# Patient Record
Sex: FEMALE | Race: WHITE | NOT HISPANIC OR LATINO | Employment: OTHER | URBAN - METROPOLITAN AREA
[De-identification: names, ages, dates, MRNs, and addresses within clinical notes are randomized per-mention and may not be internally consistent; named-entity substitution may affect disease eponyms.]

---

## 2017-01-11 ENCOUNTER — TRANSFERRED RECORDS (OUTPATIENT)
Dept: HEALTH INFORMATION MANAGEMENT | Facility: CLINIC | Age: 72
End: 2017-01-11

## 2017-01-17 ENCOUNTER — CARE COORDINATION (OUTPATIENT)
Dept: CARE COORDINATION | Facility: CLINIC | Age: 72
End: 2017-01-17

## 2017-01-17 NOTE — PROGRESS NOTES
Clinic Care Coordination Contact - Social Work - Follow-Up - 1-24-17  [facility]/Voicemail    Referral Source: Care Team  Clinical Data: SW/Care Coordinator Outreach to follow up to pt's dtr-in-law, Allison Reyes, regarding getting pt's home cleaned up, hoarding and counseling resources, transportation  Outreach attempted x2.  Left message on voicemail with call back information and requested return call.  Plan: SW/Care Coordinator will follow up with dtr-in-law in 3-5 days if she does not call SW back by then    AKIRA Bradford  Care Coordinator - Social Work  [facility]  Office:  [phone]  1/24/2017 2:36 PM    Clinic Care Coordination Contact - Social Work - Follow-Up - 1-17-17  [facility]/Voicemail    Referral Source: Care Team  Clinical Data: SW/Care Coordinator Outreach to follow up to pt's dtr-in-law, Allison Reyes, regarding getting pt's home cleaned up, hoarding and counseling resources, transportation  Outreach attempted x1.  Left message on voicemail with call back information and requested return call.  Plan: SW/Care Coordinator will follow up with dtr-in-law in 3-5 days if she does not call SW back by then    AKIRA Bradford  Care Coordinator - [facility] Work  [facility]  Office:  [phone]  1/17/2017 12:03 PM

## 2017-01-19 ENCOUNTER — TRANSFERRED RECORDS (OUTPATIENT)
Dept: HEALTH INFORMATION MANAGEMENT | Facility: CLINIC | Age: 72
End: 2017-01-19

## 2017-01-26 ENCOUNTER — TELEPHONE (OUTPATIENT)
Dept: FAMILY MEDICINE | Facility: CLINIC | Age: 72
End: 2017-01-26

## 2017-01-26 NOTE — TELEPHONE ENCOUNTER
Patient called regarding (reason for call): Patient has short term disability termed out for her stroke. Needs to see PCP. Is currently scheduled for Fri 2/3/17-would like to be seen sooner if possible to start process to renew claim. An appointment later in the day is ideal.  Is this regarding a medication?: na  If yes, which medication?: na  Pt Provider?: Dr. Moreno  Phone Number Pt can be reached at: Call daughter in law Charles [phone]  Best Time: any  Can we leave a detailed message on this number?: yes    Central Scheduling  Karuna STALEY

## 2017-01-27 NOTE — TELEPHONE ENCOUNTER
Please schedule Wed afternoon, 2/3. Would like 40 min for her. Ok to use same day to accomplish this if needed

## 2017-01-27 NOTE — TELEPHONE ENCOUNTER
Called and spoke with patient's daughter in law per her request.   Family is still concerned about home safety- patient is a hoarder, has had a rodent issue that family helped her get rid of. Daughter in law notes she frequently burns things as she will fall asleep with things on the stove.    Family also concerned about intake. Think eating less than 700# david/day.think she is overall dehydrated.  Has lost over 70# in the past year. GI w/u was benign and feels due to her memory concerns.   Family also concerned by the enormous number of supplements she is taking. Patient Was told by GI to stop cleanse med but doesn't think she has done this.   Also very anxious ongoing.     Daughter will also call SW to review.     Will review possible home eval at visit (unclear if patient is truly home bound, does not drive or take public transportation), nutritionist eval, consider restart anti-depressant at visit next week.

## 2017-01-27 NOTE — TELEPHONE ENCOUNTER
Pt scheduled 2/1/17.  Pt daughter in-law would like a call back from Dr. Pemberton, she would not state what this is regarding, just that she has something to discuss with her before pts appt.    Diane BRASHER, Patient Care

## 2017-02-01 ENCOUNTER — TELEPHONE (OUTPATIENT)
Dept: FAMILY MEDICINE | Facility: CLINIC | Age: 72
End: 2017-02-01

## 2017-02-01 ENCOUNTER — OFFICE VISIT (OUTPATIENT)
Dept: FAMILY MEDICINE | Facility: CLINIC | Age: 72
End: 2017-02-01
Payer: COMMERCIAL

## 2017-02-01 VITALS
WEIGHT: 145.7 LBS | BODY MASS INDEX: 28.61 KG/M2 | SYSTOLIC BLOOD PRESSURE: 136 MMHG | HEIGHT: 60 IN | OXYGEN SATURATION: 97 % | RESPIRATION RATE: 16 BRPM | DIASTOLIC BLOOD PRESSURE: 76 MMHG | HEART RATE: 84 BPM | TEMPERATURE: 97.6 F

## 2017-02-01 DIAGNOSIS — R41.9 COGNITIVE COMPLAINTS: ICD-10-CM

## 2017-02-01 DIAGNOSIS — N30.10 INTERSTITIAL CYSTITIS: ICD-10-CM

## 2017-02-01 DIAGNOSIS — R63.4 LOSS OF WEIGHT: ICD-10-CM

## 2017-02-01 DIAGNOSIS — I10 HYPERTENSION GOAL BP (BLOOD PRESSURE) < 130/80: ICD-10-CM

## 2017-02-01 DIAGNOSIS — R73.01 ELEVATED FASTING BLOOD SUGAR: ICD-10-CM

## 2017-02-01 DIAGNOSIS — I63.9 CEREBROVASCULAR ACCIDENT (CVA), UNSPECIFIED MECHANISM (H): Primary | ICD-10-CM

## 2017-02-01 DIAGNOSIS — N18.2 CKD (CHRONIC KIDNEY DISEASE) STAGE 2, GFR 60-89 ML/MIN: ICD-10-CM

## 2017-02-01 PROCEDURE — 99214 OFFICE O/P EST MOD 30 MIN: CPT | Performed by: FAMILY MEDICINE

## 2017-02-01 ASSESSMENT — PAIN SCALES - GENERAL: PAINLEVEL: NO PAIN (0)

## 2017-02-01 NOTE — MR AVS SNAPSHOT
After Visit Summary   2/1/2017    Anabelle Herbert    MRN: [id number]           Patient Information     Date Of Birth          1945        Visit Information        Provider Department      2/1/2017 1:20 PM Jyoti Moreno MD [facility]        Today's Diagnoses     Cerebrovascular accident (CVA), unspecified mechanism (H)    -  1     Interstitial cystitis         Elevated fasting blood sugar         Loss of weight         Cognitive complaints         CKD (chronic kidney disease) stage 2, GFR 60-89 ml/min         Hypertension goal BP (blood pressure) < 130/80           Care Instructions    Follow up with nutritionist.     Stop Organic Juice cleanse, Ceraplex, sweetMicroZ, Sweet Osei, doctor's total nutrition,  B-12 energy melt, Procera AVH,     Continue Youngevity Beyond Osteo-fx, Youngevity ultimate EFA, perfect multivitamin.     Follow up with home care. They will process the referral and someone will call you to arrange the initial visit.      Follow up in 3-4 weeks.         Follow-ups after your visit        Additional Services     HOME CARE NURSING REFERRAL       **Order classes of: [facility], [facility] and [facility] will route to the Home Care and Hospice Referral Pool.  Home Care or Hospice will then contact the patient to schedule their appointment.**    If you do not hear from Home Care and Hospice, or you would like to call to schedule, please call the referring place of service that your provider has listed below.  ______________________________________________________________________    Your provider has referred you to: FMG: [facility] [place] [phone]   [email or web address]    Extended Emergency Contact Information  Primary Emergency Contact: ATIYA SALAS   [employer]  Work Phone: [phone]  Mobile Phone: [phone]  Relation: Daughter  Secondary Emergency Contact: Ratna Herbert    [place]  Home Phone: [phone]  Relation: Daughter    Patient Anticipated Discharge Date: n/a. Not been hospitalized   RN, PT, HHA to begin 24 - 48 hours after discharge.  PLEASE EVALUATE AND TREAT (Evaluation timeline is 24 - 48 hrs. Please call if there is need for a variance to this timeline).    REASON FOR REFERRAL: Assessment & Treatment: RN    ADDITIONAL SERVICES NEEDED: HHA, Life Line and OT    OTHER PERTINENT INFORMATION: Patient was last seen by provider on 2/1/2017  for CVA, memory loss, weight loss.    Current Outpatient Prescriptions:  clopidogrel (PLAVIX) 75 MG tablet, Take 1 tablet (75 mg) by mouth daily, Disp: 90 tablet, Rfl: 1  simvastatin (ZOCOR) 20 MG tablet, TAKE 1 TABLET BY MOUTH AT BEDTIME, Disp: 90 tablet, Rfl: 2  amLODIPine (NORVASC) 5 MG tablet, TAKE 1 TABLET (5 MG) BY MOUTH DAILY, Disp: 90 tablet, Rfl: 1  lisinopril (PRINIVIL,ZESTRIL) 20 MG tablet, Take 1 tablet (20 mg) by mouth daily, Disp: 90 tablet, Rfl: 3  pantoprazole (PROTONIX) 40 MG enteric coated tablet, TAKE 1 TABLET BY MOUTH DAILY. TAKE 30-60 MINUTES BEFORE A MEAL., Disp: 90 tablet, Rfl: 3  prednisoLONE acetate (PRED FORTE) 1 % ophthalmic suspension, , Disp: , Rfl:   simvastatin (ZOCOR) 20 MG tablet, TAKE 1 TABLET (20 MG) BY MOUTH AT BEDTIME, Disp: 30 tablet, Rfl: 0  fluticasone (FLONASE) 50 MCG/ACT nasal spray, INSTILL 1-2 SPRAYS INTO BOTH NOSTRILS DAILY, Disp: 3 Package, Rfl: 3  Calcium Polycarbophil (KONSYL FIBER PO), Take by mouth 2 times daily, Disp: , Rfl:   traZODone (DESYREL) 50 MG tablet, Take 1-2 tablets ( mg) by mouth nightly as needed for sleep, Disp: 180 tablet, Rfl: 1  Fish Oil OIL, daily, Disp: , Rfl:   MAGNESIUM CITRATE PO, Take by mouth daily, Disp: , Rfl:   Polyethylene Glycol 3350 (MIRALAX PO), Take 1-2 capfuls by mouth as needed. , Disp: , Rfl:   ASPIRIN PO, Take 81 mg by mouth., Disp: , Rfl:   Cholecalciferol (VITAMIN D) 2000 UNIT tablet, Take 2,000 Units by mouth daily., Disp: 100 tablet, Rfl:  0  cetirizine (ZYRTEC ALLERGY) 10 MG tablet, Take 10 mg by mouth daily as needed., Disp: , Rfl:   co-enzyme Q-10 (COQ-10) 100 MG CAPS, Take 1 capsule by mouth daily., Disp: , Rfl:       Patient Active Problem List:     Chronic interstitial cystitis     Adjustment reaction with anxiety and depression     Abdominal pain     Rotator cuff syndrome     Hypertension goal BP (blood pressure) < 130/80     CKD (chronic kidney disease) stage 2, GFR 60-89 ml/min     Hyperlipidemia LDL goal <100     Muscle cramps     Mild persistent asthma     Numbness and tingling     Vitamin D deficiency     Abnormal MRI of head     GERD (gastroesophageal reflux disease)     Advance care planning     B12 deficiency     Stroke (H)     Transient cerebral ischemia     Carotid stenosis     Diverticulosis of colon     Colon polyp     Urgency of urination     Atrophic vaginitis     Bladder pain     Interstitial cystitis     Chronic constipation     Cognitive complaints     Elevated fasting blood sugar     Prediabetes     Osteoporosis     Cerebrovascular accident (CVA), unspecified mechanism (H)     Health Care Home      Documentation of Face to Face and Certification for Home Health Services    I certify that patient, Anabelle Herbert is under my care and that I, or a Nurse Practitioner or Physician's Assistant working with me, had a face-to-face encounter that meets the physician face-to-face encounter requirements with this patient on: 2/1/2017.    This encounter with the patient was in whole, or in part, for the following medical condition, which is the primary reason for Home Health Care: yes, CVA, cognitive concerns, hoarding, home safety.    I certify that, based on my findings, the following services are medically necessary Home Health Services: Nursing and Occupational Therapy    My clinical findings support the need for the above services because: Nurse is needed: To provide assessment and oversight required in the home to assure adherence  to the medical plan due to: CVA.. and Occupational Therapy Services are needed to assess and treat cognitive ability and address ADL safety due to impairment in CVA, impaired memory, vision..    Further, I certify that my clinical findings support that this patient is homebound (i.e. absences from home require considerable and taxing effort and are for medical reasons or [employer] services or infrequently or of short duration when for other reasons) because: unable to leave the house without assistance.    Based on the above findings, I certify that this patient is confined to the home and needs intermittent skilled nursing care, physical therapy and/or speech therapy.  The patient is under my care, and I have initiated the establishment of the plan of care.  This patient will be followed by a physician who will periodically review the plan of care.    Physician/Provider to provide follow up care: Jyoti Moreno    [facility] certified Physician at time of discharge: Jyoti Moreno MD      Please be aware that coverage of these services is subject to the terms and limitations of your health insurance plan.  Call member services at your health plan with any benefit or coverage questions.            NUTRITION REFERRAL       Your provider has referred you to: FMG: [facility] [phone]   [email or web address]    Please be aware that coverage of these services is subject to the terms and limitations of your health insurance plan.  Call member services at your health plan with any benefit or coverage questions.      Please bring the following with you to your appointment:    (1) This referral request  (2) Any documents given to you regarding this referral  (3) Any specific questions you have about diet and/or food choices                  Who to contact     If you have questions or need follow up information about today's clinic  "visit or your schedule please contact [facility] directly at [phone].  Normal or non-critical lab and imaging results will be communicated to you by MyChart, letter or phone within 4 business days after the clinic has received the results. If you do not hear from us within 7 days, please contact the clinic through [employer] or phone. If you have a critical or abnormal lab result, we will notify you by phone as soon as possible.  Submit refill requests through [employer] or call your pharmacy and they will forward the refill request to us. Please allow 3 business days for your refill to be completed.          Additional Information About Your Visit        MyChart Information     [employer] lets you send messages to your doctor, view your test results, renew your prescriptions, schedule appointments and more. To sign up, go to [email or web address] . Click on \"Log in\" on the left side of the screen, which will take you to the Welcome page. Then click on \"Sign up Now\" on the right side of the page.     You will be asked to enter the access code listed below, as well as some personal information. Please follow the directions to create your username and password.     Your access code is: [email or web address]  Expires: 2017  2:44 PM     Your access code will  in 90 days. If you need help or a new code, please call your [facility] clinic or [phone].        Care EveryWhere ID     This is your Care EveryWhere ID. This could be used by other organizations to access your [facility] medical records  [id number]        Your Vitals Were     Pulse Temperature Respirations Height BMI (Body Mass Index) Pulse Oximetry    84 97.6  F (36.4  C) (Oral) 16 1.511 m (4' 11.5\") 28.95 kg/m2 97%       Blood Pressure from Last 3 Encounters:   17 136/76   16 102/58   10/31/16 120/80    Weight from Last 3 Encounters:   17 66.089 kg (145 lb 11.2 oz)   16 68.493 kg (151 lb)   10/31/16 68.947 kg (152 lb)    "           We Performed the Following     HOME CARE NURSING REFERRAL     NUTRITION REFERRAL        Primary Care Provider Office Phone # Fax #    Jyoti Moreno [phone]       [facility] [address] [place] [address] N  [facility] [id number]        Thank you!     Thank you for choosing [facility]  for your care. Our goal is always to provide you with excellent care. Hearing back from our patients is one way we can continue to improve our services. Please take a few minutes to complete the written survey that you may receive in the mail after your visit with us. Thank you!             Your Updated Medication List - Protect others around you: Learn how to safely use, store and throw away your medicines at www.disposemymeds.org.          This list is accurate as of: 2/1/17  2:44 PM.  Always use your most recent med list.                   Brand Name Dispense Instructions for use    amLODIPine 5 MG tablet    NORVASC    90 tablet    TAKE 1 TABLET (5 MG) BY MOUTH DAILY       ASPIRIN PO      Take 81 mg by mouth.       clopidogrel 75 MG tablet    PLAVIX    90 tablet    Take 1 tablet (75 mg) by mouth daily       co-enzyme Q-10 100 MG Caps capsule      Take 1 capsule by mouth daily.       Fish Oil Oil      daily       fluticasone 50 MCG/ACT spray    FLONASE    3 Package    INSTILL 1-2 SPRAYS INTO BOTH NOSTRILS DAILY       KONSYL FIBER PO      Take by mouth 2 times daily       lisinopril 20 MG tablet    PRINIVIL/ZESTRIL    90 tablet    Take 1 tablet (20 mg) by mouth daily       MAGNESIUM CITRATE PO      Take by mouth daily       MIRALAX PO      Take 1-2 capfuls by mouth as needed.       pantoprazole 40 MG EC tablet    PROTONIX    90 tablet    TAKE 1 TABLET BY MOUTH DAILY. TAKE 30-60 MINUTES BEFORE A MEAL.       prednisoLONE acetate 1 % ophthalmic susp    PRED FORTE         * simvastatin 20 MG tablet    ZOCOR    30 tablet    TAKE 1 TABLET (20 MG) BY MOUTH AT BEDTIME       * simvastatin  20 MG tablet    ZOCOR    90 tablet    TAKE 1 TABLET BY MOUTH AT BEDTIME       traZODone 50 MG tablet    DESYREL    180 tablet    Take 1-2 tablets ( mg) by mouth nightly as needed for sleep       vitamin D 2000 UNITS tablet     100 tablet    Take 2,000 Units by mouth daily.       ZYRTEC ALLERGY 10 MG tablet   Generic drug:  cetirizine      Take 10 mg by mouth daily as needed.       * Notice:  This list has 2 medication(s) that are the same as other medications prescribed for you. Read the directions carefully, and ask your doctor or other care provider to review them with you.

## 2017-02-01 NOTE — TELEPHONE ENCOUNTER
First and Last name caller: ATIYA SALAS  Relationship to patient: DAUGHTER IN LAW  Reason for call: [employer] stated that [employer] stated they faxed short term disability forms that need to be filled out and is requesting after visit summary's for the month of December 2016 to present to be attached with the forms.   Provider they see: KRISTIN ROTH  Phone number they can be reached at: ATIYA CAN BE REACHED AT [phone]  Ok to leave a message: YES     Mateo Peterson, Patient Rep  [facility]

## 2017-02-01 NOTE — PATIENT INSTRUCTIONS
Follow up with nutritionist.     Stop Organic Juice cleanse, Ceraplex, sweetMicroZ, Sweet Osei, doctor's total nutrition,  B-12 energy melt, Procera AVH,     Continue Youngevity Beyond Osteo-fx, Youngevity ultimate EFA, perfect multivitamin.     Follow up with home care. They will process the referral and someone will call you to arrange the initial visit.      Follow up in 3-4 weeks.

## 2017-02-01 NOTE — NURSING NOTE
"Chief Complaint   Patient presents with     Hospital F/U       Initial /76 mmHg  Pulse 84  Temp(Src) 97.6  F (36.4  C) (Oral)  Resp 16  Ht 1.511 m (4' 11.5\")  Wt 66.089 kg (145 lb 11.2 oz)  BMI 28.95 kg/m2  SpO2 97% Estimated body mass index is 28.95 kg/(m^2) as calculated from the following:    Height as of this encounter: 1.511 m (4' 11.5\").    Weight as of this encounter: 66.089 kg (145 lb 11.2 oz).  BP completed using cuff size: lynette Javier MA      "

## 2017-02-01 NOTE — PROGRESS NOTES
"  SUBJECTIVE:                                                    Anabelle Herbert is a 71 year old female who presents to clinic today for the following health issues:    1. Follow up OV 11/21/16   - CVA - occurred last in Sept, 2016    Hyperlipidemia Follow-Up - pt is requesting a Nutritionist      Rate your low fat/cholesterol diet?: fair    Taking statin?  Yes, no muscle aches from statin    Other lipid medications/supplements?:  none     Hypertension Follow-up      Outpatient blood pressures are not being checked.    Low Salt Diet: no added salt       Amount of exercise or physical activity: None    Problems taking medications regularly: No    Medication side effects: none    Diet: regular (no restrictions)        Problem list and histories reviewed & adjusted, as indicated.  Additional history: as documented    Patient had CVA this fall with visual field cut and poor balance. Her vision trouble has still persisted since 11/21. She is no longer seeing bottlecaps or waves in her vision but still is not able to see out of her peripheral vision. She is not driving but has a [facility] appt.on March 1st for this    Denies headache. Notes a feeling of weakness in her arms and legs is intermittent.She notices some imbalance with her gait only at night. Her balance is fine during the day. She will have pain in the bottom of her feet when she walks.     She states her memory is \"Discouraging\". She will be talking and want to say something but the words won't come out. She is also unable to remember things in the recent past and then it will come back after she has stopped thinking about it. She had been forgetting things on the stove. Her daughter in law notes that recently she has stopped using the oven/stove due to forgetting things in there. They are focusing more on eating fresh foods and using the microwave.     She has put a lot of her bills on autopay to make managing her finances easier. She has not missed " "any payments. Her daughter in law has helped her develop a filing stystem at home.      She state she is \"excited about her life change\" with turning 72 in April and rearranging her house. She has returned to her own house since last week and notes no concerns. Her daughter in law states she has made great improvements regarding the organization of her home and improving safety such as clearing entryways. Patient has hx of hoarding that has been noted in past encounters. She reports her spirits have been high and does not thinks she needs Prozac. Per past phone encounter, there is concern of anxiety that family has noted    Anabelle is setting up her own medication by organizing medicine by days of the week. She will occasionally miss her meds at night but this is rare.    She has finished OT and PT therapy and they referred her back to her PCP. Anabelle and her daughter-in-law felt that the therapy was more task-focused and did not work on her memory or focus on teaching-.Her daughter-in-law also states there was no f/u with OT and PT. Anabelle would like to f/u with neurology for cognitive testing.      They are looking for a behavioral counselor and Anabelle would like to connect with a nutritionist with home care. Anabelle has lost a lot of weight and states she is not eating much. Anabelle reports that with her interstitial cystis she would like to also work with a nutritionist to learn more about natural foods. Per GI w/u, weight loss was felt to be secondary to her memory loss/concerns.     Additional Notes  -Anabelle also has a bag with her non prescribed medications/supplement. She has an herbal cleanse supplement which her daughter is concerned about as she is already taking Miralax and fiber.   - She is considering the Neomobile Dial-a-Ride for transpotation.   -Daughter-in-law states there have only been a few instances that her blood pressure has been low. Anabelle denies lightheadedness or dizziness.       Patient Active Problem List "   Diagnosis     Chronic interstitial cystitis     Adjustment reaction with anxiety and depression     Abdominal pain     Rotator cuff syndrome     Hypertension goal BP (blood pressure) < 130/80     CKD (chronic kidney disease) stage 2, GFR 60-89 ml/min     Hyperlipidemia LDL goal <100     Muscle cramps     Mild persistent asthma     Numbness and tingling     Vitamin D deficiency     Abnormal MRI of head     GERD (gastroesophageal reflux disease)     Advance care planning     B12 deficiency     Stroke (H)     Transient cerebral ischemia     Carotid stenosis     Diverticulosis of colon     Colon polyp     Urgency of urination     Atrophic vaginitis     Bladder pain     Interstitial cystitis     Chronic constipation     Cognitive complaints     Elevated fasting blood sugar     Prediabetes     Osteoporosis     Cerebrovascular accident (CVA), unspecified mechanism (H)     Health Care Home     Past Surgical History   Procedure Laterality Date     No history of surgery       Colonoscopy       Genitourinary surgery       Cystoscopy, inject collagen, combined  1/6/2014     Procedure: COMBINED CYSTOSCOPY, INJECT BULKING AGENT;  IC cocktail, bladder biopsy, fulguration, heparin, gentamyacin, lidocaine & kenalog;  Surgeon: Vandana Tang MD;  Location: [facility] OR     Cystoscopy, biopsy bladder, combined  1/6/2014     Procedure: COMBINED CYSTOSCOPY, BIOPSY BLADDER;;  Surgeon: Vandana Tang MD;  Location: [facility] OR       Social History   Substance Use Topics     Smoking status: Never Smoker      Smokeless tobacco: Never Used     Alcohol Use: No     Family History   Problem Relation Age of Onset     CANCER Father      colon?     HEART DISEASE Father      Asthma Mother      Alzheimer Disease Mother 86     Unknown/Adopted Maternal Grandmother      Unknown/Adopted Maternal Grandfather      Unknown/Adopted Paternal Grandmother      Unknown/Adopted Paternal Grandfather      Asthma Sister      Allergies Sister      Unknown/Adopted Son   "        Current Outpatient Prescriptions   Medication Sig Dispense Refill     clopidogrel (PLAVIX) 75 MG tablet Take 1 tablet (75 mg) by mouth daily 90 tablet 1     simvastatin (ZOCOR) 20 MG tablet TAKE 1 TABLET BY MOUTH AT BEDTIME 90 tablet 2     amLODIPine (NORVASC) 5 MG tablet TAKE 1 TABLET (5 MG) BY MOUTH DAILY 90 tablet 1     lisinopril (PRINIVIL,ZESTRIL) 20 MG tablet Take 1 tablet (20 mg) by mouth daily 90 tablet 3     pantoprazole (PROTONIX) 40 MG enteric coated tablet TAKE 1 TABLET BY MOUTH DAILY. TAKE 30-60 MINUTES BEFORE A MEAL. 90 tablet 3     prednisoLONE acetate (PRED FORTE) 1 % ophthalmic suspension        simvastatin (ZOCOR) 20 MG tablet TAKE 1 TABLET (20 MG) BY MOUTH AT BEDTIME 30 tablet 0     fluticasone (FLONASE) 50 MCG/ACT nasal spray INSTILL 1-2 SPRAYS INTO BOTH NOSTRILS DAILY 3 Package 3     Calcium Polycarbophil (KONSYL FIBER PO) Take by mouth 2 times daily       traZODone (DESYREL) 50 MG tablet Take 1-2 tablets ( mg) by mouth nightly as needed for sleep 180 tablet 1     Fish Oil OIL daily       MAGNESIUM CITRATE PO Take by mouth daily       Polyethylene Glycol 3350 (MIRALAX PO) Take 1-2 capfuls by mouth as needed.        ASPIRIN PO Take 81 mg by mouth.       Cholecalciferol (VITAMIN D) 2000 UNIT tablet Take 2,000 Units by mouth daily. 100 tablet 0     cetirizine (ZYRTEC ALLERGY) 10 MG tablet Take 10 mg by mouth daily as needed.       co-enzyme Q-10 (COQ-10) 100 MG CAPS Take 1 capsule by mouth daily.       Allergies   Allergen Reactions     Contrast Dye      Burning, hematuria     Dogs      Throat started to close up.        ROS:  Constitutional, HEENT, cardiovascular, pulmonary, gi and gu systems are negative, except as otherwise noted.    OBJECTIVE:                                                    /76 mmHg  Pulse 84  Temp(Src) 97.6  F (36.4  C) (Oral)  Resp 16  Ht 1.511 m (4' 11.5\")  Wt 66.089 kg (145 lb 11.2 oz)  BMI 28.95 kg/m2  SpO2 97%  Body mass index is 28.95 " kg/(m^2).  GENERAL: healthy, alert and no distress  EYES: Eyes grossly normal to inspection, PERRL and conjunctivae and sclerae normal  HENT: ear canals and TM's normal, nose and mouth without ulcers or lesions  NECK: no adenopathy, no asymmetry, masses, or scars and thyroid normal to palpation  RESP: lungs clear to auscultation - no rales, rhonchi or wheezes  CV: regular rate and rhythm, normal S1 S2, no S3 or S4, no murmur, click or rub, no peripheral edema and peripheral pulses strong  NEURO: Normal strength and tone, sensory exam grossly normal, mentation intact and cranial nerves 2-12 intact. Gait is normal. Patient is not always the best historian, slow in reporting her history, her daughter in law fills in information.   PSYCH: mentation appears baseline, affect normal/bright    Diagnostic Test Results:  none      ASSESSMENT/PLAN:                                                      1. Cerebrovascular accident (CVA), unspecified mechanism (H)  Pt is now living at her own home for the last week. Family feels she has been doing okay so far as they have been able to clear the exits to the home and she is not using the range. Given the home safety concerns, will have home care eval patient in her home. Possibly home OT also.  Reviewed importance of safety and benefits of home health aide to assist with daily tasks and safety inspection. F/u 3-4 weeks for check-in.   - HOME CARE NURSING REFERRAL  - NUTRITION REFERRAL    2. Interstitial cystitis  Reviewed benefits of working with nutritionist. F/u for nutrition consultation and discuss urologist notes with nutritionist.   - NUTRITION REFERRAL    3. Elevated fasting blood sugar  Monitor prn.     4. Loss of weight  Suspect mulitfactorial- ? Intentional, anxiety, FTT/cva. Will have her meet with nutrition. Consider daily ensure . Instructed her to d/c various supplements as listed in pt instructions below. Also f/u with nutritionist as above.   - NUTRITION  REFERRAL    5. Cognitive complaints  Reviewed benefits of home health aide. Pt willing to trial this. Instructed her to d/c various supplements as listed in instruction below. [facility] visit scheduled for tomorrow. Consider f/u with neuro and neuropsych testing. I'm not convinced that anxieyt not playing role with some of her memory issues again. Might benefit from prozac again. She declines today  - HOME CARE NURSING REFERRAL    6. CKD (chronic kidney disease) stage 2, GFR 60-89 ml/min  - NUTRITION REFERRAL    7. Hypertension goal BP (blood pressure) < 130/80  At goal, considering dropping htn meds if orthostatic sx's arise      See Patient Instructions  Patient Instructions   Follow up with nutritionist.     Stop Organic Juice cleanse, Ceraplex, sweetMicroZ, Sweet Soei, doctor's total nutrition,  B-12 energy melt, Procera AVH,     Continue Youngevity Beyond Osteo-fx, Youngevity ultimate EFA, perfect multivitamin.     Follow up with home care. They will process the referral and someone will call you to arrange the initial visit.      Follow up in 3-4 weeks.       Total time spent with patient is 50 min with over 50% counseling regarding above information     This document serves as a record of the services and decisions personally performed and made by Jyoti Moreno MD. It was created on her behalf by Ilda Jean,a trained medical scribe. The creation of this document is based the provider's statements to the medical scribe.  Ilda Jean February 1, 2017 2:20 PM     Jyoti Moreno MD  [facility]

## 2017-02-02 ENCOUNTER — TELEPHONE (OUTPATIENT)
Dept: FAMILY MEDICINE | Facility: CLINIC | Age: 72
End: 2017-02-02

## 2017-02-02 ASSESSMENT — ASTHMA QUESTIONNAIRES: ACT_TOTALSCORE: 25

## 2017-02-02 NOTE — TELEPHONE ENCOUNTER
Left message asking pt to call back - also to resend forms    Asked her to either call back to clarify what is being needed attached, or write these down on the forms that will be resent.    Diane - please watch for these    Will Yaya SORENSEN

## 2017-02-02 NOTE — TELEPHONE ENCOUNTER
Reason for Call:  Home Health Care    Pt wants to delay start of care until 2-10    Pt Provider: Josh    Phone Number Homecare Nurse can be reached at: [phone]    Can we leave a detailed message on this number? NO    Best Time: any    Call taken on 2/2/2017 at 11:59 AM by Tania Tam

## 2017-02-02 NOTE — TELEPHONE ENCOUNTER
Dr. Pemberton    Do you have these forms? I have not seen them.    Thank you,    Diane BRASHER, Patient Care

## 2017-02-02 NOTE — TELEPHONE ENCOUNTER
I did not have these forms as of yesterday. Not sure if anything new has been placed on my desk today.      can you clarify what they are wanting from [facility]. Patient was not even seen in Dec.

## 2017-02-06 NOTE — TELEPHONE ENCOUNTER
Left message again for marisol to call back/resend forms.    Please watch for these    Will Yaya SORENSEN

## 2017-02-06 NOTE — TELEPHONE ENCOUNTER
Daughter Charles calling back. She was asking if forms have been received. I gave her information below and stated we have not seen them. I She Spoke with short term disability and she stated she faxed form and got a confirmation that it was received. I asked her to please call them and have them send again as we do not have them.  She is also requesting that all visits and notes from Dec-now be sent to [phone] claim # [id number]. Explained that pt was not seen in Dec. She said she was seen by OT and PT and needs those notes sent as well as notes from 2/1/17. Asking to have these sent ASAP with or without form so they can get a start on this.    Routing to PCP to advise.    Cristy Goodwin CMA

## 2017-02-06 NOTE — TELEPHONE ENCOUNTER
Ok for records to be sent (? Do we send this to [facility] to complete??)  Will fill out forms once received

## 2017-02-07 ENCOUNTER — MEDICAL CORRESPONDENCE (OUTPATIENT)
Dept: HEALTH INFORMATION MANAGEMENT | Facility: CLINIC | Age: 72
End: 2017-02-07

## 2017-02-07 NOTE — TELEPHONE ENCOUNTER
I do not see any OT or PT records in pts chart.    Please advise.    Diane BRASHER, Patient Care

## 2017-02-07 NOTE — TELEPHONE ENCOUNTER
Look under notes section in Epic- look for author type (occupational therapist and physical therapist)

## 2017-02-08 ENCOUNTER — TELEPHONE (OUTPATIENT)
Dept: FAMILY MEDICINE | Facility: CLINIC | Age: 72
End: 2017-02-08

## 2017-02-10 ENCOUNTER — TELEPHONE (OUTPATIENT)
Dept: FAMILY MEDICINE | Facility: CLINIC | Age: 72
End: 2017-02-10

## 2017-02-10 NOTE — TELEPHONE ENCOUNTER
Reason for call: Order   Order or referral being requested: admission to homecare  Reason for request: start of service  Date needed: as soon as possible  Has the patient been seen by the PCP for this problem? YES    Additional comments: leave verbal if no answer      Phone Number: KARON Sadler (HOME CARE) [phone]    Best Time: any  Can we leave a detailed message on this number? YES

## 2017-02-13 ENCOUNTER — TELEPHONE (OUTPATIENT)
Dept: FAMILY MEDICINE | Facility: CLINIC | Age: 72
End: 2017-02-13

## 2017-02-13 NOTE — TELEPHONE ENCOUNTER
Reason for call: Home Healthcare Reason for Call:  Home Health Care    nida with  Homecare called regarding (reason for call): verbal orders    Orders are needed for this patient. PT    PT: one time a week for 2 weeks  For gait training- home exercise program- fall prevention plan    OT: [facility]    Skilled Nursing: [facility]    Pt Provider: Josh    Phone Number Homecare Nurse can be reached at:   KARON Richard (HOME CARE) [phone]         Can we leave a detailed message on this number? YES        Best Time: any    Call taken on 2/13/2017 at 3:07 PM by Ibis Samuel

## 2017-02-15 ENCOUNTER — DOCUMENTATION ONLY (OUTPATIENT)
Dept: CARE COORDINATION | Facility: CLINIC | Age: 72
End: 2017-02-15

## 2017-02-16 ENCOUNTER — TELEPHONE (OUTPATIENT)
Dept: FAMILY MEDICINE | Facility: CLINIC | Age: 72
End: 2017-02-16

## 2017-02-16 NOTE — PROGRESS NOTES
[facility] now requests orders and shares plan of care/discharge summaries for some patients through [id number].  Please REPLY TO THIS MESSAGE in order to give authorization for orders when needed.  This is considered a verbal order, you will still receive a faxed copy of orders for signature.  Thank you for your assistance in improving collaboration for our patients.    ORDER    ST to treat [id number], [id number] for cognitive linguistic skills. Plan also to include monitoring of meds, pain, skin, and falls risk.    GOALS  To improve communication and cognition for safety in the home and community, using both structured therapy materials and functional daily materials, by 3/31/17, the patient will  1. state/utilize 3 strategies to compensate for memory with minimal cues   2. complete mod to complex memory tasks w/ 90 percent acc and min cues   3. complete mod to complex reasoning/problem solving tasks with 90 percent acc and min cues   4. complete high level word finding tasks w/ 90 percent acc and min cues  5. demo use of word finding comp strats in appropriate situations w/ 80 percent acc  6. demo completion of HEP as directed in 80 percent of opps given min cues      Ami Robbins MA, CCC-SLP  Speech-Language Pathologist  [facility]  [email or web address]  [phone]

## 2017-02-16 NOTE — TELEPHONE ENCOUNTER
Reason for Call: Request for an order or referral: VERBAL ORDERS    Order or referral being requested: Continue OT for next week 3 x a week, the week after for 2 x a week.    Date needed: as soon as possible    Has the patient been seen by the PCP for this problem? YES    Additional comments:     Phone number Patient can be reached at:  Other phone number:     [facility] [phone]         Best Time:  any    Can we leave a detailed message on this number?  YES    Call taken on 2/16/2017 at 11:02 AM by Joyce Black

## 2017-02-20 ENCOUNTER — TELEPHONE (OUTPATIENT)
Dept: FAMILY MEDICINE | Facility: CLINIC | Age: 72
End: 2017-02-20

## 2017-02-21 ENCOUNTER — TELEPHONE (OUTPATIENT)
Dept: FAMILY MEDICINE | Facility: CLINIC | Age: 72
End: 2017-02-21

## 2017-02-21 DIAGNOSIS — I63.9 CEREBROVASCULAR ACCIDENT (CVA), UNSPECIFIED MECHANISM (H): Primary | ICD-10-CM

## 2017-02-21 DIAGNOSIS — N18.2 CKD (CHRONIC KIDNEY DISEASE) STAGE 2, GFR 60-89 ML/MIN: ICD-10-CM

## 2017-02-21 DIAGNOSIS — R73.03 PREDIABETES: ICD-10-CM

## 2017-02-21 DIAGNOSIS — R41.9 COGNITIVE COMPLAINTS: ICD-10-CM

## 2017-02-21 DIAGNOSIS — R63.4 LOSS OF WEIGHT: ICD-10-CM

## 2017-02-21 NOTE — TELEPHONE ENCOUNTER
Spoke with daughter in law and informed her of message below. She said they will stop in a little early to get the labs done before. Informed her no orders in but lab could just draw and pur blood on hold until orders are placed. Did you want to put in the orders now?       Alessandra Fuller MA

## 2017-02-21 NOTE — TELEPHONE ENCOUNTER
Reason for Call:  Other call back    Detailed comments: Daughter in law wanted to know if Pt should come in fasting or not for tomorrow's apt.     Phone Number Patient can be reached at: Other phone number:  [phone]     Best Time: Anytime    Can we leave a detailed message on this number? YES    Call taken on 2/21/2017 at 12:43 PM by Nghia Brown

## 2017-02-22 ENCOUNTER — MEDICAL CORRESPONDENCE (OUTPATIENT)
Dept: HEALTH INFORMATION MANAGEMENT | Facility: CLINIC | Age: 72
End: 2017-02-22

## 2017-02-22 ENCOUNTER — TELEPHONE (OUTPATIENT)
Dept: FAMILY MEDICINE | Facility: CLINIC | Age: 72
End: 2017-02-22

## 2017-02-22 ENCOUNTER — OFFICE VISIT (OUTPATIENT)
Dept: FAMILY MEDICINE | Facility: CLINIC | Age: 72
End: 2017-02-22
Payer: COMMERCIAL

## 2017-02-22 ENCOUNTER — DOCUMENTATION ONLY (OUTPATIENT)
Dept: CARE COORDINATION | Facility: CLINIC | Age: 72
End: 2017-02-22

## 2017-02-22 VITALS
WEIGHT: 147.2 LBS | BODY MASS INDEX: 29.23 KG/M2 | SYSTOLIC BLOOD PRESSURE: 114 MMHG | DIASTOLIC BLOOD PRESSURE: 76 MMHG | HEART RATE: 80 BPM | OXYGEN SATURATION: 95 % | TEMPERATURE: 98 F

## 2017-02-22 DIAGNOSIS — R41.9 COGNITIVE COMPLAINTS: ICD-10-CM

## 2017-02-22 DIAGNOSIS — I10 HYPERTENSION GOAL BP (BLOOD PRESSURE) < 130/80: ICD-10-CM

## 2017-02-22 DIAGNOSIS — N18.2 CKD (CHRONIC KIDNEY DISEASE) STAGE 2, GFR 60-89 ML/MIN: ICD-10-CM

## 2017-02-22 DIAGNOSIS — R63.4 LOSS OF WEIGHT: ICD-10-CM

## 2017-02-22 DIAGNOSIS — F43.23 ADJUSTMENT REACTION WITH ANXIETY AND DEPRESSION: ICD-10-CM

## 2017-02-22 DIAGNOSIS — R73.03 PREDIABETES: ICD-10-CM

## 2017-02-22 DIAGNOSIS — I95.1 ORTHOSTATIC HYPOTENSION: Primary | ICD-10-CM

## 2017-02-22 DIAGNOSIS — M79.671 FOOT PAIN, BILATERAL: ICD-10-CM

## 2017-02-22 DIAGNOSIS — I63.9 CEREBROVASCULAR ACCIDENT (CVA), UNSPECIFIED MECHANISM (H): ICD-10-CM

## 2017-02-22 DIAGNOSIS — M79.672 FOOT PAIN, BILATERAL: ICD-10-CM

## 2017-02-22 LAB
ERYTHROCYTE [DISTWIDTH] IN BLOOD BY AUTOMATED COUNT: 12.4 % (ref 10–15)
HCT VFR BLD AUTO: 37.1 % (ref 35–47)
HGB BLD-MCNC: 12.6 G/DL (ref 11.7–15.7)
MCH RBC QN AUTO: 32 PG (ref 26.5–33)
MCHC RBC AUTO-ENTMCNC: 34 G/DL (ref 31.5–36.5)
MCV RBC AUTO: 94 FL (ref 78–100)
PLATELET # BLD AUTO: 265 10E9/L (ref 150–450)
RBC # BLD AUTO: 3.94 10E12/L (ref 3.8–5.2)
WBC # BLD AUTO: 6.8 10E9/L (ref 4–11)

## 2017-02-22 PROCEDURE — 85027 COMPLETE CBC AUTOMATED: CPT | Performed by: FAMILY MEDICINE

## 2017-02-22 PROCEDURE — 80053 COMPREHEN METABOLIC PANEL: CPT | Performed by: FAMILY MEDICINE

## 2017-02-22 PROCEDURE — 99214 OFFICE O/P EST MOD 30 MIN: CPT | Performed by: FAMILY MEDICINE

## 2017-02-22 PROCEDURE — 36415 COLL VENOUS BLD VENIPUNCTURE: CPT | Performed by: FAMILY MEDICINE

## 2017-02-22 PROCEDURE — 82607 VITAMIN B-12: CPT | Performed by: FAMILY MEDICINE

## 2017-02-22 PROCEDURE — 84443 ASSAY THYROID STIM HORMONE: CPT | Performed by: FAMILY MEDICINE

## 2017-02-22 RX ORDER — ADHESIVE BANDAGE 3/4"
1 BANDAGE TOPICAL DAILY PRN
Qty: 1 EACH | Refills: 0 | Status: SHIPPED | OUTPATIENT
Start: 2017-02-22 | End: 2017-03-20

## 2017-02-22 NOTE — LETTER
[address]  [address]  [phone]    February 23, 2017      Anabelle Herbert  [address] [address]              Dear Anabelle,      It was a pleasure to see you in the office recently.    -Your vitamin B12 level continues to be high normal from your supplementation. Please continue to stay with your current supplements as we had reviewed in your past visit.     Your thyroid test was normal.     -Your blood count panel was normal.     -Nothing was seen to explain the hot and cold feelings. I was reviewing your chart after your visit, and see those symptoms were present this fall too. Perhaps the stroke has triggered some of this for you.     -The kidney function test (gfr) was slightly decreased from last check. I suspect that could be from not enough fluids and low blood pressure. Given this, I would like to make a small adjustment to your blood pressure medication. Please start taking 1/2 tablet of amlodipine daily instead of the full tablet you have been taking. Please keep me posted on your home blood pressure readings.     Please MyChart or call if you have any concerns or questions.       Sincerely,    Jyoti Moreno M.D.      Results for orders placed or performed in visit on 02/22/17   **TSH with free T4 reflex FUTURE anytime   Result Value Ref Range    TSH 2.72 0.40 - 4.00 mU/L   **Vitamin B12 FUTURE 2mo   Result Value Ref Range    Vitamin B12 1176 (H) 193 - 986 pg/mL   **Comprehensive metabolic panel FUTURE anytime   Result Value Ref Range    Sodium 140 133 - 144 mmol/L    Potassium 4.3 3.4 - 5.3 mmol/L    Chloride 106 94 - 109 mmol/L    Carbon Dioxide 30 20 - 32 mmol/L    Anion Gap 4 3 - 14 mmol/L    Glucose 89 70 - 99 mg/dL    Urea Nitrogen 29 7 - 30 mg/dL    Creatinine 0.94 0.52 - 1.04 mg/dL    GFR Estimate 59 (L) >60 mL/min/1.7m2    GFR Estimate If Black 71 >60 mL/min/1.7m2    Calcium 9.5 8.5 - 10.1 mg/dL    Bilirubin Total  0.5 0.2 - 1.3 mg/dL    Albumin 4.2 3.4 - 5.0 g/dL    Protein Total 7.7 6.8 - 8.8 g/dL    Alkaline Phosphatase 54 40 - 150 U/L    ALT 26 0 - 50 U/L    AST 27 0 - 45 U/L   **CBC with platelets FUTURE anytime   Result Value Ref Range    WBC 6.8 4.0 - 11.0 10e9/L    RBC Count 3.94 3.8 - 5.2 10e12/L    Hemoglobin 12.6 11.7 - 15.7 g/dL    Hematocrit 37.1 35.0 - 47.0 %    MCV 94 78 - 100 fl    MCH 32.0 26.5 - 33.0 pg    MCHC 34.0 31.5 - 36.5 g/dL    RDW 12.4 10.0 - 15.0 %    Platelet Count 265 150 - 450 10e9/L

## 2017-02-22 NOTE — MR AVS SNAPSHOT
After Visit Summary   2/22/2017    Anabelle Herbert    MRN: [id number]           Patient Information     Date Of Birth          1945        Visit Information        Provider Department      2/22/2017 6:20 PM Jyoti Moreno MD [facility]        Today's Diagnoses     Orthostatic hypotension    -  1    Cerebrovascular accident (CVA), unspecified mechanism (H)        Cognitive complaints        Hypertension goal BP (blood pressure) < 130/80          Care Instructions    Start recording water intake to make sure you are keeping fluid intake up.     Recheck low and high blood pressure readings. Compare your individual home readings with nurse's readings.     Look into nutrition services with home care.     Wear flat, cushioned shoes and ice the bottom of your feet to help with plantar fascitis.     Follow up in 4-6 weeks for check-in.                            Plantar Fasciitis   What is plantar fasciitis?   Plantar fasciitis is a painful inflammation of the bottom of the foot between the ball of the foot and the heel.   How does it occur?   There are several possible causes of plantar fasciitis, including:     wearing high heels     gaining weight     increased walking, standing, or stair-climbing   If you wear high-heeled shoes, including western-style boots, for long periods of time, the tough, tendonlike tissue of the bottom of your foot can become shorter. This layer of tissue is called fascia. Pain occurs when you stretch fascia that has shortened. This painful stretching might happen, for example, when you walk barefoot after getting out of bed in the morning.   If you gain weight, you might be more likely to have plantar fasciitis, especially if you walk a lot or  shoes with poor heel cushioning. Normally there is a pad of fatty tissue under your heel bone. Weight gain might break down this fat pad and cause heel pain.   Runners may get plantar fasciitis when  they change their workout and increase their mileage or frequency of workouts. It can also occur with a change in exercise surface or terrain, or if your shoes are worn out and don't provide enough cushion for your heels.   If the arches of your foot are abnormally high or low, you are more likely to develop plantar fasciitis than if your arches are normal.   What are the symptoms?   The main symptom of plantar fasciitis is heel pain when you walk. You may also feel pain when you stand and possibly even when you are resting. This pain typically occurs first thing in the morning after you get out of bed, when your foot is placed flat on the floor. The pain occurs because you are stretching the plantar fascia. The pain usually lessens with more walking, but you may have it again after periods of rest.   You may feel no pain when you are sleeping because the position of your feet during rest allows the fascia to shorten and relax.   How is it diagnosed?   Your healthcare provider will ask about your symptoms. He or she will ask if the bottom of your heel is tender and if you have pain when you stretch the bottom of your foot. An X-ray of your heel may be done.   How is it treated?     Give your painful heel lots of rest. You may need to stay completely off your foot for several days when the pain is severe.     Your healthcare provider may recommend or prescribe anti-inflammatory medicines, such as aspirin or ibuprofen. These drugs decrease pain and inflammation. Adults aged 65 years and older should not take non-steroidal anti-inflammatory medicine for more than 7 days without their healthcare provider's approval. Resting your heel on an ice pack for a few minutes several times a day can also help.     Try to cushion your foot. You can do this by wearing athletic shoes, even at work, for awhile. Heel cushions can also be used. The cushions should be worn in both shoes. They are most helpful if you are overweight or an  older adult.     Your provider may recommend special arch supports or inserts for your shoes called orthotics, either custom-made or off the shelf. These supports can be particularly helpful if you have flat feet or high arches.     Your provider may recommend an injection of a cortisone-like medicine.     Lose weight if needed.     A night splint may be recommended. This will keep the plantar fascia stretched while you are sleeping.     Physical therapy for additional treatments may be recommended     Surgery is rarely needed.   How long will the effects last?   You may find that the pain is sometimes worse and sometimes better over time. If you get treatment soon after you notice the pain, the symptoms should stop after several weeks. If, however, you have had plantar fasciitis for a long time, it may take many weeks to months for the pain to go away.   When can I return to my normal activities?   Everyone recovers from an injury at a different rate. Return to your activities will be determined by how soon your foot recovers, not by how many days or weeks it has been since your injury has occurred. In general, the longer you have symptoms before you start treatment, the longer it will take to get better. The goal of rehabilitation is to return you to your normal activities as soon as is safely possible. If you return too soon you may worsen your injury.   You may safely return to your activities when, starting from the top of the list and progressing to the end, each of the following is true:     You have full range of motion in the injured foot compared with the uninjured foot.     You have full strength of the injured foot compared with the uninjured foot.     You can walk straight ahead without significant pain or limping.   How can I prevent plantar fasciitis?   The best way to prevent plantar fasciitis is to wear shoes that are well made and fit your feet. This is especially important when you exercise or  walk a lot or stand for a long time on hard surfaces. Get new athletic shoes before your old shoes stop supporting and cushioning your feet.   You should also:     Avoid repeated jarring to the heel.     Keep a healthy weight.     Do your leg and foot stretching exercises regularly.   Developed by [employer].   Published by [employer].   Last modified: 2008-08-11   Last reviewed: 2008-07-07   This content is reviewed periodically and is subject to change as new health information becomes available. The information is intended to inform and educate and is not a replacement for medical evaluation, advice, diagnosis or treatment by a healthcare professional.   Sports Medicine Advisor 2009.1 Index  Sports Medicine Advisor 2009.1 Credits     2009 [facility] and/or its affiliates. All Rights Reserved.               Plantar Fasciitis Rehabilitation Exercises   You may begin exercising the muscles of your foot right away by gently stretching them as follows:     Prone hip extension: Lie on your stomach with your legs straight out behind you. Tighten the buttocks and thigh muscles of your injured leg and lift it off the floor about 8 inches. Keep your knee straight. Hold for 5 seconds. Then lower your leg and relax. Do 3 sets of 10.     Towel stretch: Sit on a hard surface with one leg stretched out in front of you. Loop a towel around your toes and the ball of your foot and pull the towel toward your body keeping your knee straight. Hold this position for 15 to 30 seconds then relax. Repeat 3 times.   When the towel stretch becomes too easy, you may begin doing the standing calf stretch.     Standing calf stretch: Facing a wall, put your hands against the wall at about eye level. Keep one leg back with the heel on the floor, and the other leg forward. Turn your back foot slightly inward (as if you were pigeon-toed) as you slowly lean into the wall until you feel a stretch in the back of your calf. Hold for 15 to 30  seconds. Repeat 3 times and then switch the position of your legs and repeat the exercise 3 times. Do this exercise several times each day.     Sitting plantar fascia stretch: Sit in a chair and cross one foot over your other knee. Grab the base of your toes and pull them back toward your leg until you feel a comfortable stretch. Hold 15 seconds and repeat 3 times.   When you can stand comfortably on your injured foot, you can begin standing to stretch the bottom of your foot using the plantar fascia stretch.     Achilles stretch: Stand with the ball of one foot on a stair. Reach for the bottom step with your heel until you feel a stretch in the arch of your foot. Hold this position for 15 to 30 seconds and then relax. Repeat 3 times.   After you have stretched the bottom muscles of your foot, you can begin strengthening the top muscles of your foot.     Frozen can roll: Roll your bare injured foot back and forth from your heel to your mid-arch over a frozen juice can. Repeat for 3 to 5 minutes. This exercise is particularly helpful if done first thing in the morning.     Towel pickup: With your heel on the ground,  a towel with your toes. Release. Repeat 10 to 20 times. When this gets easy, add more resistance by placing a book or small weight on the towel.     Balance and reach exercises   Stand upright next to a chair with your injured leg farthest from the chair. This will provide you with support if you need it. Stand just on the foot of your injured leg. Try to raise the arch of this foot while keeping your toes on the floor.   A. Keep your foot in this position and reach forward in front of you with the hand farthest away from the chair, allowing your knee to bend. Repeat this 10 times while maintaining the arch height. This exercise can be made more difficult by reaching farther in front of you. Do 2 sets.   B.  the same position as above. While maintaining your arch height, reach the hand  farthest away from the chair across your body toward the chair. The farther you reach, the more challenging the exercise. Do 2 sets of 10.     Heel raise: Balance yourself while standing behind a chair or counter. Using the chair to help you, raise your body up onto your toes and hold for 5 seconds. Then slowly lower yourself down without holding onto the chair. Hold onto the chair or counter if you need to. When this exercise becomes less painful, try lowering on one leg only. Repeat 10 times. Do 3 sets of 10.     Side-lying leg lift: Lying on your uninjured side, tighten the front thigh muscles on your top leg and lift that leg 8 to 10 inches away from the other leg. Keep the leg straight and lower slowly. Do 3 sets of 10.   Written by Rocío Alcantara, MS, PT, and Fabiola Bender PT, [facility], \[facility] [place] [facility]\"", for [employer].   Published by [employer].   Last modified: 2009-02-09   Last reviewed: 2008-07-07   This content is reviewed periodically and is subject to change as new health information becomes available. The information is intended to inform and educate and is not a replacement for medical evaluation, advice, diagnosis or treatment by a healthcare professional.   Sports Medicine Advisor 2009.1 Index                      Follow-ups after your visit        Future tests that were ordered for you today     Open Future Orders        Priority Expected Expires Ordered    **TSH with free T4 reflex FUTURE anytime Routine 2/21/2017 2/21/2018 2/21/2017    **Vitamin B12 FUTURE 2mo Routine  6/21/2017 2/21/2017    **Comprehensive metabolic panel FUTURE anytime Routine 2/21/2017 2/21/2018 2/21/2017    **CBC with platelets FUTURE anytime Routine 2/21/2017 2/21/2018 2/21/2017            Who to contact     If you have questions or need follow up information about today's clinic visit or your schedule please contact [facility] directly at [phone].  Normal or non-critical lab and imaging results will be communicated to  "you by MyChart, letter or phone within 4 business days after the clinic has received the results. If you do not hear from us within 7 days, please contact the clinic through [employer] or phone. If you have a critical or abnormal lab result, we will notify you by phone as soon as possible.  Submit refill requests through [employer] or call your pharmacy and they will forward the refill request to us. Please allow 3 business days for your refill to be completed.          Additional Information About Your Visit        [employer] Information     [employer] lets you send messages to your doctor, view your test results, renew your prescriptions, schedule appointments and more. To sign up, go to [email or web address] . Click on \"Log in\" on the left side of the screen, which will take you to the Welcome page. Then click on \"Sign up Now\" on the right side of the page.     You will be asked to enter the access code listed below, as well as some personal information. Please follow the directions to create your username and password.     Your access code is: [email or web address]  Expires: 2017  2:44 PM     Your access code will  in 90 days. If you need help or a new code, please call your [place] clinic or [phone].        Care EveryWhere ID     This is your Care EveryWhere ID. This could be used by other organizations to access your [place] medical records  [id number]        Your Vitals Were     Pulse Temperature Pulse Oximetry BMI (Body Mass Index)          80 98  F (36.7  C) (Oral) 95% 29.23 kg/m2         Blood Pressure from Last 3 Encounters:   17 114/76   17 136/76   16 102/58    Weight from Last 3 Encounters:   17 66.8 kg (147 lb 3.2 oz)   17 66.1 kg (145 lb 11.2 oz)   16 68.5 kg (151 lb)              Today, you had the following     No orders found for display         Today's Medication Changes          These changes are accurate as of: 17  7:15 PM.  If you have any questions, " ask your nurse or doctor.               Start taking these medicines.        Dose/Directions    Blood Pressure Cuff Misc   Used for:  Hypertension goal BP (blood pressure) < 130/80   Started by:  Jyoti Moreno MD        Dose:  1 Device   1 Device daily as needed   Quantity:  1 each   Refills:  0            Where to get your medicines      Some of these will need a paper prescription and others can be bought over the counter.  Ask your nurse if you have questions.     Bring a paper prescription for each of these medications     Blood Pressure Cuff Misc                Primary Care Provider Office Phone # Fax #    Jyoti Moreno [phone]       [facility] [address] [place] [address] [address]  [facility] [address]        Thank you!     Thank you for choosing [facility]  for your care. Our goal is always to provide you with excellent care. Hearing back from our patients is one way we can continue to improve our services. Please take a few minutes to complete the written survey that you may receive in the mail after your visit with us. Thank you!             Your Updated Medication List - Protect others around you: Learn how to safely use, store and throw away your medicines at www.disposemymeds.org.          This list is accurate as of: 2/22/17  7:15 PM.  Always use your most recent med list.                   Brand Name Dispense Instructions for use    amLODIPine 5 MG tablet    NORVASC    90 tablet    TAKE 1 TABLET (5 MG) BY MOUTH DAILY       Blood Pressure Cuff Misc     1 each    1 Device daily as needed       clopidogrel 75 MG tablet    PLAVIX    90 tablet    Take 1 tablet (75 mg) by mouth daily       co-enzyme Q-10 100 MG Caps capsule      Take 1 capsule by mouth daily.       Fish Oil Oil      daily       fluticasone 50 MCG/ACT spray    FLONASE    3 Package    INSTILL 1-2 SPRAYS INTO BOTH NOSTRILS DAILY       KONSYL FIBER PO      Take by mouth 2 times daily        lisinopril 20 MG tablet    PRINIVIL/ZESTRIL    90 tablet    Take 1 tablet (20 mg) by mouth daily       MAGNESIUM CITRATE PO      Take by mouth daily       MIRALAX PO      Take 1-2 capfuls by mouth as needed.       pantoprazole 40 MG EC tablet    PROTONIX    90 tablet    TAKE 1 TABLET BY MOUTH DAILY. TAKE 30-60 MINUTES BEFORE A MEAL.       prednisoLONE acetate 1 % ophthalmic susp    PRED FORTE         simvastatin 20 MG tablet    ZOCOR    30 tablet    TAKE 1 TABLET (20 MG) BY MOUTH AT BEDTIME       traZODone 50 MG tablet    DESYREL    180 tablet    Take 1-2 tablets ( mg) by mouth nightly as needed for sleep       vitamin D 2000 UNITS tablet     100 tablet    Take 2,000 Units by mouth daily.

## 2017-02-22 NOTE — PROGRESS NOTES
[facility] now requests orders and shares plan of care/discharge summaries for some patients through [employer].  Please REPLY TO THIS MESSAGE in order to give authorization for orders when needed.  This is considered a verbal order, you will still receive a faxed copy of orders for signature.  Thank you for your assistance in improving collaboration for our patients.    ORDER    Requesting order for SW to eval/treat for transportation needs and other community resources.    Ami Robbins MA, CCC-SLP  Speech-Language Pathologist  [facility]  [email or web address]  [phone]

## 2017-02-22 NOTE — PROGRESS NOTES
"  SUBJECTIVE:                                                    Anabelle Herbert is a 71 year old female who presents to clinic today for the following health issues:    Follow up of Cerebrovascular Accident (CVA)        Problem list and histories reviewed & adjusted, as indicated.  Additional history: as documented    Anabelle states she has been \"up and down\" since her last visit. She reports she can be hot and cold-she is having hot flashes, and her mood also fluctuates-feeling agitated at certain instances. Daughter-in-law reports that Anabelle is typically cold and the hot flashes are new. Anabelle denies fevers. She told the nurse coordinator about her sx's and they told her to have her hormones checked when she is in the clinic. Her appetite has increased since d/c of  her hunger suppression supplements.  Daughter-in-law states pt is concerned about her weight.     Her Bp is fluctuating, some lows with positional changes. Sitting systolic : 114-142, diastolic: 62-90. Standing systolic  diastolic: 52-90.    She would like a home bp cuff. She is drinking \"as much water as possible\", she is unsure how much water she is drinking daily and admits some days might not be much    Foot:She is having persistent pain in the bottom of her feet but it is not as bad on her heels.  The pain was constant, day and night. After her last visit (2/1), the pain has improved and is now intermittent instead of constant. She notices the pain any time, it hurts to stand and it hurts when her feet touch the floor. Pain is still present when she is sleeping , but overall better when she does not place pressure on feet.  Denies numbness, tingling, burning in feet. Reports she has had plantar fascitis in the past but sx's do not feel like that. She is not wearing shoes around her house.     Additional Notes  -She reports that things have been busy with home care-they started coming last week. She is feeling safe in her home. She is eating a " lot of salad and sometimes using the stove.states she does not forget things on the stove as she is not as fatigued since she is not working. Daughter-in-law states that family is doing close f/u and check-in. Daughter-in-law has not yet been able to schedule with nutrition.   -Bladder sx's are at baseline. She saw urologist Sept. 2016. She has not scheduled interstitial cystitis cocktail injection yet.    -Her disability is extended to 2/28 and she has reached 12 weeks for FMLA. Her short term disability will be evaluated based on clinic visit and home care visits.   -vision sx's still present, but still improving-higher visual fields are improving but lower visual fields are at baseline.       Patient Active Problem List   Diagnosis     Chronic interstitial cystitis     Adjustment reaction with anxiety and depression     Abdominal pain     Rotator cuff syndrome     Hypertension goal BP (blood pressure) < 130/80     CKD (chronic kidney disease) stage 2, GFR 60-89 ml/min     Hyperlipidemia LDL goal <100     Muscle cramps     Mild persistent asthma     Numbness and tingling     Vitamin D deficiency     Abnormal MRI of head     GERD (gastroesophageal reflux disease)     Advance care planning     B12 deficiency     Stroke (H)     Transient cerebral ischemia     Carotid stenosis     Diverticulosis of colon     Colon polyp     Urgency of urination     Atrophic vaginitis     Bladder pain     Interstitial cystitis     Chronic constipation     Cognitive complaints     Elevated fasting blood sugar     Prediabetes     Osteoporosis     Cerebrovascular accident (CVA), unspecified mechanism (H)     Health Care Home     Past Surgical History   Procedure Laterality Date     No history of surgery       Colonoscopy       Genitourinary surgery       Cystoscopy, inject collagen, combined  1/6/2014     Procedure: COMBINED CYSTOSCOPY, INJECT BULKING AGENT;  IC cocktail, bladder biopsy, fulguration, heparin, gentamyacin, lidocaine &  judiealog;  Surgeon: Vandana Tang MD;  Location: [facility] OR     Cystoscopy, biopsy bladder, combined  1/6/2014     Procedure: COMBINED CYSTOSCOPY, BIOPSY BLADDER;;  Surgeon: Vandana Tang MD;  Location: [facility] OR       Social History   Substance Use Topics     Smoking status: Never Smoker     Smokeless tobacco: Never Used     Alcohol use No     Family History   Problem Relation Age of Onset     CANCER Father      colon?     HEART DISEASE Father      Asthma Mother      Alzheimer Disease Mother 86     Unknown/Adopted Maternal Grandmother      Unknown/Adopted Maternal Grandfather      Unknown/Adopted Paternal Grandmother      Unknown/Adopted Paternal Grandfather      Asthma Sister      Allergies Sister      Unknown/Adopted Son          Current Outpatient Prescriptions   Medication Sig Dispense Refill     clopidogrel (PLAVIX) 75 MG tablet Take 1 tablet (75 mg) by mouth daily 90 tablet 1     amLODIPine (NORVASC) 5 MG tablet TAKE 1 TABLET (5 MG) BY MOUTH DAILY 90 tablet 1     lisinopril (PRINIVIL,ZESTRIL) 20 MG tablet Take 1 tablet (20 mg) by mouth daily 90 tablet 3     pantoprazole (PROTONIX) 40 MG enteric coated tablet TAKE 1 TABLET BY MOUTH DAILY. TAKE 30-60 MINUTES BEFORE A MEAL. 90 tablet 3     prednisoLONE acetate (PRED FORTE) 1 % ophthalmic suspension        simvastatin (ZOCOR) 20 MG tablet TAKE 1 TABLET (20 MG) BY MOUTH AT BEDTIME 30 tablet 0     fluticasone (FLONASE) 50 MCG/ACT nasal spray INSTILL 1-2 SPRAYS INTO BOTH NOSTRILS DAILY 3 Package 3     Calcium Polycarbophil (KONSYL FIBER PO) Take by mouth 2 times daily       traZODone (DESYREL) 50 MG tablet Take 1-2 tablets ( mg) by mouth nightly as needed for sleep 180 tablet 1     Fish Oil OIL daily       MAGNESIUM CITRATE PO Take by mouth daily       Polyethylene Glycol 3350 (MIRALAX PO) Take 1-2 capfuls by mouth as needed.        Cholecalciferol (VITAMIN D) 2000 UNIT tablet Take 2,000 Units by mouth daily. 100 tablet 0     co-enzyme Q-10 (COQ-10) 100 MG  CAPS Take 1 capsule by mouth daily.       Allergies   Allergen Reactions     Contrast Dye      Burning, hematuria     Dogs      Throat started to close up.        ROS:  Constitutional, HEENT, cardiovascular, pulmonary, gi and gu systems are negative, except as otherwise noted.    OBJECTIVE:                                                    /76 (BP Location: Right arm, Patient Position: Chair, Cuff Size: Adult Regular)  Pulse 80  Temp 98  F (36.7  C) (Oral)  Wt 66.8 kg (147 lb 3.2 oz)  SpO2 95%  BMI 29.23 kg/m2  Body mass index is 29.23 kg/(m^2).  GENERAL: healthy, alert and no distress  NEURO: mentation at baseline, poor historian, non-focal  PSYCH: mentation appears normal, affect slightly flat, mood is anxious  Diabetic foot exam:normal sensory exam and normal monofilament exam    Diagnostic Test Results:  none      ASSESSMENT/PLAN:                                                      1. Orthostatic hypotension  4. Hypertension goal BP (blood pressure) < 130/80  Some orthostatic hypotensive readings with home monitoring. Suspect primarily due to poor hydration. However,  If persistent with pushing fluids, will consider decreasing htn meds. Pt will ask pharmacy for best home monitor cuff, instructed her to compare her own readings with nurse's readings to ensure accuracy.   - Blood Pressure Monitoring (BLOOD PRESSURE CUFF) MISC; 1 Device daily as needed  Dispense: 1 each; Refill: 0    2. Cerebrovascular accident (CVA), unspecified mechanism (H)  Assessed status. Patient is now living on her own with close f/u from family members and home cares. She is not yet at a point of returning to work, and I doubt she will get back there. However, is slowly having some improvement in her cva sx's/visual change. She continues to not drive.   - **Comprehensive metabolic panel FUTURE anytime    3. Cognitive complaints  Suspect could be related to #2 and depression. Consider restart prozac disucssed- she will consider but  not interested in this today. Consider neuropsych testing again- again declined for now.   - **TSH with free T4 reflex FUTURE anytime  - **Vitamin B12 FUTURE 2mo  - **CBC with platelets FUTURE anytime      5. Loss of weight  Increased appetite off appetite suppressants. Pt continues to be concerned about weight-offered reassurance and discussed healthy eating and exercise as weight loss methods and to stay off wt loss supplements.   - **TSH with free T4 reflex FUTURE anytime  - **Vitamin B12 FUTURE 2mo  - **CBC with platelets FUTURE anytime    6. Prediabetes  - **Comprehensive metabolic panel FUTURE anytime    7. CKD (chronic kidney disease) stage 2, GFR 60-89 ml/min  monitor  - **Comprehensive metabolic panel FUTURE anytime  - **CBC with platelets FUTURE anytime      9. Adjustment reaction with anxiety and depression  Pt declines medication today but will consider restart of Prozac in the future.     10. Foot pain  Suspect plantar fasciitis by history. Reviewed foot ware, stretching,icing and f/u prn.       See Patient Instructions    Patient Instructions   Start recording water intake to make sure you are keeping fluid intake up.     Recheck low and high blood pressure readings. Compare your individual home readings with nurse's readings.     Look into nutrition services with home care.     Wear flat, cushioned shoes and ice the bottom of your feet to help with plantar fascitis.     Follow up in 4-6 weeks for check-in.                            Plantar Fasciitis   What is plantar fasciitis?   Plantar fasciitis is a painful inflammation of the bottom of the foot between the ball of the foot and the heel.   How does it occur?   There are several possible causes of plantar fasciitis, including:     wearing high heels     gaining weight     increased walking, standing, or stair-climbing   If you wear high-heeled shoes, including western-style boots, for long periods of time, the tough, tendonlike tissue of the bottom  of your foot can become shorter. This layer of tissue is called fascia. Pain occurs when you stretch fascia that has shortened. This painful stretching might happen, for example, when you walk barefoot after getting out of bed in the morning.   If you gain weight, you might be more likely to have plantar fasciitis, especially if you walk a lot or  shoes with poor heel cushioning. Normally there is a pad of fatty tissue under your heel bone. Weight gain might break down this fat pad and cause heel pain.   Runners may get plantar fasciitis when they change their workout and increase their mileage or frequency of workouts. It can also occur with a change in exercise surface or terrain, or if your shoes are worn out and don't provide enough cushion for your heels.   If the arches of your foot are abnormally high or low, you are more likely to develop plantar fasciitis than if your arches are normal.   What are the symptoms?   The main symptom of plantar fasciitis is heel pain when you walk. You may also feel pain when you stand and possibly even when you are resting. This pain typically occurs first thing in the morning after you get out of bed, when your foot is placed flat on the floor. The pain occurs because you are stretching the plantar fascia. The pain usually lessens with more walking, but you may have it again after periods of rest.   You may feel no pain when you are sleeping because the position of your feet during rest allows the fascia to shorten and relax.   How is it diagnosed?   Your healthcare provider will ask about your symptoms. He or she will ask if the bottom of your heel is tender and if you have pain when you stretch the bottom of your foot. An X-ray of your heel may be done.   How is it treated?     Give your painful heel lots of rest. You may need to stay completely off your foot for several days when the pain is severe.     Your healthcare provider may recommend or prescribe  anti-inflammatory medicines, such as aspirin or ibuprofen. These drugs decrease pain and inflammation. Adults aged 65 years and older should not take non-steroidal anti-inflammatory medicine for more than 7 days without their healthcare provider's approval. Resting your heel on an ice pack for a few minutes several times a day can also help.     Try to cushion your foot. You can do this by wearing athletic shoes, even at work, for awhile. Heel cushions can also be used. The cushions should be worn in both shoes. They are most helpful if you are overweight or an older adult.     Your provider may recommend special arch supports or inserts for your shoes called orthotics, either custom-made or off the shelf. These supports can be particularly helpful if you have flat feet or high arches.     Your provider may recommend an injection of a cortisone-like medicine.     Lose weight if needed.     A night splint may be recommended. This will keep the plantar fascia stretched while you are sleeping.     Physical therapy for additional treatments may be recommended     Surgery is rarely needed.   How long will the effects last?   You may find that the pain is sometimes worse and sometimes better over time. If you get treatment soon after you notice the pain, the symptoms should stop after several weeks. If, however, you have had plantar fasciitis for a long time, it may take many weeks to months for the pain to go away.   When can I return to my normal activities?   Everyone recovers from an injury at a different rate. Return to your activities will be determined by how soon your foot recovers, not by how many days or weeks it has been since your injury has occurred. In general, the longer you have symptoms before you start treatment, the longer it will take to get better. The goal of rehabilitation is to return you to your normal activities as soon as is safely possible. If you return too soon you may worsen your injury.    You may safely return to your activities when, starting from the top of the list and progressing to the end, each of the following is true:     You have full range of motion in the injured foot compared with the uninjured foot.     You have full strength of the injured foot compared with the uninjured foot.     You can walk straight ahead without significant pain or limping.   How can I prevent plantar fasciitis?   The best way to prevent plantar fasciitis is to wear shoes that are well made and fit your feet. This is especially important when you exercise or walk a lot or stand for a long time on hard surfaces. Get new athletic shoes before your old shoes stop supporting and cushioning your feet.   You should also:     Avoid repeated jarring to the heel.     Keep a healthy weight.     Do your leg and foot stretching exercises regularly.   Developed by [employer].   Published by [employer].   Last modified: 2008-08-11   Last reviewed: 2008-07-07   This content is reviewed periodically and is subject to change as new health information becomes available. The information is intended to inform and educate and is not a replacement for medical evaluation, advice, diagnosis or treatment by a healthcare professional.   Sports Medicine Advisor 2009.1 Index  Sports Medicine Advisor 2009.1 Credits     2009 [facility] and/or its affiliates. All Rights Reserved.               Plantar Fasciitis Rehabilitation Exercises   You may begin exercising the muscles of your foot right away by gently stretching them as follows:     Prone hip extension: Lie on your stomach with your legs straight out behind you. Tighten the buttocks and thigh muscles of your injured leg and lift it off the floor about 8 inches. Keep your knee straight. Hold for 5 seconds. Then lower your leg and relax. Do 3 sets of 10.     Towel stretch: Sit on a hard surface with one leg stretched out in front of you. Loop a towel around your toes and the ball of  your foot and pull the towel toward your body keeping your knee straight. Hold this position for 15 to 30 seconds then relax. Repeat 3 times.   When the towel stretch becomes too easy, you may begin doing the standing calf stretch.     Standing calf stretch: Facing a wall, put your hands against the wall at about eye level. Keep one leg back with the heel on the floor, and the other leg forward. Turn your back foot slightly inward (as if you were pigeon-toed) as you slowly lean into the wall until you feel a stretch in the back of your calf. Hold for 15 to 30 seconds. Repeat 3 times and then switch the position of your legs and repeat the exercise 3 times. Do this exercise several times each day.     Sitting plantar fascia stretch: Sit in a chair and cross one foot over your other knee. Grab the base of your toes and pull them back toward your leg until you feel a comfortable stretch. Hold 15 seconds and repeat 3 times.   When you can stand comfortably on your injured foot, you can begin standing to stretch the bottom of your foot using the plantar fascia stretch.     Achilles stretch: Stand with the ball of one foot on a stair. Reach for the bottom step with your heel until you feel a stretch in the arch of your foot. Hold this position for 15 to 30 seconds and then relax. Repeat 3 times.   After you have stretched the bottom muscles of your foot, you can begin strengthening the top muscles of your foot.     Frozen can roll: Roll your bare injured foot back and forth from your heel to your mid-arch over a frozen juice can. Repeat for 3 to 5 minutes. This exercise is particularly helpful if done first thing in the morning.     Towel pickup: With your heel on the ground,  a towel with your toes. Release. Repeat 10 to 20 times. When this gets easy, add more resistance by placing a book or small weight on the towel.     Balance and reach exercises   Stand upright next to a chair with your injured leg farthest  from the chair. This will provide you with support if you need it. Stand just on the foot of your injured leg. Try to raise the arch of this foot while keeping your toes on the floor.   A. Keep your foot in this position and reach forward in front of you with the hand farthest away from the chair, allowing your knee to bend. Repeat this 10 times while maintaining the arch height. This exercise can be made more difficult by reaching farther in front of you. Do 2 sets.   B.  the same position as above. While maintaining your arch height, reach the hand farthest away from the chair across your body toward the chair. The farther you reach, the more challenging the exercise. Do 2 sets of 10.     Heel raise: Balance yourself while standing behind a chair or counter. Using the chair to help you, raise your body up onto your toes and hold for 5 seconds. Then slowly lower yourself down without holding onto the chair. Hold onto the chair or counter if you need to. When this exercise becomes less painful, try lowering on one leg only. Repeat 10 times. Do 3 sets of 10.     Side-lying leg lift: Lying on your uninjured side, tighten the front thigh muscles on your top leg and lift that leg 8 to 10 inches away from the other leg. Keep the leg straight and lower slowly. Do 3 sets of 10.   Written by Rocío Alcantara MS, PT, and Fabiola Bender PT, [facility], for [employer].   Published by [employer].   Last modified: 2009-02-09   Last reviewed: 2008-07-07   This content is reviewed periodically and is subject to change as new health information becomes available. The information is intended to inform and educate and is not a replacement for medical evaluation, advice, diagnosis or treatment by a healthcare professional.   Sports Medicine Advisor 2009.1 Index                      This document serves as a record of the services and decisions personally performed and made by Jyoti Moreno MD. It was created on her  behalf by Ilda Jean,a trained medical scribe. The creation of this document is based the provider's statements to the medical scribe.  Ilda Jean February 22, 2017 6:20 PM     The information in this document, created by a scribe for me, accurately reflects the services I personally performed and the decisions made by me. I have reviewed and approved this document for accuracy.    MD Jyoti Renee MD  [facility]

## 2017-02-23 LAB
ALBUMIN SERPL-MCNC: 4.2 G/DL (ref 3.4–5)
ALP SERPL-CCNC: 54 U/L (ref 40–150)
ALT SERPL W P-5'-P-CCNC: 26 U/L (ref 0–50)
ANION GAP SERPL CALCULATED.3IONS-SCNC: 4 MMOL/L (ref 3–14)
AST SERPL W P-5'-P-CCNC: 27 U/L (ref 0–45)
BILIRUB SERPL-MCNC: 0.5 MG/DL (ref 0.2–1.3)
BUN SERPL-MCNC: 29 MG/DL (ref 7–30)
CALCIUM SERPL-MCNC: 9.5 MG/DL (ref 8.5–10.1)
CHLORIDE SERPL-SCNC: 106 MMOL/L (ref 94–109)
CO2 SERPL-SCNC: 30 MMOL/L (ref 20–32)
CREAT SERPL-MCNC: 0.94 MG/DL (ref 0.52–1.04)
GFR SERPL CREATININE-BSD FRML MDRD: 59 ML/MIN/1.7M2
GLUCOSE SERPL-MCNC: 89 MG/DL (ref 70–99)
POTASSIUM SERPL-SCNC: 4.3 MMOL/L (ref 3.4–5.3)
PROT SERPL-MCNC: 7.7 G/DL (ref 6.8–8.8)
SODIUM SERPL-SCNC: 140 MMOL/L (ref 133–144)
TSH SERPL DL<=0.005 MIU/L-ACNC: 2.72 MU/L (ref 0.4–4)
VIT B12 SERPL-MCNC: 1176 PG/ML (ref 193–986)

## 2017-02-23 NOTE — TELEPHONE ENCOUNTER
Form brought to office visit filled out. Please add clinic stamp and fax.   Also [employer] has still not received medical records requested on 2/7/17- see form scanned to chart. Please make sure all these records are sent. [employer] needs by Friday, 2/24/17

## 2017-02-23 NOTE — PATIENT INSTRUCTIONS
Start recording water intake to make sure you are keeping fluid intake up.     Recheck low and high blood pressure readings. Compare your individual home readings with nurse's readings.     Look into nutrition services with home care.     Wear flat, cushioned shoes and ice the bottom of your feet to help with plantar fascitis.     Follow up in 4-6 weeks for check-in.                            Plantar Fasciitis   What is plantar fasciitis?   Plantar fasciitis is a painful inflammation of the bottom of the foot between the ball of the foot and the heel.   How does it occur?   There are several possible causes of plantar fasciitis, including:     wearing high heels     gaining weight     increased walking, standing, or stair-climbing   If you wear high-heeled shoes, including western-style boots, for long periods of time, the tough, tendonlike tissue of the bottom of your foot can become shorter. This layer of tissue is called fascia. Pain occurs when you stretch fascia that has shortened. This painful stretching might happen, for example, when you walk barefoot after getting out of bed in the morning.   If you gain weight, you might be more likely to have plantar fasciitis, especially if you walk a lot or  shoes with poor heel cushioning. Normally there is a pad of fatty tissue under your heel bone. Weight gain might break down this fat pad and cause heel pain.   Runners may get plantar fasciitis when they change their workout and increase their mileage or frequency of workouts. It can also occur with a change in exercise surface or terrain, or if your shoes are worn out and don't provide enough cushion for your heels.   If the arches of your foot are abnormally high or low, you are more likely to develop plantar fasciitis than if your arches are normal.   What are the symptoms?   The main symptom of plantar fasciitis is heel pain when you walk. You may also feel pain when you stand and possibly even when you  are resting. This pain typically occurs first thing in the morning after you get out of bed, when your foot is placed flat on the floor. The pain occurs because you are stretching the plantar fascia. The pain usually lessens with more walking, but you may have it again after periods of rest.   You may feel no pain when you are sleeping because the position of your feet during rest allows the fascia to shorten and relax.   How is it diagnosed?   Your healthcare provider will ask about your symptoms. He or she will ask if the bottom of your heel is tender and if you have pain when you stretch the bottom of your foot. An X-ray of your heel may be done.   How is it treated?     Give your painful heel lots of rest. You may need to stay completely off your foot for several days when the pain is severe.     Your healthcare provider may recommend or prescribe anti-inflammatory medicines, such as aspirin or ibuprofen. These drugs decrease pain and inflammation. Adults aged 65 years and older should not take non-steroidal anti-inflammatory medicine for more than 7 days without their healthcare provider's approval. Resting your heel on an ice pack for a few minutes several times a day can also help.     Try to cushion your foot. You can do this by wearing athletic shoes, even at work, for awhile. Heel cushions can also be used. The cushions should be worn in both shoes. They are most helpful if you are overweight or an older adult.     Your provider may recommend special arch supports or inserts for your shoes called orthotics, either custom-made or off the shelf. These supports can be particularly helpful if you have flat feet or high arches.     Your provider may recommend an injection of a cortisone-like medicine.     Lose weight if needed.     A night splint may be recommended. This will keep the plantar fascia stretched while you are sleeping.     Physical therapy for additional treatments may be recommended     Surgery  is rarely needed.   How long will the effects last?   You may find that the pain is sometimes worse and sometimes better over time. If you get treatment soon after you notice the pain, the symptoms should stop after several weeks. If, however, you have had plantar fasciitis for a long time, it may take many weeks to months for the pain to go away.   When can I return to my normal activities?   Everyone recovers from an injury at a different rate. Return to your activities will be determined by how soon your foot recovers, not by how many days or weeks it has been since your injury has occurred. In general, the longer you have symptoms before you start treatment, the longer it will take to get better. The goal of rehabilitation is to return you to your normal activities as soon as is safely possible. If you return too soon you may worsen your injury.   You may safely return to your activities when, starting from the top of the list and progressing to the end, each of the following is true:     You have full range of motion in the injured foot compared with the uninjured foot.     You have full strength of the injured foot compared with the uninjured foot.     You can walk straight ahead without significant pain or limping.   How can I prevent plantar fasciitis?   The best way to prevent plantar fasciitis is to wear shoes that are well made and fit your feet. This is especially important when you exercise or walk a lot or stand for a long time on hard surfaces. Get new athletic shoes before your old shoes stop supporting and cushioning your feet.   You should also:     Avoid repeated jarring to the heel.     Keep a healthy weight.     Do your leg and foot stretching exercises regularly.   Developed by "[employer]   Published by "[employer]   Last modified: 2008-08-11   Last reviewed: 2008-07-07   This content is reviewed periodically and is subject to change as new health information becomes available. The  information is intended to inform and educate and is not a replacement for medical evaluation, advice, diagnosis or treatment by a healthcare professional.   Sports Medicine Advisor 2009.1 Index  Sports Medicine Advisor 2009.1 Credits     2009 [facility] and/or its affiliates. All Rights Reserved.               Plantar Fasciitis Rehabilitation Exercises   You may begin exercising the muscles of your foot right away by gently stretching them as follows:     Prone hip extension: Lie on your stomach with your legs straight out behind you. Tighten the buttocks and thigh muscles of your injured leg and lift it off the floor about 8 inches. Keep your knee straight. Hold for 5 seconds. Then lower your leg and relax. Do 3 sets of 10.     Towel stretch: Sit on a hard surface with one leg stretched out in front of you. Loop a towel around your toes and the ball of your foot and pull the towel toward your body keeping your knee straight. Hold this position for 15 to 30 seconds then relax. Repeat 3 times.   When the towel stretch becomes too easy, you may begin doing the standing calf stretch.     Standing calf stretch: Facing a wall, put your hands against the wall at about eye level. Keep one leg back with the heel on the floor, and the other leg forward. Turn your back foot slightly inward (as if you were pigeon-toed) as you slowly lean into the wall until you feel a stretch in the back of your calf. Hold for 15 to 30 seconds. Repeat 3 times and then switch the position of your legs and repeat the exercise 3 times. Do this exercise several times each day.     Sitting plantar fascia stretch: Sit in a chair and cross one foot over your other knee. Grab the base of your toes and pull them back toward your leg until you feel a comfortable stretch. Hold 15 seconds and repeat 3 times.   When you can stand comfortably on your injured foot, you can begin standing to stretch the bottom of your foot using the plantar fascia stretch.      Achilles stretch: Stand with the ball of one foot on a stair. Reach for the bottom step with your heel until you feel a stretch in the arch of your foot. Hold this position for 15 to 30 seconds and then relax. Repeat 3 times.   After you have stretched the bottom muscles of your foot, you can begin strengthening the top muscles of your foot.     Frozen can roll: Roll your bare injured foot back and forth from your heel to your mid-arch over a frozen juice can. Repeat for 3 to 5 minutes. This exercise is particularly helpful if done first thing in the morning.     Towel pickup: With your heel on the ground,  a towel with your toes. Release. Repeat 10 to 20 times. When this gets easy, add more resistance by placing a book or small weight on the towel.     Balance and reach exercises   Stand upright next to a chair with your injured leg farthest from the chair. This will provide you with support if you need it. Stand just on the foot of your injured leg. Try to raise the arch of this foot while keeping your toes on the floor.   A. Keep your foot in this position and reach forward in front of you with the hand farthest away from the chair, allowing your knee to bend. Repeat this 10 times while maintaining the arch height. This exercise can be made more difficult by reaching farther in front of you. Do 2 sets.   B.  the same position as above. While maintaining your arch height, reach the hand farthest away from the chair across your body toward the chair. The farther you reach, the more challenging the exercise. Do 2 sets of 10.     Heel raise: Balance yourself while standing behind a chair or counter. Using the chair to help you, raise your body up onto your toes and hold for 5 seconds. Then slowly lower yourself down without holding onto the chair. Hold onto the chair or counter if you need to. When this exercise becomes less painful, try lowering on one leg only. Repeat 10 times. Do 3 sets of 10.      Side-lying leg lift: Lying on your uninjured side, tighten the front thigh muscles on your top leg and lift that leg 8 to 10 inches away from the other leg. Keep the leg straight and lower slowly. Do 3 sets of 10.   Written by Rocío Alcantara MS, PT, and Fabiola Bender PT, [facility], [facility], for [employer].   Published by [employer].   Last modified: 2009-02-09   Last reviewed: 2008-07-07   This content is reviewed periodically and is subject to change as new health information becomes available. The information is intended to inform and educate and is not a replacement for medical evaluation, advice, diagnosis or treatment by a healthcare professional.   Sports Medicine Advisor 2009.1 Index

## 2017-02-23 NOTE — TELEPHONE ENCOUNTER
Stamp added to form, form copied and placed in scan pile. Faxed over OV from 12-16 until now, PT/OT notes from 12-16 till now, med lists to [facility] [phone].      Alessandra Fuller MA

## 2017-02-23 NOTE — NURSING NOTE
"Chief Complaint   Patient presents with     RECHECK       Initial /76 (BP Location: Right arm, Patient Position: Chair, Cuff Size: Adult Regular)  Pulse 80  Temp 98  F (36.7  C) (Oral)  Wt 147 lb 3.2 oz (66.8 kg)  SpO2 95%  BMI 29.23 kg/m2 Estimated body mass index is 29.23 kg/(m^2) as calculated from the following:    Height as of 2/1/17: 4' 11.5\" (1.511 m).    Weight as of this encounter: 147 lb 3.2 oz (66.8 kg).  Medication Reconciliation: complete   Lakia Marrero CMA      "

## 2017-02-24 ENCOUNTER — TELEPHONE (OUTPATIENT)
Dept: FAMILY MEDICINE | Facility: CLINIC | Age: 72
End: 2017-02-24

## 2017-02-24 DIAGNOSIS — F43.23 ADJUSTMENT REACTION WITH ANXIETY AND DEPRESSION: Primary | ICD-10-CM

## 2017-02-24 RX ORDER — FLUOXETINE 10 MG/1
10 CAPSULE ORAL DAILY
Qty: 30 CAPSULE | Refills: 1 | Status: SHIPPED | OUTPATIENT
Start: 2017-02-24 | End: 2017-03-03

## 2017-02-24 NOTE — TELEPHONE ENCOUNTER
rx sent for prozac to her pharmacy.   She was on this in the past.   Will start low dose again.   Call if problems with taking.

## 2017-02-24 NOTE — TELEPHONE ENCOUNTER
Patient contacted and informed of the below per provider documentation. Patient verbalizes understanding.     Erika Brock RN

## 2017-02-24 NOTE — TELEPHONE ENCOUNTER
Reason for call: Symptom   Symptom or request: depression/anxiety    Duration (how long have symptoms been present): ongoing  Have you been treated for this before? No but has discussed this with provider    Additional comments: pt was told to call you if she decided to try some mediation   Call to advise as she is ready    Phone Number Pt can be reached at: Home number on file [phone] (home)  Best Time: any  Can we leave a detailed message on this number? YES

## 2017-02-27 ENCOUNTER — MEDICAL CORRESPONDENCE (OUTPATIENT)
Dept: HEALTH INFORMATION MANAGEMENT | Facility: CLINIC | Age: 72
End: 2017-02-27

## 2017-03-01 ENCOUNTER — TRANSFERRED RECORDS (OUTPATIENT)
Dept: HEALTH INFORMATION MANAGEMENT | Facility: CLINIC | Age: 72
End: 2017-03-01

## 2017-03-02 ENCOUNTER — DOCUMENTATION ONLY (OUTPATIENT)
Dept: CARE COORDINATION | Facility: CLINIC | Age: 72
End: 2017-03-02

## 2017-03-02 DIAGNOSIS — F43.23 ADJUSTMENT REACTION WITH ANXIETY AND DEPRESSION: ICD-10-CM

## 2017-03-02 DIAGNOSIS — R41.9 COGNITIVE COMPLAINTS: Primary | ICD-10-CM

## 2017-03-02 NOTE — PROGRESS NOTES
[facility] now requests orders and shares plan of care/discharge summaries for some patients through [employer].  Please REPLY TO THIS MESSAGE in order to give authorization for orders when needed.  This is considered a verbal order, you will still receive a faxed copy of orders for signature.  Thank you for your assistance in improving collaboration for our patients.    ORDER   Continue home PT visits 1w2 as previously ordered for gait training, instuction in ther exs and balance exs for HEP, falls prevention education, monitor and treat pain, monitor skin integrity, monitor for s/s of depression, diabetic foot care, monitor for symptoms of heart failure and to achieve the following goals,   1. Pt will demo household ambulation without AD with improved gait pattern, decreased pathway deviation and improved safety awareness, including using railing on stairs.   2. Pt will demo ability for safe ambulation outdoors in driveway with sba, with or without AD, to be assessed.   3. Pt will demo correct and indep performance of ther exs for continued progress after d/c from home PT.

## 2017-03-02 NOTE — PROGRESS NOTES
When ST ran home care medication screening tool today, a significant interaction came up between Pt's clopidogrel and her new anti depressant, fluoxetine.   ST is required to inform MD of this interaction.   If no changes are recommended and Pt is ok to remain on her current meds, please simply reply to this message with an OK.  If any med changes are recommended, please call Pt directly. Next home care visits from SN and from ST will take place on 3/7/17.    Thank you,    Ami Robbins MA, CCC-SLP  Speech-Language Pathologist  [facility]  [email or web address]  [phone]

## 2017-03-03 RX ORDER — ESCITALOPRAM OXALATE 10 MG/1
10 TABLET ORAL DAILY
Qty: 30 TABLET | Refills: 1 | Status: SHIPPED | OUTPATIENT
Start: 2017-03-03 | End: 2017-03-28 | Stop reason: DRUGHIGH

## 2017-03-03 NOTE — PROGRESS NOTES
Please call patient: Due to interaction of prozac and plavix would like patient to change from prozac to lexapro 10 mg. D/c prozac. Start lexapro. Continue with plan to see me in 3-4 weeks.   Please fax rx and update patient.

## 2017-03-03 NOTE — PROGRESS NOTES
Linda Coppola contacted [facility] on 03/03/17 and left a message. If patient calls back please contact RN team at [facility].    Pt called back, updated on the below.  Medication faxed.  Linda Coppola RN

## 2017-03-06 ENCOUNTER — DOCUMENTATION ONLY (OUTPATIENT)
Dept: CARE COORDINATION | Facility: CLINIC | Age: 72
End: 2017-03-06

## 2017-03-06 ENCOUNTER — MEDICAL CORRESPONDENCE (OUTPATIENT)
Dept: HEALTH INFORMATION MANAGEMENT | Facility: CLINIC | Age: 72
End: 2017-03-06

## 2017-03-06 NOTE — PROGRESS NOTES
[facility] now requests orders and shares plan of care/discharge summaries for some patients through [employer].  Please REPLY TO THIS MESSAGE in order to give authorization for orders when needed.  This is considered a verbal order, you will still receive a faxed copy of orders for signature.  Thank you for your assistance in improving collaboration for our patients.    ORDER    SW [employer] to assist patient in completing POLST and HC Directive as well as continue discussion regarding long term planning.

## 2017-03-07 ENCOUNTER — TELEPHONE (OUTPATIENT)
Dept: FAMILY MEDICINE | Facility: CLINIC | Age: 72
End: 2017-03-07

## 2017-03-07 NOTE — TELEPHONE ENCOUNTER
..Reason for Call:     verbal orders to continue nursing services    Detailed comments: weekly x 5 weeks with 2 prns  Pt reported today, pain behind rt eye x 2 days and numbness; with Hx of stroke, would you suggest f/u with provider?    Phone Number Patient can be reached at: Other phone number:  [phone]    Best Time: anytime    Can we leave a detailed message on this number? YES    Call taken on 3/7/2017 at 12:39 PM by Fifi Thapa    High priority msg due to sx and hx

## 2017-03-07 NOTE — TELEPHONE ENCOUNTER
"Pt contacted, denies all symptoms listed below.  She said: \"it is all gone now.\" Pt advised to david back if develops any symptoms.   Linda Coppola RN    "

## 2017-03-09 ENCOUNTER — CARE COORDINATION (OUTPATIENT)
Dept: CARE COORDINATION | Facility: CLINIC | Age: 72
End: 2017-03-09

## 2017-03-09 ENCOUNTER — DOCUMENTATION ONLY (OUTPATIENT)
Dept: CARE COORDINATION | Facility: CLINIC | Age: 72
End: 2017-03-09

## 2017-03-09 ENCOUNTER — TELEPHONE (OUTPATIENT)
Dept: FAMILY MEDICINE | Facility: CLINIC | Age: 72
End: 2017-03-09
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
## 2017-03-10 ENCOUNTER — TELEPHONE (OUTPATIENT)
Dept: FAMILY MEDICINE | Facility: CLINIC | Age: 72
End: 2017-03-10

## 2017-03-10 DIAGNOSIS — Z53.9 DIAGNOSIS NOT YET DEFINED: Primary | ICD-10-CM

## 2017-03-10 PROCEDURE — G0180 MD CERTIFICATION HHA PATIENT: HCPCS | Performed by: FAMILY MEDICINE

## 2017-03-10 NOTE — TELEPHONE ENCOUNTER
POLST signed.   Please send copy to [facility] to be scanned to chart and fax copy back to home care.

## 2017-03-10 NOTE — PROGRESS NOTES
[facility] now requests orders and shares plan of care/discharge summaries for some patients through [employer].  Please REPLY TO THIS MESSAGE in order to give authorization for orders when needed.  This is considered a verbal order, you will still receive a faxed copy of orders for signature.  Thank you for your assistance in improving collaboration for our patients.    ORDER    ST to continue 1week4  for cognitive linguistic skills. Plan also to include monitoring of meds, pain, skin, and falls risk.    GOALS  To improve communication and cognition for safety in the home and community, by 4/8/17 the patient will  1. state/utilize 3 strategies to compensate for memory with minimal cues MET 3/9/17  2. complete mod to complex memory tasks w/ 90 percent acc and min cues ONGOING  3. complete mod to complex reasoning/problem solving tasks with 90 percent acc and min cues ONGOING  4. complete high level word finding tasks w/ 90 percent acc and min cues ONGOING  5. demo use of word finding comp strats in appropriate situations w/ 80 percent acc ONGOING  6. demo completion of HEP as directed in 80 percent of opps given min cues ONGOING    Ami Robbins MA, CCC-SLP  Speech-Language Pathologist  [facility]  [email or web address]  [phone]

## 2017-03-13 ENCOUNTER — TELEPHONE (OUTPATIENT)
Dept: FAMILY MEDICINE | Facility: CLINIC | Age: 72
End: 2017-03-13

## 2017-03-15 ENCOUNTER — TELEPHONE (OUTPATIENT)
Dept: FAMILY MEDICINE | Facility: CLINIC | Age: 72
End: 2017-03-15

## 2017-03-15 DIAGNOSIS — I10 HYPERTENSION GOAL BP (BLOOD PRESSURE) < 130/80: Primary | ICD-10-CM

## 2017-03-15 RX ORDER — AMLODIPINE BESYLATE 2.5 MG/1
2.5 TABLET ORAL DAILY
Qty: 90 TABLET | Refills: 1 | Status: SHIPPED | OUTPATIENT
Start: 2017-03-15 | End: 2017-10-02

## 2017-03-15 NOTE — TELEPHONE ENCOUNTER
Reason for Call:  Home Health Care    amlodopine is 5 mg tabs and cant break them can she get 2.5 tabs  [id number] - [place], [place] - [address] [place] [address] Provider: Josh     Phone Number Homecare Nurse can be reached at: [phone]    Can we leave a detailed message on this number? YES    Best Time: any    Call taken on 3/15/2017 at 5:04 PM by Tania Tam

## 2017-03-20 ENCOUNTER — TELEPHONE (OUTPATIENT)
Dept: FAMILY MEDICINE | Facility: CLINIC | Age: 72
End: 2017-03-20

## 2017-03-20 ENCOUNTER — OFFICE VISIT (OUTPATIENT)
Dept: FAMILY MEDICINE | Facility: CLINIC | Age: 72
End: 2017-03-20
Payer: COMMERCIAL

## 2017-03-20 VITALS
HEART RATE: 65 BPM | HEIGHT: 60 IN | OXYGEN SATURATION: 98 % | TEMPERATURE: 97.8 F | RESPIRATION RATE: 12 BRPM | WEIGHT: 151 LBS | SYSTOLIC BLOOD PRESSURE: 122 MMHG | BODY MASS INDEX: 29.64 KG/M2 | DIASTOLIC BLOOD PRESSURE: 80 MMHG

## 2017-03-20 DIAGNOSIS — R26.89 POOR BALANCE: ICD-10-CM

## 2017-03-20 DIAGNOSIS — I95.1 ORTHOSTATIC HYPOTENSION: ICD-10-CM

## 2017-03-20 DIAGNOSIS — I10 HYPERTENSION GOAL BP (BLOOD PRESSURE) < 130/80: ICD-10-CM

## 2017-03-20 DIAGNOSIS — K59.09 CHRONIC CONSTIPATION: ICD-10-CM

## 2017-03-20 DIAGNOSIS — I63.9 CEREBROVASCULAR ACCIDENT (CVA), UNSPECIFIED MECHANISM (H): Primary | ICD-10-CM

## 2017-03-20 DIAGNOSIS — R41.9 COGNITIVE COMPLAINTS: ICD-10-CM

## 2017-03-20 DIAGNOSIS — F43.23 ADJUSTMENT REACTION WITH ANXIETY AND DEPRESSION: ICD-10-CM

## 2017-03-20 PROCEDURE — 99214 OFFICE O/P EST MOD 30 MIN: CPT | Performed by: FAMILY MEDICINE

## 2017-03-20 RX ORDER — ADHESIVE BANDAGE 3/4"
1 BANDAGE TOPICAL DAILY PRN
Qty: 1 EACH | Refills: 0 | Status: SHIPPED | OUTPATIENT
Start: 2017-03-20 | End: 2021-11-11

## 2017-03-20 RX ORDER — FLUOXETINE 10 MG/1
CAPSULE ORAL PRN
Refills: 1 | COMMUNITY
Start: 2017-02-24 | End: 2017-03-20 | Stop reason: ALTCHOICE

## 2017-03-20 RX ORDER — ADHESIVE BANDAGE 3/4"
1 BANDAGE TOPICAL DAILY PRN
Qty: 1 EACH | Refills: 0 | Status: SHIPPED | OUTPATIENT
Start: 2017-03-20 | End: 2017-03-20

## 2017-03-20 ASSESSMENT — ANXIETY QUESTIONNAIRES
GAD7 TOTAL SCORE: 3
2. NOT BEING ABLE TO STOP OR CONTROL WORRYING: SEVERAL DAYS
7. FEELING AFRAID AS IF SOMETHING AWFUL MIGHT HAPPEN: NOT AT ALL
6. BECOMING EASILY ANNOYED OR IRRITABLE: NOT AT ALL
3. WORRYING TOO MUCH ABOUT DIFFERENT THINGS: SEVERAL DAYS
5. BEING SO RESTLESS THAT IT IS HARD TO SIT STILL: NOT AT ALL
1. FEELING NERVOUS, ANXIOUS, OR ON EDGE: SEVERAL DAYS
IF YOU CHECKED OFF ANY PROBLEMS ON THIS QUESTIONNAIRE, HOW DIFFICULT HAVE THESE PROBLEMS MADE IT FOR YOU TO DO YOUR WORK, TAKE CARE OF THINGS AT HOME, OR GET ALONG WITH OTHER PEOPLE: VERY DIFFICULT

## 2017-03-20 ASSESSMENT — PATIENT HEALTH QUESTIONNAIRE - PHQ9: 5. POOR APPETITE OR OVEREATING: NOT AT ALL

## 2017-03-20 ASSESSMENT — PAIN SCALES - GENERAL: PAINLEVEL: NO PAIN (0)

## 2017-03-20 NOTE — MR AVS SNAPSHOT
After Visit Summary   3/20/2017    Anabelle Herbert    MRN: [id number]           Patient Information     Date Of Birth          1945        Visit Information        Provider Department      3/20/2017 8:20 AM Jyoti Moreno MD [facility]        Today's Diagnoses     Visit for screening mammogram        Need for prophylactic vaccination against Streptococcus pneumoniae (pneumococcus)          Care Instructions    Fax or mail blood pressure readings to the clinic.     Take Miralax 2x a day.     Follow up in 1 month for med-check.         Follow-ups after your visit        Who to contact     If you have questions or need follow up information about today's clinic visit or your schedule please contact [facility] directly at [phone].  Normal or non-critical lab and imaging results will be communicated to you by [employer], letter or phone within 4 business days after the clinic has received the results. If you do not hear from us within 7 days, please contact the clinic through [employer] or phone. If you have a critical or abnormal lab result, we will notify you by phone as soon as possible.  Submit refill requests through [employer] or call your pharmacy and they will forward the refill request to us. Please allow 3 business days for your refill to be completed.          Additional Information About Your Visit        MyChart Information     [employer] gives you secure access to your electronic health record. If you see a primary care provider, you can also send messages to your care team and make appointments. If you have questions, please call your primary care clinic.  If you do not have a primary care provider, please call [phone] and they will assist you.        Care EveryWhere ID     This is your Care EveryWhere ID. This could be used by other organizations to access your [place] medical records  [id number]        Your Vitals Were     Pulse Temperature  "Respirations Height Pulse Oximetry BMI (Body Mass Index)    65 97.8  F (36.6  C) (Oral) 12 1.511 m (4' 11.5\") 98% 29.99 kg/m2       Blood Pressure from Last 3 Encounters:   03/20/17 122/80   02/22/17 114/76   02/01/17 136/76    Weight from Last 3 Encounters:   03/20/17 68.5 kg (151 lb)   02/22/17 66.8 kg (147 lb 3.2 oz)   02/01/17 66.1 kg (145 lb 11.2 oz)              Today, you had the following     No orders found for display         Today's Medication Changes          These changes are accurate as of: 3/20/17  9:25 AM.  If you have any questions, ask your nurse or doctor.               Stop taking these medicines if you haven't already. Please contact your care team if you have questions.     FLUoxetine 10 MG capsule   Commonly known as:  PROzac   Stopped by:  Jyoti Moreno MD                    Primary Care Provider Office Phone # Fax #    Jyoti Moreno [phone]       [facility] [address] [place] [address] [place]  [facility] [address]        Thank you!     Thank you for choosing [facility]  for your care. Our goal is always to provide you with excellent care. Hearing back from our patients is one way we can continue to improve our services. Please take a few minutes to complete the written survey that you may receive in the mail after your visit with us. Thank you!             Your Updated Medication List - Protect others around you: Learn how to safely use, store and throw away your medicines at www.disposemymeds.org.          This list is accurate as of: 3/20/17  9:25 AM.  Always use your most recent med list.                   Brand Name Dispense Instructions for use    amLODIPine 2.5 MG tablet    NORVASC    90 tablet    Take 1 tablet (2.5 mg) by mouth daily       Blood Pressure Cuff Misc     1 each    1 Device daily as needed       clopidogrel 75 MG tablet    PLAVIX    90 tablet    Take 1 tablet (75 mg) by mouth daily       co-enzyme Q-10 100 MG Caps " capsule      Take 1 capsule by mouth daily.       escitalopram 10 MG tablet    LEXAPRO    30 tablet    Take 1 tablet (10 mg) by mouth daily       Fish Oil Oil      daily       fluticasone 50 MCG/ACT spray    FLONASE    3 Package    INSTILL 1-2 SPRAYS INTO BOTH NOSTRILS DAILY       KONSYL FIBER PO      Take by mouth 2 times daily       lisinopril 20 MG tablet    PRINIVIL/ZESTRIL    90 tablet    Take 1 tablet (20 mg) by mouth daily       MAGNESIUM CITRATE PO      Take by mouth daily       MIRALAX PO      Take 1-2 capfuls by mouth as needed.       prednisoLONE acetate 1 % ophthalmic susp    PRED FORTE         simvastatin 20 MG tablet    ZOCOR    30 tablet    TAKE 1 TABLET (20 MG) BY MOUTH AT BEDTIME       traZODone 50 MG tablet    DESYREL    180 tablet    Take 1-2 tablets ( mg) by mouth nightly as needed for sleep       vitamin D 2000 UNITS tablet     100 tablet    Take 2,000 Units by mouth daily.

## 2017-03-20 NOTE — NURSING NOTE
"Chief Complaint   Patient presents with     Recheck Medication       Initial /80 (BP Location: Left arm, Patient Position: Left side, Cuff Size: Adult Regular)  Pulse 65  Temp 97.8  F (36.6  C) (Oral)  Resp 12  Ht 1.511 m (4' 11.5\")  Wt 68.5 kg (151 lb)  SpO2 98%  BMI 29.99 kg/m2 Estimated body mass index is 29.99 kg/(m^2) as calculated from the following:    Height as of this encounter: 1.511 m (4' 11.5\").    Weight as of this encounter: 68.5 kg (151 lb).  Medication Reconciliation: complete     Will Yaya SORENSEN      "

## 2017-03-20 NOTE — LETTER
[address] [id number] [phone]    March 20, 2017        Anabelle Herbert  [address] [facility] [address]          To whom it may concern:    Anabelle had a driving evaluation with the [facility] on 3/1/2017. Based on this evaluation she may resume driving with the following recommendations:  - limit driving to familiar routes only  - limit vehicle distractions such as conversation/radio/cell phone/gps, etc  - No driving in inclement weather  - limit rush hour driving.   - Refrain from driving when not feeling well, or is fatigued/stressed.    Please contact me for questions or concerns.         Sincerely,        Jyoti Moreno MD
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
## 2017-03-20 NOTE — PATIENT INSTRUCTIONS
Fax or mail blood pressure readings to the clinic.     Take Miralax 2x a day.     Follow up in 1 month for med-check.

## 2017-03-21 ASSESSMENT — PATIENT HEALTH QUESTIONNAIRE - PHQ9: SUM OF ALL RESPONSES TO PHQ QUESTIONS 1-9: 7

## 2017-03-21 ASSESSMENT — ANXIETY QUESTIONNAIRES: GAD7 TOTAL SCORE: 3

## 2017-03-22 ENCOUNTER — DOCUMENTATION ONLY (OUTPATIENT)
Dept: CARE COORDINATION | Facility: CLINIC | Age: 72
End: 2017-03-22

## 2017-03-22 DIAGNOSIS — I63.9 CEREBROVASCULAR ACCIDENT (CVA), UNSPECIFIED MECHANISM (H): Primary | ICD-10-CM

## 2017-03-22 DIAGNOSIS — R41.9 COGNITIVE COMPLAINTS: ICD-10-CM

## 2017-03-22 NOTE — PROGRESS NOTES
[facility] now requests orders and shares plan of care/discharge summaries for some patients through [employer]. Thank you for your assistance in improving collaboration for our patients.    ORDER    MD, please send an order for outpatient speech-language pathology evaluation for cognitive linguistic skills to the [facility] at fax number .  Final home care speech-language pathology visit will be 3/30/17. Pt will soon be ready to transition to outpatient services and she feels most comfortable getting to the [place] location.     Thank you,    Ami Robbins MA, CCC-SLP  Speech-Language Pathologist  [facility]  [email or web address]  [phone]

## 2017-03-23 ENCOUNTER — TELEPHONE (OUTPATIENT)
Dept: FAMILY MEDICINE | Facility: CLINIC | Age: 72
End: 2017-03-23

## 2017-03-28 ENCOUNTER — TELEPHONE (OUTPATIENT)
Dept: FAMILY MEDICINE | Facility: CLINIC | Age: 72
End: 2017-03-28

## 2017-03-28 DIAGNOSIS — F43.23 ADJUSTMENT REACTION WITH ANXIETY AND DEPRESSION: Primary | ICD-10-CM

## 2017-03-28 NOTE — TELEPHONE ENCOUNTER
Referral made for counseling. Should move up her appt with me if needed if not doing well. Keep her appt 40 min

## 2017-03-28 NOTE — TELEPHONE ENCOUNTER
Reason for Call:  Other 2 issues    Detailed comments:  No 1.FYI- Patient is being discharged from home care as of today. Pt is no longer home bound. No 2 issue, pt is reporting that she is feeling depressed, & her medications was changed 3 weeks ago. Pt is in agreement that a referral be placed for a local therapist.    Phone Number Patient can be reached at: Other phone number:    [facility]/ HOME CARE [phone]         Best Time: any    Can we leave a detailed message on this number? YES    Call taken on 3/28/2017 at 3:34 PM by Joyce Black

## 2017-03-28 NOTE — TELEPHONE ENCOUNTER
Guerrero thinks it is possibly situational - did speak with pt about it. Pt agreed to being seen by therapist.    Guerrero will follow up with pt and let her know to be seen sooner if sx's don't seem to be improving (with Dr. Moreno)    Will Yaya SORENSEN

## 2017-03-31 ENCOUNTER — CARE COORDINATION (OUTPATIENT)
Dept: CARE COORDINATION | Facility: CLINIC | Age: 72
End: 2017-03-31

## 2017-03-31 DIAGNOSIS — Z76.89 HEALTH CARE HOME: ICD-10-CM
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
## 2017-04-07 ENCOUNTER — TELEPHONE (OUTPATIENT)
Dept: FAMILY MEDICINE | Facility: CLINIC | Age: 72
End: 2017-04-07

## 2017-04-14 NOTE — TELEPHONE ENCOUNTER
Form received today and faxed to home care [phone].  Form placed now in abstraction.  CHARLES Castro ([employer])

## 2017-04-18 ENCOUNTER — TELEPHONE (OUTPATIENT)
Dept: FAMILY MEDICINE | Facility: CLINIC | Age: 72
End: 2017-04-18

## 2017-04-18 NOTE — TELEPHONE ENCOUNTER
Reason for Call: Request for an order or referral:    Order or referral being requested: verbal order for Nurse consult for private pay home health aid services.     Date needed: as soon as possible    Has the patient been seen by the PCP for this problem? Not Applicable    Additional comments: Please call Candice for verbal order/ consent. Thanks.     Phone number Patient can be reached at:  [phone]    Best Time:  Anytime     Can we leave a detailed message on this number?  YES    Call taken on 4/18/2017 at 11:18 AM by Yonas Zimmer

## 2017-04-20 DIAGNOSIS — F43.23 ADJUSTMENT DISORDER WITH MIXED ANXIETY AND DEPRESSED MOOD: Primary | ICD-10-CM

## 2017-04-20 RX ORDER — ESCITALOPRAM OXALATE 10 MG/1
10 TABLET ORAL DAILY
Qty: 30 TABLET | Refills: 0 | Status: SHIPPED | OUTPATIENT
Start: 2017-04-20 | End: 2017-08-23

## 2017-04-20 RX ORDER — ESCITALOPRAM OXALATE 10 MG/1
10 TABLET ORAL DAILY
Refills: 1 | COMMUNITY
Start: 2017-03-29 | End: 2017-04-20

## 2017-04-20 NOTE — TELEPHONE ENCOUNTER
Routing refill request to provider for review/approval because:  Drug not active on patient's medication list  PHQ-9 over 4  Melanie Jones RN

## 2017-04-20 NOTE — TELEPHONE ENCOUNTER
escitalopram (LEXAPRO) 10 MG tablet (Discontinued)     Last Written Prescription Date: 3/3/17  Last Fill Quantity: 30, # refills: 1  Last Office Visit with [facility] primary care provider:  3/20/17 Dr. Moreno     Next 5 appointments (look out 90 days)     Apr 26, 2017 11:00 AM CDT   SHORT with Jyoti Moreno MD   [facility]    [address] [place] [address] [phone]                   Last PHQ-9 score on record=   PHQ-9 SCORE 3/20/2017   Total Score -   Total Score 7       Jeniffer Fabian

## 2017-04-26 ENCOUNTER — RADIANT APPOINTMENT (OUTPATIENT)
Dept: ULTRASOUND IMAGING | Facility: CLINIC | Age: 72
End: 2017-04-26
Attending: FAMILY MEDICINE
Payer: COMMERCIAL

## 2017-04-26 ENCOUNTER — OFFICE VISIT (OUTPATIENT)
Dept: FAMILY MEDICINE | Facility: CLINIC | Age: 72
End: 2017-04-26
Payer: COMMERCIAL

## 2017-04-26 VITALS
SYSTOLIC BLOOD PRESSURE: 137 MMHG | WEIGHT: 151.1 LBS | BODY MASS INDEX: 29.67 KG/M2 | TEMPERATURE: 97 F | HEART RATE: 78 BPM | OXYGEN SATURATION: 96 % | HEIGHT: 60 IN | DIASTOLIC BLOOD PRESSURE: 78 MMHG

## 2017-04-26 DIAGNOSIS — R53.83 OTHER FATIGUE: ICD-10-CM

## 2017-04-26 DIAGNOSIS — M79.89 LEFT LEG SWELLING: ICD-10-CM

## 2017-04-26 DIAGNOSIS — F43.23 ADJUSTMENT REACTION WITH ANXIETY AND DEPRESSION: ICD-10-CM

## 2017-04-26 DIAGNOSIS — I63.9 CEREBROVASCULAR ACCIDENT (CVA), UNSPECIFIED MECHANISM (H): Primary | ICD-10-CM

## 2017-04-26 DIAGNOSIS — R42 DIZZINESS: ICD-10-CM

## 2017-04-26 LAB
ALBUMIN SERPL-MCNC: 3.9 G/DL (ref 3.4–5)
ALBUMIN UR-MCNC: 30 MG/DL
ALP SERPL-CCNC: 53 U/L (ref 40–150)
ALT SERPL W P-5'-P-CCNC: 21 U/L (ref 0–50)
AMORPH CRY #/AREA URNS HPF: ABNORMAL /HPF
ANION GAP SERPL CALCULATED.3IONS-SCNC: 4 MMOL/L (ref 3–14)
APPEARANCE UR: CLEAR
AST SERPL W P-5'-P-CCNC: 20 U/L (ref 0–45)
BACTERIA #/AREA URNS HPF: ABNORMAL /HPF
BASOPHILS # BLD AUTO: 0.1 10E9/L (ref 0–0.2)
BASOPHILS NFR BLD AUTO: 0.8 %
BILIRUB SERPL-MCNC: 0.3 MG/DL (ref 0.2–1.3)
BILIRUB UR QL STRIP: NEGATIVE
BUN SERPL-MCNC: 18 MG/DL (ref 7–30)
CALCIUM SERPL-MCNC: 9.3 MG/DL (ref 8.5–10.1)
CHLORIDE SERPL-SCNC: 107 MMOL/L (ref 94–109)
CO2 SERPL-SCNC: 31 MMOL/L (ref 20–32)
COLOR UR AUTO: YELLOW
CREAT SERPL-MCNC: 0.88 MG/DL (ref 0.52–1.04)
DIFFERENTIAL METHOD BLD: NORMAL
EOSINOPHIL # BLD AUTO: 0.3 10E9/L (ref 0–0.7)
EOSINOPHIL NFR BLD AUTO: 4 %
ERYTHROCYTE [DISTWIDTH] IN BLOOD BY AUTOMATED COUNT: 11.8 % (ref 10–15)
GFR SERPL CREATININE-BSD FRML MDRD: 64 ML/MIN/1.7M2
GLUCOSE SERPL-MCNC: 93 MG/DL (ref 70–99)
GLUCOSE UR STRIP-MCNC: NEGATIVE MG/DL
HCT VFR BLD AUTO: 37.9 % (ref 35–47)
HGB BLD-MCNC: 12.7 G/DL (ref 11.7–15.7)
HGB UR QL STRIP: NEGATIVE
KETONES UR STRIP-MCNC: NEGATIVE MG/DL
LEUKOCYTE ESTERASE UR QL STRIP: NEGATIVE
LYMPHOCYTES # BLD AUTO: 2 10E9/L (ref 0.8–5.3)
LYMPHOCYTES NFR BLD AUTO: 31.3 %
MCH RBC QN AUTO: 31.9 PG (ref 26.5–33)
MCHC RBC AUTO-ENTMCNC: 33.5 G/DL (ref 31.5–36.5)
MCV RBC AUTO: 95 FL (ref 78–100)
MONOCYTES # BLD AUTO: 0.5 10E9/L (ref 0–1.3)
MONOCYTES NFR BLD AUTO: 7.8 %
MUCOUS THREADS #/AREA URNS LPF: PRESENT /LPF
NEUTROPHILS # BLD AUTO: 3.6 10E9/L (ref 1.6–8.3)
NEUTROPHILS NFR BLD AUTO: 56.1 %
NITRATE UR QL: NEGATIVE
NON-SQ EPI CELLS #/AREA URNS LPF: ABNORMAL /LPF
PH UR STRIP: 6 PH (ref 5–7)
PLATELET # BLD AUTO: 221 10E9/L (ref 150–450)
POTASSIUM SERPL-SCNC: 4.8 MMOL/L (ref 3.4–5.3)
PROT SERPL-MCNC: 7.3 G/DL (ref 6.8–8.8)
RBC # BLD AUTO: 3.98 10E12/L (ref 3.8–5.2)
RBC #/AREA URNS AUTO: ABNORMAL /HPF (ref 0–2)
SODIUM SERPL-SCNC: 142 MMOL/L (ref 133–144)
SP GR UR STRIP: 1.02 (ref 1–1.03)
URN SPEC COLLECT METH UR: ABNORMAL
UROBILINOGEN UR STRIP-ACNC: 0.2 EU/DL (ref 0.2–1)
WBC # BLD AUTO: 6.3 10E9/L (ref 4–11)
WBC #/AREA URNS AUTO: ABNORMAL /HPF (ref 0–2)

## 2017-04-26 PROCEDURE — 99214 OFFICE O/P EST MOD 30 MIN: CPT | Performed by: FAMILY MEDICINE

## 2017-04-26 PROCEDURE — 85025 COMPLETE CBC W/AUTO DIFF WBC: CPT | Performed by: FAMILY MEDICINE

## 2017-04-26 PROCEDURE — 81001 URINALYSIS AUTO W/SCOPE: CPT | Performed by: FAMILY MEDICINE

## 2017-04-26 PROCEDURE — 80053 COMPREHEN METABOLIC PANEL: CPT | Performed by: FAMILY MEDICINE

## 2017-04-26 PROCEDURE — 93971 EXTREMITY STUDY: CPT | Mod: LT

## 2017-04-26 PROCEDURE — 36415 COLL VENOUS BLD VENIPUNCTURE: CPT | Performed by: FAMILY MEDICINE

## 2017-04-26 ASSESSMENT — ANXIETY QUESTIONNAIRES
3. WORRYING TOO MUCH ABOUT DIFFERENT THINGS: SEVERAL DAYS
2. NOT BEING ABLE TO STOP OR CONTROL WORRYING: SEVERAL DAYS
1. FEELING NERVOUS, ANXIOUS, OR ON EDGE: SEVERAL DAYS
6. BECOMING EASILY ANNOYED OR IRRITABLE: SEVERAL DAYS
IF YOU CHECKED OFF ANY PROBLEMS ON THIS QUESTIONNAIRE, HOW DIFFICULT HAVE THESE PROBLEMS MADE IT FOR YOU TO DO YOUR WORK, TAKE CARE OF THINGS AT HOME, OR GET ALONG WITH OTHER PEOPLE: SOMEWHAT DIFFICULT
5. BEING SO RESTLESS THAT IT IS HARD TO SIT STILL: NOT AT ALL
7. FEELING AFRAID AS IF SOMETHING AWFUL MIGHT HAPPEN: NOT AT ALL
GAD7 TOTAL SCORE: 4

## 2017-04-26 ASSESSMENT — PATIENT HEALTH QUESTIONNAIRE - PHQ9: 5. POOR APPETITE OR OVEREATING: NOT AT ALL

## 2017-04-26 ASSESSMENT — PAIN SCALES - GENERAL: PAINLEVEL: NO PAIN (0)

## 2017-04-26 NOTE — LETTER
[address]  [address]  [phone]    April 27, 2017      Anabelle Herbert  [address] [address]              Dear Anabelle,    It was a pleasure seeing you at your recent visit. Your labs have been reviewed and are attached.     Labs look good. No cause for your dizziness and weakness was seen. The kidney function has improved a bit with your better hydration. Keep up the water!    I've checked with our pharmacist and she report that prozac could decrease the effectiveness of the plavix, so we will avoid this medicine. Let's go ahead and trial a medicine called effexor (venlafaxine). I'll start you at a very low dose of 37.5 mg. It's helpful to take this medicine at the same time every day to help minimize side effects. Remember it may take 2-4 weeks to see the benefit of the medication. Please stop the lexapro and then start the effexor (venlafaxine). I'll send a prescription to your pharmacy.     Sincerely,    Jyoti Moreno M.D.      Results for orders placed or performed in visit on 04/26/17   Comprehensive metabolic panel   Result Value Ref Range    Sodium 142 133 - 144 mmol/L    Potassium 4.8 3.4 - 5.3 mmol/L    Chloride 107 94 - 109 mmol/L    Carbon Dioxide 31 20 - 32 mmol/L    Anion Gap 4 3 - 14 mmol/L    Glucose 93 70 - 99 mg/dL    Urea Nitrogen 18 7 - 30 mg/dL    Creatinine 0.88 0.52 - 1.04 mg/dL    GFR Estimate 64 >60 mL/min/1.7m2    GFR Estimate If Black 77 >60 mL/min/1.7m2    Calcium 9.3 8.5 - 10.1 mg/dL    Bilirubin Total 0.3 0.2 - 1.3 mg/dL    Albumin 3.9 3.4 - 5.0 g/dL    Protein Total 7.3 6.8 - 8.8 g/dL    Alkaline Phosphatase 53 40 - 150 U/L    ALT 21 0 - 50 U/L    AST 20 0 - 45 U/L   UA reflex to Microscopic and Culture   Result Value Ref Range    Color Urine Yellow     Appearance Urine Clear     Glucose Urine Negative NEG mg/dL    Bilirubin Urine Negative NEG    Ketones Urine Negative NEG mg/dL    Specific Gravity  Urine 1.025 1.003 - 1.035    Blood Urine Negative NEG    pH Urine 6.0 5.0 - 7.0 pH    Protein Albumin Urine 30 (A) NEG mg/dL    Urobilinogen Urine 0.2 0.2 - 1.0 EU/dL    Nitrite Urine Negative NEG    Leukocyte Esterase Urine Negative NEG    Source Midstream Urine    CBC with platelets differential   Result Value Ref Range    WBC 6.3 4.0 - 11.0 10e9/L    RBC Count 3.98 3.8 - 5.2 10e12/L    Hemoglobin 12.7 11.7 - 15.7 g/dL    Hematocrit 37.9 35.0 - 47.0 %    MCV 95 78 - 100 fl    MCH 31.9 26.5 - 33.0 pg    MCHC 33.5 31.5 - 36.5 g/dL    RDW 11.8 10.0 - 15.0 %    Platelet Count 221 150 - 450 10e9/L    Diff Method Automated Method     % Neutrophils 56.1 %    % Lymphocytes 31.3 %    % Monocytes 7.8 %    % Eosinophils 4.0 %    % Basophils 0.8 %    Absolute Neutrophil 3.6 1.6 - 8.3 10e9/L    Absolute Lymphocytes 2.0 0.8 - 5.3 10e9/L    Absolute Monocytes 0.5 0.0 - 1.3 10e9/L    Absolute Eosinophils 0.3 0.0 - 0.7 10e9/L    Absolute Basophils 0.1 0.0 - 0.2 10e9/L   Urine Microscopic   Result Value Ref Range    WBC Urine O - 2 0 - 2 /HPF    RBC Urine O - 2 0 - 2 /HPF    Squamous Epithelial /LPF Urine Few FEW /LPF    Bacteria Urine Few (A) NEG /HPF    Amorphous Crystals Few (A) NEG /HPF    Mucous Urine Present (A) NEG /LPF

## 2017-04-26 NOTE — NURSING NOTE
"Chief Complaint   Patient presents with     Hypertension     Hyperlipidemia       Initial /78 (BP Location: Right arm, Patient Position: Chair, Cuff Size: Adult Regular)  Pulse 78  Temp 97  F (36.1  C) (Oral)  Ht 1.511 m (4' 11.5\")  Wt 68.5 kg (151 lb 1.6 oz)  SpO2 96%  Breastfeeding? No  BMI 30.01 kg/m2 Estimated body mass index is 30.01 kg/(m^2) as calculated from the following:    Height as of this encounter: 1.511 m (4' 11.5\").    Weight as of this encounter: 68.5 kg (151 lb 1.6 oz).  Medication Reconciliation: complete     INGRID Horta MA      "

## 2017-04-26 NOTE — MR AVS SNAPSHOT
After Visit Summary   4/26/2017    Anabelle Herbert    MRN: [id number]           Patient Information     Date Of Birth          1945        Visit Information        Provider Department      4/26/2017 11:00 AM Jyoti Moreno MD [facility]        Today's Diagnoses     Cerebrovascular accident (CVA), unspecified mechanism (H)    -  1    Adjustment reaction with anxiety and depression        Left leg swelling        Dizziness        Other fatigue          Care Instructions    Follow up for ultrasound    Follow up with counseling.     Eat at least 3 times a day. Consider nutritional supplement such as Ensure to replace a meal.     Monitor the situation surrounding episodes of dizziness/lightheadedness. Also, monitor cardiac symptoms during these episodes.     Follow up in 3 weeks for check-in.             Follow-ups after your visit        Your next 10 appointments already scheduled     Apr 26, 2017  4:20 PM CDT   US LOWER EXTREMITY VENOUS DUPLEX LEFT with MGUS1 MG  [email or web address]   [facility] ([facility]    [address] [place] [id number] [phone]           Please bring a list of your medicines (including vitamins, minerals and over-the-counter drugs). Also, tell your doctor about any allergies you may have. Wear comfortable clothes and leave your valuables at home.  You do not need to do anything special to prepare for your exam.  Please call the Imaging Department at your exam site with any questions.            May 22, 2017  1:00 PM CDT   Office Visit with Jyoti Moreno MD   [facility] ([facility]    [address] [facility] [address] [phone]           Bring a current list of meds and any records pertaining to this visit.  For Physicals, please bring immunization records and any forms needing to be filled out.  Please arrive 10 minutes early to  "complete paperwork.              Future tests that were ordered for you today     Open Future Orders        Priority Expected Expires Ordered    US Lower Extremity Venous Duplex Left Today  4/26/2018 4/26/2017            Who to contact     If you have questions or need follow up information about today's clinic visit or your schedule please contact [facility] directly at [phone].  Normal or non-critical lab and imaging results will be communicated to you by MyChart, letter or phone within 4 business days after the clinic has received the results. If you do not hear from us within 7 days, please contact the clinic through [employer] or phone. If you have a critical or abnormal lab result, we will notify you by phone as soon as possible.  Submit refill requests through [employer] or call your pharmacy and they will forward the refill request to us. Please allow 3 business days for your refill to be completed.          Additional Information About Your Visit        [employer] [address] Information     [employer] gives you secure access to your electronic health record. If you see a primary care provider, you can also send messages to your care team and make appointments. If you have questions, please call your primary care clinic.  If you do not have a primary care provider, please call [phone] and they will assist you.        Care EveryWhere ID     This is your Care EveryWhere ID. This could be used by other organizations to access your [facility] medical records  [id number]        Your Vitals Were     Pulse Temperature Height Pulse Oximetry Breastfeeding? BMI (Body Mass Index)    78 97  F (36.1  C) (Oral) 1.511 m (4' 11.5\") 96% No 30.01 kg/m2       Blood Pressure from Last 3 Encounters:   04/26/17 137/78   03/20/17 122/80   02/22/17 114/76    Weight from Last 3 Encounters:   04/26/17 68.5 kg (151 lb 1.6 oz)   03/20/17 68.5 kg (151 lb)   02/22/17 66.8 kg (147 lb 3.2 oz)              We Performed the Following     " CBC with platelets differential     Comprehensive metabolic panel     UA reflex to Microscopic and Culture          Today's Medication Changes          These changes are accurate as of: 4/26/17 12:17 PM.  If you have any questions, ask your nurse or doctor.               Stop taking these medicines if you haven't already. Please contact your care team if you have questions.     traZODone 50 MG tablet   Commonly known as:  DESYREL   Stopped by:  Jyoti Moreno MD                    Primary Care Provider Office Phone # Fax #    Jyoti Moreno [phone]       [facility] [address] [place] [address] N  [facility] [address]        Thank you!     Thank you for choosing [facility]  for your care. Our goal is always to provide you with excellent care. Hearing back from our patients is one way we can continue to improve our services. Please take a few minutes to complete the written survey that you may receive in the mail after your visit with us. Thank you!             Your Updated Medication List - Protect others around you: Learn how to safely use, store and throw away your medicines at www.disposemymeds.org.          This list is accurate as of: 4/26/17 12:17 PM.  Always use your most recent med list.                   Brand Name Dispense Instructions for use    amLODIPine 2.5 MG tablet    NORVASC    90 tablet    Take 1 tablet (2.5 mg) by mouth daily       Blood Pressure Cuff Misc     1 each    1 Device daily as needed       clopidogrel 75 MG tablet    PLAVIX    90 tablet    Take 1 tablet (75 mg) by mouth daily       co-enzyme Q-10 100 MG Caps capsule      Take 1 capsule by mouth daily.       escitalopram 10 MG tablet    LEXAPRO    30 tablet    Take 1 tablet (10 mg) by mouth daily       Fish Oil Oil      daily       fluticasone 50 MCG/ACT spray    FLONASE    3 Package    INSTILL 1-2 SPRAYS INTO BOTH NOSTRILS DAILY       lisinopril 20 MG tablet    PRINIVIL/ZESTRIL     90 tablet    Take 1 tablet (20 mg) by mouth daily       MAGNESIUM CITRATE PO      Take by mouth daily       MIRALAX PO      Take 1-2 capfuls by mouth as needed.       prednisoLONE acetate 1 % ophthalmic susp    PRED FORTE         simvastatin 20 MG tablet    ZOCOR    30 tablet    TAKE 1 TABLET (20 MG) BY MOUTH AT BEDTIME       vitamin D 2000 UNITS tablet     100 tablet    Take 2,000 Units by mouth daily.

## 2017-04-26 NOTE — PROGRESS NOTES
"  SUBJECTIVE:                                                    Anabelle Herbert is a 72 year old female who presents to clinic today for the following health issues:      Hyperlipidemia Follow-Up      Rate your low fat/cholesterol diet?: not sure    Taking statin?  Yes, possible muscle aches from statin    Other lipid medications/supplements?:  none     Hypertension Follow-up      Outpatient blood pressures are not being checked.    Low Salt Diet: no added salt       Amount of exercise or physical activity: None    Problems taking medications regularly: No    Medication side effects: none    Diet: acidic foods      Problem list and histories reviewed & adjusted, as indicated.  Additional history: as documented    Anabelle was over 20 minutes late to her visit today, so shorter appt was attempted.    Diet:Anabelle reports that she is feeling very fatigued lately and her sleeping pattern is \"all over the place\". States she has also gained weight and does not believe Lexapro is not working. Reports she has experienced \"some really weird things\". She will become dizzy all of a sudden, feel faint/ligtheaded, and experience a loss of energy. States she will need to lay down and rest during those episodes and she feels disorientated. Denies palpitations, heart racing but she will become shaky. Episodes occur about a couple of times a week, they are not occurring at any specific time during the day. She admits she does not have a consistent eating patterns. She is eating chicken, occ hamburgers, eggs with veggies- she is eating inconsistently throughout the day. Eating 1-2 meals a day. There are no days where she does not eat at all. On days where she only eats 1 meal, she might have a small snack. Unsure if the episodes are related to her eating habits. She is keeping track of her fluid intake, she is having at least 6 glasses of water daily.     Sleep: sleep schedule is variable, ranging from bedtime from 7pm-12am. THe time she " "wakes up is usually dependent on what time she goes to sleep, usually ranging from 6am-9am. She is not sleeping through the night due to her IC and needing to get up to go the bathroom and is not always able to go back to sleep after waking. Having difficulty going back to sleep 1/3 of the time. She has not used Trazodone at all. Denies issues falling asleep initially.     Mood: Describes mood during the day as \"lazy\". Home care has ended and reports that things have been harder/worsened since home care ended. Believes that eating patterns changed since home care ended. This is also when the dizziness/weak episodes sx's started. She is feeling safe in her home. She is driving. Reports memory is not where she wants it to be but it is at baseline since her last visit on 3/20. She is taking Lexapro in the morning and reiterates that she does not believe she it is working and she believes she is gaining weight. Initially started her on Prozac after her visit on 3/20, but had to d/c as it was found to interact with Plavix. Reports Prozac has worked the best for her in the past. On review of chart she has also been on zoloft, celexa in past     Home care has recommended she see a counselor. Anabelle states that she forgot to schedule due to being busy with her family and Easter. States she is back in communication with her son and daughter in law, this has been a stressor for her. Reports she also needs some help with her daughter in law for some financial issues that are affecting her credit.  has not been in contact recently.     Additional Notes  -she states she can not go to her opthalmologist anymore, as they are disputing one of her bills. As a result, [facility] has closed her account.   -Anabelle reports 1 week ago she fell when she was playing with her grand children and hurt her right foot and hand. When she went to sleep that night, she expereinced significnat pain. Pain has improved and now resolved.   -notes " she has a lump on her medial left knee, will occ be black and blue. She is concerned about a blood clot.     Patient Active Problem List   Diagnosis     Chronic interstitial cystitis     Adjustment reaction with anxiety and depression     Abdominal pain     Hypertension goal BP (blood pressure) < 130/80     CKD (chronic kidney disease) stage 2, GFR 60-89 ml/min     Hyperlipidemia LDL goal <100     Mild persistent asthma     Vitamin D deficiency     GERD (gastroesophageal reflux disease)     Advance care planning     B12 deficiency     Transient cerebral ischemia     Carotid stenosis     Diverticulosis of colon     Colon polyp     Atrophic vaginitis     Interstitial cystitis     Chronic constipation     Cognitive complaints     Prediabetes     Osteoporosis     Cerebrovascular accident (CVA), unspecified mechanism (H)     Health Care Home     Past Surgical History:   Procedure Laterality Date     COLONOSCOPY       CYSTOSCOPY, BIOPSY BLADDER, COMBINED  1/6/2014    Procedure: COMBINED CYSTOSCOPY, BIOPSY BLADDER;;  Surgeon: Vandana Tang MD;  Location: [facility] OR     CYSTOSCOPY, INJECT COLLAGEN, COMBINED  1/6/2014    Procedure: COMBINED CYSTOSCOPY, INJECT BULKING AGENT;  IC cocktail, bladder biopsy, fulguration, heparin, gentamyacin, lidocaine & kenalog;  Surgeon: Vandana Tang MD;  Location: [facility] OR     GENITOURINARY SURGERY       NO HISTORY OF SURGERY         Social History   Substance Use Topics     Smoking status: Never Smoker     Smokeless tobacco: Never Used     Alcohol use No     Family History   Problem Relation Age of Onset     CANCER Father      colon?     HEART DISEASE Father      Asthma Mother      Alzheimer Disease Mother 86     Unknown/Adopted Maternal Grandmother      Unknown/Adopted Maternal Grandfather      Unknown/Adopted Paternal Grandmother      Unknown/Adopted Paternal Grandfather      Asthma Sister      Allergies Sister      Unknown/Adopted Son          Current Outpatient Prescriptions  "  Medication Sig Dispense Refill     escitalopram (LEXAPRO) 10 MG tablet Take 1 tablet (10 mg) by mouth daily 30 tablet 0     Blood Pressure Monitoring (BLOOD PRESSURE CUFF) MISC 1 Device daily as needed 1 each 0     amLODIPine (NORVASC) 2.5 MG tablet Take 1 tablet (2.5 mg) by mouth daily 90 tablet 1     clopidogrel (PLAVIX) 75 MG tablet Take 1 tablet (75 mg) by mouth daily 90 tablet 1     lisinopril (PRINIVIL,ZESTRIL) 20 MG tablet Take 1 tablet (20 mg) by mouth daily 90 tablet 3     prednisoLONE acetate (PRED FORTE) 1 % ophthalmic suspension        simvastatin (ZOCOR) 20 MG tablet TAKE 1 TABLET (20 MG) BY MOUTH AT BEDTIME 30 tablet 0     fluticasone (FLONASE) 50 MCG/ACT nasal spray INSTILL 1-2 SPRAYS INTO BOTH NOSTRILS DAILY 3 Package 3     Fish Oil OIL daily       MAGNESIUM CITRATE PO Take by mouth daily       Polyethylene Glycol 3350 (MIRALAX PO) Take 1-2 capfuls by mouth as needed.        Cholecalciferol (VITAMIN D) 2000 UNIT tablet Take 2,000 Units by mouth daily. 100 tablet 0     co-enzyme Q-10 (COQ-10) 100 MG CAPS Take 1 capsule by mouth daily.       traZODone (DESYREL) 50 MG tablet Take 1-2 tablets ( mg) by mouth nightly as needed for sleep (Patient not taking: Reported on 4/26/2017) 180 tablet 1     Allergies   Allergen Reactions     Contrast Dye      Burning, hematuria     Dogs      Throat started to close up.        Reviewed and updated as needed this visit by clinical staff  Tobacco  Allergies  Meds  Med Hx  Surg Hx  Fam Hx  Soc Hx      Reviewed and updated as needed this visit by Provider         ROS:  Constitutional, HEENT, cardiovascular, pulmonary, gi and gu systems are negative, except as otherwise noted.    OBJECTIVE:                                                    /78 (BP Location: Right arm, Patient Position: Chair, Cuff Size: Adult Regular)  Pulse 78  Temp 97  F (36.1  C) (Oral)  Ht 1.511 m (4' 11.5\")  Wt 68.5 kg (151 lb 1.6 oz)  SpO2 96%  Breastfeeding? No  BMI 30.01 " kg/m2  Body mass index is 30.01 kg/(m^2).     GENERAL: healthy, alert and no distress  EYES: Eyes grossly normal to inspection, PERRL and conjunctivae and sclerae normal  NECK: no adenopathy, no asymmetry, masses, or scars and thyroid normal to palpation  RESP: lungs clear to auscultation - no rales, rhonchi or wheezes  CV: regular rate and rhythm, normal S1 S2, no S3 or S4, no murmur, click or rub, no peripheral edema and peripheral pulses strong  MS: prominence of fatty soft tissue on left medial knee, non-tender. No redness or swelling  NEURO: weakness of right arm- able to raise and lower very slowly- at baseline, sensory exam grossly normal and mentation intact  PSYCH: mentation appears at baseline. Affect anxious but more bright than in the past. Continues to be poor to poor historian.     Wt Readings from Last 5 Encounters:   04/26/17 68.5 kg (151 lb 1.6 oz)   03/20/17 68.5 kg (151 lb)   02/22/17 66.8 kg (147 lb 3.2 oz)   02/01/17 66.1 kg (145 lb 11.2 oz)   11/21/16 68.5 kg (151 lb)       Diagnostic Test Results:    Results for orders placed or performed in visit on 04/26/17 (from the past 24 hour(s))   CBC with platelets differential   Result Value Ref Range    WBC 6.3 4.0 - 11.0 10e9/L    RBC Count 3.98 3.8 - 5.2 10e12/L    Hemoglobin 12.7 11.7 - 15.7 g/dL    Hematocrit 37.9 35.0 - 47.0 %    MCV 95 78 - 100 fl    MCH 31.9 26.5 - 33.0 pg    MCHC 33.5 31.5 - 36.5 g/dL    RDW 11.8 10.0 - 15.0 %    Platelet Count 221 150 - 450 10e9/L    Diff Method Automated Method     % Neutrophils 56.1 %    % Lymphocytes 31.3 %    % Monocytes 7.8 %    % Eosinophils 4.0 %    % Basophils 0.8 %    Absolute Neutrophil 3.6 1.6 - 8.3 10e9/L    Absolute Lymphocytes 2.0 0.8 - 5.3 10e9/L    Absolute Monocytes 0.5 0.0 - 1.3 10e9/L    Absolute Eosinophils 0.3 0.0 - 0.7 10e9/L    Absolute Basophils 0.1 0.0 - 0.2 10e9/L   UA reflex to Microscopic and Culture   Result Value Ref Range    Color Urine Yellow     Appearance Urine Clear      Glucose Urine Negative NEG mg/dL    Bilirubin Urine Negative NEG    Ketones Urine Negative NEG mg/dL    Specific Gravity Urine 1.025 1.003 - 1.035    Blood Urine Negative NEG    pH Urine 6.0 5.0 - 7.0 pH    Protein Albumin Urine 30 (A) NEG mg/dL    Urobilinogen Urine 0.2 0.2 - 1.0 EU/dL    Nitrite Urine Negative NEG    Leukocyte Esterase Urine Negative NEG    Source Midstream Urine    Urine Microscopic   Result Value Ref Range    WBC Urine O - 2 0 - 2 /HPF    RBC Urine O - 2 0 - 2 /HPF    Squamous Epithelial /LPF Urine Few FEW /LPF    Bacteria Urine Few (A) NEG /HPF    Amorphous Crystals Few (A) NEG /HPF    Mucous Urine Present (A) NEG /LPF         PHQ-9 SCORE 1/7/2016 3/20/2017 4/26/2017   Total Score - - -   Total Score 6 7 6     VENTURA-7 SCORE 1/7/2016 3/20/2017 4/26/2017   Total Score - - -   Total Score 4 3 4          ASSESSMENT/PLAN:                                                      1. Cerebrovascular accident (CVA), unspecified mechanism (H)  Assessed status. Pt is no longer receiving home care and reports this has been difficult and has impacted her health negatively. Has some financial concerns and I will have  follow up with her. Due to a shorter visit today, will have her f/u in 3 weeks for check-in.   - Comprehensive metabolic panel  - Urine Microscopic    2. Adjustment reaction with anxiety and depression  Pt reports weight gain as a side effect of Lexapro-Reassured her that it is unlikely that the Lexapro is causing weight gain as her weight has been stable since starting. Although Prozac has worked well for her in the past, was informed that it interacts wih Plavix. Have messaged pharmacist to double check on risk with prozac. C ould also consider change to effexor.   - Comprehensive metabolic panel    3. Left leg swelling  Suspect fatty tissue, however, pt is concerned of thrombosis and requests further workup. Given the location and her immobility this is reasonable. F/U for US.   - US  Lower Extremity Venous Duplex Left; Future    4. Dizziness  Suspect due to hypoglycemia given her poor eating patterns. Discussed eating at least 3x a day and consider incorporating nutritional shake such as ensure as meal replacement. Continue hydrating frequently. She should monitor circumstances surrounding her sx's. Consider Holter monitor, repeat carotid u/s if persistent. I have updated. Patient's /care coordinator Ten Styles ([phone])  - Comprehensive metabolic panel  - UA reflex to Microscopic and Culture  - CBC with platelets differential    5. Other fatigue  Labs today to rule out metabolic causes. Pt is no longer taking Trazodone.   - Comprehensive metabolic panel  - UA reflex to Microscopic and Culture  - CBC with platelets differential    See Patient Instructions  Patient Instructions   Follow up for ultrasound    Follow up with counseling.     Eat at least 3 times a day. Consider nutritional supplement such as Ensure to replace a meal.     Monitor the situation surrounding episodes of dizziness/lightheadedness. Also, monitor cardiac symptoms during these episodes.     Follow up in 3 weeks for check-in.           This document serves as a record of the services and decisions personally performed and made by Jyoti Moreno MD. It was created on her behalf by Ilda Jean,a trained medical scribe. The creation of this document is based the provider's statements to the medical scribe.  Ilda Jean April 26, 2017 11:59 AM     The information in this document, created by a scribe for me, accurately reflects the services I personally performed and the decisions made by me. I have reviewed and approved this document for accuracy.    MD Jyoti Renee MD  [facility]

## 2017-04-27 RX ORDER — VENLAFAXINE HYDROCHLORIDE 37.5 MG/1
CAPSULE, EXTENDED RELEASE ORAL
Qty: 30 CAPSULE | Refills: 0 | Status: SHIPPED | OUTPATIENT
Start: 2017-04-27 | End: 2017-08-23

## 2017-04-27 ASSESSMENT — ANXIETY QUESTIONNAIRES: GAD7 TOTAL SCORE: 4

## 2017-04-27 ASSESSMENT — PATIENT HEALTH QUESTIONNAIRE - PHQ9: SUM OF ALL RESPONSES TO PHQ QUESTIONS 1-9: 6

## 2017-04-28 ENCOUNTER — CARE COORDINATION (OUTPATIENT)
Dept: CARE COORDINATION | Facility: CLINIC | Age: 72
End: 2017-04-28

## 2017-04-28 DIAGNOSIS — I63.9 CEREBROVASCULAR ACCIDENT (CVA), UNSPECIFIED MECHANISM (H): ICD-10-CM

## 2017-04-28 RX ORDER — CLOPIDOGREL BISULFATE 75 MG/1
75 TABLET ORAL DAILY
Qty: 90 TABLET | Refills: 3 | Status: SHIPPED | OUTPATIENT
Start: 2017-04-28 | End: 2018-05-20

## 2017-04-28 NOTE — TELEPHONE ENCOUNTER
Advised Home care nurse of verbal order ok below.    Hawa Harris also states today was her first time meeting patient and that patient said she is not taking Plavix. Pt stated she was told they were going to be changing it to another medication but was unsure who told her this and when. Patient unable to say how long she has been off this.      Med list review shows Plavix active on med list, last prescribed 12/2017 for Dx CVA.   Media scan reviewed and last OV notes from [facility] clinic of neuro found from 12/6/16 without mention of changing this medication.    Routing to AW to review and advise.    Edmar Estrella RN

## 2017-04-28 NOTE — TELEPHONE ENCOUNTER
She should be taking the plavix because of her recent stroke.   We had stopped prozac (fluoxetine) due to possible interaction with the plavix. lexapro was started as alternative. Lexapro will be changing to Effexor (see letter I sent to patient 4/27/2017 )

## 2017-04-28 NOTE — TELEPHONE ENCOUNTER
Advised home care nurse of provider notation below.     She states patient doesn't have any Plavix at home and needs refills sent (last Rx 12/2016 for 6 month supply sent to [facility] in [facility], Kimberly said this is not her pharmacy). Kimberly will contact patient to advise.    Routing Plavix refill request to provider for review/approval because:  Unsure how many refills able to send.

## 2017-04-28 NOTE — TELEPHONE ENCOUNTER
Reason for Call:  Home Health Care    Kimberly with [facility] called regarding orders     Orders are needed for this patient. Private pay Health aid 0-24 Hrs per day as requested and private pay nursing 0-24 Hrs as requested     PT:     OT:    Skilled Nursing:     Pt Provider: MD Josh    Phone Number Homecare Nurse can be reached at: [phone]    Can we leave a detailed message on this number? YES    Phone number patient can be reached at:     Best Time: Anytime     Call taken on 4/28/2017 at 11:46 AM by Yonas Zimmer

## 2017-04-28 NOTE — PROGRESS NOTES
Clinic Care Coordination Contact - Social Work - Follow-Up - 5-5-17  [facility]/Voicemail    Referral Source: Care Team  Clinical Data: SW/Care Coordinator Outreach to pt to follow-up with pt regarding things that pt needs assistance with.  SW was able to locate resource, [employer], Jacki Lancaster, [phone], that may be able to assist pt in sorting through her finances/bills.  SW also talked with [facility] regarding mental health case management and was told that pt does meet eligibility requirements.  However, it was recommended that pt contact [facility], [phone], to discuss whether pt might be eligible for other assistance at home.    Outreach attempted x1.  Left message on voicemail with call back information and requested return call.    Plan: SW/Care Coordinator will follow-up with pt in 3-5 days, if pt does not call SW back by then.    AKIRA Bradford  Care Coordinator - Social Work  [facility]  Office:  [phone]  5/5/2017 12:01 PM    Clinic Care Coordination Contact - Social Work - Initial - 4-28-17  OUTREACH    Referral Information:  Referral Source: Care Team  Reason for Contact:  Pt is having difficulty managing everything at home since [facility] discontinued services recently.  Care Conference: No     Universal Utilization:   ED Visits in last year:  (1)  Hospital visits in last year: 0  Last PCP appointment: 04/26/17  Missed Appointments: 0  Concerns:  (none)  Multiple Providers or Specialists:  (OT,PT,Neuro., Urology)    Clinical Concerns:  Current Medical Concerns:      Atrophic vaginitis      Other    Advance care planning     Prediabetes     Osteoporosis     Cerebrovascular accident (CVA), unspecified mechanism (H)     Chronic interstitial cystitis     Adjustment reaction with anxiety and depression     Abdominal pain     Hypertension goal BP (blood pressure) < 130/80     CKD (chronic kidney disease) stage 2, GFR  60-89 ml/min     Hyperlipidemia LDL goal <100     Mild persistent asthma     Vitamin D deficiency     GERD (gastroesophageal reflux disease)     B12 deficiency     Transient cerebral ischemia     Carotid stenosis     Diverticulosis of colon     Colon polyp     Interstitial cystitis     Chronic constipation     Cognitive complaints     Current Behavioral Concerns:  Pt has diagnosis of adjustment disorder with depression and anxiety, pt has had issues with hoarding, forgetfulness    Education Provided to patient:     Clinical Pathway Name: None      Medication Management:  Pt is forgetful and does own now     Functional Status:  Mobility Status: Independent     Transportation:  Pt drives close to home           Psychosocial:  Current living arrangement:: I live alone (staying with son)  Financial/Insurance:  Pt reports that her work disability may end May 1, 2017, and that her insurance through her employer may end as well.  Pt is unsure of this and indicates that her dtr-in-law, Allison, has assisted pt with these things in the past though Allison is handling a crisis situation in her life so has not been as available to assist pt.  SW attempted to discuss Medical Assistance/MnSure application process with pt and pt had difficulty following it.  Pt did tell MICHAEL that a woman came and saw her today to initiate the process of pt getting household help at home.  MICHAEL believes this to be a program that pt qualified for through [facility] (when pt was recently receiving services through [facility]), where she can get a certain amount of household help.  Pt also talked about an outstanding eye clinic appt bill she has so cannot return to her eye clinic and feels the need to see an opthomologist. Pt indicated that she has left message for Charles to call her to see if she can assist her with some of the above issues.  Pt does not want MICHAEL to contact Charles yet.  MICHAEL LM for [facility] to see if pt may qualify for case  management through the [facility] to assist pt in above areas.            Resources and Interventions:  Current Resources:  ([facility] regarding CM for pt);    PAS Number:  (na)  Senior Linkage Line Referral Placed:  (no)  Advanced Care Plans/Directives on file:: No  Referrals Placed:  ([facility])     Goals:   Goal 1 Statement:  (I need help figuring out my disability, insurance, finances)  Goal 1 Progression Percent: 20%              Barriers: Pt's fororgetfulness  Patient/Caregiver understanding:  Limited due to forgetfulness  Frequency of Care Coordination:  (prn)  Upcoming appointment: 05/22/17 (PCP)     Plan:  SW will follow up with pt in one week to see if she received response from dtr-in-Charles arriola.  SW will follow up with [facility] in one week if SW does not receive call back from them.     AKIRA Bradford  Care Coordinator - Social Work  [facility]  Office:  [phone]  4/28/2017 4:36 PM

## 2017-05-10 ENCOUNTER — TELEPHONE (OUTPATIENT)
Dept: FAMILY MEDICINE | Facility: CLINIC | Age: 72
End: 2017-05-10

## 2017-05-10 DIAGNOSIS — Z53.9 DIAGNOSIS NOT YET DEFINED: Primary | ICD-10-CM

## 2017-05-10 PROCEDURE — 99207 ZZC NO BILLABLE SERVICE THIS VISIT: CPT | Performed by: FAMILY MEDICINE

## 2017-05-12 ENCOUNTER — CARE COORDINATION (OUTPATIENT)
Dept: CARE COORDINATION | Facility: CLINIC | Age: 72
End: 2017-05-12

## 2017-05-12 NOTE — PROGRESS NOTES
Clinic Care Coordination Contact - Social Work - Follow Up - 5-12-17  Care Team Conversations    SW contacted pt via phone today and pt was at the cell phone store getting her cell phone repaired.  Pt indicated that she will call SW back.  SW contacted [facility] to see if they are currently involved with pt and learned that pt has home health aid only through the jose d program that [facility] offers.  SW will call pt back if pt does not call SW back in 3-5 days.    AKIRA Bradford  Care Coordinator - Social Work  [facility]  Office:  [phone]  5/12/2017 10:37 AM

## 2017-06-30 ENCOUNTER — TELEPHONE (OUTPATIENT)
Dept: FAMILY MEDICINE | Facility: CLINIC | Age: 72
End: 2017-06-30

## 2017-06-30 ENCOUNTER — DOCUMENTATION ONLY (OUTPATIENT)
Dept: OTHER | Facility: CLINIC | Age: 72
End: 2017-06-30

## 2017-06-30 DIAGNOSIS — Z71.89 ADVANCE CARE PLANNING: Chronic | ICD-10-CM

## 2017-07-03 NOTE — TELEPHONE ENCOUNTER
Patient returning call and states she does not have insurance and cannot come in to be seen. Patient unaware there were forms to be filled out. Please advise pt what can be done instead of coming in to be seen?

## 2017-07-03 NOTE — TELEPHONE ENCOUNTER
This writer attempted to contact Anabelle on 07/03/17      Reason for call appointment/forms and left detailed message.      When patient calls back, please schedule Office Visit appointment ASAP with PCP, document that pt called and close encounter .        Pura Tomlinson

## 2017-07-03 NOTE — TELEPHONE ENCOUNTER
Will route to  to assist with concerns of no insurance.     Forms are disability forms for work, so would be very helpful to have visit of some sort (phone could be okay) to get update on her ability to work, function, due adl's in house, etc. There have been changes since her last forms filled out (no longer with home care and is now driving).   I can fill out forms with the last info I have, but visit would be very much helpful if any way can do it

## 2017-07-03 NOTE — TELEPHONE ENCOUNTER
Patient due for appt.   Would prefer appt to fill out the disability forms so I can fill out most recent info. Please schedule her with me (40 min) some time in the next 1-2 weeks)

## 2017-07-05 ENCOUNTER — CARE COORDINATION (OUTPATIENT)
Dept: CARE COORDINATION | Facility: CLINIC | Age: 72
End: 2017-07-05

## 2017-07-05 NOTE — PROGRESS NOTES
7/5/2017 Clinic Care Coordination Contact  Care Team Conversations - Social Work     Received a return call from Dtr in- law, Charles Reyes.   She has not been very involved recently . Pt's son and she are .   Pt. is back in her home. They did several safety improvements before she moved back in after staying with her son for 6 months.   Charles  is willing to continue to help Anabelle, but has not had the time to spend several hours going through paperwork .  She advised Anabelle to not pay for [employer].   Pt. needs to sign up for Medicare Part B& D and maybe a Medicare Supplement.   Informed that patient declined an appt due to not having insurance.  Dtr. in law will try to find  for this coming Sat., to go and help pt.   They cannot do it in her house, because she gets so distracted by all the mail that is piled up .  Pt. continues to be a hoarder.  She shared the home care services ended when pt. got her drivers license back.  She thinks the Home care jose d provided some HHA hours. Not sure if still in place.  Dtr in Law works for [facility] and is very familiar with services, but feels Ramana has different criteria.   Dtr in law agreed with my concern for assisting with conference calls to L . This writers concern is that pt. will not fully understand what she is signing up for. Charles stated Anabelle  has a very hard time understanding if not presented slowly in a calm environment. We did discuss POA/ conservator ship if family cannot assist, safety of her home, need for help with business affaris.  The following resources were provided to Dtr in law:      [facility] [phone]   [email or web address]  Can request an  to go to their home and assess the situation.  They have 8  licensed Social Workers.   Free of charge .   They cannot manage money, but might help sort through bills, assess situation. They took over [employer] s Chore service and HOME- House and  "outside Maintenance.  Minor repairs,  Homemaking .    Senior Financial Help      Jael Burgos  - from [facility]     Conservators, guardians, [phone]   Has resources and info .  She is an excellent resource    Professional POA -  Usual fee $65-$110 hour   VOA can connect with a person to be POA  Same people that are guardians and Conservators.  Pts can have some deficits but must be able to understand what they are signing.   RECOMMEND papers are drawn up by an   [facility]. Sliding fee    Plan: Dtr in -law will assist pt. with insurance matters and review resources provided.  SW will follow up with pt and dtr in law in     [facility]  , Clinic Care Coordination  Clinics:  [facility], [place]  [phone]   7/5/2017   1:59 PM    7/5/2017 Clinic Care Coordination Contact  Care Team Conversations - Social Work     Message forwarded to me regarding Disability paper work .   PCP requesting a visit prior to completing  forms.  Patient told the  office she cold not come in, as she no longer has insurance.  Patient has been followed by Ten CHAMPION.     Call to pt.   She was very unclear about her situation.  She was on Short Term disability through her employer until May, now thinks she is on Long term disability Stated she just received her second check for $1,694.  Pt. also stated she received paperwork about a \"C\" word.\" - Assuming she meant COBRA.  Pt. said someone told her to just get Medicare.  Very unclear if pt has Medicare, BCBS, or nothing.  Pt admits she does not know.   Nervous giggles throughout our conversation.  Pt gave me permission to contact her DTR In Law , Charles Reyes-  for clarification.  Anabelle shared her son and DTR in law have parted ways and no one returns her calls.  Says she has no one to help her.  In the Past Ten Styles, KATHERINEW has spoken with both son and DTR in law,  but it appeared " her DTR in law helped with insurance issues. Message left for daughter in law.     Plan SW will attempt to clarify insurance situation. And follow up in 5-7 business days.     Thalia Lewis [facility]  , Clinic Care Coordination  Clinics:  [facility], [place]  [phone]   7/5/2017   10:44 AM

## 2017-07-18 ENCOUNTER — TELEPHONE (OUTPATIENT)
Dept: FAMILY MEDICINE | Facility: CLINIC | Age: 72
End: 2017-07-18

## 2017-07-18 NOTE — TELEPHONE ENCOUNTER
7/18/2017    Attempt 1    Contacted patient in regards to scheduling VIP mammogram  Message on voicemail     Patient is also due for - None    Comments:       Outreach   CC

## 2017-07-21 ENCOUNTER — TELEPHONE (OUTPATIENT)
Dept: PHYSICAL THERAPY | Facility: CLINIC | Age: 72
End: 2017-07-21

## 2017-07-21 NOTE — TELEPHONE ENCOUNTER
Social Work Contact - Follow-Up - 7-21-17    SW contacted pt via phone to day to follow-up regarding resources provided to pt/dtr-in-law, Stephanie, during Thalia Lewis's last contact with pt/dtr-in-law on 7-5-17.  Pt indicated that Charles has not been over to assist pt since pt talked with SW last.  Pt indicated that she has applied for Medicare though it is difficult to follow pt's story as to how she wentt about doing that.  Pt reports she completed forms and is awaiting to hear back from Medicare a start date for her Medicare.  SW also talked with pt regarding calling the [facility], [phone], to request an  to assist pt with her bills.  Pt indicates that she will do that.  SW chose not to discuss other resources as to not overwhelm pt.  SW also LM for pt's dtr-in-law, Charles, to call this SW regarding assistance for pt.  Pt reports that she has a dtr though they they have challenges in their relationship so dtr is not able to assist pt.  SW will follow up with pt to confirm that she was able to call [facility] to get assistance with her bills.        AKIRA Bradford     Social Work  [facility]  Office:  [phone]  E-Mail:  [email or web address]  7/21/2017 4:19 PM

## 2017-07-28 ENCOUNTER — TELEPHONE (OUTPATIENT)
Dept: PHYSICAL THERAPY | Facility: CLINIC | Age: 72
End: 2017-07-28

## 2017-07-28 NOTE — TELEPHONE ENCOUNTER
Social Work Contact - Follow-Up - 7-28-17    SW contacted pt via phone to follow-up regarding resource SW provided pt during last contact to assist her with mail/paperwork/bills at no cost.  Pt reports that she contacted [facility] services, Jacki Anisha, [phone], and was told pt would have to pay for this service.  SW LM with Jacki Anisha also as it was SW understanding that this service was free.  Pt continues to indicate that she applied for Medicare and is waiting to hear back from them.  Pt also reports that she has not heard from her dtr-in-law, Griffinizabelmee, who per previous SW conversation with dtr-in-law, she was going to try and get to pt's residence and assist her with some of these things, though it is difficult for Griffinizabelmee to do this as she has small children and it is difficult to work with pt in her home due pt's hoarding tendencies and distractibility.  SW attempted to discuss pt's finances with her and pt is unable to give much details.  MICHAEL LM for Charles again to see if she could give SW information regarding pt's financial situation as to determine whether would be eligible for [facility] services through the [facility].  SW provided active listening and support to pt during contact.  SW will await return call from [facility] and Charles.        Ten Styles, AKIRA     Social Work  [facility]  Office:  [phone]  E-Mail:  [email or web address]  7/28/2017 2:03 PM

## 2017-08-22 ENCOUNTER — TELEPHONE (OUTPATIENT)
Dept: FAMILY MEDICINE | Facility: CLINIC | Age: 72
End: 2017-08-22

## 2017-08-23 ENCOUNTER — OFFICE VISIT (OUTPATIENT)
Dept: FAMILY MEDICINE | Facility: CLINIC | Age: 72
End: 2017-08-23
Payer: MEDICARE

## 2017-08-23 VITALS
SYSTOLIC BLOOD PRESSURE: 110 MMHG | OXYGEN SATURATION: 95 % | BODY MASS INDEX: 28.57 KG/M2 | TEMPERATURE: 97.5 F | HEART RATE: 78 BPM | WEIGHT: 145.5 LBS | HEIGHT: 60 IN | DIASTOLIC BLOOD PRESSURE: 62 MMHG

## 2017-08-23 DIAGNOSIS — I10 BENIGN ESSENTIAL HYPERTENSION: ICD-10-CM

## 2017-08-23 DIAGNOSIS — I63.9 CEREBROVASCULAR ACCIDENT (CVA), UNSPECIFIED MECHANISM (H): Primary | ICD-10-CM

## 2017-08-23 DIAGNOSIS — R41.9 COGNITIVE COMPLAINTS: ICD-10-CM

## 2017-08-23 DIAGNOSIS — F43.23 ADJUSTMENT REACTION WITH ANXIETY AND DEPRESSION: ICD-10-CM

## 2017-08-23 DIAGNOSIS — K59.09 CHRONIC CONSTIPATION: ICD-10-CM

## 2017-08-23 DIAGNOSIS — N18.2 CKD (CHRONIC KIDNEY DISEASE) STAGE 2, GFR 60-89 ML/MIN: ICD-10-CM

## 2017-08-23 PROCEDURE — 99214 OFFICE O/P EST MOD 30 MIN: CPT | Performed by: FAMILY MEDICINE

## 2017-08-23 ASSESSMENT — PAIN SCALES - GENERAL: PAINLEVEL: NO PAIN (0)

## 2017-08-23 NOTE — Clinical Note
"Ten, Please see my note. Anabelle is completely overwhelmed with her financial/insurance issues. She says it's just \"piling up\". She does have more limited cognitive ability to handle these things since her CVA- no family support currently. Any options for her? Thanks!"

## 2017-08-23 NOTE — MR AVS SNAPSHOT
After Visit Summary   8/23/2017    Anabelle Herbert    MRN: [id number]           Patient Information     Date Of Birth          1945        Visit Information        Provider Department      8/23/2017 4:20 PM Jyoti Moreno MD [facility]        Today's Diagnoses     Cerebrovascular accident (CVA), unspecified mechanism (H)    -  1    Adjustment reaction with anxiety and depression        Cognitive complaints        Benign essential hypertension        CKD (chronic kidney disease) stage 2, GFR 60-89 ml/min        Chronic constipation          Care Instructions    To contact your :  AKIRA Bradford     Social Work  [facility]  Office:  [phone]    I will have the  call you.    Discontinue amlodipine and have blood pressure checked in 2-3 weeks- MA only visit.          Follow-ups after your visit        Who to contact     If you have questions or need follow up information about today's clinic visit or your schedule please contact [facility] directly at [phone].  Normal or non-critical lab and imaging results will be communicated to you by [employer], letter or phone within 4 business days after the clinic has received the results. If you do not hear from us within 7 days, please contact the clinic through [employer] or phone. If you have a critical or abnormal lab result, we will notify you by phone as soon as possible.  Submit refill requests through [employer] or call your pharmacy and they will forward the refill request to us. Please allow 3 business days for your refill to be completed.          Additional Information About Your Visit        [employer] Information     [employer] gives you secure access to your electronic health record. If you see a primary care provider, you can also send messages to your care team and make appointments. If you have questions, please call your primary care  "clinic.  If you do not have a primary care provider, please call [phone] and they will assist you.        Care EveryWhere ID     This is your Care EveryWhere ID. This could be used by other organizations to access your [place] medical records  [id number]        Your Vitals Were     Pulse Temperature Height Pulse Oximetry Breastfeeding? BMI (Body Mass Index)    78 97.5  F (36.4  C) (Oral) 1.511 m (4' 11.5\") 95% No 28.9 kg/m2       Blood Pressure from Last 3 Encounters:   08/23/17 110/62   04/26/17 137/78   03/20/17 122/80    Weight from Last 3 Encounters:   08/23/17 66 kg (145 lb 8 oz)   04/26/17 68.5 kg (151 lb 1.6 oz)   03/20/17 68.5 kg (151 lb)              Today, you had the following     No orders found for display         Today's Medication Changes          These changes are accurate as of: 8/23/17  5:13 PM.  If you have any questions, ask your nurse or doctor.               Stop taking these medicines if you haven't already. Please contact your care team if you have questions.     escitalopram 10 MG tablet   Commonly known as:  LEXAPRO   Stopped by:  Jyoti Moreno MD           fluticasone 50 MCG/ACT spray   Commonly known as:  FLONASE   Stopped by:  Jyoti Moreno MD           venlafaxine 37.5 MG 24 hr capsule   Commonly known as:  EFFEXOR-XR   Stopped by:  Jyoti Moreno MD                    Primary Care Provider Office Phone # Fax #    Jyoti Moreno [phone] [address]  [place] [place] [address]        Equal Access to Services     [facility] Center[facility] : Coy Ramos, wajohanda lumilady, qaybta kaalmada sukumar, juhi darden. So [facility] [phone].    ATENCIÓN: Si habla español, tiene a john disposición servicios gratuitos de asistencia lingüística. Llame al [phone].    We comply with applicable federal civil rights laws and [place] laws. We do not discriminate on the basis of " race, color, national origin, age, disability sex, sexual orientation or gender identity.            Thank you!     Thank you for choosing [facility]  for your care. Our goal is always to provide you with excellent care. Hearing back from our patients is one way we can continue to improve our services. Please take a few minutes to complete the written survey that you may receive in the mail after your visit with us. Thank you!             Your Updated Medication List - Protect others around you: Learn how to safely use, store and throw away your medicines at www.disposemymeds.org.          This list is accurate as of: 8/23/17  5:13 PM.  Always use your most recent med list.                   Brand Name Dispense Instructions for use Diagnosis    amLODIPine 2.5 MG tablet    NORVASC    90 tablet    Take 1 tablet (2.5 mg) by mouth daily    Hypertension goal BP (blood pressure) < 130/80       ANTIOXIDANT PO           Blood Pressure Cuff Misc     1 each    1 Device daily as needed    Hypertension goal BP (blood pressure) < 130/80       clopidogrel 75 MG tablet    PLAVIX    90 tablet    Take 1 tablet (75 mg) by mouth daily    Cerebrovascular accident (CVA), unspecified mechanism (H)       co-enzyme Q-10 100 MG Caps capsule      Take 1 capsule by mouth daily.        Fish Oil Oil      daily        lisinopril 20 MG tablet    PRINIVIL/ZESTRIL    90 tablet    Take 1 tablet (20 mg) by mouth daily    Hypertension goal BP (blood pressure) < 130/80       MAGNESIUM CITRATE PO      Take by mouth daily        MIRALAX PO      Take 1-2 capfuls by mouth as needed.        prednisoLONE acetate 1 % ophthalmic susp    PRED FORTE          simvastatin 20 MG tablet    ZOCOR    30 tablet    TAKE 1 TABLET (20 MG) BY MOUTH AT BEDTIME    Hyperlipidemia LDL goal <100       vitamin D 2000 UNITS tablet     100 tablet    Take 2,000 Units by mouth daily.    Vitamin D deficiency

## 2017-08-23 NOTE — PROGRESS NOTES
SUBJECTIVE:   Anabelle Herbert is a 72 year old female who presents to clinic today for the following health issues:    Forms.    Pt has brought in disability forms today.  -Pt says she has been trying to lose weight. [place] Diet is the same.  Wt Readings from Last 5 Encounters:   08/23/17 66 kg (145 lb 8 oz)   04/26/17 68.5 kg (151 lb 1.6 oz)   03/20/17 68.5 kg (151 lb)   02/22/17 66.8 kg (147 lb 3.2 oz)   02/01/17 66.1 kg (145 lb 11.2 oz)     -she is still living independently in her own home. She says it has been an adjustment since she is not working- all her friends are at work.     -She is back to driving. She had eval through [employer].  She is able to grocery shop and complete other errands on her own. She has had no concerns/trouble driving.     -Struggling with paying bills and completing paperwork on her own. Feels it is piling up. Her daughter in law is no longer involved at all. She does not have family, friends, or homecare visiting. She had been working with [facility] but not recently. . She is now on insurance- medicare part A and B which allows her to see me. She restated she is on disability through [employer] until May of 2018. She is in need of someone who can help her with social security, payments, disability, 401k, stocks, etc as this is too overwhelming and confusion to her. . Her son is not able to help her and her daughter is caring for dad.    -She would like to go back to work for financial reasons but does not feel she could return to the same job she was doing.     -Still poor balance and weakness on right side. She corrects herself: does not lose balance but she cannot walk straight- veering one way or the other. She often over-corrects herself if she notices she is veering to one side or the other. Level ground is easier to walk on. She is able to write fine, but does not normally type. She has not noticed joint issues- corrects statement: sometimes right  "shoulder and right knee give her pain when moving extremities the wrong way.     -she has felt lightheaded since the stroke. She does not think it is medication related. She is trying to drink lots of water. When she checks BP in drug store or [facility] it is sporadic. One time it was 200/90 and others it is much lower. She is wondering if it is dependent on location.  BP Readings from Last 3 Encounters:   08/23/17 110/62   04/26/17 137/78   03/20/17 122/80        Reviewed medications:  -She is not taking lexapro or venlafaxine. Mood \"has not been as bad as it was.\" She does not want to be on medication. She does not think she can afford counseling.  -She is not taking simvastatin. She ran out of medication and was unable to afford medication.  -not using Flonase  -She is using Miralax, Vitamin D, Mg, and CoQ10.   -Pt is also taking antioxidant- unsure of the name    Others:  -Anabelle reports she is able to afford food.   -denies new chest pain,  -constipation is controlled with Miralax  -no bladder changes.  -no vision changes since last visit but still with visual field defect  -No edema  -hearing is normal      Problem list and histories reviewed & adjusted, as indicated.  Additional history: as documented    Patient Active Problem List   Diagnosis     Chronic interstitial cystitis     Adjustment reaction with anxiety and depression     Abdominal pain     Hypertension goal BP (blood pressure) < 130/80     CKD (chronic kidney disease) stage 2, GFR 60-89 ml/min     Hyperlipidemia LDL goal <100     Mild persistent asthma     Vitamin D deficiency     GERD (gastroesophageal reflux disease)     Advance care planning     B12 deficiency     Transient cerebral ischemia     Carotid stenosis     Diverticulosis of colon     Colon polyp     Atrophic vaginitis     Interstitial cystitis     Chronic constipation     Cognitive complaints     Prediabetes     Osteoporosis     Cerebrovascular accident (CVA), unspecified mechanism (H)     " Past Surgical History:   Procedure Laterality Date     COLONOSCOPY       CYSTOSCOPY, BIOPSY BLADDER, COMBINED  1/6/2014    Procedure: COMBINED CYSTOSCOPY, BIOPSY BLADDER;;  Surgeon: Vandana Tang MD;  Location: [facility] OR     CYSTOSCOPY, INJECT COLLAGEN, COMBINED  1/6/2014    Procedure: COMBINED CYSTOSCOPY, INJECT BULKING AGENT;  IC cocktail, bladder biopsy, fulguration, heparin, gentamyacin, lidocaine & kenalog;  Surgeon: Vandana Tang MD;  Location: [facility] OR     GENITOURINARY SURGERY       NO HISTORY OF SURGERY         Social History   Substance Use Topics     Smoking status: Never Smoker     Smokeless tobacco: Never Used     Alcohol use No     Family History   Problem Relation Age of Onset     CANCER Father      colon?     HEART DISEASE Father      Asthma Mother      Alzheimer Disease Mother 86     Unknown/Adopted Maternal Grandmother      Unknown/Adopted Maternal Grandfather      Unknown/Adopted Paternal Grandmother      Unknown/Adopted Paternal Grandfather      Asthma Sister      Allergies Sister      Unknown/Adopted Son          Current Outpatient Prescriptions   Medication Sig Dispense Refill     clopidogrel (PLAVIX) 75 MG tablet Take 1 tablet (75 mg) by mouth daily 90 tablet 3     venlafaxine (EFFEXOR-XR) 37.5 MG 24 hr capsule Take 1 capsule daily for 14 days, then take 2 capsules daily. 30 capsule 0     escitalopram (LEXAPRO) 10 MG tablet Take 1 tablet (10 mg) by mouth daily 30 tablet 0     Blood Pressure Monitoring (BLOOD PRESSURE CUFF) MISC 1 Device daily as needed 1 each 0     amLODIPine (NORVASC) 2.5 MG tablet Take 1 tablet (2.5 mg) by mouth daily 90 tablet 1     lisinopril (PRINIVIL,ZESTRIL) 20 MG tablet Take 1 tablet (20 mg) by mouth daily 90 tablet 3     prednisoLONE acetate (PRED FORTE) 1 % ophthalmic suspension        simvastatin (ZOCOR) 20 MG tablet TAKE 1 TABLET (20 MG) BY MOUTH AT BEDTIME 30 tablet 0     fluticasone (FLONASE) 50 MCG/ACT nasal spray INSTILL 1-2 SPRAYS INTO BOTH NOSTRILS DAILY 3  "Package 3     Fish Oil OIL daily       Polyethylene Glycol 3350 (MIRALAX PO) Take 1-2 capfuls by mouth as needed.        Cholecalciferol (VITAMIN D) 2000 UNIT tablet Take 2,000 Units by mouth daily. 100 tablet 0     co-enzyme Q-10 (COQ-10) 100 MG CAPS Take 1 capsule by mouth daily.       MAGNESIUM CITRATE PO Take by mouth daily       Allergies   Allergen Reactions     Contrast Dye      Burning, hematuria     Dogs      Throat started to close up.          Reviewed and updated as needed this visit by clinical staffTobacco  Allergies  Meds  Med Hx  Surg Hx  Fam Hx  Soc Hx      Reviewed and updated as needed this visit by Provider         ROS:  Constitutional, HEENT, cardiovascular, pulmonary, gi and gu systems are negative, except as otherwise noted.    This document serves as a record of the services and decisions personally performed and made by Jyoti Moreno MD. It was created on her behalf by Margaret Will, a trained medical scribe. The creation of this document is based the provider's statements to the medical scribe.  Margaret Will August 23, 2017 4:32 PM      OBJECTIVE:   /62 (BP Location: Right arm, Patient Position: Chair, Cuff Size: Adult Regular)  Pulse 78  Temp 97.5  F (36.4  C) (Oral)  Ht 1.511 m (4' 11.5\")  Wt 66 kg (145 lb 8 oz)  SpO2 95%  Breastfeeding? No  BMI 28.9 kg/m2  Body mass index is 28.9 kg/(m^2).  GENERAL: healthy, alert and no distress, overweight   EYES: Eyes grossly normal to inspection, PERRL and conjunctivae and sclerae normal  NECK: no adenopathy, no asymmetry, masses, or scars and thyroid normal to palpation  RESP: lungs clear to auscultation - no rales, rhonchi or wheezes  CV: regular rate and rhythm, normal S1 S2, no S3 or S4, no murmur, click or rub, no peripheral edema and peripheral pulses strong  SKIN: no suspicious lesions or rashes to visible skin  NEURO: Normal strength and tone, mentation intact, speech normal, cranial nerves 2-12 intact and " gait normal not including heel/toe/tandem walking  PSYCH: mentation appears normal, affect normal/bright    Diagnostic Test Results:  No results found for this or any previous visit (from the past 24 hour(s)).    ASSESSMENT/PLAN:     1. Cerebrovascular accident (CVA), unspecified mechanism (H)  3. Cognitive complaints  Ongoing visual deficits, cognitive deficits, dizziness and balance issues. She is no longer receiving home care and children are not available to help her. She is having financial concerns and is requesting help. I will have  follow up with her. Filled out work disability forms (see scanned to chart). I don't believe she is capable of working currently.     2. Adjustment reaction with anxiety and depression  Pt not taking venlafaxine or Lexapro due to financial status. She is not interested in counseling. Monitor sx's.     4. Benign essential hypertension  Trial d/c use of amlodipine due to lightheadedness and lower bp today. Schedule MA apt in 2-3 weeks to check BP.     5. CKD (chronic kidney disease) stage 2, GFR 60-89 ml/min  Check labs next visit.     6. Chronic constipation  controlled with Miralax.     F/u 3-4 months.       Patient Instructions   To contact your :  AKIRA Bradford     Social Work  [facility]  Office:  [phone]    I will have the  call you.    Discontinue amlodipine and have blood pressure checked in 2-3 weeks- MA only visit.      The information in this document, created by the medical scribe for me, accurately reflects the services I personally performed and the decisions made by me. I have reviewed and approved this document for accuracy.   MD Jyoti Gamble MD  [facility]

## 2017-08-23 NOTE — PATIENT INSTRUCTIONS
To contact your :  AKIRA Bradford     Social Work  [facility]  Office:  [phone]    I will have the  call you.    Discontinue amlodipine and have blood pressure checked in 2-3 weeks- MA only visit.

## 2017-08-23 NOTE — NURSING NOTE
"Chief Complaint   Patient presents with     Forms       Initial /62 (BP Location: Right arm, Patient Position: Chair, Cuff Size: Adult Regular)  Pulse 78  Temp 97.5  F (36.4  C) (Oral)  Ht 1.511 m (4' 11.5\")  Wt 66 kg (145 lb 8 oz)  SpO2 95%  Breastfeeding? No  BMI 28.9 kg/m2 Estimated body mass index is 28.9 kg/(m^2) as calculated from the following:    Height as of this encounter: 1.511 m (4' 11.5\").    Weight as of this encounter: 66 kg (145 lb 8 oz).  Medication Reconciliation: complete     INGRID Horta MA        "

## 2017-08-24 ENCOUNTER — TELEPHONE (OUTPATIENT)
Dept: OCCUPATIONAL THERAPY | Facility: CLINIC | Age: 72
End: 2017-08-24

## 2017-08-24 NOTE — TELEPHONE ENCOUNTER
Social Work Contact - Follow-Up - 8-24-17    SW attempted to contact pt via phone today, was unable to connect with pt, and LM for pt to call SW back.  Pt continues to have difficulty managing her finances/bills.  SW did not receive call back from dtr-in-law, Allison, who is unable to assist pt and son does not assist pt either, per Dr Moreno's note of 8-23-17 .  Pt contacted [facility] - Jacki Lancaster, [phone], though was told that pt would have to pay for assistance with above matters.  SW has left another message for Jacki Lancaster to call SW to clarify their free service of sending out an  to pt's home to assess pt's situation and determine whether their agency can provided assistance to pt in managing bills.  Jacki Lancaster is a difficult person to connect with though MICHAEL will continue to try.  MICHAEL will continue to follow.      AKIRA Bradford     Social Work  [facility]  Office:  [phone]  E-Mail:  [email or web address]  8/24/2017 10:13 AM

## 2017-08-29 ASSESSMENT — ASTHMA QUESTIONNAIRES: ACT_TOTALSCORE: 25

## 2017-09-02 DIAGNOSIS — I10 HYPERTENSION GOAL BP (BLOOD PRESSURE) < 130/80: ICD-10-CM

## 2017-09-05 RX ORDER — LISINOPRIL 20 MG/1
TABLET ORAL
Qty: 90 TABLET | Refills: 2 | Status: SHIPPED | OUTPATIENT
Start: 2017-09-05 | End: 2018-07-05

## 2017-09-05 NOTE — TELEPHONE ENCOUNTER
lisinopril (PRINIVIL,ZESTRIL) 20 MG tablet      Last Written Prescription Date: 7/21/17  Last Fill Quantity: 90, # refills: 3    Last Office Visit with G, P or [facility] prescribing provider:  8/23/17 Dr. Moreno   Future Office Visit:        BP Readings from Last 3 Encounters:   08/23/17 110/62   04/26/17 137/78   03/20/17 122/80

## 2017-09-07 ENCOUNTER — TELEPHONE (OUTPATIENT)
Dept: CARE COORDINATION | Facility: CLINIC | Age: 72
End: 2017-09-07

## 2017-09-07 NOTE — TELEPHONE ENCOUNTER
Social Work Contact - Follow-Up - 9-7-17    MICHAEL contacted pt via phone today to follow up regarding pt needing assistance with finances, insurance, and paying bills.  SW discussed with pt the option of having someone from [facility] to assist her in paying her monthly bills as that is all they will do at no cost to pt.  Pt declines this as she reports that her monthly bills are taken directly out of her account, so paying them is not a problem.  Pt does not appear to be willing/able to pay for any kind of assistance with her finances and insurance though she did indicate that she has an .  SW also discussed with pt the option of applying through [facility] for an Independent Living Skills (ILS) worker that may be able to assist pt in these matters.  However, pt would need to meet financial criteria to be eligible.  Pt able to tell SW what her monthly income is ($1,600.00 minus the $400.00 her  told her she needs to save monthly to pay taxes) though is unable to provide SW with other information regarding her finances.  Pt reports that she does have a 401K though does not know the value.  MICHAEL encouraged pt to contact [facility], [phone], to be evaluated for assistance in these matters through [facility].  SW provided contact number to pt and will follow up with pt next week regarding whether she was able to connect with [facility].  Pt continues to report no assistance available to her from her family.  Pt is forgetful at times.  MICHAEL provided active listening, affirmations, and support to pt during contact.  MICHAEL will follow and reach out to pt on 9-12-17.      AKIRA Bradford     Social Work  [facility]  Office:  [phone]  E-Mail:  [email or web address]  9/7/2017 2:32 PM

## 2017-09-12 ENCOUNTER — TELEPHONE (OUTPATIENT)
Dept: CARE COORDINATION | Facility: CLINIC | Age: 72
End: 2017-09-12

## 2017-09-12 NOTE — TELEPHONE ENCOUNTER
Social Work Contact - Follow-Up - 9-12-17    SW attempted to contact pt via phone today to follow-up with pt regarding whether she was able to contact [facility] to request evaluation for assistance with finances/insurance at home.  SW unable to connect with pt and  for pt to call SW.  SW will continue to follow and reach out to pt again on 9-14-17, if SW does not hear back from pt by them.    AKIRA Bradford     Social Work  [facility]  Office:  [phone]  E-Mail:  [email or web address]  9/12/2017 11:40 AM

## 2017-09-21 ENCOUNTER — TELEPHONE (OUTPATIENT)
Dept: CARE COORDINATION | Facility: CLINIC | Age: 72
End: 2017-09-21

## 2017-09-21 NOTE — TELEPHONE ENCOUNTER
Social Work Contact - Follow-Up - 9-21-17    SW attempted to contact pt via phone today to follow-up regarding whether pt was able to talk to county regarding getting assistance with insurance/finances.  SW was unable to connect with pt and LM for pt to call SW back. This is the 2nd time that SW has reached out to pt with no response.  SW will follow and reach out to pt again in one week if SW does not hear back from pt.    AKIRA Bradford     Social Work  [facility]  Office:  [phone]  E-Mail:  [email or web address]  9/21/2017 4:10 PM

## 2017-09-29 ENCOUNTER — TELEPHONE (OUTPATIENT)
Dept: CARE COORDINATION | Facility: CLINIC | Age: 72
End: 2017-09-29

## 2017-09-29 NOTE — TELEPHONE ENCOUNTER
Social Work Contact - Follow-Up - 9-29-17    SW attempted to contact pt via phone today to follow up with pt regarding assistance with insurance/finances.  SW unable to connect with pt and LM for pt to call SW back.  This is third outreach to pt in past 3 weeks with no response and no calls back.  SW will attempt to reach pt again next week.  Previously pt has typically called SW back when SW left message.        AKIRA Bradford     Social Work  [facility]  Office:  [phone]  E-Mail:  [email or web address]  9/29/2017 2:14 PM

## 2017-10-02 ENCOUNTER — OFFICE VISIT (OUTPATIENT)
Dept: FAMILY MEDICINE | Facility: CLINIC | Age: 72
End: 2017-10-02
Payer: MEDICARE

## 2017-10-02 VITALS
OXYGEN SATURATION: 97 % | HEART RATE: 60 BPM | BODY MASS INDEX: 28.4 KG/M2 | SYSTOLIC BLOOD PRESSURE: 144 MMHG | TEMPERATURE: 97.5 F | DIASTOLIC BLOOD PRESSURE: 88 MMHG | RESPIRATION RATE: 12 BRPM | WEIGHT: 143 LBS

## 2017-10-02 DIAGNOSIS — E78.2 MIXED HYPERLIPIDEMIA: ICD-10-CM

## 2017-10-02 DIAGNOSIS — R35.0 URINARY FREQUENCY: ICD-10-CM

## 2017-10-02 DIAGNOSIS — Z13.220 SCREENING FOR CHOLESTEROL LEVEL: ICD-10-CM

## 2017-10-02 DIAGNOSIS — R42 DIZZINESS: Primary | ICD-10-CM

## 2017-10-02 DIAGNOSIS — M62.81 GENERALIZED MUSCLE WEAKNESS: ICD-10-CM

## 2017-10-02 LAB
ALBUMIN SERPL-MCNC: 3.9 G/DL (ref 3.4–5)
ALBUMIN UR-MCNC: NEGATIVE MG/DL
ALP SERPL-CCNC: 56 U/L (ref 40–150)
ALT SERPL W P-5'-P-CCNC: 22 U/L (ref 0–50)
ANION GAP SERPL CALCULATED.3IONS-SCNC: 5 MMOL/L (ref 3–14)
APPEARANCE UR: CLEAR
AST SERPL W P-5'-P-CCNC: 18 U/L (ref 0–45)
BILIRUB SERPL-MCNC: 0.5 MG/DL (ref 0.2–1.3)
BILIRUB UR QL STRIP: NEGATIVE
BUN SERPL-MCNC: 14 MG/DL (ref 7–30)
CALCIUM SERPL-MCNC: 9.8 MG/DL (ref 8.5–10.1)
CHLORIDE SERPL-SCNC: 105 MMOL/L (ref 94–109)
CHOLEST SERPL-MCNC: 227 MG/DL
CO2 SERPL-SCNC: 27 MMOL/L (ref 20–32)
COLOR UR AUTO: YELLOW
CREAT SERPL-MCNC: 0.73 MG/DL (ref 0.52–1.04)
ERYTHROCYTE [DISTWIDTH] IN BLOOD BY AUTOMATED COUNT: 11.7 % (ref 10–15)
GFR SERPL CREATININE-BSD FRML MDRD: 79 ML/MIN/1.7M2
GLUCOSE SERPL-MCNC: 98 MG/DL (ref 70–99)
GLUCOSE UR STRIP-MCNC: NEGATIVE MG/DL
HCT VFR BLD AUTO: 36.1 % (ref 35–47)
HDLC SERPL-MCNC: 71 MG/DL
HGB BLD-MCNC: 12.3 G/DL (ref 11.7–15.7)
HGB UR QL STRIP: NEGATIVE
KETONES UR STRIP-MCNC: NEGATIVE MG/DL
LDLC SERPL CALC-MCNC: 147 MG/DL
LEUKOCYTE ESTERASE UR QL STRIP: NEGATIVE
MCH RBC QN AUTO: 31.9 PG (ref 26.5–33)
MCHC RBC AUTO-ENTMCNC: 34.1 G/DL (ref 31.5–36.5)
MCV RBC AUTO: 94 FL (ref 78–100)
NITRATE UR QL: NEGATIVE
NONHDLC SERPL-MCNC: 156 MG/DL
PH UR STRIP: 7 PH (ref 5–7)
PLATELET # BLD AUTO: 208 10E9/L (ref 150–450)
POTASSIUM SERPL-SCNC: 5 MMOL/L (ref 3.4–5.3)
PROT SERPL-MCNC: 7.4 G/DL (ref 6.8–8.8)
RBC # BLD AUTO: 3.85 10E12/L (ref 3.8–5.2)
SODIUM SERPL-SCNC: 137 MMOL/L (ref 133–144)
SOURCE: NORMAL
SP GR UR STRIP: 1.01 (ref 1–1.03)
TRIGL SERPL-MCNC: 47 MG/DL
TSH SERPL DL<=0.005 MIU/L-ACNC: 2.01 MU/L (ref 0.4–4)
UROBILINOGEN UR STRIP-ACNC: 0.2 EU/DL (ref 0.2–1)
VIT B12 SERPL-MCNC: 1956 PG/ML (ref 193–986)
WBC # BLD AUTO: 4.2 10E9/L (ref 4–11)

## 2017-10-02 PROCEDURE — 36415 COLL VENOUS BLD VENIPUNCTURE: CPT | Performed by: NURSE PRACTITIONER

## 2017-10-02 PROCEDURE — 84443 ASSAY THYROID STIM HORMONE: CPT | Performed by: NURSE PRACTITIONER

## 2017-10-02 PROCEDURE — 99214 OFFICE O/P EST MOD 30 MIN: CPT | Performed by: NURSE PRACTITIONER

## 2017-10-02 PROCEDURE — 80061 LIPID PANEL: CPT | Performed by: NURSE PRACTITIONER

## 2017-10-02 PROCEDURE — 81003 URINALYSIS AUTO W/O SCOPE: CPT | Performed by: NURSE PRACTITIONER

## 2017-10-02 PROCEDURE — 82607 VITAMIN B-12: CPT | Performed by: NURSE PRACTITIONER

## 2017-10-02 PROCEDURE — 80053 COMPREHEN METABOLIC PANEL: CPT | Performed by: NURSE PRACTITIONER

## 2017-10-02 PROCEDURE — 85027 COMPLETE CBC AUTOMATED: CPT | Performed by: NURSE PRACTITIONER

## 2017-10-02 NOTE — NURSING NOTE
"Chief Complaint   Patient presents with     Dizziness       Initial /88 (BP Location: Right arm, Patient Position: Sitting, Cuff Size: Adult Regular)  Pulse 60  Temp 97.5  F (36.4  C) (Oral)  Resp 12  Wt 64.9 kg (143 lb)  SpO2 97%  BMI 28.4 kg/m2 Estimated body mass index is 28.4 kg/(m^2) as calculated from the following:    Height as of 8/23/17: 1.511 m (4' 11.5\").    Weight as of this encounter: 64.9 kg (143 lb).  Medication Reconciliation: incomplete     Alessandra Fuller        "

## 2017-10-02 NOTE — Clinical Note
Staff to call patient in 1 day-10/3/17, to ensure she scheduled her MRI/A of head/neck without contrast.

## 2017-10-02 NOTE — Clinical Note
Please call patient: Tell her to cancel the MRI that is scheduled for 10/11 as she just had a brain/carotid MR done in the ER on 10/3/17.

## 2017-10-02 NOTE — PROGRESS NOTES
SUBJECTIVE:   Anabelle Herbert is a 72 year old female who presents to clinic today for the following health issues:      Dizziness  Onset: this morning    Description:   Do you feel faint:  no   Does it feel like the surroundings (bed, room) are moving: no   Unsteady/off balance: YES  Have you passed out or fallen: no     Intensity: moderate, severe    Progression of Symptoms:  Same or improving as the day goes on    Accompanying Signs & Symptoms:  Heart palpitations: no   Nausea, vomiting: YES- nausea but no vomiting. Also seems to be improving as the day goes on  Weakness in arms or legs: no   Fatigue: YES  Vision or speech changes: slightly, feels like she is talking slower than normal  Ringing in ears (Tinnitus): no   Hearing Loss: no     History:   Head trauma/concussion hx: no   Previous similar symptoms: no   Recent bleeding history: no     Precipitating factors:   Worse with activity or head movement: no   Any new medications (BP?): no   Alcohol/drug abuse/withdrawal: no     Alleviating factors:   Does staying in a fixed position give relief:  YES    Therapies Tried and outcome: resting and being a fixed position    When she called her daughter this morning, felt like she was panicking. Daughter reports she was talking very fast, appeared to be panicking. Had dizziness, nausea. Tried to eat some crackers, felt nauseated. That's all she had to eat today.   Currently stomach is upset but improving, had a few loose stools, not quite diarrhea. Has interstitial cysitis-always feels a little dysuria and the urge to go frequently.  Hands are less shaky now than earlier this morning. Has had multiple strokes in the past, the worst one 1 year ago: affected right side-since then is able to use right arm more without pain recently, has right visual/peripheral cut, speech was slower 1 year ago, memory was also affected.  Currently she feels she is talking slightly slower than normal, dizziness is improving some,  denies weakness in one side of the body. Denies vision changes from baseline. Denies neuropathy but feels like the bottoms of her feet can get sore at times.     Up until about 1.5 week ago she felt she was doing well. Was walking at least once a day for 1-2 miles. For past week has not exercised. No changes to diet.     The only medication she is currently taking is plavix. She is supposed to be on lisinpril but has not picked that up since begining of September. Takes many vitamins: Vit D 5000 un, B12, Magnesium, CoQ10.   She is no longer on norvasc due to hypotension since April/May 2017. States she was taken off her zocor 1 year ago.     Problem list and histories reviewed & adjusted, as indicated.  Additional history: as documented    Patient Active Problem List   Diagnosis     Chronic interstitial cystitis     Adjustment reaction with anxiety and depression     Abdominal pain     Benign essential hypertension     CKD (chronic kidney disease) stage 2, GFR 60-89 ml/min     Hyperlipidemia LDL goal <100     Mild persistent asthma     Vitamin D deficiency     GERD (gastroesophageal reflux disease)     Advance care planning     B12 deficiency     Transient cerebral ischemia     Carotid stenosis     Diverticulosis of colon     Colon polyp     Atrophic vaginitis     Interstitial cystitis     Chronic constipation     Cognitive complaints     Prediabetes     Osteoporosis     Cerebrovascular accident (CVA), unspecified mechanism (H)     Past Surgical History:   Procedure Laterality Date     COLONOSCOPY       CYSTOSCOPY, BIOPSY BLADDER, COMBINED  1/6/2014    Procedure: COMBINED CYSTOSCOPY, BIOPSY BLADDER;;  Surgeon: Vandana Tang MD;  Location:  OR     CYSTOSCOPY, INJECT COLLAGEN, COMBINED  1/6/2014    Procedure: COMBINED CYSTOSCOPY, INJECT BULKING AGENT;  IC cocktail, bladder biopsy, fulguration, heparin, gentamyacin, lidocaine & kenalog;  Surgeon: Vandana Tang MD;  Location:  OR     GENITOURINARY SURGERY        NO HISTORY OF SURGERY         Social History   Substance Use Topics     Smoking status: Never Smoker     Smokeless tobacco: Never Used     Alcohol use No     Family History   Problem Relation Age of Onset     CANCER Father      colon?     HEART DISEASE Father      Asthma Mother      Alzheimer Disease Mother 86     Unknown/Adopted Maternal Grandmother      Unknown/Adopted Maternal Grandfather      Unknown/Adopted Paternal Grandmother      Unknown/Adopted Paternal Grandfather      Asthma Sister      Allergies Sister      Unknown/Adopted Son          Current Outpatient Prescriptions   Medication Sig Dispense Refill     lisinopril (PRINIVIL/ZESTRIL) 20 MG tablet TAKE 1 TABLET (20 MG) BY MOUTH DAILY 90 tablet 2     Multiple Vitamins-Minerals (ANTIOXIDANT PO)        clopidogrel (PLAVIX) 75 MG tablet Take 1 tablet (75 mg) by mouth daily 90 tablet 3     Blood Pressure Monitoring (BLOOD PRESSURE CUFF) MISC 1 Device daily as needed 1 each 0     prednisoLONE acetate (PRED FORTE) 1 % ophthalmic suspension        Fish Oil OIL daily       MAGNESIUM CITRATE PO Take by mouth daily       Polyethylene Glycol 3350 (MIRALAX PO) Take 1-2 capfuls by mouth as needed.        Cholecalciferol (VITAMIN D) 2000 UNIT tablet Take 2,000 Units by mouth daily. 100 tablet 0     co-enzyme Q-10 (COQ-10) 100 MG CAPS Take 1 capsule by mouth daily.       Allergies   Allergen Reactions     Contrast Dye      Burning, hematuria     Dogs      Throat started to close up.          Reviewed and updated as needed this visit by clinical staffTobacco  Allergies  Meds  Problems  Med Hx  Surg Hx  Fam Hx  Soc Hx        Reviewed and updated as needed this visit by Provider  Allergies  Meds  Problems  Fam Hx         ROS:  Constitutional, HEENT-positive for dizziness, slow speech, cardiovascular, pulmonary, gi-nausea and gu systems are negative, except as otherwise noted in HPI.      OBJECTIVE:   /88 (BP Location: Right arm, Patient Position:  Sitting, Cuff Size: Adult Regular)  Pulse 60  Temp 97.5  F (36.4  C) (Oral)  Resp 12  Wt 64.9 kg (143 lb)  SpO2 97%  BMI 28.4 kg/m2  Body mass index is 28.4 kg/(m^2).  GENERAL: healthy appearing, alert and no acute distress  EYES: Eyes grossly normal to inspection, PERRL and conjunctivae and sclerae normal  HENT: ear canals and TM's normal, nose and mouth without ulcers or lesions  NECK: no adenopathy, no asymmetry, masses, or scars and thyroid normal to palpation  RESP: lungs clear to auscultation - no rales, rhonchi or wheezes  CV: regular rate and rhythm, normal S1 S2, no S3 or S4, no murmur, click or rub, no peripheral edema  ABDOMEN: soft, nontender, no hepatosplenomegaly, no masses and bowel sounds hypoactive. LBM today-loose  MS: states she feels overall weak, gait is slightly unsteady-using the walk to keep balance.   NEURO: Strength equal both arms/legs, no facial droop, mentation is forgetful at baseline, and speech is slow-per patient feels it is slightly slower than normal, no slurred speech, did not note word finding difficulty. No dizziness during exam with EOM-normal.  PSYCH: mentation appears normal although slightly forgetful, affect normal     Diagnostic Test Results:  Results for orders placed or performed in visit on 10/02/17 (from the past 24 hour(s))   UA reflex to Microscopic and Culture   Result Value Ref Range    Color Urine Yellow     Appearance Urine Clear     Glucose Urine Negative NEG^Negative mg/dL    Bilirubin Urine Negative NEG^Negative    Ketones Urine Negative NEG^Negative mg/dL    Specific Gravity Urine 1.015 1.003 - 1.035    Blood Urine Negative NEG^Negative    pH Urine 7.0 5.0 - 7.0 pH    Protein Albumin Urine Negative NEG^Negative mg/dL    Urobilinogen Urine 0.2 0.2 - 1.0 EU/dL    Nitrite Urine Negative NEG^Negative    Leukocyte Esterase Urine Negative NEG^Negative    Source Midstream Urine        ASSESSMENT/PLAN:       1. Dizziness  Unsure what caused this. Potential for  TIA, uncontrolled HTN as she has not taken her lisinopril in 1 month, metabolic reasons for dizziness? Do not see any alarming neurological signs on exam-patient feels dizziness is resolving and speech is slightly slower than normal but no other neuro changes. Obtain labs today and get MRI/A of head/neck today or tomorrow. Staff will call her tomorrow to remind her of this as she has memory issues at baseline. Go to ER if anything changes from how you feel now-daughter was present and also verbalized understanding.   - Vitamin B12  - Comprehensive metabolic panel (BMP + Alb, Alk Phos, ALT, AST, Total. Bili, TP)  - CBC with platelets  - MRA Neck w/o Contrast Angiogram; Future  - MR Brain w/o Contrast; Future    10-4-17: call patient to cancel MR for 10/11. She had brain/carotid MR done in ER on 10/3/17 and was stable-negative for acute stroke.     Addend: 10/3/17: B12 is very high, called patient to tell her to STOP taking this supplement. We can recheck this level in a few months.     2. Urinary frequency  Normal UA  - UA reflex to Microscopic and Culture    3. Generalized muscle weakness  Check thyroid, see above  - TSH with free T4 reflex    4. Screening for cholesterol level  Check today. Off zocor for 1 year now  - Lipid panel reflex to direct LDL    Addend: 10/3/17  5. Mixed hyperlipidemia  Cholesterol is elevated. With history of stroke, suggest restarting. Sent script in and had staff call patient to remind her.   - simvastatin (ZOCOR) 20 MG tablet; Take 1 tablet (20 mg) by mouth At Bedtime  Dispense: 90 tablet; Refill: 0      FUTURE APPOINTMENTS:       - Follow-up visit prn depends on labs and MRA/I of head/neck. Daughter present for whole visit, is aware patient needs to go to ER if she feels any different or worse at all.       Will also send to PCP so MD is aware of plan.    CLAYTON Gallagher, NP-C  [facility]

## 2017-10-02 NOTE — MR AVS SNAPSHOT
After Visit Summary   10/2/2017    Anabelle Herbert    MRN: [id number]           Patient Information     Date Of Birth          1945        Visit Information        Provider Department      10/2/2017 1:40 PM Sheba Henriquez NP [facility]        Today's Diagnoses     Urinary frequency    -  1    Dizziness        Generalized muscle weakness        Screening for cholesterol level           Follow-ups after your visit        Future tests that were ordered for you today     Open Future Orders        Priority Expected Expires Ordered    MRA Neck w/o Contrast Angiogram Routine  10/2/2018 10/2/2017    MR Brain w/o Contrast Routine  10/2/2018 10/2/2017            Who to contact     If you have questions or need follow up information about today's clinic visit or your schedule please contact [facility] directly at [phone].  Normal or non-critical lab and imaging results will be communicated to you by MyChart, letter or phone within 4 business days after the clinic has received the results. If you do not hear from us within 7 days, please contact the clinic through MyChart or phone. If you have a critical or abnormal lab result, we will notify you by phone as soon as possible.  Submit refill requests through [employer] or call your pharmacy and they will forward the refill request to us. Please allow 3 business days for your refill to be completed.          Additional Information About Your Visit        MyChart Information     [employer] gives you secure access to your electronic health record. If you see a primary care provider, you can also send messages to your care team and make appointments. If you have questions, please call your primary care clinic.  If you do not have a primary care provider, please call [phone] and they will assist you.        Care EveryWhere ID     This is your Care EveryWhere ID. This could be used by other organizations to access your [facility]  medical records  [id number]        Your Vitals Were     Pulse Temperature Respirations Pulse Oximetry BMI (Body Mass Index)       60 97.5  F (36.4  C) (Oral) 12 97% 28.4 kg/m2        Blood Pressure from Last 3 Encounters:   10/02/17 144/88   08/23/17 110/62   04/26/17 137/78    Weight from Last 3 Encounters:   10/02/17 64.9 kg (143 lb)   08/23/17 66 kg (145 lb 8 oz)   04/26/17 68.5 kg (151 lb 1.6 oz)              We Performed the Following     CBC with platelets     Comprehensive metabolic panel (BMP + Alb, Alk Phos, ALT, AST, Total. Bili, TP)     Lipid panel reflex to direct LDL     TSH with free T4 reflex     UA reflex to Microscopic and Culture     Vitamin B12          Today's Medication Changes          These changes are accurate as of: 10/2/17  2:56 PM.  If you have any questions, ask your nurse or doctor.               Stop taking these medicines if you haven't already. Please contact your care team if you have questions.     amLODIPine 2.5 MG tablet   Commonly known as:  NORVASC   Stopped by:  Sheba Henriquez NP           simvastatin 20 MG tablet   Commonly known as:  ZOCOR   Stopped by:  Sheba Henriquez NP                    Primary Care Provider Office Phone # Fax #    Jyoti Gayatri Moreno [phone] [address]  [place] [address]        Equal Access to Services     [facility] Hospital[facility] : Hadii aad ku hadasho Soomaali, waaxda luqadaha, qaybta kaalmada adeegyada, wax brittani buchanan . So [facility] [phone].    ATENCIÓN: Si habla español, tiene a john disposición servicios gratuitos de asistencia lingüística. Llame al [phone].    We comply with applicable federal civil rights laws and [place] laws. We do not discriminate on the basis of race, color, national origin, age, disability, sex, sexual orientation, or gender identity.            Thank you!     Thank you for choosing [facility]  for your care. Our goal is always to provide you with  excellent care. Hearing back from our patients is one way we can continue to improve our services. Please take a few minutes to complete the written survey that you may receive in the mail after your visit with us. Thank you!             Your Updated Medication List - Protect others around you: Learn how to safely use, store and throw away your medicines at www.disposemymeds.org.          This list is accurate as of: 10/2/17  2:56 PM.  Always use your most recent med list.                   Brand Name Dispense Instructions for use Diagnosis    ANTIOXIDANT PO           Blood Pressure Cuff Misc     1 each    1 Device daily as needed    Hypertension goal BP (blood pressure) < 130/80       clopidogrel 75 MG tablet    PLAVIX    90 tablet    Take 1 tablet (75 mg) by mouth daily    Cerebrovascular accident (CVA), unspecified mechanism (H)       co-enzyme Q-10 100 MG Caps capsule      Take 1 capsule by mouth daily.        Fish Oil Oil      daily        lisinopril 20 MG tablet    PRINIVIL/ZESTRIL    90 tablet    TAKE 1 TABLET (20 MG) BY MOUTH DAILY    Hypertension goal BP (blood pressure) < 130/80       MAGNESIUM CITRATE PO      Take by mouth daily        MIRALAX PO      Take 1-2 capfuls by mouth as needed.        prednisoLONE acetate 1 % ophthalmic susp    PRED FORTE          vitamin D 2000 UNITS tablet     100 tablet    Take 2,000 Units by mouth daily.    Vitamin D deficiency

## 2017-10-03 ENCOUNTER — TELEPHONE (OUTPATIENT)
Dept: FAMILY MEDICINE | Facility: CLINIC | Age: 72
End: 2017-10-03

## 2017-10-03 ENCOUNTER — TRANSFERRED RECORDS (OUTPATIENT)
Dept: HEALTH INFORMATION MANAGEMENT | Facility: CLINIC | Age: 72
End: 2017-10-03

## 2017-10-03 DIAGNOSIS — E78.2 MIXED HYPERLIPIDEMIA: Primary | ICD-10-CM

## 2017-10-03 RX ORDER — SIMVASTATIN 20 MG
20 TABLET ORAL AT BEDTIME
Qty: 90 TABLET | Refills: 0 | Status: SHIPPED | OUTPATIENT
Start: 2017-10-03 | End: 2017-10-04

## 2017-10-03 NOTE — TELEPHONE ENCOUNTER
Called patient and she informed that unable to get in for MRI/MRA of brain until next week. Advised to go to ED for evaluation as a week is too long with concerning symptoms. Patient wants provider to recommend if she can wait or if she should go to ED.  Routing to provider to review and advise if agrees with RN recommendation.  Melanie Dixon RN.

## 2017-10-03 NOTE — TELEPHONE ENCOUNTER
Reason for Call:  Other     Detailed comments: can anyone take a triage call [phone] history of stroke very dizzy pt said md wanted her to get scan but they cant do until next week- her questions should I go to ER  Transferring to Triage    Phone Number Patient can be reached at: Home number on file [phone] (home)    Best Time: any    Can we leave a detailed message on this number? YES    Call taken on 10/3/2017 at 9:49 AM by Tania Tam

## 2017-10-03 NOTE — TELEPHONE ENCOUNTER
If cannot get in until next week, would agree with RN and advise going to ER to ensure this is not a new stroke or TIA, especially with her history of multiple strokes, uncontrolled HTN, and high cholesterol.   CLAYTON Gallagher, NP-C

## 2017-10-03 NOTE — TELEPHONE ENCOUNTER
Called and advised patient of below. Advised that she has a  or to call 911 but do not drive self. Voiced understanding and will call for a ride and go to ED for evaluation.  Melanie Dixon RN.

## 2017-10-04 RX ORDER — ATORVASTATIN CALCIUM 20 MG/1
10 TABLET, FILM COATED ORAL DAILY
Qty: 30 TABLET | Refills: 1 | Status: SHIPPED | OUTPATIENT
Start: 2017-10-04 | End: 2018-05-30

## 2017-10-05 NOTE — PROGRESS NOTES
This writer attempted to contact Anabelle on 10/05/17      Reason for call relay message and left message to return call.      If patient calls back:   Relay message Tell her to cancel the MRI that is scheduled for 10/11 as she just had a brain/carotid MR done in the ER on 10/3/17.   , (read verbatim), document that pt called and close encounter.        Diane Peters MA

## 2017-10-06 NOTE — PROGRESS NOTES
This writer attempted to contact Anabelle on 10/6/17        Reason for call relay message and left message to return call.        If patient calls back:                        Relay message Tell her to cancel the MRI that is scheduled for 10/11 as she just had a brain/carotid MR done in the ER on 10/3/17.   , (read verbatim), document that pt called and close encounter.           Diane Peters MA

## 2017-10-10 ENCOUNTER — TELEPHONE (OUTPATIENT)
Dept: CARE COORDINATION | Facility: CLINIC | Age: 72
End: 2017-10-10

## 2017-10-10 NOTE — TELEPHONE ENCOUNTER
Social Work Contact - Follow-Up - 10-10-17    SW attempted to contact pt via phone today, [phone] to follow up regarding pt situation.  SW was unable to connect with pt and LM for pt to call SW back.  SW also attempted to contact pt's dtr-in-law, Charles Reyes, though SW was unable to connect with her either.  SW will follow and await return call from pt.      Ten Styles, AKIRA     Social Work  [facility]  Office:  [phone]  E-Mail:  [email or web address]  10/10/2017 3:34 PM

## 2017-10-17 ENCOUNTER — TELEPHONE (OUTPATIENT)
Dept: FAMILY MEDICINE | Facility: CLINIC | Age: 72
End: 2017-10-17

## 2017-10-17 NOTE — TELEPHONE ENCOUNTER
[facility] PT order placed on Dr. Moreno's desk for completion.        Avani LIND)(JESSICA)

## 2017-10-24 ENCOUNTER — TELEPHONE (OUTPATIENT)
Dept: CARE COORDINATION | Facility: CLINIC | Age: 72
End: 2017-10-24

## 2017-10-24 NOTE — TELEPHONE ENCOUNTER
Social Work Contact - Follow-Up - 10-24-17    MICHAEL contacted pt via phone to follow-up regarding support/resources.  Anabelle indicated that she finally received her Medicare card indicating that she now has both Medicare Part A and B, B being effective 8-1-17.  MICHAEL contacted [employer] to provide that information to them as pt has outstanding bills with them.  [employer] will now bill anything from 8-1-17 to pt's Medicare Part B.  MICHAEL also contacted [facility] and provided Medicare information to them so they can bill pt's 10-3-17 visit to pt's Medicare Part B.  With pt's permission, MICHAEL also contacted [facility] to request an evaluation for [employer] to get assistance at home with paperwork/hoarding.  Natty, [phone], took referral from MICHAEL.  MICHAEL is uncertain whether pt will be eligible as it is very difficult to obtain financial information from pt.  MICHAEL will follow and reach out to pt again in 2-3 weeks to follow-up regarding evaluation for home assistance through the [employer].    AKIRA Bradford     Social Work  [facility]  Office:  [phone]  E-Mail:  [email or web address]  10/24/2017 8:48 AM

## 2017-11-09 ENCOUNTER — TELEPHONE (OUTPATIENT)
Dept: FAMILY MEDICINE | Facility: CLINIC | Age: 72
End: 2017-11-09

## 2017-11-09 ENCOUNTER — ALLIED HEALTH/NURSE VISIT (OUTPATIENT)
Dept: NURSING | Facility: CLINIC | Age: 72
End: 2017-11-09
Payer: MEDICARE

## 2017-11-09 VITALS — SYSTOLIC BLOOD PRESSURE: 136 MMHG | DIASTOLIC BLOOD PRESSURE: 82 MMHG

## 2017-11-09 DIAGNOSIS — I10 BENIGN ESSENTIAL HYPERTENSION: Primary | ICD-10-CM

## 2017-11-09 PROCEDURE — 99207 ZZC NO CHARGE NURSE ONLY: CPT

## 2017-11-09 NOTE — MR AVS SNAPSHOT
After Visit Summary   11/9/2017    Anabelle Herbert    MRN: [id number]           Patient Information     Date Of Birth          1945        Visit Information        Provider Department      11/9/2017 4:20 PM BA ANCILLARY [facility]        Today's Diagnoses     Benign essential hypertension    -  1       Follow-ups after your visit        Who to contact     If you have questions or need follow up information about today's clinic visit or your schedule please contact [facility] directly at [phone].  Normal or non-critical lab and imaging results will be communicated to you by MyChart, letter or phone within 4 business days after the clinic has received the results. If you do not hear from us within 7 days, please contact the clinic through [employer] or phone. If you have a critical or abnormal lab result, we will notify you by phone as soon as possible.  Submit refill requests through [employer] or call your pharmacy and they will forward the refill request to us. Please allow 3 business days for your refill to be completed.          Additional Information About Your Visit        MyChart Information     [employer] gives you secure access to your electronic health record. If you see a primary care provider, you can also send messages to your care team and make appointments. If you have questions, please call your primary care clinic.  If you do not have a primary care provider, please call [phone] and they will assist you.        Care EveryWhere ID     This is your Care EveryWhere ID. This could be used by other organizations to access your [facility] medical records  [id number]         Blood Pressure from Last 3 Encounters:   11/09/17 136/82   10/02/17 144/88   08/23/17 110/62    Weight from Last 3 Encounters:   10/02/17 64.9 kg (143 lb)   08/23/17 66 kg (145 lb 8 oz)   04/26/17 68.5 kg (151 lb 1.6 oz)              Today, you had the following     No orders found for  display       Primary Care Provider Office Phone # Fax #    Jyoti Moreno [phone] [address]  [place] [facility] [address]        Equal Access to Services     MYRON MARTINEZ : Coy diego samo Somontyali, waaxda luqadaha, qaybta kaalmada adeegyada, juhi griffith dewayne darden. So [facility] [phone].    ATENCIÓN: Si habla español, tiene a john disposición servicios gratuitos de asistencia lingüística. Llame al [phone].    We comply with applicable federal civil rights laws and [place] laws. We do not discriminate on the basis of race, color, national origin, age, disability, sex, sexual orientation, or gender identity.            Thank you!     Thank you for choosing [facility]  for your care. Our goal is always to provide you with excellent care. Hearing back from our patients is one way we can continue to improve our services. Please take a few minutes to complete the written survey that you may receive in the mail after your visit with us. Thank you!             Your Updated Medication List - Protect others around you: Learn how to safely use, store and throw away your medicines at www.disposemymeds.org.          This list is accurate as of: 11/9/17  4:54 PM.  Always use your most recent med list.                   Brand Name Dispense Instructions for use Diagnosis    ANTIOXIDANT PO           atorvastatin 20 MG tablet    LIPITOR    30 tablet    Take 0.5 tablets (10 mg) by mouth daily    Mixed hyperlipidemia       Blood Pressure Cuff Misc     1 each    1 Device daily as needed    Hypertension goal BP (blood pressure) < 130/80       clopidogrel 75 MG tablet    PLAVIX    90 tablet    Take 1 tablet (75 mg) by mouth daily    Cerebrovascular accident (CVA), unspecified mechanism (H)       co-enzyme Q-10 100 MG Caps capsule      Take 1 capsule by mouth daily.        Fish Oil Oil      daily        lisinopril 20 MG tablet    PRINIVIL/ZESTRIL    90  tablet    TAKE 1 TABLET (20 MG) BY MOUTH DAILY    Hypertension goal BP (blood pressure) < 130/80       MAGNESIUM CITRATE PO      Take by mouth daily        MIRALAX PO      Take 1-2 capfuls by mouth as needed.        vitamin D 2000 UNITS tablet     100 tablet    Take 2,000 Units by mouth daily.    Vitamin D deficiency

## 2017-11-09 NOTE — TELEPHONE ENCOUNTER
..Reason for Call:  Form, our goal is to have forms completed with 72 hours, however, some forms may require a visit or additional information.    Type of letter, form or note:  disability    Who is the form from?: Insurance comp    Where did the form come from: Patient or family brought in       What clinic location was the form placed at?: [place]    Where the form was placed: Given to MA/RN    What number is listed as a contact on the form?: [phone]        Additional comments: patient would like the last three office visit notes and testing results faxed to [employer] ASAP. They need it before 12/01/2017. Please call the patient it is faxed.    Call taken on 11/9/2017 at 3:56 PM by Philip Elias

## 2017-11-09 NOTE — TELEPHONE ENCOUNTER
Unable to take care of from Christina Montgomery MA at [place] to review forms, as Diane is off.  Jacque Kc MA/  For Teams Frank

## 2017-11-10 NOTE — TELEPHONE ENCOUNTER
Last 2 office visit dated 10/2/17 and 823/17 including labs dated 10/2/17 and 4/26/17 printed and faxed to [facility] at fax # [phone]. Alejandro Mayberry,  For [facility]    Postponing message till Monday for the care team/TC at [facility] to check if the form from [facility] needs to be faxed with the medical records, If the form is required, pls fax the form and note that medical records were faxed on 11/10/17 as this message is being taken care of offsite from [facility].  Alejandro Mayberry,  For [employer] [facility]

## 2017-11-20 ENCOUNTER — TELEPHONE (OUTPATIENT)
Dept: CARE COORDINATION | Facility: CLINIC | Age: 72
End: 2017-11-20

## 2017-11-20 NOTE — TELEPHONE ENCOUNTER
Social Work Telephone Message Note  [facility]    Patient Name:  Anabelle Herbert  /Age:  1945 (72 year old)    Referral Source:  PT/OT  Reason for Referral:  Assistance at home with chores, managing finances     contacted Patient via telephone on 17. Message was left on Patient's voicemail to be called back.  will await Patient's return phone call and will provide assistance at that time.    AKIRA Bradford     Social Work  [facility]  Office:  [phone]  E-Mail:  [email or web address]  2017 8:50 AM

## 2017-11-28 ENCOUNTER — TELEPHONE (OUTPATIENT)
Dept: CARE COORDINATION | Facility: CLINIC | Age: 72
End: 2017-11-28

## 2017-11-28 NOTE — TELEPHONE ENCOUNTER
Social Work Telephone Message Note  [facility]    Patient Name:  Anabelle Herbert  /Age:  1945 (72 year old)    Referral Source:  Dr Moreno, PCP  Reason for Referral:  Assistance at home through the [facility] waiver program.    SW attempted to contact pt via phone today, was unable to connect with pt, and LM for pt to call SW back.  SW will follow and reach out to pt again in one week if pt has not called SW back by then.    AKIRA Bradford     Social Work  [facility]  Office:  [phone]  E-Mail:  [email or web address]  2017 9:34 AM

## 2017-12-11 ENCOUNTER — TELEPHONE (OUTPATIENT)
Dept: CARE COORDINATION | Facility: CLINIC | Age: 72
End: 2017-12-11

## 2017-12-11 NOTE — TELEPHONE ENCOUNTER
Social Work Brief Intervention  [facility]    Data/Intervention:    Patient Name:  Anabelle Herbert  /Age:  1945 (72 year old)    Visit Type: telephone  Referral Source:  OT, Corinna Richards  Reason for Referral:  Pt needs help at home, pt curtis aldana    Collaborated With:    Pt, [facility], [phone]    Patient Concerns/Issues:   Pt is forgetful and needs assistance at home, pt hoards things    Intervention/Education/Resources Provided:  SW contacted pt via phone today to follow up to see if [facility] contacted pt to set up assessment for home help for pt.  SW made initial referral to [facility] on 10-24-17.  Pt indicated that she has not heard from [facility] regarding this though also indicates that she doesn't always get her phone messages    Assessment/Plan:  Pt hoards things and needs assistance managing this at home.  SW LM for [facility] regarding whether they have reached out to pt yet regarding home assessment.  SW will follow and await return call from [facility].  Provided patient/family with contact information and availability to follow up as needed.    Addendum:  SW received call back from [facility] today and was informed that PCA  attempted to contact pt on 17 to schedule home PCA assessment.  Pt must not have accessed this message on her phone.  [facility] indicated that they will call pt X3 and then send a letter out to pt and if they don't get a response from pt they will close referral.  SW attempted to contact pt via phone 4 more times this afternoon to give her this infomration and could not reach her.  SW will attempt to reach pt again tomorrow and request she call PCA scheduling herself, [phone], so assessment can be scheduled.  SW will follow.      Ten Styles, AKIRA     Social Work  [facility]  Clinic  Office:  [phone]  E-Mail:  [email or web address]  12/11/2017 3:58 PM

## 2017-12-12 NOTE — TELEPHONE ENCOUNTER
Addendum To Note Below:  Social Work Brief Intervention  12-12-17    SW attempted to call pt X5 today to let pt know she needs to call [facility] Assessment, [phone], in order to be evaluated for help at home.  SW LM for pt to call SW back though pt does not always listen to her messages.  SW will continue to attempt to reach pt via phone.     AKIRA Bradford     Social Work  [facility]  Office:  [phone]  E-Mail:  [email or web address]  12/12/2017 4:25 PM

## 2017-12-14 ENCOUNTER — TELEPHONE (OUTPATIENT)
Dept: CARE COORDINATION | Facility: CLINIC | Age: 72
End: 2017-12-14

## 2017-12-14 NOTE — TELEPHONE ENCOUNTER
Social Work Follow-Up  [facility]    Data/Intervention:  Patient Name:  Anabelle Herbert  /Age:  1945 (72 year old)    Reason for Follow-Up:  SW attempting to reach pt to call [facility] back regarding scheduling assessment for home help.      Collaborated With:    Pt, dtr-in-law, Charles Reyes, [phone]    Intervention/Education/Resources Provided:  SW unable to reach pt to let her know she needs to call [facility], [phone], to schedule home assessment for assistance at home.  SW has reached out to pt multiple times on 17, 17, and today.  Today SW LM for pt's dtr-in-law, Charles Reyes, [phone], to see if she can assist SW in getting pt's home assistance evaluation set up.      Assessment/Plan:  SW will follow and continue to attempt to reach pt via phone re: above.  Previously provided patient/family with writer's contact information and availability.     AKIRA Bradford     Social Work  [facility]  Office:  [phone]  E-Mail:  [email or web address]  2017 11:28 AM

## 2018-01-22 ENCOUNTER — TELEPHONE (OUTPATIENT)
Dept: CARE COORDINATION | Facility: CLINIC | Age: 73
End: 2018-01-22

## 2018-01-22 NOTE — TELEPHONE ENCOUNTER
Social Work Follow-Up  [facility]    Data/Intervention:  Patient Name:  Anabelle Herbert  /Age:  1945 (72 year old)    Reason for Follow-Up:  To follow up with pt regarding home assistance and resources.    Collaborated With:    RONAL    Intervention/Education/Resources Provided:  SW contacted pt via phone today and pt indicated that she was shopping so could not talk with SW at that time.      Assessment/Plan:  [facility] was unable to connect with pt to set up home help assessment and sent letter to pt indicating that.  SW will follow and reach out again to pt tomorrow.  Previously provided patient/family with writer's contact information and availability.     AKIRA Bradford     Social Work  [facility]  Office:  [phone]  E-Mail:  [email or web address]  2018 2:42 PM

## 2018-05-08 ENCOUNTER — TELEPHONE (OUTPATIENT)
Dept: FAMILY MEDICINE | Facility: CLINIC | Age: 73
End: 2018-05-08

## 2018-05-08 NOTE — TELEPHONE ENCOUNTER
Panel Management Review      BP Readings from Last 1 Encounters:   11/09/17 136/82    ,   Lab Results   Component Value Date    A1C 5.6 10/31/2016    A1C 6.0 07/05/2016   , 11/9/2017  Last Office Visit with this department: 11/9/2017    Fail List measure: Mammogram      Patient is due/failing the following:   MAMMOGRAM    Action needed:   call    Type of outreach:    Sent [employer] message.    Questions for provider review:    None                                                                                                                                    Stefania Branch      Chart routed to N/A .

## 2018-05-20 DIAGNOSIS — I63.9 CEREBROVASCULAR ACCIDENT (CVA), UNSPECIFIED MECHANISM (H): ICD-10-CM

## 2018-05-21 NOTE — TELEPHONE ENCOUNTER
"Requested Prescriptions   Pending Prescriptions Disp Refills     clopidogrel (PLAVIX) 75 MG tablet [Pharmacy Med Name: CLOPIDOGREL 75 MG TABLET] 90 tablet 2     Sig: TAKE 1 TABLET (75 MG) BY MOUTH DAILY    Plavix Passed    5/20/2018  5:50 AM       Passed - No active PPI on record unless is Protonix       Passed - Normal HGB on file in past 12 months    Recent Labs   Lab Test  10/02/17   1440   HGB  12.3              Passed - Normal Platelets on file in past 12 months    Recent Labs   Lab Test  10/02/17   1440   PLT  208              Passed - Recent (12 mo) or future (30 days) visit within the authorizing provider's specialty    Patient had office visit in the last 12 months or has a visit in the next 30 days with authorizing provider or within the authorizing provider's specialty.  See \"Patient Info\" tab in inbasket, or \"Choose Columns\" in Meds & Orders section of the refill encounter.           Passed - Patient is age 18 or older       Passed - No active pregnancy on record       Passed - No positive pregnancy test in past 12 months        clopidogrel (PLAVIX) 75 MG tablet  Last Written Prescription Date:  4/28/17  Last Fill Quantity: 90,  # refills: 3   Last office visit: 10/2/2017 with prescribing provider:  Dr. Moreno   Future Office Visit:   Next 5 appointments (look out 90 days)     May 30, 2018  1:20 PM CDT   Office Visit with Jyoti [facility]no MD   [facility]    [address] [place] [id number] [phone]                   "

## 2018-05-22 RX ORDER — CLOPIDOGREL BISULFATE 75 MG/1
TABLET ORAL
Qty: 90 TABLET | Refills: 0 | Status: SHIPPED | OUTPATIENT
Start: 2018-05-22 | End: 2018-07-05

## 2018-05-22 NOTE — TELEPHONE ENCOUNTER
Prescription approved per [facility] Refill Protocol.    Nelda Terry RN, BSN

## 2018-05-29 ENCOUNTER — TELEPHONE (OUTPATIENT)
Dept: FAMILY MEDICINE | Facility: CLINIC | Age: 73
End: 2018-05-29

## 2018-05-29 NOTE — TELEPHONE ENCOUNTER
Informed pt of Dr. rockwell note.  Pt insists she can do it tomorrow, states she doesn't work so she can adjust her schedule.  Again advised pt that this isnt safe per DR. Pemberton, pt states she will be fine.    Diane BRASHER, Patient Care

## 2018-05-29 NOTE — TELEPHONE ENCOUNTER
.Reason for Call:  Other     Detailed comments: Patient called and wondering if she need to do a fasting lab for tomorrows apt. Patient stated that she need to know if she need to fast by the end of the day. Please call patient back as regard to this message.     Phone Number Patient can be reached at: Cell number on file:    Telephone Information:   Mobile [phone]       Best Time: any    Can we leave a detailed message on this number? YES    Call taken on 5/29/2018 at 9:42 AM by Philip Elias

## 2018-05-29 NOTE — TELEPHONE ENCOUNTER
She will need fasting labs, but I don't suggest she fast all day (not safe!).   We can discuss the labs she needs tomorrow and then she can rtc for fasting lab appt in the morning another day.

## 2018-05-30 ENCOUNTER — OFFICE VISIT (OUTPATIENT)
Dept: FAMILY MEDICINE | Facility: CLINIC | Age: 73
End: 2018-05-30
Payer: COMMERCIAL

## 2018-05-30 VITALS
OXYGEN SATURATION: 98 % | HEART RATE: 59 BPM | HEIGHT: 60 IN | WEIGHT: 146 LBS | RESPIRATION RATE: 16 BRPM | SYSTOLIC BLOOD PRESSURE: 154 MMHG | TEMPERATURE: 97.7 F | DIASTOLIC BLOOD PRESSURE: 88 MMHG | BODY MASS INDEX: 28.66 KG/M2

## 2018-05-30 DIAGNOSIS — I63.9 CEREBROVASCULAR ACCIDENT (CVA), UNSPECIFIED MECHANISM (H): ICD-10-CM

## 2018-05-30 DIAGNOSIS — E53.8 B12 DEFICIENCY: ICD-10-CM

## 2018-05-30 DIAGNOSIS — N18.2 CKD (CHRONIC KIDNEY DISEASE) STAGE 2, GFR 60-89 ML/MIN: ICD-10-CM

## 2018-05-30 DIAGNOSIS — Z12.31 VISIT FOR SCREENING MAMMOGRAM: ICD-10-CM

## 2018-05-30 DIAGNOSIS — F43.23 ADJUSTMENT REACTION WITH ANXIETY AND DEPRESSION: Primary | ICD-10-CM

## 2018-05-30 DIAGNOSIS — I65.29 STENOSIS OF CAROTID ARTERY, UNSPECIFIED LATERALITY: ICD-10-CM

## 2018-05-30 DIAGNOSIS — I10 BENIGN ESSENTIAL HYPERTENSION: ICD-10-CM

## 2018-05-30 DIAGNOSIS — G47.33 OSA (OBSTRUCTIVE SLEEP APNEA): ICD-10-CM

## 2018-05-30 DIAGNOSIS — R41.9 COGNITIVE COMPLAINTS: ICD-10-CM

## 2018-05-30 LAB
ALBUMIN SERPL-MCNC: 4.1 G/DL (ref 3.4–5)
ALP SERPL-CCNC: 53 U/L (ref 40–150)
ALT SERPL W P-5'-P-CCNC: 20 U/L (ref 0–50)
ANION GAP SERPL CALCULATED.3IONS-SCNC: 3 MMOL/L (ref 3–14)
AST SERPL W P-5'-P-CCNC: 20 U/L (ref 0–45)
BILIRUB SERPL-MCNC: 0.5 MG/DL (ref 0.2–1.3)
BUN SERPL-MCNC: 22 MG/DL (ref 7–30)
CALCIUM SERPL-MCNC: 9.4 MG/DL (ref 8.5–10.1)
CHLORIDE SERPL-SCNC: 107 MMOL/L (ref 94–109)
CHOLEST SERPL-MCNC: 243 MG/DL
CO2 SERPL-SCNC: 31 MMOL/L (ref 20–32)
CREAT SERPL-MCNC: 0.87 MG/DL (ref 0.52–1.04)
ERYTHROCYTE [DISTWIDTH] IN BLOOD BY AUTOMATED COUNT: 11.7 % (ref 10–15)
GFR SERPL CREATININE-BSD FRML MDRD: 64 ML/MIN/1.7M2
GLUCOSE SERPL-MCNC: 92 MG/DL (ref 70–99)
HCT VFR BLD AUTO: 36.4 % (ref 35–47)
HDLC SERPL-MCNC: 73 MG/DL
HGB BLD-MCNC: 12.4 G/DL (ref 11.7–15.7)
LDLC SERPL CALC-MCNC: 156 MG/DL
MCH RBC QN AUTO: 32.2 PG (ref 26.5–33)
MCHC RBC AUTO-ENTMCNC: 34.1 G/DL (ref 31.5–36.5)
MCV RBC AUTO: 95 FL (ref 78–100)
NONHDLC SERPL-MCNC: 170 MG/DL
PLATELET # BLD AUTO: 226 10E9/L (ref 150–450)
POTASSIUM SERPL-SCNC: 4.7 MMOL/L (ref 3.4–5.3)
PROT SERPL-MCNC: 7.4 G/DL (ref 6.8–8.8)
RBC # BLD AUTO: 3.85 10E12/L (ref 3.8–5.2)
SODIUM SERPL-SCNC: 141 MMOL/L (ref 133–144)
TRIGL SERPL-MCNC: 71 MG/DL
TSH SERPL DL<=0.005 MIU/L-ACNC: 2.41 MU/L (ref 0.4–4)
VIT B12 SERPL-MCNC: 1139 PG/ML (ref 193–986)
WBC # BLD AUTO: 5.3 10E9/L (ref 4–11)

## 2018-05-30 PROCEDURE — 85027 COMPLETE CBC AUTOMATED: CPT | Performed by: FAMILY MEDICINE

## 2018-05-30 PROCEDURE — 82607 VITAMIN B-12: CPT | Performed by: FAMILY MEDICINE

## 2018-05-30 PROCEDURE — 80053 COMPREHEN METABOLIC PANEL: CPT | Performed by: FAMILY MEDICINE

## 2018-05-30 PROCEDURE — 99214 OFFICE O/P EST MOD 30 MIN: CPT | Performed by: FAMILY MEDICINE

## 2018-05-30 PROCEDURE — 84443 ASSAY THYROID STIM HORMONE: CPT | Performed by: FAMILY MEDICINE

## 2018-05-30 PROCEDURE — 80061 LIPID PANEL: CPT | Performed by: FAMILY MEDICINE

## 2018-05-30 PROCEDURE — 36415 COLL VENOUS BLD VENIPUNCTURE: CPT | Performed by: FAMILY MEDICINE

## 2018-05-30 RX ORDER — FLUOXETINE 10 MG/1
10 CAPSULE ORAL DAILY
Qty: 30 CAPSULE | Refills: 1 | Status: SHIPPED | OUTPATIENT
Start: 2018-05-30 | End: 2018-07-05 | Stop reason: DRUGHIGH

## 2018-05-30 ASSESSMENT — ANXIETY QUESTIONNAIRES
6. BECOMING EASILY ANNOYED OR IRRITABLE: NEARLY EVERY DAY
IF YOU CHECKED OFF ANY PROBLEMS ON THIS QUESTIONNAIRE, HOW DIFFICULT HAVE THESE PROBLEMS MADE IT FOR YOU TO DO YOUR WORK, TAKE CARE OF THINGS AT HOME, OR GET ALONG WITH OTHER PEOPLE: EXTREMELY DIFFICULT
2. NOT BEING ABLE TO STOP OR CONTROL WORRYING: NEARLY EVERY DAY
3. WORRYING TOO MUCH ABOUT DIFFERENT THINGS: NEARLY EVERY DAY
7. FEELING AFRAID AS IF SOMETHING AWFUL MIGHT HAPPEN: MORE THAN HALF THE DAYS
1. FEELING NERVOUS, ANXIOUS, OR ON EDGE: NEARLY EVERY DAY

## 2018-05-30 ASSESSMENT — PATIENT HEALTH QUESTIONNAIRE - PHQ9: 5. POOR APPETITE OR OVEREATING: NEARLY EVERY DAY

## 2018-05-30 NOTE — LETTER
June 1, 2018      Anabelle Herbert  [address]  [place] [address]        Dear Anabelle,     It was a pleasure seeing you at your recent visit. Your labs have been reviewed and are attached.     Your cholesterol panel showed the LDL (bad cholesterol) is above goal of < 100. I would urge you to reconsider a daily cholesterol medication- we can talk more about this at your next visit.     The other labs look pretty good.   -thyroid test is normal.   - kidney, liver and electrolyte panel and complete blood count were good.   - the vitamin b12 level was in an acceptable range.     Please continue with the plan we developed in the office.       Sincerely,        Jyoti Moreno MD      Results for orders placed or performed in visit on 05/30/18   TSH with free T4 reflex   Result Value Ref Range    TSH 2.41 0.40 - 4.00 mU/L   Lipid panel reflex to direct LDL Fasting   Result Value Ref Range    Cholesterol 243 (H) <200 mg/dL    Triglycerides 71 <150 mg/dL    HDL Cholesterol 73 >49 mg/dL    LDL Cholesterol Calculated 156 (H) <100 mg/dL    Non HDL Cholesterol 170 (H) <130 mg/dL   CBC with platelets   Result Value Ref Range    WBC 5.3 4.0 - 11.0 10e9/L    RBC Count 3.85 3.8 - 5.2 10e12/L    Hemoglobin 12.4 11.7 - 15.7 g/dL    Hematocrit 36.4 35.0 - 47.0 %    MCV 95 78 - 100 fl    MCH 32.2 26.5 - 33.0 pg    MCHC 34.1 31.5 - 36.5 g/dL    RDW 11.7 10.0 - 15.0 %    Platelet Count 226 150 - 450 10e9/L   Comprehensive metabolic panel   Result Value Ref Range    Sodium 141 133 - 144 mmol/L    Potassium 4.7 3.4 - 5.3 mmol/L    Chloride 107 94 - 109 mmol/L    Carbon Dioxide 31 20 - 32 mmol/L    Anion Gap 3 3 - 14 mmol/L    Glucose 92 70 - 99 mg/dL    Urea Nitrogen 22 7 - 30 mg/dL    Creatinine 0.87 0.52 - 1.04 mg/dL    GFR Estimate 64 >60 mL/min/1.7m2    GFR Estimate If Black 78 >60 mL/min/1.7m2    Calcium 9.4 8.5 - 10.1 mg/dL    Bilirubin Total 0.5 0.2 - 1.3 mg/dL    Albumin 4.1 3.4 - 5.0 g/dL    Protein Total 7.4 6.8  - 8.8 g/dL    Alkaline Phosphatase 53 40 - 150 U/L    ALT 20 0 - 50 U/L    AST 20 0 - 45 U/L   Vitamin B12   Result Value Ref Range    Vitamin B12 1139 (H) 193 - 986 pg/mL

## 2018-05-30 NOTE — PATIENT INSTRUCTIONS
Med check in 3-4 weeks  Goal BP <140/90  Ideal BP <120/80    Start fluoxetine 10 mg daily.   Consider follow up with Mark Lin [phone]      We will want to update your cardiac echo, carotid ultrasound and mammogram. We can talk more about these at your next visit.     Rotator Cuff Injury   What is a rotator cuff injury?   A rotator cuff injury is a strain or tear in the group of tendons and muscles that hold your shoulder joint together and help move your shoulder.   How does it occur?   A rotator cuff injury may result from:     using your arm to break a fall     falling onto your arm     lifting a heavy object     use of your shoulder in sports with a repetitive overhead movement, such as swimming, baseball (mainly pitchers), football, and tennis, which gradually strains the tendon     manual labor such as painting, plastering, raking leaves, or housework   What are the symptoms?   The symptoms of a torn rotator cuff are:     arm and shoulder pain     shoulder weakness     shoulder tenderness     loss of shoulder movement, especially overhead   How is it diagnosed?   Your healthcare provider will examine you and check your shoulder for pain, tenderness, and loss of motion as you move your arm in all directions. Your provider will ask if your shoulder pain began suddenly or gradually. You may have an X-ray to make sure there are not any fractures or bone spurs.   Based on these results, you may have other tests or procedures right away or later, such as:     magnetic resonance imaging (MRI), which creates images of your shoulder and surrounding structures with sound waves     an arthrogram, which is an X-ray or MRI that is taken after a special dye has been injected into your shoulder joint to outline its soft structures     arthroscopy, a surgical procedure in which a small instrument is inserted into your shoulder joint so your provider can look directly at your rotator cuff   What is the  treatment?   A tendon in your shoulder can be inflamed, partially torn, or completely torn. What is done about it depends on how torn it is and how much it hurts.   If your tear is a minor one, it can be left to heal by itself if it does not interfere with your everyday activities. Your treatment plan should include:     proper sitting posture, in which your head and shoulders are balanced     rest for your shoulder, which means avoiding strenuous activity or any overhead motion that causes pain     ice packs at least once a day, and preferably 2 or 3 times a day     doing the exercises your healthcare provider gives you     anti-inflammatory drugs. Adults aged 65 years and older should not take non-steroidal anti-inflammatory medicine for more than 7 days without their healthcare provider's approval.     physical therapy to strengthen your shoulder as it heals   If you have a bad tear, you may need to have it repaired by arthroscopy. Arthroscopy can be used to perform surgery on a joint as well as to see inside the joint. The rough edges of a torn tendon can be trimmed and left to heal. Larger tears can be stitched back together. After surgery, your treatment plan will include physical therapy to strengthen your shoulder as it heals.   How long will the effects last?   Full recovery depends on what is torn and how it is treated.   When can I return to my normal activities?   Everyone recovers from an injury at a different rate. Return to your activities will be determined by how soon your shoulder recovers, not by how many days or weeks it has been since your injury has occurred. In general, the longer you have symptoms before you start treatment, the longer it will take to get better. The goal of rehabilitation is to return you to your normal activities as soon as is safely possible. If you return too soon you may worsen your injury.   You may safely return to your normal activities when:     Your injured shoulder  has full range of motion without pain.     Your injured shoulder has regained normal strength compared to the uninjured shoulder.   What can be done to help prevent this from recurring?   The best way to prevent a recurrence is to strengthen your shoulder muscles and keep them in peak condition with shoulder exercises.           Rotator Cuff Strain Rehabilitation Exercises                You may do all of these exercises right away.   Isometric shoulder external rotation:  a doorway with your elbow bent 90 degrees and the back of the wrist on your injured side pressed against the door frame. Try to press your hand outward into the door frame. Hold for 5 seconds. Do 3 sets of 10.   Isometric shoulder internal rotation:  a doorway with your elbow bent 90 degrees and the front of the wrist on your injured side pressed against the door frame. Try to press your palm into the door frame. Hold for 5 seconds. Do 3 sets of 10.   Wand exercise: Flexion: Stand upright and hold a stick in both hands, palms down. Stretch your arms by lifting them over your head, keeping your arms straight. Hold for 5 seconds and return to the starting position. Repeat 10 times.   Wand exercise: Extension: Stand upright and hold a stick in both hands behind your back. Move the stick away from your back. Hold the end position for 5 seconds. Relax and return to the starting position. Repeat 10 times.   Wand exercise: External rotation: Lie on your back and hold a stick in both hands, palms up. Your upper arms should be resting on the floor with your elbows at your sides and bent 90 degrees. Use your uninjured arm to push your injured arm out away from your body. Keep the elbow of your injured arm at your side while it is being pushed. Hold the stretch for 5 seconds. Repeat 10 times.   Wand exercise: Shoulder abduction and adduction: Stand and hold a stick with both hands, palms facing away from your body. Rest the stick against the  front of your thighs. Use your uninjured arm to push your injured arm out to the side and up as high as possible. Keep your arms straight. Hold for 5 seconds. Repeat 10 times.   Resisted shoulder external rotation: Stand sideways next to a door with your injured arm farther from the door. Tie a knot in the end of the tubing and shut the knot in the door at waist level. Hold the other end of the tubing with the hand of your injured arm. Rest the hand of your injured arm across your stomach. Keeping your elbow in at your side, rotate your arm outward and away from your waist. Make sure you keep your elbow bent 90 degrees and your forearm parallel to the floor. Repeat 10 times. Build up to 3 sets of 10.   Resisted shoulder internal rotation: Stand sideways next to a door with your injured arm closest to the door. Tie a knot in the end of the tubing and shut the knot in the door at waist level. Hold the other end of the tubing with the hand of your injured arm. Bend the elbow of your injured arm 90 degrees. Keeping your elbow in at your side, rotate your forearm across your body and then back to the starting position. Make sure you keep your forearm parallel to the floor. Do 3 sets of 10.   Scaption: Stand with your arms at your sides and with your elbows straight. Slowly raise your arms to eye level. As you raise your arms, spread them apart so that they are only slightly in front of your body (at about a 30-degree angle to the front of your body). Point your thumbs toward the ceiling. Hold for 2 seconds and lower your arms slowly. Do 3 sets of 10. Progress to holding a soup can or light weight when you are doing the exercise and increase the weight as the exercise gets easier.   Side-lying external rotation: Lie on your uninjured side with your injured arm at your side and your elbow bent 90 degrees. Keeping your elbow against your side, raise your forearm toward the ceiling and hold for 2 seconds. Slowly lower your  "arm. Do 3 sets of 10. You can start doing this exercise holding a soup can or light weight and gradually increase the weight as long as there is no pain.   Horizontal abduction: Lie on your stomach on a table or the edge of a bed with the arm on your injured side hanging down over the edge. Raise your arm out to the side, with your thumb pointed toward the ceiling, until your arm is parallel to the floor. Hold for 2 seconds and then lower it slowly. Start this exercise with no weight. As you get stronger, add a light weight or hold a soup can. Do 3 sets of 10.   Push-up with a plus: Begin on the floor on your hands and knees. Keep your arms a shoulder width apart and lift your feet off the floor. Arch your back as high as possible and round your shoulders (this is the \"plus\" part or the exercise). Bend your elbows and lower your body to the floor. Return to the starting position and arch your back again. Do 3 sets of 10.   Published by [employer].  This content is reviewed periodically and is subject to change as new health information becomes available. The information is intended to inform and educate and is not a replacement for medical evaluation, advice, diagnosis or treatment by a healthcare professional.   Written by Rocío Alcantara, MS, PT, and Fabiola Bender PT, [facility], [facility], for [employer].   ? 2010 [employer] and/or its affiliates. All Rights Reserved.   Copyright   [employer] 2011  Adult Health Advisor                  "

## 2018-05-30 NOTE — PROGRESS NOTES
"  SUBJECTIVE:   Anabelle Herbert is a 73 year old female who presents to clinic today for the following health issues:    Patient is here for stroke follow-up. She is here to discuss past results with MD.     Hyperlipidemia Follow-Up      Rate your low fat/cholesterol diet?: good    Taking statin?  Yes, no muscle aches from statin    Other lipid medications/supplements?:  none    Hypertension Follow-up      Outpatient blood pressures are not being checked.    Low Salt Diet: no added salt    Asthma Follow-Up    Was ACT completed today?  No      Respiratory symptoms:   Cough: No   Wheezing: No   Shortness of breath: No    Use of short- acting(rescue) inhaler: None    Taking controlled (daily) meds as prescribed: Yes    ER/UC visits or hospital admissions since last visit: none     Recent asthma triggers that patient is dealing with: None     Mood: Pt feels she has not been herself. As of December she has been more irritable, anxious, depressed, having mood swings, and feels her body temp changes from cold to hot at random times. She had hot flashes in the past, but this feels irregular/different.   -not getting along with her children. Her son is \"gomez,\" irritable, and currently going through a divorce. Pt's daughter is caring for Anabelle's ex- and often will get angry with Anabelle which makes her sad. She says her daughter does not yell or get upset with anyone besides herself.  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  -Also stressed about finances. She was hoping to get more money back from 401K and is currently struggling to pay bills.  -Pt says she is not sleeping well. She is urinating about 2-4x per night due to cystitis and anxiety also keeps hear awake.  -She has been trying to f/u with a counselor although the counselor she was seeing was not contacting her back. She has given up on this and only wants to follow a with a [employer] counselor.     Others: "   -Low back pain bilaterally- chronically  -Pain in legs (deep) and plantar aspect of feet bilterally- thinks this is related to cystitis   -Memory is worsening recently. She has had this concern in the past and it correlated with mood sx's. Pt has been using nerium supplement in attempt to help with this.  -Not using CPAP for the last few years as she did not like this. She is waking often due to cystitis and she feels wearing this takes more time away from sleeping.  -Feeling more off balance and needing to get up slowly. When trying to walk in a straight she veers to one side and then will veer back to the other. This has been going on for a while (I believe since her stroke)  -Using bene-fiber 2x daily to help with bowel movements. Reports this allows her to have daily, soft, BM.  -upper right arm/shoulder is sore/painful and is unsure why. She has had similar pain in the past and completed exercises to help with this.      Weight: Gained 6 lbs over winter. She thinks this is due to eating more and exercising less.  Wt Readings from Last 5 Encounters:   05/30/18 66.2 kg (146 lb)   10/02/17 64.9 kg (143 lb)   08/23/17 66 kg (145 lb 8 oz)   04/26/17 68.5 kg (151 lb 1.6 oz)   03/20/17 68.5 kg (151 lb)     BP: Typically taking BP med in the morning although had not taken medication this morning as she wanted to be completely fasting.   BP Readings from Last 3 Encounters:   05/30/18 154/88   11/09/17 136/82   10/02/17 144/88     Lipids: pt declines statin again today.  Recent Labs   Lab Test  10/02/17   1441  11/28/16   1132   06/30/15   1442  11/13/14   1541   CHOL  227*  172   < >  146  200*   HDL  71  69   < >  53  74   LDL  147*  87   < >  76  114   TRIG  47  82   < >  87  59   CHOLHDLRATIO   --    --    --   2.8  2.7    < > = values in this interval not displayed.           Problem list and histories reviewed & adjusted, as indicated.  Additional history: as documented    Patient Active Problem List   Diagnosis      Chronic interstitial cystitis     Adjustment reaction with anxiety and depression     Abdominal pain     Benign essential hypertension     CKD (chronic kidney disease) stage 2, GFR 60-89 ml/min     Hyperlipidemia LDL goal <100     Mild persistent asthma     Vitamin D deficiency     GERD (gastroesophageal reflux disease)     Advance care planning     B12 deficiency     Transient cerebral ischemia     Carotid stenosis     Diverticulosis of colon     Colon polyp     Atrophic vaginitis     Interstitial cystitis     Chronic constipation     Cognitive complaints     Prediabetes     Osteoporosis     Cerebrovascular accident (CVA), unspecified mechanism (H)     Past Surgical History:   Procedure Laterality Date     COLONOSCOPY       CYSTOSCOPY, BIOPSY BLADDER, COMBINED  1/6/2014    Procedure: COMBINED CYSTOSCOPY, BIOPSY BLADDER;;  Surgeon: Vandana Tang MD;  Location: [facility] OR     CYSTOSCOPY, INJECT COLLAGEN, COMBINED  1/6/2014    Procedure: COMBINED CYSTOSCOPY, INJECT BULKING AGENT;  IC cocktail, bladder biopsy, fulguration, heparin, gentamyacin, lidocaine & kenalog;  Surgeon: Vandana Tang MD;  Location: [facility] OR     GENITOURINARY SURGERY       NO HISTORY OF SURGERY         Social History   Substance Use Topics     Smoking status: Never Smoker     Smokeless tobacco: Never Used     Alcohol use No     Family History   Problem Relation Age of Onset     CANCER Father      colon?     HEART DISEASE Father      Asthma Mother      Alzheimer Disease Mother 86     Unknown/Adopted Maternal Grandmother      Unknown/Adopted Maternal Grandfather      Unknown/Adopted Paternal Grandmother      Unknown/Adopted Paternal Grandfather      Asthma Sister      Allergies Sister      Unknown/Adopted Son          Current Outpatient Prescriptions   Medication Sig Dispense Refill     Cholecalciferol (VITAMIN D) 2000 UNIT tablet Take 2,000 Units by mouth daily. 100 tablet 0     clopidogrel (PLAVIX) 75 MG tablet TAKE 1 TABLET (75 MG) BY MOUTH  DAILY 90 tablet 0     co-enzyme Q-10 (COQ-10) 100 MG CAPS Take 1 capsule by mouth daily.       Fish Oil OIL daily       lisinopril (PRINIVIL/ZESTRIL) 20 MG tablet TAKE 1 TABLET (20 MG) BY MOUTH DAILY 90 tablet 2     MAGNESIUM CITRATE PO Take by mouth daily       Multiple Vitamins-Minerals (ANTIOXIDANT PO)        Polyethylene Glycol 3350 (MIRALAX PO) Take 1-2 capfuls by mouth as needed.        atorvastatin (LIPITOR) 20 MG tablet Take 0.5 tablets (10 mg) by mouth daily (Patient not taking: Reported on 5/30/2018) 30 tablet 1     Blood Pressure Monitoring (BLOOD PRESSURE CUFF) MISC 1 Device daily as needed (Patient not taking: Reported on 5/30/2018) 1 each 0     Allergies   Allergen Reactions     Contrast Dye      Burning, hematuria     Dogs      Throat started to close up.        Reviewed and updated as needed this visit by clinical staff  Tobacco  Allergies  Meds  Med Hx  Surg Hx  Fam Hx  Soc Hx      Reviewed and updated as needed this visit by Provider Tobacco  Allergies  Meds  Med Hx  Surg Hx  Fam Hx  Soc Hx          ROS:  Constitutional, HEENT, cardiovascular, pulmonary, gi and gu systems are negative, except as otherwise noted.    This document serves as a record of the services and decisions personally performed and made by Jyoti Moreno MD. It was created on her behalf by Margaret Will, a trained medical scribe. The creation of this document is based the provider's statements to the medical scribe.  Margaret Will May 30, 2018 1:46 PM      OBJECTIVE:     /88 (BP Location: Right arm, Patient Position: Sitting, Cuff Size: Adult Regular)  Pulse 59  Temp 97.7  F (36.5  C) (Oral)  Resp 16  Ht 1.524 m (5')  Wt 66.2 kg (146 lb)  SpO2 98%  BMI 28.51 kg/m2  Body mass index is 28.51 kg/(m^2).  GENERAL alert and no distress  EYES: Eyes grossly normal to inspection, PERRL and conjunctivae and sclerae normal  NECK: no adenopathy, no asymmetry, masses, or scars and thyroid normal to  palpation, no carotid bruits  RESP: lungs clear to auscultation - no rales, rhonchi or wheezes  CV: regular rate and rhythm, normal S1 S2, no S3 or S4, no murmur, click or rub, no peripheral edema and peripheral pulses strong  ABDOMEN: soft, nontender, no hepatosplenomegaly, no masses and bowel sounds normal  MS: right shoulder decreased external rotation, normal internal rotation.   SKIN: no suspicious lesions or rashes to visible skin  NEURO: Normal strength and tone, cranial nerves 2-12 intact, mentation intact and speech normal  PSYCH: mentation appears normal, affect normal/bright    Diagnostic Test Results:  No results found for this or any previous visit (from the past 24 hour(s)).    ASSESSMENT/PLAN:   1. Adjustment reaction with anxiety and depression   flare of mood sx's ongoing for about 5 months. She has been off ssri since last year but ready to begin again.  Trial Prozac 10 mg daily and recommended f/u with counseling. Reviewed timing of taking, onset, benefits, monitoring and typicall and severe AE of the medication.   - FLUoxetine (PROZAC) 10 MG capsule; Take 1 capsule (10 mg) by mouth daily  Dispense: 30 capsule; Refill: 1  - TSH with free T4 reflex  - CBC with platelets    2. Cerebrovascular accident (CVA), unspecified mechanism (H)  Ongoing cognitive deficits, dizziness and balance issues. Advised f/u with future carotid US and cardiac echo. She does not want to be overwhelmed right now with scheduling this and will address next time.   - Lipid panel reflex to direct LDL Fasting  - Comprehensive metabolic panel    3. Visit for screening mammogram   pt is to schedule mammogram.  - MA SCREENING DIGITAL BILAT - Future  (s+30); Future    4. Benign essential hypertension   no med changes today given she had not taken medication at the same time she typically does. Recheck at f/u visit in 4 weeks. All questions answered and pt agrees with the plan.  - Comprehensive metabolic panel    5. CKD (chronic  kidney disease) stage 2, GFR 60-89 ml/min   monitoring labs  - Lipid panel reflex to direct LDL Fasting  - Comprehensive metabolic panel    6. Stenosis of carotid artery, unspecified laterality  as above #2  - Lipid panel reflex to direct LDL Fasting    7. B12 deficiency  supplementing.   - Vitamin B12    8. Cognitive complaints  \suspect due to mood sx's given similar concerns with flare of mood sx's in the past. monitor  - CBC with platelets  - Comprehensive metabolic panel    9. SHANEKA (obstructive sleep apnea)  not using CPAP. Strongly encourage her to use this and f/u with sleep clinic.    10. Right shoulder injury- suspect injury to rotator cuff. Pt is to complete at home exercises. If pain is not improving/worsening she is to let me know and will f/u with formal PT.      Patient Instructions   Med check in 3-4 weeks  Goal BP <140/90  Ideal BP <120/80    Start fluoxetine 10 mg daily.   Consider follow up with Mark Lin [phone]      We will want to update your cardiac echo, carotid ultrasound and mammogram. We can talk more about these at your next visit.     Rotator Cuff Injury   What is a rotator cuff injury?   A rotator cuff injury is a strain or tear in the group of tendons and muscles that hold your shoulder joint together and help move your shoulder.   How does it occur?   A rotator cuff injury may result from:     using your arm to break a fall     falling onto your arm     lifting a heavy object     use of your shoulder in sports with a repetitive overhead movement, such as swimming, baseball (mainly pitchers), football, and tennis, which gradually strains the tendon     manual labor such as painting, plastering, raking leaves, or housework   What are the symptoms?   The symptoms of a torn rotator cuff are:     arm and shoulder pain     shoulder weakness     shoulder tenderness     loss of shoulder movement, especially overhead   How is it diagnosed?   Your healthcare provider will  examine you and check your shoulder for pain, tenderness, and loss of motion as you move your arm in all directions. Your provider will ask if your shoulder pain began suddenly or gradually. You may have an X-ray to make sure there are not any fractures or bone spurs.   Based on these results, you may have other tests or procedures right away or later, such as:     magnetic resonance imaging (MRI), which creates images of your shoulder and surrounding structures with sound waves     an arthrogram, which is an X-ray or MRI that is taken after a special dye has been injected into your shoulder joint to outline its soft structures     arthroscopy, a surgical procedure in which a small instrument is inserted into your shoulder joint so your provider can look directly at your rotator cuff   What is the treatment?   A tendon in your shoulder can be inflamed, partially torn, or completely torn. What is done about it depends on how torn it is and how much it hurts.   If your tear is a minor one, it can be left to heal by itself if it does not interfere with your everyday activities. Your treatment plan should include:     proper sitting posture, in which your head and shoulders are balanced     rest for your shoulder, which means avoiding strenuous activity or any overhead motion that causes pain     ice packs at least once a day, and preferably 2 or 3 times a day     doing the exercises your healthcare provider gives you     anti-inflammatory drugs. Adults aged 65 years and older should not take non-steroidal anti-inflammatory medicine for more than 7 days without their healthcare provider's approval.     physical therapy to strengthen your shoulder as it heals   If you have a bad tear, you may need to have it repaired by arthroscopy. Arthroscopy can be used to perform surgery on a joint as well as to see inside the joint. The rough edges of a torn tendon can be trimmed and left to heal. Larger tears can be stitched back  together. After surgery, your treatment plan will include physical therapy to strengthen your shoulder as it heals.   How long will the effects last?   Full recovery depends on what is torn and how it is treated.   When can I return to my normal activities?   Everyone recovers from an injury at a different rate. Return to your activities will be determined by how soon your shoulder recovers, not by how many days or weeks it has been since your injury has occurred. In general, the longer you have symptoms before you start treatment, the longer it will take to get better. The goal of rehabilitation is to return you to your normal activities as soon as is safely possible. If you return too soon you may worsen your injury.   You may safely return to your normal activities when:     Your injured shoulder has full range of motion without pain.     Your injured shoulder has regained normal strength compared to the uninjured shoulder.   What can be done to help prevent this from recurring?   The best way to prevent a recurrence is to strengthen your shoulder muscles and keep them in peak condition with shoulder exercises.           Rotator Cuff Strain Rehabilitation Exercises                You may do all of these exercises right away.   Isometric shoulder external rotation:  a doorway with your elbow bent 90 degrees and the back of the wrist on your injured side pressed against the door frame. Try to press your hand outward into the door frame. Hold for 5 seconds. Do 3 sets of 10.   Isometric shoulder internal rotation:  a doorway with your elbow bent 90 degrees and the front of the wrist on your injured side pressed against the door frame. Try to press your palm into the door frame. Hold for 5 seconds. Do 3 sets of 10.   Wand exercise: Flexion: Stand upright and hold a stick in both hands, palms down. Stretch your arms by lifting them over your head, keeping your arms straight. Hold for 5 seconds and  return to the starting position. Repeat 10 times.   Wand exercise: Extension: Stand upright and hold a stick in both hands behind your back. Move the stick away from your back. Hold the end position for 5 seconds. Relax and return to the starting position. Repeat 10 times.   Wand exercise: External rotation: Lie on your back and hold a stick in both hands, palms up. Your upper arms should be resting on the floor with your elbows at your sides and bent 90 degrees. Use your uninjured arm to push your injured arm out away from your body. Keep the elbow of your injured arm at your side while it is being pushed. Hold the stretch for 5 seconds. Repeat 10 times.   Wand exercise: Shoulder abduction and adduction: Stand and hold a stick with both hands, palms facing away from your body. Rest the stick against the front of your thighs. Use your uninjured arm to push your injured arm out to the side and up as high as possible. Keep your arms straight. Hold for 5 seconds. Repeat 10 times.   Resisted shoulder external rotation: Stand sideways next to a door with your injured arm farther from the door. Tie a knot in the end of the tubing and shut the knot in the door at waist level. Hold the other end of the tubing with the hand of your injured arm. Rest the hand of your injured arm across your stomach. Keeping your elbow in at your side, rotate your arm outward and away from your waist. Make sure you keep your elbow bent 90 degrees and your forearm parallel to the floor. Repeat 10 times. Build up to 3 sets of 10.   Resisted shoulder internal rotation: Stand sideways next to a door with your injured arm closest to the door. Tie a knot in the end of the tubing and shut the knot in the door at waist level. Hold the other end of the tubing with the hand of your injured arm. Bend the elbow of your injured arm 90 degrees. Keeping your elbow in at your side, rotate your forearm across your body and then back to the starting position.  "Make sure you keep your forearm parallel to the floor. Do 3 sets of 10.   Scaption: Stand with your arms at your sides and with your elbows straight. Slowly raise your arms to eye level. As you raise your arms, spread them apart so that they are only slightly in front of your body (at about a 30-degree angle to the front of your body). Point your thumbs toward the ceiling. Hold for 2 seconds and lower your arms slowly. Do 3 sets of 10. Progress to holding a soup can or light weight when you are doing the exercise and increase the weight as the exercise gets easier.   Side-lying external rotation: Lie on your uninjured side with your injured arm at your side and your elbow bent 90 degrees. Keeping your elbow against your side, raise your forearm toward the ceiling and hold for 2 seconds. Slowly lower your arm. Do 3 sets of 10. You can start doing this exercise holding a soup can or light weight and gradually increase the weight as long as there is no pain.   Horizontal abduction: Lie on your stomach on a table or the edge of a bed with the arm on your injured side hanging down over the edge. Raise your arm out to the side, with your thumb pointed toward the ceiling, until your arm is parallel to the floor. Hold for 2 seconds and then lower it slowly. Start this exercise with no weight. As you get stronger, add a light weight or hold a soup can. Do 3 sets of 10.   Push-up with a plus: Begin on the floor on your hands and knees. Keep your arms a shoulder width apart and lift your feet off the floor. Arch your back as high as possible and round your shoulders (this is the \"plus\" part or the exercise). Bend your elbows and lower your body to the floor. Return to the starting position and arch your back again. Do 3 sets of 10.   Published by [employer].  This content is reviewed periodically and is subject to change as new health information becomes available. The information is intended to inform and educate and is not " a replacement for medical evaluation, advice, diagnosis or treatment by a healthcare professional.   Written by Rocío Alcantara, MS, PT, and Fabiola Bender, PT, [facility], [facility], for [employer].   ? 2010 [employer] and/or its affiliates. All Rights Reserved.   Copyright   [employer] 2011  Adult Health Advisor                    Length of visit was 43 minutes with more than 50 percent of that time used for discussing medical concerns and education    The information in this document, created by the medical scribe for me, accurately reflects the services I personally performed and the decisions made by me. I have reviewed and approved this document for accuracy.   MD Jyoti Gamble MD  [facility]

## 2018-05-30 NOTE — MR AVS SNAPSHOT
After Visit Summary   5/30/2018    Anabelle Herbert    MRN: [id number]           Patient Information     Date Of Birth          1945        Visit Information        Provider Department      5/30/2018 1:20 PM Jyoti Moreno MD [facility]        Today's Diagnoses     Adjustment reaction with anxiety and depression    -  1    Cerebrovascular accident (CVA), unspecified mechanism (H)        Visit for screening mammogram        Benign essential hypertension        CKD (chronic kidney disease) stage 2, GFR 60-89 ml/min        Stenosis of carotid artery, unspecified laterality        B12 deficiency        Cognitive complaints        SHANEKA (obstructive sleep apnea)          Care Instructions      Med check in 3-4 weeks    Goal BP <140/90  Ideal BP <120/80    Start fluoxetine 10 mg daily.   Consider follow up with Mark Lin [phone]      We will want to update your cardiac echo, carotid ultrasound and mammogram. We can talk more about these at your next visit.     Rotator Cuff Injury   What is a rotator cuff injury?   A rotator cuff injury is a strain or tear in the group of tendons and muscles that hold your shoulder joint together and help move your shoulder.   How does it occur?   A rotator cuff injury may result from:     using your arm to break a fall     falling onto your arm     lifting a heavy object     use of your shoulder in sports with a repetitive overhead movement, such as swimming, baseball (mainly pitchers), football, and tennis, which gradually strains the tendon     manual labor such as painting, plastering, raking leaves, or housework   What are the symptoms?   The symptoms of a torn rotator cuff are:     arm and shoulder pain     shoulder weakness     shoulder tenderness     loss of shoulder movement, especially overhead   How is it diagnosed?   Your healthcare provider will examine you and check your shoulder for pain, tenderness, and  loss of motion as you move your arm in all directions. Your provider will ask if your shoulder pain began suddenly or gradually. You may have an X-ray to make sure there are not any fractures or bone spurs.   Based on these results, you may have other tests or procedures right away or later, such as:     magnetic resonance imaging (MRI), which creates images of your shoulder and surrounding structures with sound waves     an arthrogram, which is an X-ray or MRI that is taken after a special dye has been injected into your shoulder joint to outline its soft structures     arthroscopy, a surgical procedure in which a small instrument is inserted into your shoulder joint so your provider can look directly at your rotator cuff   What is the treatment?   A tendon in your shoulder can be inflamed, partially torn, or completely torn. What is done about it depends on how torn it is and how much it hurts.   If your tear is a minor one, it can be left to heal by itself if it does not interfere with your everyday activities. Your treatment plan should include:     proper sitting posture, in which your head and shoulders are balanced     rest for your shoulder, which means avoiding strenuous activity or any overhead motion that causes pain     ice packs at least once a day, and preferably 2 or 3 times a day     doing the exercises your healthcare provider gives you     anti-inflammatory drugs. Adults aged 65 years and older should not take non-steroidal anti-inflammatory medicine for more than 7 days without their healthcare provider's approval.     physical therapy to strengthen your shoulder as it heals   If you have a bad tear, you may need to have it repaired by arthroscopy. Arthroscopy can be used to perform surgery on a joint as well as to see inside the joint. The rough edges of a torn tendon can be trimmed and left to heal. Larger tears can be stitched back together. After surgery, your treatment plan will include  physical therapy to strengthen your shoulder as it heals.   How long will the effects last?   Full recovery depends on what is torn and how it is treated.   When can I return to my normal activities?   Everyone recovers from an injury at a different rate. Return to your activities will be determined by how soon your shoulder recovers, not by how many days or weeks it has been since your injury has occurred. In general, the longer you have symptoms before you start treatment, the longer it will take to get better. The goal of rehabilitation is to return you to your normal activities as soon as is safely possible. If you return too soon you may worsen your injury.   You may safely return to your normal activities when:     Your injured shoulder has full range of motion without pain.     Your injured shoulder has regained normal strength compared to the uninjured shoulder.   What can be done to help prevent this from recurring?   The best way to prevent a recurrence is to strengthen your shoulder muscles and keep them in peak condition with shoulder exercises.           Rotator Cuff Strain Rehabilitation Exercises                You may do all of these exercises right away.   Isometric shoulder external rotation:  a doorway with your elbow bent 90 degrees and the back of the wrist on your injured side pressed against the door frame. Try to press your hand outward into the door frame. Hold for 5 seconds. Do 3 sets of 10.   Isometric shoulder internal rotation:  a doorway with your elbow bent 90 degrees and the front of the wrist on your injured side pressed against the door frame. Try to press your palm into the door frame. Hold for 5 seconds. Do 3 sets of 10.   Wand exercise: Flexion: Stand upright and hold a stick in both hands, palms down. Stretch your arms by lifting them over your head, keeping your arms straight. Hold for 5 seconds and return to the starting position. Repeat 10 times.   Serafin  exercise: Extension: Stand upright and hold a stick in both hands behind your back. Move the stick away from your back. Hold the end position for 5 seconds. Relax and return to the starting position. Repeat 10 times.   Wand exercise: External rotation: Lie on your back and hold a stick in both hands, palms up. Your upper arms should be resting on the floor with your elbows at your sides and bent 90 degrees. Use your uninjured arm to push your injured arm out away from your body. Keep the elbow of your injured arm at your side while it is being pushed. Hold the stretch for 5 seconds. Repeat 10 times.   Wand exercise: Shoulder abduction and adduction: Stand and hold a stick with both hands, palms facing away from your body. Rest the stick against the front of your thighs. Use your uninjured arm to push your injured arm out to the side and up as high as possible. Keep your arms straight. Hold for 5 seconds. Repeat 10 times.   Resisted shoulder external rotation: Stand sideways next to a door with your injured arm farther from the door. Tie a knot in the end of the tubing and shut the knot in the door at waist level. Hold the other end of the tubing with the hand of your injured arm. Rest the hand of your injured arm across your stomach. Keeping your elbow in at your side, rotate your arm outward and away from your waist. Make sure you keep your elbow bent 90 degrees and your forearm parallel to the floor. Repeat 10 times. Build up to 3 sets of 10.   Resisted shoulder internal rotation: Stand sideways next to a door with your injured arm closest to the door. Tie a knot in the end of the tubing and shut the knot in the door at waist level. Hold the other end of the tubing with the hand of your injured arm. Bend the elbow of your injured arm 90 degrees. Keeping your elbow in at your side, rotate your forearm across your body and then back to the starting position. Make sure you keep your forearm parallel to the floor. Do  "3 sets of 10.   Scaption: Stand with your arms at your sides and with your elbows straight. Slowly raise your arms to eye level. As you raise your arms, spread them apart so that they are only slightly in front of your body (at about a 30-degree angle to the front of your body). Point your thumbs toward the ceiling. Hold for 2 seconds and lower your arms slowly. Do 3 sets of 10. Progress to holding a soup can or light weight when you are doing the exercise and increase the weight as the exercise gets easier.   Side-lying external rotation: Lie on your uninjured side with your injured arm at your side and your elbow bent 90 degrees. Keeping your elbow against your side, raise your forearm toward the ceiling and hold for 2 seconds. Slowly lower your arm. Do 3 sets of 10. You can start doing this exercise holding a soup can or light weight and gradually increase the weight as long as there is no pain.   Horizontal abduction: Lie on your stomach on a table or the edge of a bed with the arm on your injured side hanging down over the edge. Raise your arm out to the side, with your thumb pointed toward the ceiling, until your arm is parallel to the floor. Hold for 2 seconds and then lower it slowly. Start this exercise with no weight. As you get stronger, add a light weight or hold a soup can. Do 3 sets of 10.   Push-up with a plus: Begin on the floor on your hands and knees. Keep your arms a shoulder width apart and lift your feet off the floor. Arch your back as high as possible and round your shoulders (this is the \"plus\" part or the exercise). Bend your elbows and lower your body to the floor. Return to the starting position and arch your back again. Do 3 sets of 10.   Published by [employer].  This content is reviewed periodically and is subject to change as new health information becomes available. The information is intended to inform and educate and is not a replacement for medical evaluation, advice, diagnosis or " treatment by a healthcare professional.   Written by Rocío Alcantara, MS, PT, and Fabiola Bender, PT, [facility], [facility], for [facility].   ? 2010 [facility] and/or its affiliates. All Rights Reserved.   Copyright   [employer] 2011  Adult Health Advisor                          Follow-ups after your visit        Future tests that were ordered for you today     Open Future Orders        Priority Expected Expires Ordered    MA SCREENING DIGITAL BILAT - Future  (s+30) Routine  5/30/2019 5/30/2018            Who to contact     If you have questions or need follow up information about today's clinic visit or your schedule please contact [facility] directly at [phone].  Normal or non-critical lab and imaging results will be communicated to you by MyChart, letter or phone within 4 business days after the clinic has received the results. If you do not hear from us within 7 days, please contact the clinic through [employer] or phone. If you have a critical or abnormal lab result, we will notify you by phone as soon as possible.  Submit refill requests through [employer] or call your pharmacy and they will forward the refill request to us. Please allow 3 business days for your refill to be completed.          Additional Information About Your Visit        [employer] Information     [employer] gives you secure access to your electronic health record. If you see a primary care provider, you can also send messages to your care team and make appointments. If you have questions, please call your primary care clinic.  If you do not have a primary care provider, please call [phone] and they will assist you.        Care EveryWhere ID     This is your Care EveryWhere ID. This could be used by other organizations to access your [place] medical records  [id number]        Your Vitals Were     Pulse Temperature Respirations Height Pulse Oximetry BMI (Body Mass Index)    59 97.7  F (36.5  C) (Oral) 16 1.524 m (5') 98%  28.51 kg/m2       Blood Pressure from Last 3 Encounters:   05/30/18 154/88   11/09/17 136/82   10/02/17 144/88    Weight from Last 3 Encounters:   05/30/18 66.2 kg (146 lb)   10/02/17 64.9 kg (143 lb)   08/23/17 66 kg (145 lb 8 oz)              We Performed the Following     CBC with platelets     Comprehensive metabolic panel     Lipid panel reflex to direct LDL Fasting     TSH with free T4 reflex     Vitamin B12          Today's Medication Changes          These changes are accurate as of 5/30/18  2:22 PM.  If you have any questions, ask your nurse or doctor.               Start taking these medicines.        Dose/Directions    FLUoxetine 10 MG capsule   Commonly known as:  PROzac   Used for:  Adjustment reaction with anxiety and depression   Started by:  Jyoti Moreno MD        Dose:  10 mg   Take 1 capsule (10 mg) by mouth daily   Quantity:  30 capsule   Refills:  1         Stop taking these medicines if you haven't already. Please contact your care team if you have questions.     atorvastatin 20 MG tablet   Commonly known as:  LIPITOR   Stopped by:  Jyoti Moreno MD                Where to get your medicines      These medications were sent to [facility]/pharmacy #[id number] - [address] [address]     Phone:  [phone]     FLUoxetine 10 MG capsule                Primary Care Provider Office Phone # Fax #    Jyoti Moreno [phone] [address] [facility] [address]        Equal Access to Services     [facility]: Hadii aad ku hadasho Soomaali, waaxda luqadaha, qaybta kaalmada adeegyada, waxay brittani darden. So [facility] [phone].    ATENCIÓN: Si habla español, tiene a john disposición servicios gratuitos de asistencia lingüística. Llame al [phone].    We comply with applicable federal civil rights laws and [place] laws. We do not discriminate on the basis of race,  color, national origin, age, disability, sex, sexual orientation, or gender identity.            Thank you!     Thank you for choosing [facility]  for your care. Our goal is always to provide you with excellent care. Hearing back from our patients is one way we can continue to improve our services. Please take a few minutes to complete the written survey that you may receive in the mail after your visit with us. Thank you!             Your Updated Medication List - Protect others around you: Learn how to safely use, store and throw away your medicines at www.disposemymeds.org.          This list is accurate as of 5/30/18  2:22 PM.  Always use your most recent med list.                   Brand Name Dispense Instructions for use Diagnosis    ANTIOXIDANT PO           Blood Pressure Cuff Misc     1 each    1 Device daily as needed    Hypertension goal BP (blood pressure) < 130/80       clopidogrel 75 MG tablet    PLAVIX    90 tablet    TAKE 1 TABLET (75 MG) BY MOUTH DAILY    Cerebrovascular accident (CVA), unspecified mechanism (H)       co-enzyme Q-10 100 MG Caps capsule      Take 1 capsule by mouth daily.        Fish Oil Oil      daily        FLUoxetine 10 MG capsule    PROzac    30 capsule    Take 1 capsule (10 mg) by mouth daily    Adjustment reaction with anxiety and depression       lisinopril 20 MG tablet    PRINIVIL/ZESTRIL    90 tablet    TAKE 1 TABLET (20 MG) BY MOUTH DAILY    Hypertension goal BP (blood pressure) < 130/80       MAGNESIUM CITRATE PO      Take by mouth daily        MIRALAX PO      Take 1-2 capfuls by mouth as needed.        vitamin D 2000 units tablet     100 tablet    Take 2,000 Units by mouth daily.    Vitamin D deficiency

## 2018-05-31 ASSESSMENT — PATIENT HEALTH QUESTIONNAIRE - PHQ9: SUM OF ALL RESPONSES TO PHQ QUESTIONS 1-9: 25

## 2018-05-31 ASSESSMENT — ASTHMA QUESTIONNAIRES: ACT_TOTALSCORE: 25

## 2018-06-13 ENCOUNTER — TELEPHONE (OUTPATIENT)
Dept: FAMILY MEDICINE | Facility: CLINIC | Age: 73
End: 2018-06-13

## 2018-06-25 ENCOUNTER — OFFICE VISIT (OUTPATIENT)
Dept: FAMILY MEDICINE | Facility: CLINIC | Age: 73
End: 2018-06-25
Payer: COMMERCIAL

## 2018-06-25 VITALS
TEMPERATURE: 98.1 F | SYSTOLIC BLOOD PRESSURE: 136 MMHG | OXYGEN SATURATION: 97 % | DIASTOLIC BLOOD PRESSURE: 80 MMHG | HEIGHT: 60 IN | RESPIRATION RATE: 18 BRPM | WEIGHT: 141 LBS | BODY MASS INDEX: 27.68 KG/M2 | HEART RATE: 66 BPM

## 2018-06-25 DIAGNOSIS — N30.10 CHRONIC INTERSTITIAL CYSTITIS: ICD-10-CM

## 2018-06-25 DIAGNOSIS — E53.8 B12 DEFICIENCY: ICD-10-CM

## 2018-06-25 DIAGNOSIS — F43.23 ADJUSTMENT REACTION WITH ANXIETY AND DEPRESSION: Primary | ICD-10-CM

## 2018-06-25 DIAGNOSIS — I63.9 CEREBROVASCULAR ACCIDENT (CVA), UNSPECIFIED MECHANISM (H): ICD-10-CM

## 2018-06-25 DIAGNOSIS — R42 DIZZINESS: ICD-10-CM

## 2018-06-25 DIAGNOSIS — E78.5 HYPERLIPIDEMIA LDL GOAL <100: ICD-10-CM

## 2018-06-25 LAB
ALBUMIN UR-MCNC: 30 MG/DL
AMORPH CRY #/AREA URNS HPF: ABNORMAL /HPF
APPEARANCE UR: CLEAR
BACTERIA #/AREA URNS HPF: ABNORMAL /HPF
BILIRUB UR QL STRIP: NEGATIVE
COLOR UR AUTO: YELLOW
GLUCOSE UR STRIP-MCNC: NEGATIVE MG/DL
HGB UR QL STRIP: NEGATIVE
KETONES UR STRIP-MCNC: NEGATIVE MG/DL
LEUKOCYTE ESTERASE UR QL STRIP: NEGATIVE
MUCOUS THREADS #/AREA URNS LPF: PRESENT /LPF
NITRATE UR QL: NEGATIVE
NON-SQ EPI CELLS #/AREA URNS LPF: ABNORMAL /LPF
PH UR STRIP: 8.5 PH (ref 5–7)
RBC #/AREA URNS AUTO: ABNORMAL /HPF
SOURCE: ABNORMAL
SP GR UR STRIP: 1.01 (ref 1–1.03)
UROBILINOGEN UR STRIP-ACNC: 0.2 EU/DL (ref 0.2–1)
WBC #/AREA URNS AUTO: ABNORMAL /HPF

## 2018-06-25 PROCEDURE — 81001 URINALYSIS AUTO W/SCOPE: CPT | Performed by: FAMILY MEDICINE

## 2018-06-25 PROCEDURE — 99214 OFFICE O/P EST MOD 30 MIN: CPT | Performed by: FAMILY MEDICINE

## 2018-06-25 RX ORDER — BUPROPION HYDROCHLORIDE 150 MG/1
150 TABLET ORAL EVERY MORNING
Qty: 30 TABLET | Refills: 1 | Status: SHIPPED | OUTPATIENT
Start: 2018-06-25 | End: 2018-07-05

## 2018-06-25 ASSESSMENT — ANXIETY QUESTIONNAIRES
3. WORRYING TOO MUCH ABOUT DIFFERENT THINGS: NEARLY EVERY DAY
GAD7 TOTAL SCORE: 18
5. BEING SO RESTLESS THAT IT IS HARD TO SIT STILL: MORE THAN HALF THE DAYS
2. NOT BEING ABLE TO STOP OR CONTROL WORRYING: MORE THAN HALF THE DAYS
6. BECOMING EASILY ANNOYED OR IRRITABLE: NEARLY EVERY DAY
IF YOU CHECKED OFF ANY PROBLEMS ON THIS QUESTIONNAIRE, HOW DIFFICULT HAVE THESE PROBLEMS MADE IT FOR YOU TO DO YOUR WORK, TAKE CARE OF THINGS AT HOME, OR GET ALONG WITH OTHER PEOPLE: VERY DIFFICULT
1. FEELING NERVOUS, ANXIOUS, OR ON EDGE: NEARLY EVERY DAY
7. FEELING AFRAID AS IF SOMETHING AWFUL MIGHT HAPPEN: MORE THAN HALF THE DAYS

## 2018-06-25 ASSESSMENT — PATIENT HEALTH QUESTIONNAIRE - PHQ9: 5. POOR APPETITE OR OVEREATING: NEARLY EVERY DAY

## 2018-06-25 ASSESSMENT — PAIN SCALES - GENERAL: PAINLEVEL: MILD PAIN (3)

## 2018-06-25 NOTE — LETTER
My Depression Action Plan  Name: Anabelle Herbert   Date of Birth 1945  Date: 6/25/2018    My doctor: Jyoti Moreno   My clinic: [address] [address] [phone]          GREEN    ZONE   Good Control    What it looks like:     Things are going generally well. You have normal up s and down s. You may even feel depressed from time to time, but bad moods usually last less than a day.   What you need to do:  1. Continue to care for yourself (see self care plan)  2. Check your depression survival kit and update it as needed  3. Follow your physician s recommendations including any medication.  4. Do not stop taking medication unless you consult with your physician first.           YELLOW         ZONE Getting Worse    What it looks like:     Depression is starting to interfere with your life.     It may be hard to get out of bed; you may be starting to isolate yourself from others.    Symptoms of depression are starting to last most all day and this has happened for several days.     You may have suicidal thoughts but they are not constant.   What you need to do:     1. Call your care team, your response to treatment will improve if you keep your care team informed of your progress. Yellow periods are signs an adjustment may need to be made.     2. Continue your self-care, even if you have to fake it!    3. Talk to someone in your support network    4. Open up your depression survival kit           RED    ZONE Medical Alert - Get Help    What it looks like:     Depression is seriously interfering with your life.     You may experience these or other symptoms: You can t get out of bed most days, can t work or engage in other necessary activities, you have trouble taking care of basic hygiene, or basic responsibilities, thoughts of suicide or death that will not go away, self-injurious behavior.     What you need to do:  1. Call your  care team and request a same-day appointment. If they are not available (weekends or after hours) call your local crisis line, emergency room or 911.            Depression Self Care Plan / Survival Kit    Self-Care for Depression  Here s the deal. Your body and mind are really not as separate as most people think.  What you do and think affects how you feel and how you feel influences what you do and think. This means if you do things that people who feel good do, it will help you feel better.  Sometimes this is all it takes.  There is also a place for medication and therapy depending on how severe your depression is, so be sure to consult with your medical provider and/ or Behavioral Health Consultant if your symptoms are worsening or not improving.     In order to better manage my stress, I will:    Exercise  Get some form of exercise, every day. This will help reduce pain and release endorphins, the  feel good  chemicals in your brain. This is almost as good as taking antidepressants!  This is not the same as joining a gym and then never going! (they count on that by the way ) It can be as simple as just going for a walk or doing some gardening, anything that will get you moving.      Hygiene   Maintain good hygiene (Get out of bed in the morning, Make your bed, Brush your teeth, Take a shower, and Get dressed like you were going to work, even if you are unemployed).  If your clothes don't fit try to get ones that do.    Diet  I will strive to eat foods that are good for me, drink plenty of water, and avoid excessive sugar, caffeine, alcohol, and other mood-altering substances.  Some foods that are helpful in depression are: complex carbohydrates, B vitamins, flaxseed, fish or fish oil, fresh fruits and vegetables.    Psychotherapy  I agree to participate in Individual Therapy (if recommended).    Medication  If prescribed medications, I agree to take them.  Missing doses can result in serious side effects.  I  understand that drinking alcohol, or other illicit drug use, may cause potential side effects.  I will not stop my medication abruptly without first discussing it with my provider.    Staying Connected With Others  I will stay in touch with my friends, family members, and my primary care provider/team.    Use your imagination  Be creative.  We all have a creative side; it doesn t matter if it s oil painting, sand castles, or mud pies! This will also kick up the endorphins.    Witness Beauty  (AKA stop and smell the roses) Take a look outside, even in mid-winter. Notice colors, textures. Watch the squirrels and birds.     Service to others  Be of service to others.  There is always someone else in need.  By helping others we can  get out of ourselves  and remember the really important things.  This also provides opportunities for practicing all the other parts of the program.    Humor  Laugh and be silly!  Adjust your TV habits for less news and crime-drama and more comedy.    Control your stress  Try breathing deep, massage therapy, biofeedback, and meditation. Find time to relax each day.     My support system    Clinic Contact:  Phone number:    Contact 1:  Phone number:    Contact 2:  Phone number:    [employer]/:  Phone number:    Therapist:  Phone number:    Local crisis center:    Phone number:    Other community support:  Phone number:

## 2018-06-25 NOTE — MR AVS SNAPSHOT
After Visit Summary   6/25/2018    Anabelle Herbert    MRN: [id number]           Patient Information     Date Of Birth          1945        Visit Information        Provider Department      6/25/2018 1:00 PM Jyoti Moreno MD [facility]        Today's Diagnoses     Adjustment reaction with anxiety and depression    -  1    Dizziness        Chronic interstitial cystitis        Hyperlipidemia LDL goal <100        Cerebrovascular accident (CVA), unspecified mechanism (H)        B12 deficiency          Care Instructions    Start Wellbutrin 150 mg in the morning. Continue with Prozac.   Med check in 3-4 weeks (end of July)- phone visit is ok.  Strongly recommend follow up with counseling- via your [facility] or [facility]: [phone]    Drink lots of water during the day and then decrease in the afternoon.    Follow up with neurology for brain scan when you are ready.    At [facility], we strive to deliver an exceptional experience to you, every time we see you.  If you receive a survey in the mail, please send us back your thoughts. We really do value your feedback.    Suggested websites for health information:  Www.[facility].[employer] : Up to date and easily searchable information on multiple topics.  Www.medlineplus.gov : medication info, interactive tutorials, watch real surgeries online  Www.familydoctor.org : good info from the Academy of Family Physicians  Www.cdc.gov : public health info, travel advisories, epidemics (H1N1)  Www.aap.org : children's health info, normal development, vaccinations  Www.health.state[place] : [place] dept of health, public health issues in [place], N1N1    Your care team:     Family Medicine   KYE Marks MD Emily Bunt, APRN CNP   S. MD Aida Covarrubias MD Angela Wermerskirchen, MD         Clinic hours: Monday - Wednesday 7 am-7 pm   Thursdays and Fridays 7 am-5 pm.      [facility]: Monday - Friday 11 am-9 pm,   Saturday and Sunday 9 am-5 pm.    [facility]: Monday -Thursday 8 am-8 pm; Friday 8 am-6 pm; Saturday and Sunday 9 am-5 pm.     [facility]: Monday - Thursday 8 am - 7 pm; Friday 8 am - 6 pm    Clinic: [phone]   [facility]: [phone]   [facility]: [phone]                  Follow-ups after your visit        Additional Services     CARE COORDINATION REFERRAL       Services are provided by a Care Coordinator for people with complex needs such as: medical, social, or financial troubles.  The Care Coordinator works with the patient and their Primary Care Provider to determine health goals, obtain resources, achieve outcomes, and develop care plans that help coordinate the patient's care.     Reason for Referral: Complex Medical Concerns/Education: Chronic diagnosis s/p CVA, depression/anxiety, Financial Support: feels current medicare is not enough to live on, Mental Wellness (Health) (Mental Illness/Chemical Depedency): Education, Resources of Behavioral Health Services and Treatment Options and Patient/Caregiver Support: Resources for Support    Additional pertinent details:  Previous care coordination patient- see previous notes    Clinical Staff have discussed the Care Coordination Referral with the patient and/or caregiver: yes                  Who to contact     If you have questions or need follow up information about today's clinic visit or your schedule please contact [facility] directly at [phone].  Normal or non-critical lab and imaging results will be communicated to you by MyChart, letter or phone within 4 business days after the clinic has received the results. If you do not hear from us within 7 days, please contact the clinic through MyChart or phone. If you have a critical or abnormal lab result, we will notify you by phone as soon as  possible.  Submit refill requests through [employer] or call your pharmacy and they will forward the refill request to us. Please allow 3 business days for your refill to be completed.          Additional Information About Your Visit        [employer] Information     [employer] gives you secure access to your electronic health record. If you see a primary care provider, you can also send messages to your care team and make appointments. If you have questions, please call your primary care clinic.  If you do not have a primary care provider, please call [phone] and they will assist you.        Care EveryWhere ID     This is your Care EveryWhere ID. This could be used by other organizations to access your [facility] medical records  [id number]        Your Vitals Were     Pulse Temperature Respirations Height Pulse Oximetry Breastfeeding?    66 98.1  F (36.7  C) (Oral) 18 1.524 m (5') 97% No    BMI (Body Mass Index)                   27.54 kg/m2            Blood Pressure from Last 3 Encounters:   06/25/18 136/80   05/30/18 154/88   11/09/17 136/82    Weight from Last 3 Encounters:   06/25/18 64 kg (141 lb)   05/30/18 66.2 kg (146 lb)   10/02/17 64.9 kg (143 lb)              We Performed the Following     CARE COORDINATION REFERRAL     DEPRESSION ACTION PLAN (DAP)          Today's Medication Changes          These changes are accurate as of 6/25/18  1:48 PM.  If you have any questions, ask your nurse or doctor.               Start taking these medicines.        Dose/Directions    buPROPion 150 MG 24 hr tablet   Commonly known as:  WELLBUTRIN XL   Used for:  Adjustment reaction with anxiety and depression   Started by:  Jyoti Moreno MD        Dose:  150 mg   Take 1 tablet (150 mg) by mouth every morning   Quantity:  30 tablet   Refills:  1            Where to get your medicines      These medications were sent to [facility]/pharmacy #[id number] - [place], [place] - [address] [place] [address]  [address]     Phone:  [phone]     buPROPion 150 MG 24 hr tablet                Primary Care Provider Office Phone # Fax #    Jyoti Moreno [phone] [address]  [facility] [address]        Equal Access to Services     MYRON MARTINEZ : Hadii aad ku hadasho Soomaali, waaxda luqadaha, qaybta kaalmada adeegyada, waxlauren christianin elen albaro maegantri darden. So [facility] [phone].    ATENCIÓN: Si habla español, tiene a john disposición servicios gratuitos de asistencia lingüística. Bhumika al [phone].    We comply with applicable federal civil rights laws and [place] laws. We do not discriminate on the basis of race, color, national origin, age, disability, sex, sexual orientation, or gender identity.            Thank you!     Thank you for choosing [facility]  for your care. Our goal is always to provide you with excellent care. Hearing back from our patients is one way we can continue to improve our services. Please take a few minutes to complete the written survey that you may receive in the mail after your visit with us. Thank you!             Your Updated Medication List - Protect others around you: Learn how to safely use, store and throw away your medicines at www.disposemymeds.org.          This list is accurate as of 6/25/18  1:48 PM.  Always use your most recent med list.                   Brand Name Dispense Instructions for use Diagnosis    ANTIOXIDANT PO           Blood Pressure Cuff Misc     1 each    1 Device daily as needed    Hypertension goal BP (blood pressure) < 130/80       buPROPion 150 MG 24 hr tablet    WELLBUTRIN XL    30 tablet    Take 1 tablet (150 mg) by mouth every morning    Adjustment reaction with anxiety and depression       clopidogrel 75 MG tablet    PLAVIX    90 tablet    TAKE 1 TABLET (75 MG) BY MOUTH DAILY    Cerebrovascular accident (CVA), unspecified mechanism (H)       co-enzyme Q-10 100 MG Caps capsule      Take 1  capsule by mouth daily.        Fish Oil Oil      daily        FLUoxetine 10 MG capsule    PROzac    30 capsule    Take 1 capsule (10 mg) by mouth daily    Adjustment reaction with anxiety and depression       lisinopril 20 MG tablet    PRINIVIL/ZESTRIL    90 tablet    TAKE 1 TABLET (20 MG) BY MOUTH DAILY    Hypertension goal BP (blood pressure) < 130/80       MAGNESIUM CITRATE PO      Take by mouth daily        MIRALAX PO      Take 1-2 capfuls by mouth as needed.        vitamin D 2000 units tablet     100 tablet    Take 2,000 Units by mouth daily.    Vitamin D deficiency

## 2018-06-25 NOTE — LETTER
June 26, 2018      Anabelle Herbert  [address]  [place] [address]        Dear Anabelle,     Your recent urinalysis showed no signs of infection. Please continue with the plan we developed in the office.       Sincerely,        Jyoti Moreno MD

## 2018-06-25 NOTE — PATIENT INSTRUCTIONS
Start Wellbutrin 150 mg in the morning. Continue with Prozac.   Med check in 3-4 weeks (end of July)- phone visit is ok.  Strongly recommend follow up with counseling- via your [facility] or [facility]: [phone]    Drink lots of water during the day and then decrease in the afternoon.    Follow up with neurology for brain scan when you are ready.    At [facility], we strive to deliver an exceptional experience to you, every time we see you.  If you receive a survey in the mail, please send us back your thoughts. We really do value your feedback.    Suggested websites for health information:  Www.[facility].org : Up to date and easily searchable information on multiple topics.  Www.medlineplus.gov : medication info, interactive tutorials, watch real surgeries online  Www.familydoctor.org : good info from the Academy of Family Physicians  Www.cdc.gov : public health info, travel advisories, epidemics (H1N1)  Www.aap.org : children's health info, normal development, vaccinations  Www.health.[facility].[place].us : [place] dept of health, public health issues in [place], N1N1    Your care team:     Family Medicine   KYE Marks MD Emily Bunt, CLAYTON CNP   S. MD Aida Covarrubias MD Angela Wermerskirchen, MD         Clinic hours: Monday - Wednesday 7 am-7 pm   Thursdays and Fridays 7 am-5 pm.     [facility]: Monday - Friday 11 am-9 pm,   Saturday and Sunday 9 am-5 pm.    [facility]: Monday -Thursday 8 am-8 pm; Friday 8 am-6 pm; Saturday and Sunday 9 am-5 pm.     [facility]: Monday Thursday 8 am   7 pm; Friday 8 am   6 pm    Clinic: [phone]   [facility]: [phone]   [facility]: [phone]

## 2018-06-25 NOTE — PROGRESS NOTES
"  SUBJECTIVE:   Anabelle Herbert is a 73 year old female who presents to clinic today for the following health issues:  Discuss lab results      Mood; Pt says she is \"miserable.\" Prozac is not helping, she has low energy, difficulty sleeping, and she feels fatigued.   -Some nights she is only waking a few times and feels so tired she can fall asleep quickly after waking to urinate. Other nights she is waking multiple times both due to bladder and anxiety. Reports she can stay awake for 2 days, sleep for about 4 hours, or sleep for multiple hours.   -Pt has been taking fluoxetine daily, in the morning since the last visit 5/30. Mood is not improving and at times she feels mood has worsened. She occasionally feels 'jittery' and off balance- onset after stroke although this has worsened. She is moving slowly to avoid falling. Notes orthostatic dizziness.  -feeling stressed out as she is struggling with money. She receives SSI but this is not enough. She doesn't think she can afford senior living  -[place] and recently construction has cut down her trees and fence in the backyard which has removed her privacy. Her street is also being redone so it is difficult to get from her house to another location.   -doesn't remember discussing counseling and is unsure if she was able to schedule a therapy appointment. She had wanted to f/u with [employer] counseling. -Reports brain fog recent.  -unsure of last f/u with neurology- chart review shows last visit December 2016. Reviewed note with pt.   Denies: change in vision, AE with prozac, thoughts of self harm,       Mood: She has had decrease appetite over the past few months  Wt Readings from Last 5 Encounters:   06/25/18 64 kg (141 lb)   05/30/18 66.2 kg (146 lb)   10/02/17 64.9 kg (143 lb)   08/23/17 66 kg (145 lb 8 oz)   04/26/17 68.5 kg (151 lb 1.6 oz)       Lab Results   Component Value Date    CHOL 243 05/30/2018     Lab Results   Component Value Date    " HDL 73 05/30/2018     Lab Results   Component Value Date     05/30/2018     Lab Results   Component Value Date    TRIG 71 05/30/2018     Lab Results   Component Value Date    CHOLHDLRATIO 2.8 06/30/2015     The ASCVD Risk score (Sadegabriel LIZ Jr, et al., 2013) failed to calculate for the following reasons:    The patient has a prior MCI or stroke diagnosis      Problem list and histories reviewed & adjusted, as indicated.  Additional history: as documented    Patient Active Problem List   Diagnosis     Chronic interstitial cystitis     Adjustment reaction with anxiety and depression     Abdominal pain     Benign essential hypertension     CKD (chronic kidney disease) stage 2, GFR 60-89 ml/min     Hyperlipidemia LDL goal <100     Mild persistent asthma     Vitamin D deficiency     GERD (gastroesophageal reflux disease)     Advance care planning     B12 deficiency     Transient cerebral ischemia     Carotid stenosis     Diverticulosis of colon     Colon polyp     Atrophic vaginitis     Interstitial cystitis     Chronic constipation     Cognitive complaints     Prediabetes     Osteoporosis     Cerebrovascular accident (CVA), unspecified mechanism (H)     SHANEKA (obstructive sleep apnea)     Past Surgical History:   Procedure Laterality Date     COLONOSCOPY       CYSTOSCOPY, BIOPSY BLADDER, COMBINED  1/6/2014    Procedure: COMBINED CYSTOSCOPY, BIOPSY BLADDER;;  Surgeon: Vandana Tang MD;  Location: [facility] OR     CYSTOSCOPY, INJECT COLLAGEN, COMBINED  1/6/2014    Procedure: COMBINED CYSTOSCOPY, INJECT BULKING AGENT;  IC cocktail, bladder biopsy, fulguration, heparin, gentamyacin, lidocaine & kenalog;  Surgeon: Vandana Tang MD;  Location: [facility] OR     GENITOURINARY SURGERY       NO HISTORY OF SURGERY         Social History   Substance Use Topics     Smoking status: Never Smoker     Smokeless tobacco: Never Used     Alcohol use No     Family History   Problem Relation Age of Onset     Cancer Father      colon?     HEART  DISEASE Father      Asthma Mother      Alzheimer Disease Mother 86     Unknown/Adopted Maternal Grandmother      Unknown/Adopted Maternal Grandfather      Unknown/Adopted Paternal Grandmother      Unknown/Adopted Paternal Grandfather      Asthma Sister      Allergies Sister      Unknown/Adopted Son          Current Outpatient Prescriptions   Medication Sig Dispense Refill     Blood Pressure Monitoring (BLOOD PRESSURE CUFF) MISC 1 Device daily as needed 1 each 0     Cholecalciferol (VITAMIN D) 2000 UNIT tablet Take 2,000 Units by mouth daily. 100 tablet 0     clopidogrel (PLAVIX) 75 MG tablet TAKE 1 TABLET (75 MG) BY MOUTH DAILY 90 tablet 0     co-enzyme Q-10 (COQ-10) 100 MG CAPS Take 1 capsule by mouth daily.       Fish Oil OIL daily       FLUoxetine (PROZAC) 10 MG capsule Take 1 capsule (10 mg) by mouth daily 30 capsule 1     lisinopril (PRINIVIL/ZESTRIL) 20 MG tablet TAKE 1 TABLET (20 MG) BY MOUTH DAILY 90 tablet 2     MAGNESIUM CITRATE PO Take by mouth daily       Multiple Vitamins-Minerals (ANTIOXIDANT PO)        Polyethylene Glycol 3350 (MIRALAX PO) Take 1-2 capfuls by mouth as needed.        Allergies   Allergen Reactions     Contrast Dye      Burning, hematuria     Dogs      Throat started to close up.        Reviewed and updated as needed this visit by clinical staff       Reviewed and updated as needed this visit by Provider Tobacco  Allergies  Meds  Med Hx  Surg Hx  Fam Hx  Soc Hx            ROS:  Constitutional, HEENT, cardiovascular, pulmonary, gi and gu systems are negative, except as otherwise noted.    This document serves as a record of the services and decisions personally performed and made by Jyoti Moreno MD. It was created on her behalf by Margaret Will, a trained medical scribe. The creation of this document is based the provider's statements to the medical scribe.  Margaret Will June 25, 2018 1:15 PM      OBJECTIVE:     /80 (BP Location: Right arm, Patient Position:  Chair, Cuff Size: Adult Regular)  Pulse 66  Temp 98.1  F (36.7  C) (Oral)  Resp 18  Ht 1.524 m (5')  Wt 64 kg (141 lb)  SpO2 97%  Breastfeeding? No  BMI 27.54 kg/m2  Body mass index is 27.54 kg/(m^2).  GENERAL: healthy, alert and no distress  EYES: Eyes grossly normal to inspection, PERRL and conjunctivae and sclerae normal, EOMI  RESP: lungs clear to auscultation - no rales, rhonchi or wheezes  CV: regular rate and rhythm, normal S1 S2, no S3 or S4, no murmur, click or rub, no peripheral edema and peripheral pulses strong  SKIN: no suspicious lesions or rashes to visible skin  NEURO: Normal strength and tone, sensory exam normal, mentation intact and speech normal  PSYCH: mentation appears normal, affect normal/bright    Diagnostic Test Results:  Results for orders placed or performed in visit on 06/25/18 (from the past 24 hour(s))   UA reflex to Microscopic and Culture   Result Value Ref Range    Color Urine Yellow     Appearance Urine Clear     Glucose Urine Negative NEG^Negative mg/dL    Bilirubin Urine Negative NEG^Negative    Ketones Urine Negative NEG^Negative mg/dL    Specific Gravity Urine 1.015 1.003 - 1.035    Blood Urine Negative NEG^Negative    pH Urine 8.5 (H) 5.0 - 7.0 pH    Protein Albumin Urine 30 (A) NEG^Negative mg/dL    Urobilinogen Urine 0.2 0.2 - 1.0 EU/dL    Nitrite Urine Negative NEG^Negative    Leukocyte Esterase Urine Negative NEG^Negative    Source Midstream Urine    Urine Microscopic   Result Value Ref Range    WBC Urine 0 - 5 OTO5^0 - 5 /HPF    RBC Urine O - 2 OTO2^O - 2 /HPF    Squamous Epithelial /LPF Urine Few FEW^Few /LPF    Bacteria Urine Few (A) NEG^Negative /HPF    Amorphous Crystals Few (A) NEG^Negative /HPF    Mucous Urine Present (A) NEG^Negative /LPF       Component      Latest Ref Rng & Units 5/30/2018   Sodium      133 - 144 mmol/L 141   Potassium      3.4 - 5.3 mmol/L 4.7   Chloride      94 - 109 mmol/L 107   Carbon Dioxide      20 - 32 mmol/L 31   Anion Gap      3  - 14 mmol/L 3   Glucose      70 - 99 mg/dL 92   Urea Nitrogen      7 - 30 mg/dL 22   Creatinine      0.52 - 1.04 mg/dL 0.87   GFR Estimate      >60 mL/min/1.7m2 64   GFR Estimate If Black      >60 mL/min/1.7m2 78   Calcium      8.5 - 10.1 mg/dL 9.4   Bilirubin Total      0.2 - 1.3 mg/dL 0.5   Albumin      3.4 - 5.0 g/dL 4.1   Protein Total      6.8 - 8.8 g/dL 7.4   Alkaline Phosphatase      40 - 150 U/L 53   ALT      0 - 50 U/L 20   AST      0 - 45 U/L 20   WBC      4.0 - 11.0 10e9/L 5.3   RBC Count      3.8 - 5.2 10e12/L 3.85   Hemoglobin      11.7 - 15.7 g/dL 12.4   Hematocrit      35.0 - 47.0 % 36.4   MCV      78 - 100 fl 95   MCH      26.5 - 33.0 pg 32.2   MCHC      31.5 - 36.5 g/dL 34.1   RDW      10.0 - 15.0 % 11.7   Platelet Count      150 - 450 10e9/L 226   Cholesterol      <200 mg/dL 243 (H)   Triglycerides      <150 mg/dL 71   HDL Cholesterol      >49 mg/dL 73   LDL Cholesterol Calculated      <100 mg/dL 156 (H)   Non HDL Cholesterol      <130 mg/dL 170 (H)   TSH      0.40 - 4.00 mU/L 2.41   Vitamin B12      193 - 986 pg/mL 1139 (H)     ASSESSMENT/PLAN:     1. Adjustment reaction with anxiety and depression  2. Dizziness  Ongoing significant mood sx's. pt continues to struggle with fatigue, dizziness, feeling depressed, and decreased motivation. Reviewed her past drug trials (lexapro, celexa, zoloft, effexor and current fluoxetine- she has stopped all these meds due to AE). Trial adding Wellbutrin and pt will continue taking Prozac. Strongly encouraged f/u with counseling.  Verbally contracts for safety. Will have care coordination connect with her to help with the financial concerns.    Reviewed timing of taking, onset, benefits, monitoring and typicall and severe AE of the medication.   - buPROPion (WELLBUTRIN XL) 150 MG 24 hr tablet; Take 1 tablet (150 mg) by mouth every morning  Dispense: 30 tablet; Refill: 1  - CARE COORDINATION REFERRAL    3. Chronic interstitial cystitis  - CARE COORDINATION  REFERRAL    4. Hyperlipidemia LDL goal <100   pt declines statin again. Discussed risks and benefits of not medicating due to hx of stroke.    5. Cerebrovascular accident (CVA), unspecified mechanism (H)   ongoing cognitive deficits, dizziness, and balance issues- pt reports this is worsening. Advised f/u with neurology and for MRI of brain although pt is not willing. She feels sx's are related to stress. Will continue to monitor  - CARE COORDINATION REFERRAL    6. B12 deficiency   pt recently restarted supplement.      Patient Instructions     Start Wellbutrin 150 mg in the morning. Continue with Prozac.   Med check in 3-4 weeks (end of July)- phone visit is ok.  Strongly recommend follow up with counseling- via your [employer] or [facility]: [phone]    Drink lots of water during the day and then decrease in the afternoon.    Follow up with neurology for brain scan when you are ready.    At [facility], we strive to deliver an exceptional experience to you, every time we see you.  If you receive a survey in the mail, please send us back your thoughts. We really do value your feedback.    Suggested websites for health information:  Www.[employer] : Up to date and easily searchable information on multiple topics.  Www.medlineplus.gov : medication info, interactive tutorials, watch real surgeries online  Www.familydoctor.org : good info from the Academy of Family Physicians  Www.cdc.gov : public health info, travel advisories, epidemics (H1N1)  Www.aap.org : children's health info, normal development, vaccinations  Www.health.[facility].[place].us : [place] dept of health, public health issues in [place], N1N1    Your care team:     Family Medicine   KYE Marks MD Emily Bunt, CLAYTON MOY   S. MD Aida Covarrubias MD Angela Wermerskirchen, MD         Clinic hours: Monday - Wednesday 7 am-7 pm   Thursdays and Fridays 7 am-5 pm.     [facility]: Monday - Friday 11 am-9 pm,   Saturday and Sunday 9 am-5 pm.    [facility]: Monday -Thursday 8 am-8 pm; Friday 8 am-6 pm; Saturday and Sunday 9 am-5 pm.     [facility]: Monday - Thursday 8 am - 7 pm; Friday 8 am - 6 pm    Clinic: [phone]   [facility]: [phone]   [facility]: [phone]              The information in this document, created by the medical scribe for me, accurately reflects the services I personally performed and the decisions made by me. I have reviewed and approved this document for accuracy.   MD Jyoti Gamble MD  [facility]

## 2018-06-26 ASSESSMENT — ANXIETY QUESTIONNAIRES: GAD7 TOTAL SCORE: 18

## 2018-06-26 ASSESSMENT — PATIENT HEALTH QUESTIONNAIRE - PHQ9: SUM OF ALL RESPONSES TO PHQ QUESTIONS 1-9: 21

## 2018-06-28 ENCOUNTER — TELEPHONE (OUTPATIENT)
Dept: CARE COORDINATION | Facility: CLINIC | Age: 73
End: 2018-06-28

## 2018-06-28 NOTE — TELEPHONE ENCOUNTER
Social Work Telephone Message Note  [facility]    Patient Name:  Anabelle Herbert  /Age:  1945 (73 year old)    Referral Source:  Original referral was from Corinna Richards OT, though updated referral information received from Dr Jyoti Moreno, [facility]  Reason for Referral:  Complex Medical Concerns/Education: Chronic diagnosis s/p CVA, depression/anxiety, Financial Support: feels current medicare is not enough to live on, Mental Wellness (Health) (Mental Illness/Chemical Depedency): Education, Resources of Behavioral Health Services and Treatment Options and Patient/Caregiver Support: Resources for Support    SW has been following pt for care coordination though it has been very difficult to work with pt on goal achievement for a number of reasons.  Pt is forgetful, she is not able/open to give details about her finances, she has no support system, and she either does not listen to her voice mail messages or is not willing/able return calls.  Previously SW had worked with pt at length on getting a home PCA assessment set up through [facility] though it never happened as pt did not return [facility] calls to schedule the home PCA assessment.  SW attempted to contact pt several times when the [facility] was attempting to get this scheduled, to no avail.  Per new information received from Dr Moreno, SW attempted to contact pt via phone today, was unable to connect with pt, and LM for pt to call SW back.  SW also decided it might be helpful to send out [facility], per pt's interest,so that was done today.  SW will follow and await return call from pt.  If pt does not call SW back in 3-5 days, SW will attempt to reach pt at home again.    AKIRA Bradford     Social Work  [facility]  Office:  [phone]  E-Mail:  [email or web address]  2018 1:25 PM

## 2018-06-29 ENCOUNTER — TELEPHONE (OUTPATIENT)
Dept: FAMILY MEDICINE | Facility: CLINIC | Age: 73
End: 2018-06-29

## 2018-06-29 NOTE — TELEPHONE ENCOUNTER
Anabelle Herbert is a 73 year old female who calls with Anxiety.    NURSING ASSESSMENT:  Description:  Patient reports she was seen on 6/25/18 and started on a new medication Bupropion which she has taken every morning this week. She also has been taking her prozac every night. Shortly after taking her medication this morning, patient started feeling very shakey. She describes visible tremors in her hands. She feels anxious, inability to concentrate, forgetful, increased light-headedness, and notices a slight pressure in her head on both temples. Patient denies hallucinations, paranoia, confusion, self-harm, palpitations, SOB/hyperventalation, profuse sweating, N/V/diarrhea, use of caffeine, and difficulty sleeping. Patient is not a smoker.    Onset/duration:  Started today, after taking her morning medications. Patient has been feeling ok prior. She was able to sleep last night.   Precip. factors:  Recent OV 6/25/18 - started a new medication, Bupropion.   Psych History: Adjustment reaction with anxiety and depression  Allergies:   Allergies   Allergen Reactions     Contrast Dye      Burning, hematuria     Dogs      Throat started to close up.        MEDICATIONS:   Taking medication(s) as prescribed? Yes  Taking over the counter medication(s)? No  Any medication side effects? tremulousness    Any barriers to taking medication(s) as prescribed?  No  Medication(s) improving/managing symptoms?  unsure  Medication reconciliation completed: Yes    Last visit:  6/25/18  Imminent danger to self:  no.   Imminent danger to others:  no.   Impaired reality:  no  Is patient home alone?  Yes    If further questions/concerns or if symptoms do not improve, worsen or new symptoms develop such as  *Suicidal thought without a plan or means to carry out the plan  *Depression interferes with daily activities  *Persistent inability to sleep  Call your PCP or [facility] as soon as possible.     SEEK [facility] CARE  IMMEDIATELY IF ANY OF THE FOLLOWING OCCUR:  *Suicidal thoughts and a plan and means to carry out the plan  *Injury to self or others     NURSING PLAN: Nursing advice to patient attempt distraction, deep breaths, or sitting with head reaching to knees. Urgent care if needed.     RECOMMENDED DISPOSITION:  Provider and covering providers in office to review and advise - RN team will call patient back with provider's response.   Will comply with recommendation: Yes    Guideline used:  Telephone Triage Protocols for Nurses, Fifth Edition, Montse Brock RN

## 2018-06-29 NOTE — TELEPHONE ENCOUNTER
Continue with plan per Dr. Jack and sounds like patient will update PCP next week or call sooner if she has concerns.   CLAYTON Gallagher, NP-C

## 2018-06-29 NOTE — TELEPHONE ENCOUNTER
Called and spoke with patient. After triage earlier she ended up taking a nap for a little while, when she arose she ended up making some lunch and said that she wasn't feeling well while eating so she held off. She reports when she is lying down/sitting/at rest she feel okay but when she is walking/standing for about 10 minutes and more she begins to feel the jitters again.     Writer went over the two choices that Dr. Jack advised on. Patient became tearful over the phone and stated that she wants to stop taking the medication. Continuing to speak to patient she began to sound more relieved/relaxed after making that decision and tone was pleasant/positive when getting off the phone with writer.     She requested that PCP be sent this triage and update. Patient reports she will call early next week to update on how she is feeling/doing. Writer advised if symptoms persist or worsen, or new symptoms arise to call clinic/talk with nurse triage. She states understanding.    Patient understands this message won't be reviewed by Dr. Moreno until she returns to clinic on Monday 7/2/18.    Routing to provider to review and advise if needed.     Jennifer Ma RN

## 2018-06-29 NOTE — TELEPHONE ENCOUNTER
2 choices -could either continue with the medication and see if it disappears.  This is likely a temporary side effect that will go away in the next few days.  Or she can stop the Wellbutrin and give it a few days to wash out of her system and if she is feeling good again can try it again.  Sometimes stopping and restarting helps the side effects

## 2018-06-29 NOTE — TELEPHONE ENCOUNTER
Re: new medication    Patient was ok with this until today  This morning she is very shaky  Can't concentrate/ anxiety level is up    Close to panic attack    Call to advise as soon as possible  [phone] ok to leave message    *reaching out to triage

## 2018-07-02 ENCOUNTER — TRANSFERRED RECORDS (OUTPATIENT)
Dept: HEALTH INFORMATION MANAGEMENT | Facility: CLINIC | Age: 73
End: 2018-07-02

## 2018-07-02 LAB
CREAT SERPL-MCNC: 1.03 MG/DL (ref 0.57–1.11)
GFR SERPL CREATININE-BSD FRML MDRD: 53 ML/MIN/1.73M2
GLUCOSE SERPL-MCNC: 87 MG/DL (ref 65–100)
POTASSIUM SERPL-SCNC: 4.7 MMOL/L (ref 3.5–5)

## 2018-07-02 NOTE — TELEPHONE ENCOUNTER
"Called and spoke with patient, she reports that the symptoms did not seem to resolve with stopping the Wellbutrin over the weekend. She reports that her anxiety has been high still and that she is still having some shakiness, especially with hands. When she is lying down she feels better, but when moving around/being active it gets worse. She had not eaten since Thursday and finally had a salad yesterday morning.     RN Triage:     Chief Complaint   Patient presents with     Tremors     Anxiety     Call Back       Initial There were no vitals taken for this visit. Estimated body mass index is 27.54 kg/(m^2) as calculated from the following:    Height as of 6/25/18: 1.524 m (5').    Weight as of 6/25/18: 64 kg (141 lb).  Telephone triage    Assessment:  During conversation, patient reported new onset of her head feelings \"cloudy\" and that her ears felt plugged. She then said that it felt like one of her eyelids were drooping and then she looked in the mirror and both seemed to be drooping. She then stated, \"these symptoms feel similar to when I had a stroke in the past\". Writer immediately advised that patient call 911 to be seen in the ED. Patient is resistant as she is wanting to know what Dr. Moreno thinks. Writer advised that provider would likely agree with nursing assessment of symptoms and advise that she be seen and evaluated in ED. Patient is agreeable and will call 911. Patient is scheduled for a follow-up appointment for anxiety and likely a hospital follow-up with PCP on Thursday 7/5/18.    Triage Dispo: Patient to be seen in ED for symptoms and history of stroke. Patient advised to call 911 to go to ED. Patient agreed. Patient is scheduled for OV with PCP later this week.     Writer utilized Telephone Triage Protocols for Nurses by Montse Good Fifth Edition.    Routing to provider to review and advise.     Jennifer Ma RN      "

## 2018-07-02 NOTE — TELEPHONE ENCOUNTER
...Reason for Call:  call back    Detailed comments: Patient said she need someone to call her back as soon as possible, she did not want to give any details.    Phone Number Patient can be reached at: Home number on file [phone] (home)    Best Time: anytime    Can we leave a detailed message on this number? YES    Call taken on 7/2/2018 at 10:05 AM by Mali Tompkins

## 2018-07-02 NOTE — TELEPHONE ENCOUNTER
This writer attempted to contact Vera on 07/02/18      Reason for call relay provider message and left message.      If patient calls back:   Patient contacted by a Registered Nurse. Inform patient that someone from the RN group will contact them, document that pt called and route to P DYAD 3 RN POOL [[id number]]      Jennifer Ma RN

## 2018-07-02 NOTE — TELEPHONE ENCOUNTER
Agree with plan of stopping wellbutrin (buproprion)medication.   If she is feeling better and back to baseline and is tolerating the fluoxetine better with taking it at night, we could trial increasing the fluoxetine to 20 mg if she is willing.   If so, she can take two of the 10 mg capsules until out- route back to me for rx for the 20 mg capsules to be sent also    Appears SW was going to send her some counseling options.   impt to start counseling    ntbs in office if continuing to feel poorly from adverse effects of wellbutrin

## 2018-07-05 ENCOUNTER — OFFICE VISIT (OUTPATIENT)
Dept: FAMILY MEDICINE | Facility: CLINIC | Age: 73
End: 2018-07-05
Payer: COMMERCIAL

## 2018-07-05 VITALS
OXYGEN SATURATION: 98 % | SYSTOLIC BLOOD PRESSURE: 124 MMHG | WEIGHT: 136 LBS | HEIGHT: 60 IN | TEMPERATURE: 98 F | BODY MASS INDEX: 26.7 KG/M2 | DIASTOLIC BLOOD PRESSURE: 80 MMHG | HEART RATE: 61 BPM | RESPIRATION RATE: 16 BRPM

## 2018-07-05 DIAGNOSIS — Z12.31 VISIT FOR SCREENING MAMMOGRAM: ICD-10-CM

## 2018-07-05 DIAGNOSIS — M54.50 ACUTE RIGHT-SIDED LOW BACK PAIN WITHOUT SCIATICA: ICD-10-CM

## 2018-07-05 DIAGNOSIS — K21.9 GASTROESOPHAGEAL REFLUX DISEASE, ESOPHAGITIS PRESENCE NOT SPECIFIED: ICD-10-CM

## 2018-07-05 DIAGNOSIS — F43.23 ADJUSTMENT REACTION WITH ANXIETY AND DEPRESSION: ICD-10-CM

## 2018-07-05 DIAGNOSIS — I10 HYPERTENSION GOAL BP (BLOOD PRESSURE) < 130/80: ICD-10-CM

## 2018-07-05 DIAGNOSIS — T43.205D: Primary | ICD-10-CM

## 2018-07-05 DIAGNOSIS — I63.9 CEREBROVASCULAR ACCIDENT (CVA), UNSPECIFIED MECHANISM (H): ICD-10-CM

## 2018-07-05 PROCEDURE — 99214 OFFICE O/P EST MOD 30 MIN: CPT | Performed by: FAMILY MEDICINE

## 2018-07-05 RX ORDER — FLUOXETINE 10 MG/1
10 CAPSULE ORAL DAILY
Qty: 30 CAPSULE | Refills: 1 | Status: CANCELLED | OUTPATIENT
Start: 2018-07-05

## 2018-07-05 RX ORDER — CLOPIDOGREL BISULFATE 75 MG/1
TABLET ORAL
Qty: 90 TABLET | Refills: 3 | Status: SHIPPED | OUTPATIENT
Start: 2018-07-05 | End: 2019-08-07

## 2018-07-05 RX ORDER — LISINOPRIL 20 MG/1
TABLET ORAL
Qty: 90 TABLET | Refills: 3 | Status: SHIPPED | OUTPATIENT
Start: 2018-07-05 | End: 2019-05-08

## 2018-07-05 ASSESSMENT — PATIENT HEALTH QUESTIONNAIRE - PHQ9: 5. POOR APPETITE OR OVEREATING: MORE THAN HALF THE DAYS

## 2018-07-05 ASSESSMENT — ANXIETY QUESTIONNAIRES
GAD7 TOTAL SCORE: 15
2. NOT BEING ABLE TO STOP OR CONTROL WORRYING: MORE THAN HALF THE DAYS
IF YOU CHECKED OFF ANY PROBLEMS ON THIS QUESTIONNAIRE, HOW DIFFICULT HAVE THESE PROBLEMS MADE IT FOR YOU TO DO YOUR WORK, TAKE CARE OF THINGS AT HOME, OR GET ALONG WITH OTHER PEOPLE: VERY DIFFICULT
5. BEING SO RESTLESS THAT IT IS HARD TO SIT STILL: SEVERAL DAYS
6. BECOMING EASILY ANNOYED OR IRRITABLE: MORE THAN HALF THE DAYS
1. FEELING NERVOUS, ANXIOUS, OR ON EDGE: NEARLY EVERY DAY
3. WORRYING TOO MUCH ABOUT DIFFERENT THINGS: NEARLY EVERY DAY
7. FEELING AFRAID AS IF SOMETHING AWFUL MIGHT HAPPEN: MORE THAN HALF THE DAYS

## 2018-07-05 NOTE — PATIENT INSTRUCTIONS
Drink a protein drink daily until your stomach issues resolve.    Increase Prozac to 20 mg (2, 10 mg tablets or 1, 20 mg tablet). Med check in one month (beginning of August).  Connect with  and schedule with counselor.     Let me know if you would like to schedule with physical therapy.     Work to stay awake more during the day (especially afternoon and evening). Ok to take melatonin 3-6 mg at bedtime to help with falling asleep

## 2018-07-05 NOTE — PROGRESS NOTES
SUBJECTIVE:   Anabelle Herbert is a 73 year old female who presents to clinic today for the following health issues:   Patient stopped taking Wellbutrin.     Depression and Anxiety Follow-Up    Status since last visit: No change    Other associated symptoms: fatigue     Complicating factors:     Significant life event: Yes-  Recently in the ER on 7/2/18     Current substance abuse: None    PHQ-9 5/30/2018 6/25/2018 7/5/2018   Total Score 25 21 16   Q9: Suicide Ideation More than half the days Not at all Not at all     VENTURA-7 SCORE 4/26/2017 6/25/2018 7/5/2018   Total Score - - -   Total Score 4 18 15     PHQ-9  English  PHQ-9   Any Language  VENTURA-7  Suicide Assessment Five-step Evaluation and Treatment (SAFE-T)      Amount of exercise or physical activity: None    Problems taking medications regularly: Yes,  Wellbutrin - side effects    Medication side effects: Dizziness, Nauseas .. Etc     Diet: regular (no restrictions)      Last visit 6/25 pt was struggling with persistent mood d/o- fatigue, dizziness, feeling depressed, and decreased motivation. We reviewed her past drug trials (lexapro, celexa, zoloft, effexor and current fluoxetine- she has stopped all these meds due to AE). We decided to trial Wellbutrin which she started immediately after this visit. Pt had AE with this medication and was seen in the ER 7/2 due to the sxs.  AE of Wellbutrin: shaking, very anxious, couldn't concentrate, pressure in the face, ear congested, nausea, and right eye lid drooping. She d/c'ed Wellbutrin 7 days ago.     GI: Since d/c'ing Wellbutrin most of the above sx's have resolved. However, she has continued to have mild dyspepsia (no pain or nury reflux) and decreased appetite. These sx's are possible little better as she is now eating again (hadn't eaten a few days while on the wellbutrin).  She is drinking nutritional supplement on some days- comes in a can, but patient very vague about how often she is doing this. Pt  "originally states that she may be taking Prilosec. After discussing, she says she doesn't think she taking antacid medication and confused the name with Prozac.  Denies: change in bowels, constipation, diarrhea, GERD, abdominal pain with eating,     Mood: pt continues to feel overwhelmed with her financial status and feels she needs the \"help of a professional\". Care coordination has tried to contact her although she has not contacted them back. She also has not scheduled with counseling but says she will do this when she has time. Her daughter in law, who previously came to visits and advocated for her, is no longer speaking to her. Her son and daughter in law are going through a divorce currently and she reports neither of them are speaking to her.  -Fatigue has worsened. She has been lying in her bed since Friday (6 days ago). She is sleeping every few hours during the day and night and watching TV when she is awake. She doesn't have a specific sleep schedule.   -pt does feel safe right now.  Denies: thoughts of self harm/suicide    MS: Pt lifted her grandchild and felt a twinge of pain in her right low back. The pain was significant and kept her awake at night for the first 1-2 days. She has been stretching which has improved pain slightly. No spinal pain. No radicular pain/new weakness/change in bowel/bladder    Problem list and histories reviewed & adjusted, as indicated.  Additional history: as documented    Patient Active Problem List   Diagnosis     Chronic interstitial cystitis     Adjustment reaction with anxiety and depression     Abdominal pain     Benign essential hypertension     CKD (chronic kidney disease) stage 2, GFR 60-89 ml/min     Hyperlipidemia LDL goal <100     Mild persistent asthma     Vitamin D deficiency     GERD (gastroesophageal reflux disease)     Advance care planning     B12 deficiency     Transient cerebral ischemia     Carotid stenosis     Diverticulosis of colon     Colon polyp "     Atrophic vaginitis     Interstitial cystitis     Chronic constipation     Cognitive complaints     Prediabetes     Osteoporosis     Cerebrovascular accident (CVA), unspecified mechanism (H)     SHANEKA (obstructive sleep apnea)     Past Surgical History:   Procedure Laterality Date     COLONOSCOPY       CYSTOSCOPY, BIOPSY BLADDER, COMBINED  1/6/2014    Procedure: COMBINED CYSTOSCOPY, BIOPSY BLADDER;;  Surgeon: Vandana Tang MD;  Location: [facility] OR     CYSTOSCOPY, INJECT COLLAGEN, COMBINED  1/6/2014    Procedure: COMBINED CYSTOSCOPY, INJECT BULKING AGENT;  IC cocktail, bladder biopsy, fulguration, heparin, gentamyacin, lidocaine & kenalog;  Surgeon: Vandana Tang MD;  Location: [facility] OR     GENITOURINARY SURGERY       NO HISTORY OF SURGERY         Social History   Substance Use Topics     Smoking status: Never Smoker     Smokeless tobacco: Never Used     Alcohol use No     Family History   Problem Relation Age of Onset     Cancer Father      colon?     HEART DISEASE Father      Asthma Mother      Alzheimer Disease Mother 86     Unknown/Adopted Maternal Grandmother      Unknown/Adopted Maternal Grandfather      Unknown/Adopted Paternal Grandmother      Unknown/Adopted Paternal Grandfather      Asthma Sister      Allergies Sister      Unknown/Adopted Son          Current Outpatient Prescriptions   Medication Sig Dispense Refill     Blood Pressure Monitoring (BLOOD PRESSURE CUFF) MISC 1 Device daily as needed 1 each 0     Cholecalciferol (VITAMIN D) 2000 UNIT tablet Take 2,000 Units by mouth daily. 100 tablet 0     clopidogrel (PLAVIX) 75 MG tablet TAKE 1 TABLET (75 MG) BY MOUTH DAILY 90 tablet 3     co-enzyme Q-10 (COQ-10) 100 MG CAPS Take 1 capsule by mouth daily.       Fish Oil OIL daily       FLUoxetine (PROZAC) 20 MG capsule Take 1 capsule (20 mg) by mouth daily 30 capsule 1     lisinopril (PRINIVIL/ZESTRIL) 20 MG tablet TAKE 1 TABLET (20 MG) BY MOUTH DAILY 90 tablet 3     MAGNESIUM CITRATE PO Take by mouth  daily       Multiple Vitamins-Minerals (ANTIOXIDANT PO)        Polyethylene Glycol 3350 (MIRALAX PO) Take 1-2 capfuls by mouth as needed.        STATIN NOT PRESCRIBED, INTENTIONAL, 1 each daily Please choose reason not prescribed, below       [DISCONTINUED] clopidogrel (PLAVIX) 75 MG tablet TAKE 1 TABLET (75 MG) BY MOUTH DAILY 90 tablet 0     [DISCONTINUED] FLUoxetine (PROZAC) 10 MG capsule Take 1 capsule (10 mg) by mouth daily 30 capsule 1     [DISCONTINUED] lisinopril (PRINIVIL/ZESTRIL) 20 MG tablet TAKE 1 TABLET (20 MG) BY MOUTH DAILY 90 tablet 2     Allergies   Allergen Reactions     Contrast Dye      Burning, hematuria     Dogs      Throat started to close up.      Bupropion Anxiety     Patient reports increased anxiety, panic, difficulty concentrating, and various aches and pains       Reviewed and updated as needed this visit by clinical staff  Tobacco  Allergies  Meds       Reviewed and updated as needed this visit by Provider Tobacco  Allergies  Meds  Med Hx  Surg Hx  Fam Hx  Soc Hx            ROS:  Constitutional, HEENT, cardiovascular, pulmonary, gi and gu systems are negative, except as otherwise noted.    This document serves as a record of the services and decisions personally performed and made by Jyoti Moreno MD. It was created on her behalf by Margaret Will, a trained medical scribe. The creation of this document is based the provider's statements to the medical scribe.  Margaret Will July 5, 2018 10:05 AM      OBJECTIVE:     /80 (BP Location: Right arm, Patient Position: Sitting, Cuff Size: Adult Regular)  Pulse 61  Temp 98  F (36.7  C) (Oral)  Resp 16  Ht 1.524 m (5')  Wt 61.7 kg (136 lb)  SpO2 98%  BMI 26.56 kg/m2  Body mass index is 26.56 kg/(m^2).  GENERAL: healthy, alert and no distress  NECK: no adenopathy, no asymmetry, masses, or scars and thyroid normal to palpation  RESP: lungs clear to auscultation - no rales, rhonchi or wheezes  CV: regular rate and  "rhythm, normal S1 S2, no S3 or S4, no murmur, click or rub, no peripheral edema and peripheral pulses strong  MS: Right paraspinal, upper pelvic brim tenderness with palpation. No spinal tenderness.    SKIN: no suspicious lesions or rashes to visible skin  PSYCH: mentation at baseline, vague and tangential historian, affect is slightly improved from last check but mood still anxious/depressed. Tearful at times.   Neuro: at baseline. CN 2-12 intact.      Diagnostic Test Results:  none           Back to top of Miscellaneous Notes  ED Provider Note - Sarah Roland PA - 07/02/2018 12:56 PM CDT  Formatting of this note may be different from the original.  PATIENT: Anabelle Herbert 73 y.o. female MRN #: [id number]    CHIEF COMPLAINT:  Anxiety and Medication Reaction    The history is provided by the patient.     I, Ludy Wall, am serving as a scribe to document services personally performed by DEE Rocha based on my observation and the provider's statements to me.     Anabelle Herbert is a 73 y.o. female with a medical history of hypertension, diverticulosis, gastroesophageal reflux disease, and colitis who presents to the Emergency Department for evaluation of anxiety and medication reaction.    On Monday(seven days ago), the patient started bupropion XL for adjunct to Lexapro 10 mg daily for anxiety and depression. since then has been experiencing inability to concentrate, tasks seem \"overwhelming\", increased restlessness and anxiety/panic feeling, generalized \"head pressure\", bilateral clogged ears, general weakness/malaise. She also notes that she felt like her right eyelid was drooping 2 days ago but never actually noticed a drooping in the mirror. Symptoms peaked on Friday, and her primary care provider subsequently discontinued the Wellbutrin due to side effects with last dose on Friday.     After consultation with her primary care provider (nurse triage today) she mentioned the eyelid swelling " "and they recommended she present to emergency department for further consultation. Patient arrives by personal vehicle. Currently feels jittery but feels that she is able to manage her anxiety with distraction. Additionally mentions that she has right calf pain. No redness or swelling.    Does mention that she had a brief moment of feeling her heart race, otherwise denies any chest pain, shortness of breath, any focal weakness, numbness, tingling, severe headache, dizziness, trouble word finding, changes to vision, overt confusion. She notes she feels safe at home. Does have family around but they are \"busy\". Denies any thoughts of harming herself or others. No history of psychiatric hospitalization.    Of note, the patient says her right side has felt weak since her stroke(September 2016). She is on Plavix. No recent falls.    SHx: The patient reports that she has never smoked. She has never used smokeless tobacco. She reports that she does not drink alcohol or use drugs.  PCP: Jyoti Moreno I, DEE Rocha attest that Ludy Wall is acting in a scribe capacity, has observed my performance of the services and has documented them in accordance with my direction.    ALLERGIES:  Contrast dye [diatrizoate meglumine (iv contrast dye)] and Wellbutrin [bupropion]    CURRENT MEDICATIONS:   acetaminophen (TYLENOL) 650 mg tablet   amLODIPine (NORVASC) 5 mg tablet   aspirin chewable 81 mg chewable tablet   IBUPROFEN 200 MG TAB   LACTAID 3,000 UNIT TAB   lisinopril (PRINIVIL; ZESTRIL) 10 mg tablet   ondansetron (ZOFRAN ODT) 4 mg disintegrating tablet   pantoprazole (PROTONIX) 40 mg delayed-release tablet   simvastatin (ZOCOR) 20 mg tablet     PROBLEM LIST:   HTN (hypertension)  Hyperlipidemia  Colitis  Diverticulosis  GERD (gastroesophageal reflux disease)    PAST MEDICAL HISTORY:   Past Medical History:   Diagnosis Date     Chronic interstitial cystitis     Depression     Diverticula, colon     Hiatal " "hernia     Hypertension     PAST SURGICAL HISTORY:   Past Surgical History:   Procedure Laterality Date      section 1982     FAMILY HISTORY:   Family History   Problem Relation Age of Onset     Other Unknown   CA - colon and rectal : dad     SOCIAL HISTORY:  Marital Status:  [4]  Employment Status: Full Time [1]   Occupation: BUILDER  Substance Use:  Smoking status: Never Smoker     Smokeless tobacco: Never Used   Alcohol use: No       Review of Systems   Constitutional: Positive for malaise/fatigue. Negative for chills and fever.   HENT: Positive for hearing loss (bilateral). Negative for congestion and sore throat.   Eyes: Negative for blurred vision and double vision.   Respiratory: Negative for cough and shortness of breath.   Cardiovascular: Negative for chest pain.   Gastrointestinal: Negative for abdominal pain, nausea and vomiting.   Genitourinary: Negative.   Musculoskeletal: Positive for neck pain (left (resolved)). Negative for falls.   Positive for right calf pain.   Neurological: Positive for focal weakness (right side (since stroke 2016)) and weakness. Negative for dizziness, tingling, tremors (But feels jittery within), sensory change, speech change and headaches (Notes generalized head pressure).   Positive for head pressure radiating to ears.   Psychiatric/Behavioral: Positive for depression. Negative for substance abuse and suicidal ideas. The patient is nervous/anxious.   Trouble concentrating   All other systems reviewed and are negative.      Patient Vitals for the past 24 hrs:  BP Temp Pulse Resp SpO2 Height Weight   18 1520 163/82 - 56 16 99 % - -   18 1226 133/74 99  F (37.2  C) 70 18 98 % 1.575 m (5' 2\") 63.5 kg (140 lb)     Physical Exam   OBJECTIVE:   Vital signs reviewed.   General: Patient alert, pleasant female who appears her stated age. No acute distress. Well-appearing.  Skin: Skin color, texture, turgor normal.   Head: Normocephalic, " atraumatic  Eyes: conjunctivae clear. PERRL, EOM's intact.   Ears: normal TM's and external ear canals bilaterally.  Nose: Nares patent. Mucosa normal.   Oropharynx: posterior pharynx moist and patent. No erythema. Uvula midline.   Neck: Supple. Trachea midline. No neck, supraclavicular lymphadenopathy. Normal ROM.   CV: RRR with grade 1 systolic murmur, no rubs or gallops. Normal S1 and S2 heart sounds. DP/PT pulses normal.  Resp: No acute respiratory distress, normal effort and chest expansion. Clear to auscultation without rhonchi, wheezes, or rales.   Extremities: No Edema, Full ROM. Right calf tenderness to palpation, no redness or swelling.   Neuro: CN II-XII grossly intact, motor & sensory function intact.  strength 4+/5 on right and 5/5 on left. Normal finger-nose-finger bilaterally. Negative Romberg. Able to go up on toes and heels. Unable to walk and tandem walk. Some gait instability.  Psychiatric: Alert & oriented. Pleasant. Slightly anxious affect. Mood cited as anxious.Appropriately-groomed, fair eye contact. No thoughts of harming self or others.    Procedures    INTERVENTIONS:  All Medication Administration through 07/02/2018 1709   None       Imaging (reviewed and interpreted):  CT head brain (final result):  1. No acute intracerebral hemorrhage or midline shift is identified.  2. Some age related volume loss and small vessel ischemic change noted.  3. Areas of chronic infarct identified in the medial left occipital lobe and the right parietal lobe. These are unchanged.  Please see the radiology dictation for the complete report    US - Venous, right lower extremity (final result):  No evidence of deep vein thrombosis within the right lower extremity. 2.9 x 0.7 x 0.8 centimeter Baker`s cyst       Please see radiology dictation for complete report.     LABORATORY: (reviewed and interpreted)  CBC WITH WBC DIFFERENTIAL:  CBC WBC Differential   Recent Labs   07/02/18  1501   WBC 5.5   HGB 12.4   HCT  "35.8   MCV 93   MCH 32.3   MCHC 34.6   RDW 10.9 L      Recent Labs   07/02/18  1501   NEUTROPHILS 61.7   LYMPHS 27.2   MONOS 8.8   EOSINOS 1.7   BASOS 0.6       BASIC METABOLIC PANEL:  Recent Labs   07/02/18  1501   SODIUM 139   POTASSIUM 4.7   CHLORIDE 107   XQ8NBPIX 26   ANIONGAP 6   BUN 18   CREATININE 1.03   GLUCOSE 87   CALCIUM 9.8     GFR:  Recent Labs   07/02/18  1501   GFRAFRICAN >60   GFRNOTAFRICA 53 L       ED COURSE:  2:00 PM I met with the patient to gather history and to perform my initial exam. We discussed plans for the ED course, including diagnostic testing (if needed), working differential, and plan. 2:13 PM ED: Based on my assessment the patient was appropriately assigned to ED. Initially, triaged as UC, changed status to ED due to need for CT imaging. Patient verbalizes understanding of change in class. Awaiting labs and imaging.  3:51 PM I rechecked and updated the patient. Reviewed CT results.  4:06 PM consulted hospital pharmacist who stated that bupropion XL will be out of system in about 5 days after last dose.  4:10 PM updated patient on results. Discussed limitations of CT that could miss very small areas of ischemia however patient agreeable with follow-up with primary care provider to consult whether MRI is needed for further evaluation. Patient will be discharged home with instructions regarding proposed management plan and indications for return to the emergency department.    IMPRESSION/PLAN:  Anabelle Herbert is a 73 y.o. female who presents to the urgent care / emergency department with medication intolerance to newly added bupropion 150 mg XL which patient started 1 week ago and discontinued Friday (3 days ago) due to symptoms. Patient reported increased anxiety and panic sensations, feeling tremorous from within but not visualized, difficulty concentrating, malaise. Additionally mentioned that she felt that her right eyelid was drooping \"similar to when she had a stroke\". " "She has had several phone consultations with primary care provider and was recommended to present to emergency department for further evaluation.     Patient does have history of CVA in 2016 and patient unable to characterize her residual deficits on the right side. On my exam she did have slightly diminished  strength and slight weakness of large muscle groups in the right lower extremity (4+/5) compared to 5/5 in the left. Her gait also seemed a little unstable which patient states was \"most likely\" her baseline but unable to fully characterize. Cranial nerves II through XII otherwise intact. Normal FNF and negative Romberg.     Broad differential diagnoses considered including medication side effect/intolerance, uncontrolled anxiety and depression, electrolyte disturbance, thyroid disorder, CVA, intracranial mass, subarachnoid hemorrhage, dysrhythmia. Also she mentions some right calf pain was tender to palpation, no redness or swelling. Ultrasound negative for DVT. Did show Baker's cyst which I feel is an incidental finding as she is not currently having any knee pain. CBC unremarkable. BMP with GFR = 53, otherwise normal. Patient definitely describing side effects that can be intolerance to the bupropion however given some limitations of neurologic exam, I felt the CT was warranted and patient was agreeable.     CT head brain (final result):  1. No acute intracerebral hemorrhage or midline shift is identified.  2. Some age related volume loss and small vessel ischemic change noted.  3. Areas of chronic infarct identified in the medial left occipital lobe and the right parietal lobe. These are unchanged.    I acknowledge limitations of CT imaging but did not feel that MRI would change her management at this point. Will defer to primary care provider if MRI is indicated if symptoms not starting to improve over the next couple days as bupropion works out of the system and an estimated 5 days. History and " physical exam are most consistent with below stated diagnoses.     Wellbutrin XL should be out of system within 5 days. Overall patient states she is improved today and I anticipate her feeling much better over the next couple days. I advise she continue to take the Lexapro and follow her advice by primary care provider. She should be having an appointment on 7/5. She otherwise feels safe at home and no thoughts of harming self or others and is safe for discharge to home. Return precautions reviewed; patient verbalized understanding and agreement with plan.     DIAGNOSIS:  ENCOUNTER DIAGNOSES   ICD-10-CM   1. Adverse effect of drug, initial encounter T88.7XXA   2. Anxiety and depression F41.8   3. Difficulty concentrating R41.840   4. Miles cyst, right M71.21   5. Right calf pain M79.661     DISCHARGE INSTRUCTIONS:  Discharge Instructions     I recommend you stay off the Wellbutrin XL. The Wellbutrin should be out of your system in the next couple days. Continue the Lexapro 10 mg.    Continue rest. Push fluids. Good nutrition. Do it relaxes you.    Labs and imaging looks stable. Not quite sure why the right calf is tender however you have a Baker's cyst in the right knee which is likely not causing any problems. There is no blood clot.    Continue close follow-up with her primary care provider. Please try to schedule appointment for recheck on 7/5/18.    Return to emergency department immediately with uncontrolled anxiety/panic, chest pain, shortness of breath, leg swelling, any thoughts of harming herself or others, any acute numbness, tingling, weakness, other concerns.    Thank you for choosing [facility]/ED for your visit today.   DEE Marie    Follow-up Information   Follow up With Specialties Details Why Contact Info   Jyoti Moreno [facility] Schedule an appointment as soon as possible for a visit in 3 days recheck [address] [address], [address]  [facility]  [address]  [phone]    [facility] Emergency/Urgent Care Emergency If symptoms worsen or change in character [address] [place] [address]  [phone]       DEE Fabian  7/2/2018  [facility] URGENT CARE / EMERGENCY DEPARTMENT    CC: Jyoti Moreno    Dictation Disclaimer: This note has in part been created with the assistance of software that utilizes speech recognition transcription technology and may create an occasional, unintended word/grammar substitution. Errors are generally corrected in real time. Please message me via [facility] In Basket if you note any errors requiring clarification.       ASSESSMENT/PLAN:     1. Adverse effect of antidepressant drug, subsequent encounter  2. Adjustment reaction with anxiety and depression  AE of wellbutrin slowly improving although ongoing GI complaints. Patient feels the GI sx's are directly mood related and does not really want to address. Is willing to Trial increasing prozac to 20 mg. Discussed her erradict sleep schedule and Also advised staying awake during the day as much as able. Urged her to f/u with psychology and care coordination. Could also benefit from psychiatry but is only willing to make very small changes at this point.   Verbally contracts for safety.   - FLUoxetine (PROZAC) 20 MG capsule; Take 1 capsule (20 mg) by mouth daily  Dispense: 30 capsule; Refill: 1    3. Cerebrovascular accident (CVA), unspecified mechanism (H)  Recent head ct neg for acute changes. Would still like her to get MRI given ongoing dizziness/gait disturbance/etc as difficult to tell via hx if these sx's are worsening/same since her cva. She again declines mri/neuro visit today as feels this is too overwhelming to do at this point  - clopidogrel (PLAVIX) 75 MG tablet; TAKE 1 TABLET (75 MG) BY MOUTH DAILY  Dispense: 90 tablet; Refill: 3    4. Hypertension goal BP (blood pressure) < 130/80   Controlled. Continue same medication.   - lisinopril  (PRINIVIL/ZESTRIL) 20 MG tablet; TAKE 1 TABLET (20 MG) BY MOUTH DAILY  Dispense: 90 tablet; Refill: 3    5. Visit for screening mammogram  pt is to schedule mammorgam    6. Gastroesophageal reflux disease, esophagitis presence not specified  Dyspepsia right now. Encouraged restart of PPI but she declines noting that she feels her sx's are mood related. Discussed drinking protein supplement shake daily while not eating well.     7. Acute right-sided low back pain without sciatica   suspect pulled muscle. Recommended f/u with PT but pt declines and will continue to stretch on her own at home. She will notify me if she would like to f/u with PT.       Patient Instructions   Drink a protein drink daily until your stomach issues resolve.    Increase Prozac to 20 mg (2, 10 mg tablets or 1, 20 mg tablet). Med check in one month (beginning of August).  Connect with  and schedule with counselor.     Let me know if you would like to schedule with physical therapy.     Work to stay awake more during the day (especially afternoon and evening). Ok to take melatonin 3-6 mg at bedtime to help with falling asleep    Length of visit was 31 minutes with more than 50 percent of that time used for discussing medical concerns and education    The information in this document, created by the medical scribe for me, accurately reflects the services I personally performed and the decisions made by me. I have reviewed and approved this document for accuracy.   MD Jyoti Gamble MD  [facility]

## 2018-07-05 NOTE — MR AVS SNAPSHOT
After Visit Summary   7/5/2018    Anabelle Herbert    MRN: [id number]           Patient Information     Date Of Birth          1945        Visit Information        Provider Department      7/5/2018 9:40 AM Jyoti Moreno MD [facility]        Today's Diagnoses     Adverse effect of antidepressant drug, subsequent encounter    -  1    Adjustment reaction with anxiety and depression        Cerebrovascular accident (CVA), unspecified mechanism (H)        Hypertension goal BP (blood pressure) < 130/80        Visit for screening mammogram        Gastroesophageal reflux disease, esophagitis presence not specified        Acute right-sided low back pain without sciatica          Care Instructions    Drink a protein drink daily until your stomach issues resolve.    Increase Prozac to 20 mg (2, 10 mg tablets or 1, 20 mg tablet). Med check in one month (beginning of August).  Connect with  and schedule with counselor.     Let me know if you would like to schedule with physical therapy.     Work to stay awake more during the day (especially afternoon and evening). Ok to take melatonin 3-6 mg at bedtime to help with falling asleep          Follow-ups after your visit        Who to contact     If you have questions or need follow up information about today's clinic visit or your schedule please contact [facility] directly at [phone].  Normal or non-critical lab and imaging results will be communicated to you by [employer], letter or phone within 4 business days after the clinic has received the results. If you do not hear from us within 7 days, please contact the clinic through [employer] or phone. If you have a critical or abnormal lab result, we will notify you by phone as soon as possible.  Submit refill requests through [employer] or call your pharmacy and they will forward the refill request to us. Please allow 3 business days for your refill to be  completed.          Additional Information About Your Visit        Prom[employer]hart Information     [facility] [facility] [employer] gives you secure access to your electronic health record. If you see a primary care provider, you can also send messages to your care team and make appointments. If you have questions, please call your primary care clinic.  If you do not have a primary care provider, please call [phone] and they will assist you.        Care EveryWhere ID     This is your Care EveryWhere ID. This could be used by other organizations to access your [place] medical records  [id number]        Your Vitals Were     Pulse Temperature Respirations Height Pulse Oximetry BMI (Body Mass Index)    61 98  F (36.7  C) (Oral) 16 1.524 m (5') 98% 26.56 kg/m2       Blood Pressure from Last 3 Encounters:   07/05/18 124/80   06/25/18 136/80   05/30/18 154/88    Weight from Last 3 Encounters:   07/05/18 61.7 kg (136 lb)   06/25/18 64 kg (141 lb)   05/30/18 66.2 kg (146 lb)              Today, you had the following     No orders found for display         Today's Medication Changes          These changes are accurate as of 7/5/18 10:33 AM.  If you have any questions, ask your nurse or doctor.               These medicines have changed or have updated prescriptions.        Dose/Directions    FLUoxetine 20 MG capsule   Commonly known as:  PROzac   This may have changed:    - medication strength  - how much to take   Used for:  Adjustment reaction with anxiety and depression   Changed by:  Jyoti Moreno MD        Dose:  20 mg   Take 1 capsule (20 mg) by mouth daily   Quantity:  30 capsule   Refills:  1         Stop taking these medicines if you haven't already. Please contact your care team if you have questions.     buPROPion 150 MG 24 hr tablet   Commonly known as:  WELLBUTRIN XL   Stopped by:  Jyoti Moreno MD                Where to get your medicines      These medications were sent to [facility]/pharmacy #[id number] [place], [place]  - [address] [place] [address]  [address] [place] [address]     Phone:  [phone]     clopidogrel 75 MG tablet    FLUoxetine 20 MG capsule    lisinopril 20 MG tablet                Primary Care Provider Office Phone # Fax #    Jyoti Moreno [phone] [address]  [facility] [address]        Equal Access to Services     [facility] : Hadii aad ku hadasho Soomaali, waaxda luqadaha, qaybta kaalmada adeegyada, waxay idiin hayaan adeeg kharash la'aan . So [facility] [phone].    ATENCIÓN: Si yasmin hester, tiene a john disposición servicios gratuitos de asistencia lingüística. [facility] [phone].    We comply with applicable federal civil rights laws and [place] laws. We do not discriminate on the basis of race, color, national origin, age, disability, sex, sexual orientation, or gender identity.            Thank you!     Thank you for choosing [facility]  for your care. Our goal is always to provide you with excellent care. Hearing back from our patients is one way we can continue to improve our services. Please take a few minutes to complete the written survey that you may receive in the mail after your visit with us. Thank you!             Your Updated Medication List - Protect others around you: Learn how to safely use, store and throw away your medicines at www.disposemymeds.org.          This list is accurate as of 7/5/18 10:33 AM.  Always use your most recent med list.                   Brand Name Dispense Instructions for use Diagnosis    ANTIOXIDANT PO           Blood Pressure Cuff Misc     1 each    1 Device daily as needed    Hypertension goal BP (blood pressure) < 130/80       clopidogrel 75 MG tablet    PLAVIX    90 tablet    TAKE 1 TABLET (75 MG) BY MOUTH DAILY    Cerebrovascular accident (CVA), unspecified mechanism (H)       co-enzyme Q-10 100 MG Caps capsule      Take 1 capsule by mouth daily.        Fish Oil Oil      daily         FLUoxetine 20 MG capsule    PROzac    30 capsule    Take 1 capsule (20 mg) by mouth daily    Adjustment reaction with anxiety and depression       lisinopril 20 MG tablet    PRINIVIL/ZESTRIL    90 tablet    TAKE 1 TABLET (20 MG) BY MOUTH DAILY    Hypertension goal BP (blood pressure) < 130/80       MAGNESIUM CITRATE PO      Take by mouth daily        MIRALAX PO      Take 1-2 capfuls by mouth as needed.        STATIN NOT PRESCRIBED (INTENTIONAL)      1 each daily Please choose reason not prescribed, below    Hyperlipidemia LDL goal <100, Cerebrovascular accident (CVA), unspecified mechanism (H)       vitamin D 2000 units tablet     100 tablet    Take 2,000 Units by mouth daily.    Vitamin D deficiency

## 2018-07-06 ASSESSMENT — PATIENT HEALTH QUESTIONNAIRE - PHQ9: SUM OF ALL RESPONSES TO PHQ QUESTIONS 1-9: 16

## 2018-07-06 ASSESSMENT — ASTHMA QUESTIONNAIRES: ACT_TOTALSCORE: 25

## 2018-07-06 ASSESSMENT — ANXIETY QUESTIONNAIRES: GAD7 TOTAL SCORE: 15

## 2018-07-09 ENCOUNTER — TELEPHONE (OUTPATIENT)
Dept: CARE COORDINATION | Facility: CLINIC | Age: 73
End: 2018-07-09

## 2018-07-09 NOTE — TELEPHONE ENCOUNTER
Social Work Telephone Message Note   [facility]    Patient Name:  Anabelle Herbert  /Age:  1945 (73 year old)    Referral Source:  Original referral was from [facility] Department and 18 referral received from Dr Moreno at [facility]  Reason for Referral:  Multiple psychosocial needs    Pt called SW back to follow up regarding list of Hoahaoism Counseling Resources that SW sent out to pt on 18.  Pt wanted more detailed information regarding the choices, which SW provided to her.  SW discussed the benefits of counseling with pt and pt reports she will call the agencies to learn more to decide which one she wants to pursue.  SW talked with pt at length again regarding her need for assistance with bills, finances.  SW had attempted to get pt assistance through [facility] though pt never returned [facility]'s phone calls/letters to her to arrange an assessment for assistance at home.  In reviewing this with pt again on the phone, pt indicated she would not be eligible for [facility] assistance service programs as she earns too much money.  However, pt is reluctant to spend money on any support/resources as she says she cannot afford it.  SW discussed with pt the options of financial counseling through [facility]  and pt reports that she will call them to discuss this.  [facility] Service phone number provided to pt.  SW offered assistance to pt in calling places with/for her and she declines as she wants to do it herself.  SW will follow and assist as needed.    AKIRA Bradford     Social Work  [facility]  Office:  [phone]  E-Mail:  [email or web address]  2018 2:06 PM

## 2018-07-16 ENCOUNTER — TELEPHONE (OUTPATIENT)
Dept: CARE COORDINATION | Facility: CLINIC | Age: 73
End: 2018-07-16

## 2018-07-16 NOTE — TELEPHONE ENCOUNTER
Social Work Telephone Message Note  [facility]    Patient Name:  Anabelle Herbert  /Age:  1945 (73 year old)    Referral Source:  Original referral was from [facility] Department and 18 referral received from Dr Moreno at [facility]  Reason for Referral:  Multiple psychosocial needs    SW attempted to contact pt today via phone to follow up regarding whether pt was able to get Rastafarian counseling appt set up.  Also, SW following up on whether pt was able to call [facility]  regarding obtaining free financial counseling from them.  SW unable to connect with pt today and LM for pt to call SW back.  SW will follow and await return call from pt.    AKIRA Bradford     Social Work  [facility]  Office:  [phone]  E-Mail:  [email or web address]  2018 4:02 PM

## 2018-07-23 ENCOUNTER — TELEPHONE (OUTPATIENT)
Dept: CARE COORDINATION | Facility: CLINIC | Age: 73
End: 2018-07-23

## 2018-07-23 NOTE — TELEPHONE ENCOUNTER
Social Work Telephone Message Note  [facility]    Patient Name:  Anabelle Herbert  /Age:  1945 (73 year old)    Referral Source:  Original referral was from [facility] Department and 18 referral received from Dr Moreno at [facility]  Reason for Referral:  Multiple psychosocial needs    SW attempted to contact pt multiple times this AM to follow up regarding whether pt was able to set up a Synagogue counseling appt and contact [facility]  for financial counseling.  SW unable to connect with pt this AM and LM for pt to call SW back.  SW will follow and assist as needed.    AKIRA Bradford     Social Work  [facility]  Office:  [phone]  E-Mail:  [email or web address]  2018 10:04 AM

## 2018-07-24 ENCOUNTER — TELEPHONE (OUTPATIENT)
Dept: CARE COORDINATION | Facility: CLINIC | Age: 73
End: 2018-07-24

## 2018-07-24 NOTE — TELEPHONE ENCOUNTER
Social Work Telephone Note  [facility]    Patient Name:  Anabelle Herbert  /Age:  1945 (73 year old)    Referral Source:  Original referral was from [facility] Rehabilitation Department and 18 referral received from Dr Moreno at [facility]  Reason for Referral:  Multiple psychosocial needs    SW contacted pt via phone today to follow up with pt regarding scheduling a Hoahaoism counseling appt and an appointment with [facility] for financial counseling.  SW talked with pt at length about the benefits of counseling though pt is no longer interested in setting up an appt for counseling as she does not feels she can pay for her copay.  Pt reported to SW that she did call [facility] , [phone], and scheduled an appt for financial counseling on 18.  Pt reported that she was told by S that she would receive paperwork in the mail prior to the appt that she needs to bring with her to the appt.  However, pt reported that she did not receive the paperwork so did not go to the appt.  SW inquired again regarding pt's desire to have financial counseling and pt agrees she needs it.  It has been very difficult for this SW to get details regarding pt's finances from her due to forgetfulness.  Pt reports that she receives $1,600.00 per month in social security and she received some work disability payment in 2017, that is for 2018, though pt indicates that money will run out in about 3 months.  Pt indicates that she is not eligible for any county assistance/services because of her income/assets being too high though isn't in a position to pay for counseling appt copays.  Pt's situation is difficult in that she has family though gets no support from son, Sang.  Pt plans to call [facility] [employer] again to schedule financial counseling appt.  SW will follow and assist pt as needed.           AKIRA Bradford     Social Work  [facility]  [facility]  Office:  [phone]  E-Mail:  [email or web address]  7/24/2018 10:13 AM

## 2018-08-03 ENCOUNTER — TELEPHONE (OUTPATIENT)
Dept: CARE COORDINATION | Facility: CLINIC | Age: 73
End: 2018-08-03

## 2018-08-03 NOTE — TELEPHONE ENCOUNTER
Social Work Telephone Note  [facility]    Patient Name:  Anabelle Herbert  /Age:  1945 (73 year old)    Referral Source:  Original referral was from [facility] Rehabilitation Department and 18 referral received from Dr Moreno at [facility]  Reason for Referral:  Multiple psychosocial needs    SW contacted pt via phone today and pt was just leaving the house to go to the post office.  Pt indicated that she has not had a chance to call [facility]  back yet and plans to do so.  SW will continue to follow for support/resources.    AKIRA Bradford     Social Work  [facility]  Office:  [phone]  E-Mail:  [email or web address]  8/3/2018 12:05 PM

## 2018-08-13 ENCOUNTER — TELEPHONE (OUTPATIENT)
Dept: CARE COORDINATION | Facility: CLINIC | Age: 73
End: 2018-08-13

## 2018-08-13 NOTE — TELEPHONE ENCOUNTER
Social Work Telephone Message Note  [facility]    Patient Name:  Anabelle Herbert  /Age:  1945 (73 year old)    Referral Source:  Original referral was from [facility] Department and 18 referral received from Dr Moreno at [facility]  Reason for Referral:  Multiple psychosocial needs    SW attempted to contact pt today via phone to follow up regarding resources SW has provided to pt previously.  SW unable to connect with pt and LM for pt to call SW back.  SW will follow and await return call from pt.    AKIRA Bradford     Social Work  [facility]  Office:  [phone]  E-Mail:  [email or web address]  2018 2:08 PM

## 2018-08-30 ENCOUNTER — TELEPHONE (OUTPATIENT)
Dept: CARE COORDINATION | Facility: CLINIC | Age: 73
End: 2018-08-30

## 2018-08-30 NOTE — TELEPHONE ENCOUNTER
Social Work Telephone Message Note  [facility]    Patient Name:  Anabelle Herbert  /Age:  1945 (73 year old)    Referral Source:  Original referral was from [facility] Department and 18 referral received from Dr Moreno at [facility]  Reason for Referral:  Multiple psychosocial needs    SW received message from pt to call her.  SW attempted to contact pt via phone today, was unable to connect with pt, and LM for pt to call SW back.    AKIRA Bradford     Social Work  [facility]  Office:  [phone]  E-Mail:  [email or web address]  2018 2:20 PM

## 2018-09-03 DIAGNOSIS — F43.23 ADJUSTMENT REACTION WITH ANXIETY AND DEPRESSION: ICD-10-CM

## 2018-09-04 NOTE — TELEPHONE ENCOUNTER
"Requested Prescriptions   Pending Prescriptions Disp Refills     FLUoxetine (PROZAC) 20 MG capsule [Pharmacy Med Name: FLUOXETINE HCL 20 MG CAPSULE] 30 capsule 1     Sig: TAKE 1 CAPSULE BY MOUTH EVERY DAY    SSRIs Protocol Passed    9/3/2018  2:19 AM       Passed - Recent (12 mo) or future (30 days) visit within the authorizing provider's specialty    Patient had office visit in the last 12 months or has a visit in the next 30 days with authorizing provider or within the authorizing provider's specialty.  See \"Patient Info\" tab in inbasket, or \"Choose Columns\" in Meds & Orders section of the refill encounter.           Passed - Patient is age 18 or older       Passed - No active pregnancy on record       Passed - No positive pregnancy test in last 12 months      FLUoxetine (PROZAC) 20 MG capsule  Last Written Prescription Date:  7/5/18  Last Fill Quantity: 30,  # refills: 1   Last office visit: 7/5/2018 with prescribing provider:  Dr. Moreno   Future Office Visit:        PHQ-9 SCORE 5/30/2018 6/25/2018 7/5/2018   Total Score - - -   Total Score 25 21 16     VENTURA-7 SCORE 4/26/2017 6/25/2018 7/5/2018   Total Score - - -   Total Score 4 18 15         "

## 2018-09-06 NOTE — TELEPHONE ENCOUNTER
Routing refill request to provider for review/approval because:  Last PHQ9 greater than 5  Yumiko Diamond RN

## 2018-10-16 DIAGNOSIS — F43.23 ADJUSTMENT REACTION WITH ANXIETY AND DEPRESSION: ICD-10-CM

## 2018-10-16 NOTE — TELEPHONE ENCOUNTER
"Requested Prescriptions   Pending Prescriptions Disp Refills     FLUoxetine (PROZAC) 20 MG capsule  Last Written Prescription Date:  09/06/18  Last Fill Quantity: 30 capsule,  # refills: 0   Last office visit: 7/5/2018 with prescribing provider:  Dr. Moreno   Future Office Visit:   30 capsule 0     Sig: Take 1 capsule (20 mg) by mouth daily Office visit needed prior to further refill    SSRIs Protocol Passed    10/16/2018  9:22 AM  VENTURA-7 SCORE 4/26/2017 6/25/2018 7/5/2018   Total Score - - -   Total Score 4 18 15       PHQ-9 SCORE 5/30/2018 6/25/2018 7/5/2018   Total Score - - -   Total Score 25 21 16            Passed - Recent (12 mo) or future (30 days) visit within the authorizing provider's specialty    Patient had office visit in the last 12 months or has a visit in the next 30 days with authorizing provider or within the authorizing provider's specialty.  See \"Patient Info\" tab in inbasket, or \"Choose Columns\" in Meds & Orders section of the refill encounter.           Passed - Patient is age 18 or older       Passed - No active pregnancy on record       Passed - No positive pregnancy test in last 12 months   FLUoxetine (PROZAC) 20 MG capsule 30 capsule 0 9/6/2018  No   Sig - Route: Take 1 capsule (20 mg) by mouth daily Office visit needed prior to further refill - Oral   Class: E-Prescribe   Order: [id number]   E-Prescribing Status: Receipt confirmed by pharmacy (9/6/2018  1:32 PM CDT)            "

## 2018-10-18 NOTE — TELEPHONE ENCOUNTER
Routing refill request to provider for review/approval because:  Patient needs to be seen because:  Per last refill patient is due for office visit  PHQ-9 is 5 or more. Per protocol, RN cannot refill if PHQ-9 is over 4.        Nelda Terry RN, BSN

## 2018-10-18 NOTE — TELEPHONE ENCOUNTER
Please call patient re:  Appointment needed - once appointment scheduled, we can send in script to get to appointment.  CHUYK

## 2018-10-19 NOTE — TELEPHONE ENCOUNTER
This writer attempted to contact pt on 10/19/18      Reason for call schedule appt and left message.      If patient calls back:   Schedule Office Visit appointment within 2 weeks with PCP, document that pt called and route to PCP.      Diane Peters MA

## 2018-10-20 DIAGNOSIS — F43.23 ADJUSTMENT REACTION WITH ANXIETY AND DEPRESSION: ICD-10-CM

## 2018-10-22 NOTE — TELEPHONE ENCOUNTER
This writer attempted to contact pt on 10/22/18        Reason for call schedule appt and left message.        If patient calls back:                        Schedule Office Visit appointment within 2 weeks with PCP, document that pt called and route to PCP.        Diane Peters MA

## 2018-10-23 NOTE — TELEPHONE ENCOUNTER
"Requested Prescriptions   Pending Prescriptions Disp Refills     FLUoxetine (PROZAC) 20 MG capsule [Pharmacy Med Name: FLUOXETINE HCL 20 MG CAPSULE] 30 capsule 0     Sig: TAKE 1 CAPSULE (20 MG) BY MOUTH DAILY OFFICE VISIT NEEDED PRIOR TO FURTHER REFILL    SSRIs Protocol Passed    10/23/2018  9:00 AM       Passed - Recent (12 mo) or future (30 days) visit within the authorizing provider's specialty    Patient had office visit in the last 12 months or has a visit in the next 30 days with authorizing provider or within the authorizing provider's specialty.  See \"Patient Info\" tab in inbasket, or \"Choose Columns\" in Meds & Orders section of the refill encounter.             Passed - Patient is age 18 or older       Passed - No active pregnancy on record       Passed - No positive pregnancy test in last 12 months        Duplicate request; see refill encounter from 10/16/18. Patient needs office visit.         Nelda Terry RN, BSN   "

## 2018-11-09 DIAGNOSIS — N18.2 CKD (CHRONIC KIDNEY DISEASE) STAGE 2, GFR 60-89 ML/MIN: ICD-10-CM

## 2018-11-09 DIAGNOSIS — I63.9 CEREBROVASCULAR ACCIDENT (CVA), UNSPECIFIED MECHANISM (H): ICD-10-CM

## 2018-11-09 LAB
ANION GAP SERPL CALCULATED.3IONS-SCNC: 4 MMOL/L (ref 3–14)
BUN SERPL-MCNC: 14 MG/DL (ref 7–30)
CALCIUM SERPL-MCNC: 9.2 MG/DL (ref 8.5–10.1)
CHLORIDE SERPL-SCNC: 107 MMOL/L (ref 94–109)
CHOLEST SERPL-MCNC: 215 MG/DL
CO2 SERPL-SCNC: 31 MMOL/L (ref 20–32)
CREAT SERPL-MCNC: 0.96 MG/DL (ref 0.52–1.04)
GFR SERPL CREATININE-BSD FRML MDRD: 57 ML/MIN/1.7M2
GLUCOSE SERPL-MCNC: 95 MG/DL (ref 70–99)
HDLC SERPL-MCNC: 62 MG/DL
LDLC SERPL CALC-MCNC: 136 MG/DL
NONHDLC SERPL-MCNC: 153 MG/DL
POTASSIUM SERPL-SCNC: 4.8 MMOL/L (ref 3.4–5.3)
SODIUM SERPL-SCNC: 142 MMOL/L (ref 133–144)
TRIGL SERPL-MCNC: 87 MG/DL

## 2018-11-09 PROCEDURE — 80061 LIPID PANEL: CPT | Performed by: FAMILY MEDICINE

## 2018-11-09 PROCEDURE — 80048 BASIC METABOLIC PNL TOTAL CA: CPT | Performed by: FAMILY MEDICINE

## 2018-11-09 PROCEDURE — 36415 COLL VENOUS BLD VENIPUNCTURE: CPT | Performed by: FAMILY MEDICINE

## 2018-11-12 ENCOUNTER — OFFICE VISIT (OUTPATIENT)
Dept: FAMILY MEDICINE | Facility: CLINIC | Age: 73
End: 2018-11-12
Payer: COMMERCIAL

## 2018-11-12 VITALS
SYSTOLIC BLOOD PRESSURE: 120 MMHG | RESPIRATION RATE: 17 BRPM | WEIGHT: 128 LBS | HEART RATE: 68 BPM | DIASTOLIC BLOOD PRESSURE: 75 MMHG | HEIGHT: 61 IN | TEMPERATURE: 98.2 F | BODY MASS INDEX: 24.17 KG/M2 | OXYGEN SATURATION: 97 %

## 2018-11-12 DIAGNOSIS — Z12.31 VISIT FOR SCREENING MAMMOGRAM: ICD-10-CM

## 2018-11-12 DIAGNOSIS — Z00.00 ENCOUNTER FOR ROUTINE ADULT HEALTH EXAMINATION WITHOUT ABNORMAL FINDINGS: Primary | ICD-10-CM

## 2018-11-12 DIAGNOSIS — I63.9 CEREBROVASCULAR ACCIDENT (CVA), UNSPECIFIED MECHANISM (H): ICD-10-CM

## 2018-11-12 DIAGNOSIS — I10 BENIGN ESSENTIAL HYPERTENSION: ICD-10-CM

## 2018-11-12 DIAGNOSIS — E78.5 HYPERLIPIDEMIA LDL GOAL <100: ICD-10-CM

## 2018-11-12 DIAGNOSIS — I65.29 STENOSIS OF CAROTID ARTERY, UNSPECIFIED LATERALITY: ICD-10-CM

## 2018-11-12 DIAGNOSIS — K59.09 CHRONIC CONSTIPATION: ICD-10-CM

## 2018-11-12 DIAGNOSIS — Z78.0 MENOPAUSE: ICD-10-CM

## 2018-11-12 DIAGNOSIS — F43.23 ADJUSTMENT DISORDER WITH MIXED ANXIETY AND DEPRESSED MOOD: ICD-10-CM

## 2018-11-12 DIAGNOSIS — R41.9 COGNITIVE COMPLAINTS: ICD-10-CM

## 2018-11-12 DIAGNOSIS — K21.9 GASTROESOPHAGEAL REFLUX DISEASE, ESOPHAGITIS PRESENCE NOT SPECIFIED: ICD-10-CM

## 2018-11-12 DIAGNOSIS — N18.2 CKD (CHRONIC KIDNEY DISEASE) STAGE 2, GFR 60-89 ML/MIN: ICD-10-CM

## 2018-11-12 DIAGNOSIS — R63.4 LOSS OF WEIGHT: ICD-10-CM

## 2018-11-12 DIAGNOSIS — N30.10 CHRONIC INTERSTITIAL CYSTITIS: ICD-10-CM

## 2018-11-12 PROCEDURE — 99213 OFFICE O/P EST LOW 20 MIN: CPT | Mod: 25 | Performed by: FAMILY MEDICINE

## 2018-11-12 PROCEDURE — 99397 PER PM REEVAL EST PAT 65+ YR: CPT | Performed by: FAMILY MEDICINE

## 2018-11-12 RX ORDER — FLUOXETINE 10 MG/1
10 CAPSULE ORAL DAILY
Qty: 30 CAPSULE | Refills: 1 | Status: SHIPPED | OUTPATIENT
Start: 2018-11-12 | End: 2019-09-04

## 2018-11-12 RX ORDER — UBIDECARENONE 200 MG
200 CAPSULE ORAL DAILY
COMMUNITY
End: 2019-10-21

## 2018-11-12 ASSESSMENT — PATIENT HEALTH QUESTIONNAIRE - PHQ9
5. POOR APPETITE OR OVEREATING: MORE THAN HALF THE DAYS
SUM OF ALL RESPONSES TO PHQ QUESTIONS 1-9: 16

## 2018-11-12 ASSESSMENT — ANXIETY QUESTIONNAIRES
1. FEELING NERVOUS, ANXIOUS, OR ON EDGE: NEARLY EVERY DAY
7. FEELING AFRAID AS IF SOMETHING AWFUL MIGHT HAPPEN: SEVERAL DAYS
5. BEING SO RESTLESS THAT IT IS HARD TO SIT STILL: NOT AT ALL
2. NOT BEING ABLE TO STOP OR CONTROL WORRYING: NEARLY EVERY DAY
6. BECOMING EASILY ANNOYED OR IRRITABLE: SEVERAL DAYS
3. WORRYING TOO MUCH ABOUT DIFFERENT THINGS: NEARLY EVERY DAY
GAD7 TOTAL SCORE: 13
IF YOU CHECKED OFF ANY PROBLEMS ON THIS QUESTIONNAIRE, HOW DIFFICULT HAVE THESE PROBLEMS MADE IT FOR YOU TO DO YOUR WORK, TAKE CARE OF THINGS AT HOME, OR GET ALONG WITH OTHER PEOPLE: SOMEWHAT DIFFICULT

## 2018-11-12 ASSESSMENT — PAIN SCALES - GENERAL: PAINLEVEL: NO PAIN (0)

## 2018-11-12 NOTE — PATIENT INSTRUCTIONS
Please call [facility] (formerly called [facility]) at [phone] to schedule your brain MRI and mammogram.     Make an appointment with [facility] at [phone] to evaluate your memory.     Schedule with a counselor. [facility] will be calling you to arrange. Their number is [phone].     To help with the pain on the bottom of your feet, wear supportive shoes at all times, even at home.    Start with the prescription of 10 mg fluoxetine once daily for a two weeks and then increase to two 10 mg capsules daily until that runs out. Then take one daily of the 20 mg capsule.         At [facility], we strive to deliver an exceptional experience to you, every time we see you.  If you receive a survey in the mail, please send us back your thoughts. We really do value your feedback.    Your care team:     Family Medicine   KYE Marks MD Emily Bunt, APRN CNP S. MD Aida Covarrubias MD Angela Wermerskirchen, MD         Clinic hours: Monday - Wednesday 7 am-7 pm   Thursdays and Fridays 7 am-5 pm.     [facility]: Monday - Friday 11 am-9 pm,   Saturday and Sunday 9 am-5 pm.    [facility]: Monday -Thursday 8 am-8 pm; Friday 8 am-6 pm; Saturday and Sunday 9 am-5 pm.     [facility]: Monday - Thursday 8 am - 7 pm; Friday 8 am - 6 pm    Clinic: [phone]   [facility]: [phone]   [facility]: [phone]              Preventive Health Recommendations    See your health care provider every year to    Review health changes.     Discuss preventive care.      Review your medicines if your doctor has prescribed any.      You no longer need a yearly Pap test unless you've had an abnormal Pap test in the past 10 years. If you have vaginal symptoms, such as bleeding or discharge, be sure to  talk with your provider about a Pap test.      Every 1 to 2 years, have a mammogram.  If you are over 69, talk with your health care provider about whether or not you want to continue having screening mammograms.      Every 10 years, have a colonoscopy. Or, have a yearly FIT test (stool test). These exams will check for colon cancer.       Have a cholesterol test every 5 years, or more often if your doctor advises it.       Have a diabetes test (fasting glucose) every three years. If you are at risk for diabetes, you should have this test more often.       At age 65, have a bone density scan (DEXA) to check for osteoporosis (brittle bone disease).    Shots:    Get a flu shot each year.    Get a tetanus shot every 10 years.    Talk to your doctor about your pneumonia vaccines. There are now two you should receive - Pneumovax (PPSV 23) and Prevnar (PCV 13).    Talk to your pharmacist about the shingles vaccine.    Talk to your doctor about the hepatitis B vaccine.    Nutrition:     Eat at least 5 servings of fruits and vegetables each day.      Eat whole-grain bread, whole-wheat pasta and brown rice instead of white grains and rice.      Get adequate about Calcium and Vitamin D.     Lifestyle    Exercise at least 150 minutes a week (30 minutes a day, 5 days a week). This will help you control your weight and prevent disease.      Limit alcohol to one drink per day.      No smoking.       Wear sunscreen to prevent skin cancer.       See your dentist twice a year for an exam and cleaning.      See your eye doctor every 1 to 2 years to screen for conditions such as glaucoma, macular degeneration, cataracts, etc.    Personalized Prevention Plan  You are due for the preventive services outlined below.  Your care team is available to assist you in scheduling these services.  If you have already completed any of these items, please share that information with your care team to update in your medical record.    Health  Maintenance Due   Topic Date Due     Mammogram - every 2 years  05/08/2015     Pneumococcal Vaccine (2 of 2 - PPSV23) 05/28/2016     Asthma Action Plan - yearly  01/07/2017     Flu Vaccine (1) 09/01/2018
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
## 2018-11-12 NOTE — MR AVS SNAPSHOT
After Visit Summary   11/12/2018    Anabelle Herbert    MRN: [id number]           Patient Information     Date Of Birth          1945        Visit Information        Provider Department      11/12/2018 1:00 PM Jyoti Moreno MD [facility]        Today's Diagnoses     Encounter for routine adult health examination without abnormal findings    -  1    Visit for screening mammogram        Cognitive complaints        Cerebrovascular accident (CVA), unspecified mechanism (H)        Gastroesophageal reflux disease, esophagitis presence not specified        Benign essential hypertension        Adjustment disorder with mixed anxiety and depressed mood        Chronic constipation        Hyperlipidemia LDL goal <100        CKD (chronic kidney disease) stage 2, GFR 60-89 ml/min        Stenosis of carotid artery, unspecified laterality        Chronic interstitial cystitis        Menopause        Adjustment reaction with anxiety and depression        Loss of weight          Care Instructions    Please call [facility] (formerly called [facility]) at [phone] to schedule your brain MRI and mammogram.     Make an appointment with [facility] at [phone] to evaluate your memory.     Schedule with a counselor. [facility] will be calling you to arrange. Their number is [phone].     To help with the pain on the bottom of your feet, wear supportive shoes at all times, even at home.    Start with the prescription of 10 mg fluoxetine once daily for a two weeks and then increase to two 10 mg capsules daily until that runs out. Then take one daily of the 20 mg capsule.         At [facility], we strive to deliver an exceptional experience to you, every time we see you.  If you receive a survey in the mail, please send us back your thoughts. We really do value your feedback.    Your  care team:     Family Medicine   KYE Marks MD Emily Bunt, CLAYTON MOY   S. MD Aida Covarrubias MD Angela Wermerskirchen, MD         Clinic hours: Monday - Wednesday 7 am-7 pm   Thursdays and Fridays 7 am-5 pm.     [facility]: Monday - Friday 11 am-9 pm,   Saturday and Sunday 9 am-5 pm.    [facility]: Monday -Thursday 8 am-8 pm; Friday 8 am-6 pm; Saturday and Sunday 9 am-5 pm.     [facility]: Monday - Thursday 8 am - 7 pm; Friday 8 am - 6 pm    Clinic: [phone]   [facility]: [phone]   [facility]: [phone]              Preventive Health Recommendations    See your health care provider every year to    Review health changes.     Discuss preventive care.      Review your medicines if your doctor has prescribed any.      You no longer need a yearly Pap test unless you've had an abnormal Pap test in the past 10 years. If you have vaginal symptoms, such as bleeding or discharge, be sure to talk with your provider about a Pap test.      Every 1 to 2 years, have a mammogram.  If you are over 69, talk with your health care provider about whether or not you want to continue having screening mammograms.      Every 10 years, have a colonoscopy. Or, have a yearly FIT test (stool test). These exams will check for colon cancer.       Have a cholesterol test every 5 years, or more often if your doctor advises it.       Have a diabetes test (fasting glucose) every three years. If you are at risk for diabetes, you should have this test more often.       At age 65, have a bone density scan (DEXA) to check for osteoporosis (brittle bone disease).    Shots:    Get a flu shot each year.    Get a tetanus shot every 10 years.    Talk to your doctor about your pneumonia vaccines. There are now two you should receive - Pneumovax (PPSV 23) and Prevnar (PCV 13).    Talk to your pharmacist about  the shingles vaccine.    Talk to your doctor about the hepatitis B vaccine.    Nutrition:     Eat at least 5 servings of fruits and vegetables each day.      Eat whole-grain bread, whole-wheat pasta and brown rice instead of white grains and rice.      Get adequate about Calcium and Vitamin D.     Lifestyle    Exercise at least 150 minutes a week (30 minutes a day, 5 days a week). This will help you control your weight and prevent disease.      Limit alcohol to one drink per day.      No smoking.       Wear sunscreen to prevent skin cancer.       See your dentist twice a year for an exam and cleaning.      See your eye doctor every 1 to 2 years to screen for conditions such as glaucoma, macular degeneration, cataracts, etc.    Personalized Prevention Plan  You are due for the preventive services outlined below.  Your care team is available to assist you in scheduling these services.  If you have already completed any of these items, please share that information with your care team to update in your medical record.    Health Maintenance Due   Topic Date Due     Mammogram - every 2 years  05/08/2015     Pneumococcal Vaccine (2 of 2 - PPSV23) 05/28/2016     Asthma Action Plan - yearly  01/07/2017     Flu Vaccine (1) 09/01/2018             Follow-ups after your visit        Additional Services     MENTAL HEALTH REFERRAL  - Adult; Outpatient Treatment; Individual/Couples/Family/Group Therapy/Health Psychology; [facility]: [facility] [phone]; We will contact you to schedule the appointment or please call with any questions       All scheduling is subject to the client's specific insurance plan & benefits, provider/location availability, and provider clinical specialities.  Please arrive 15 minutes early for your first appointment and bring your completed paperwork.    Please be aware that coverage of these services is subject to the terms and limitations of your health insurance plan.  Call member  services at your health plan with any benefit or coverage questions.                      NEUROLOGY ADULT REFERRAL       Your provider has referred you for the following:   Consult at [facility]: [facility] [phone]   [email or web address]    Please be aware that coverage of these services is subject to the terms and limitations of your health insurance plan.  Call member services at your health plan with any benefit or coverage questions.      Please bring the following with you to your appointment:    (1) Any X-Rays, CTs or MRIs which have been performed.  Contact the facility where they were done to arrange for  prior to your scheduled appointment.    (2) List of current medications  (3) This referral request   (4) Any documents/labs given to you for this referral                  Follow-up notes from your care team     Return in about 3 months (around 2/12/2019).      Future tests that were ordered for you today     Open Future Orders        Priority Expected Expires Ordered    DX Hip/Pelvis/Spine Routine  11/12/2019 11/12/2018            Who to contact     If you have questions or need follow up information about today's clinic visit or your schedule please contact [facility] directly at [phone].  Normal or non-critical lab and imaging results will be communicated to you by MyChart, letter or phone within 4 business days after the clinic has received the results. If you do not hear from us within 7 days, please contact the clinic through [employer] or phone. If you have a critical or abnormal lab result, we will notify you by phone as soon as possible.  Submit refill requests through [employer] or call your pharmacy and they will forward the refill request to us. Please allow 3 business days for your refill to be completed.          Additional Information About Your Visit        [employer] Information     [employer] gives you secure  "access to your electronic health record. If you see a primary care provider, you can also send messages to your care team and make appointments. If you have questions, please call your primary care clinic.  If you do not have a primary care provider, please call [phone] and they will assist you.        Care EveryWhere ID     This is your Care EveryWhere ID. This could be used by other organizations to access your [facility] medical records  [id number]        Your Vitals Were     Pulse Temperature Respirations Height Last Period Pulse Oximetry    68 98.2  F (36.8  C) (Oral) 17 1.537 m (5' 0.5\") (Exact Date) 97%    Breastfeeding? BMI (Body Mass Index)                No 24.59 kg/m2           Blood Pressure from Last 3 Encounters:   11/12/18 120/75   07/05/18 124/80   06/25/18 136/80    Weight from Last 3 Encounters:   11/12/18 58.1 kg (128 lb)   07/05/18 61.7 kg (136 lb)   06/25/18 64 kg (141 lb)              We Performed the Following     MENTAL HEALTH REFERRAL  - Adult; Outpatient Treatment; Individual/Couples/Family/Group Therapy/Health Psychology; G: [facility] [phone]; We will contact you to schedule the appointment or please call with any questions     NEUROLOGY ADULT REFERRAL          Today's Medication Changes          These changes are accurate as of 11/12/18  2:04 PM.  If you have any questions, ask your nurse or doctor.               These medicines have changed or have updated prescriptions.        Dose/Directions    cholecalciferol 5000 units (125 mcg) Caps capsule   Commonly known as:  vitamin D3   This may have changed:  Another medication with the same name was removed. Continue taking this medication, and follow the directions you see here.   Changed by:  Jyoti Moreno MD        Dose:  5000 Units   Take 5,000 Units by mouth daily   Refills:  0       coenzyme Q-10 200 MG Caps   This may have changed:  Another medication with the same name was removed. " Continue taking this medication, and follow the directions you see here.   Changed by:  Jyoti Moreno MD        Dose:  200 mg   Take 200 mg by mouth daily   Refills:  0       * FLUoxetine 20 MG capsule   Commonly known as:  PROzac   This may have changed:  Another medication with the same name was added. Make sure you understand how and when to take each.   Used for:  Adjustment reaction with anxiety and depression   Changed by:  Jyoti Moreno MD        Dose:  20 mg   Take 1 capsule (20 mg) by mouth daily Office visit needed prior to further refill   Quantity:  90 capsule   Refills:  1       * FLUoxetine 10 MG capsule   Commonly known as:  PROzac   This may have changed:  You were already taking a medication with the same name, and this prescription was added. Make sure you understand how and when to take each.   Used for:  Adjustment reaction with anxiety and depression   Changed by:  Jyoti Moreno MD        Dose:  10 mg   Take 1 capsule (10 mg) by mouth daily Increase to 20 mg after 2 weeks.   Quantity:  30 capsule   Refills:  1       * Notice:  This list has 2 medication(s) that are the same as other medications prescribed for you. Read the directions carefully, and ask your doctor or other care provider to review them with you.         Where to get your medicines      These medications were sent to [facility]/pharmacy #[id number] [place] [id number] [place] [id number]     Phone:  [phone]     FLUoxetine 10 MG capsule    FLUoxetine 20 MG capsule                Primary Care Provider Office Phone # Fax #    Jyoti Moreno [phone] [address] [facility] [address]        Equal Access to Services     [facility]: Hadii john ulrich Sotameka, waaxda luqlinette, qaybta juhi olivia. So [facility] [phone].    ATENCIÓN: tyler Clark  disposición servicios gratuitos de asistencia lingüística. Bhumika payne [phone].    We comply with applicable federal civil rights laws and [place] laws. We do not discriminate on the basis of race, color, national origin, age, disability, sex, sexual orientation, or gender identity.            Thank you!     Thank you for choosing [facility]  for your care. Our goal is always to provide you with excellent care. Hearing back from our patients is one way we can continue to improve our services. Please take a few minutes to complete the written survey that you may receive in the mail after your visit with us. Thank you!             Your Updated Medication List - Protect others around you: Learn how to safely use, store and throw away your medicines at www.disposemymeds.org.          This list is accurate as of 11/12/18  2:04 PM.  Always use your most recent med list.                   Brand Name Dispense Instructions for use Diagnosis    ANTIOXIDANT PO      CONSULT Lolly Wolly DoodleS WELLNESS        Blood Pressure Cuff Misc     1 each    1 Device daily as needed    Hypertension goal BP (blood pressure) < 130/80       cholecalciferol 5000 units (125 mcg) Caps capsule    vitamin D3     Take 5,000 Units by mouth daily        clopidogrel 75 MG tablet    PLAVIX    90 tablet    TAKE 1 TABLET (75 MG) BY MOUTH DAILY    Cerebrovascular accident (CVA), unspecified mechanism (H)       coenzyme Q-10 200 MG Caps      Take 200 mg by mouth daily        Fish Oil Oil      daily        * FLUoxetine 20 MG capsule    PROzac    90 capsule    Take 1 capsule (20 mg) by mouth daily Office visit needed prior to further refill    Adjustment reaction with anxiety and depression       * FLUoxetine 10 MG capsule    PROzac    30 capsule    Take 1 capsule (10 mg) by mouth daily Increase to 20 mg after 2 weeks.    Adjustment reaction with anxiety and depression       lisinopril 20 MG tablet    PRINIVIL/ZESTRIL    90 tablet    TAKE 1  TABLET (20 MG) BY MOUTH DAILY    Hypertension goal BP (blood pressure) < 130/80       MAGNESIUM CITRATE PO      Take 200 mg by mouth daily        MIRALAX PO      Take 1-2 capfuls by mouth as needed.        OVER-THE-COUNTER      Memory 500        OVER-THE-COUNTER      PERFECT MULTI SUPER ESSENTIALS (SUPER FRUITS AND VEGETABLES)        STATIN NOT PRESCRIBED (INTENTIONAL)      1 each daily Please choose reason not prescribed, below    Hyperlipidemia LDL goal <100, Cerebrovascular accident (CVA), unspecified mechanism (H)       * Notice:  This list has 2 medication(s) that are the same as other medications prescribed for you. Read the directions carefully, and ask your doctor or other care provider to review them with you.

## 2018-11-13 ASSESSMENT — ANXIETY QUESTIONNAIRES: GAD7 TOTAL SCORE: 13

## 2018-11-13 ASSESSMENT — ASTHMA QUESTIONNAIRES: ACT_TOTALSCORE: 25

## 2019-02-12 ENCOUNTER — TELEPHONE (OUTPATIENT)
Dept: FAMILY MEDICINE | Facility: CLINIC | Age: 74
End: 2019-02-12

## 2019-02-12 NOTE — TELEPHONE ENCOUNTER
Panel Management Review        Last Office Visit with this department: 11/12/2018    Fail List measure: MAMMO      Patient is due/failing the following:   MAMMOGRAM    Action needed:   Needs to schedule    Type of outreach:    Sent [employer] message.    Questions for provider review:    None                                                                                                                                    Pura Tomlinson

## 2019-02-15 ENCOUNTER — TRANSFERRED RECORDS (OUTPATIENT)
Dept: HEALTH INFORMATION MANAGEMENT | Facility: CLINIC | Age: 74
End: 2019-02-15

## 2019-02-15 LAB
ALT SERPL-CCNC: 11 IU/L (ref 8–45)
AST SERPL-CCNC: 22 IU/L (ref 2–40)
CREAT SERPL-MCNC: 0.98 MG/DL (ref 0.57–1.11)
GFR SERPL CREATININE-BSD FRML MDRD: 56 ML/MIN/1.73M2
GLUCOSE SERPL-MCNC: 87 MG/DL (ref 65–100)
POTASSIUM SERPL-SCNC: 4.7 MMOL/L (ref 3.5–5)
TSH SERPL-ACNC: 2.31 UIU/ML (ref 0.35–4.94)

## 2019-03-25 ENCOUNTER — TELEPHONE (OUTPATIENT)
Dept: FAMILY MEDICINE | Facility: CLINIC | Age: 74
End: 2019-03-25

## 2019-03-25 NOTE — TELEPHONE ENCOUNTER
This writer attempted to contact Vera on 03/25/19      Reason for call providers recommendations and left message.      If patient calls back:   Registered Nurse called. Follow Triage Call workflow        Yumiko Diamond RN

## 2019-03-25 NOTE — TELEPHONE ENCOUNTER
Patient asking for results from her [facility] [place] [facility] visit. She states that she received a letter from them advising her to follow-up with her primary doctor to go over results and findings from this visit.     Writer offered to schedule patient with provider to review and go over results. Patient is refusing to schedule an appointment and is asking for Dr. Moreno to call her back.     Writer offered a telephone visit for patient if she wishes to discuss results with provider and further suggestions. Patient states that she just wants a call from Dr. Pemberton to go over results and tell her what to do. Patient also states that she has talked with provider on the phone before and was not charged. Writer again offered visit options, patient declining and asking that Dr. Pemberton call her back.     Jennifer Ma RN

## 2019-03-25 NOTE — TELEPHONE ENCOUNTER
Reason for Call:  Other call back    Detailed comments: following up on her Urgent Care in February, received letter that they found something and to speak with her PCP. Wants to know what was found and if it would have anything to do with an episode she had yesterday 03/24. Said she was outside and all of a sudden she felt really light, almost like she was going to pass out but she did not pass out. Still feels dizzy and light headed. Would like a nurse to call her back. Thank you. Declined immediate triage    Phone Number Patient can be reached at: Home number on file [phone] (home)    Best Time: Any    Can we leave a detailed message on this number? YES    Call taken on 3/25/2019 at 2:30 PM by Kaycee Doss

## 2019-03-25 NOTE — TELEPHONE ENCOUNTER
pt returned call    Best number to reach caller: Home number on file [phone] (home)    Is it ok to leave a detailed message: NO   Voicemail doesn't work

## 2019-03-25 NOTE — TELEPHONE ENCOUNTER
I'm not sure what ER was referring to in the letter.     Only think I can see is that she had a mild anemia noted on her labs. This needs f/u in the office. Can't address this over the phone. Will need labs and exam to know how significant this is. She can bring letter with to the visit.     Also would recommend her being seen for the dizziness she is having. I don't know if they are related.

## 2019-03-25 NOTE — TELEPHONE ENCOUNTER
Reviewed providers message with her.    Spoke with patient on the phone. She reports that she almost fainted yesterday but has not felt that way today. She reports that most of the winter she has felt weak and extremely fatigued and she sleeps a lot.     She denies feeling SOB or fainting.     Told her if she has following symptoms she should be assessed tonight: SOB, chest pain, feel faint, not able to ambulate due to weakness/dizzines etc.  She reports that she does not have these symptoms. Told her if they develop she should report to ED or call ambulance. She verbalized understanding.    Assisted her in scheduling appointment in clinic tomorrow.    Yumiko Diamond RN

## 2019-03-26 ENCOUNTER — OFFICE VISIT (OUTPATIENT)
Dept: FAMILY MEDICINE | Facility: CLINIC | Age: 74
End: 2019-03-26
Payer: COMMERCIAL

## 2019-03-26 ENCOUNTER — TELEPHONE (OUTPATIENT)
Dept: FAMILY MEDICINE | Facility: CLINIC | Age: 74
End: 2019-03-26

## 2019-03-26 VITALS
HEIGHT: 61 IN | DIASTOLIC BLOOD PRESSURE: 91 MMHG | HEART RATE: 60 BPM | TEMPERATURE: 97.6 F | OXYGEN SATURATION: 99 % | BODY MASS INDEX: 22.28 KG/M2 | WEIGHT: 118 LBS | SYSTOLIC BLOOD PRESSURE: 161 MMHG

## 2019-03-26 DIAGNOSIS — R42 DIZZINESS: Primary | ICD-10-CM

## 2019-03-26 DIAGNOSIS — R63.4 WEIGHT LOSS: ICD-10-CM

## 2019-03-26 DIAGNOSIS — R82.71 BACTERIA IN URINE: ICD-10-CM

## 2019-03-26 DIAGNOSIS — R41.3 MEMORY LOSS: ICD-10-CM

## 2019-03-26 LAB
ALBUMIN SERPL-MCNC: 4 G/DL (ref 3.4–5)
ALBUMIN UR-MCNC: 30 MG/DL
ALP SERPL-CCNC: 45 U/L (ref 40–150)
ALT SERPL W P-5'-P-CCNC: 24 U/L (ref 0–50)
ANION GAP SERPL CALCULATED.3IONS-SCNC: 4 MMOL/L (ref 3–14)
APPEARANCE UR: CLEAR
AST SERPL W P-5'-P-CCNC: 24 U/L (ref 0–45)
BACTERIA #/AREA URNS HPF: ABNORMAL /HPF
BILIRUB SERPL-MCNC: 0.5 MG/DL (ref 0.2–1.3)
BILIRUB UR QL STRIP: NEGATIVE
BUN SERPL-MCNC: 19 MG/DL (ref 7–30)
CALCIUM SERPL-MCNC: 9.5 MG/DL (ref 8.5–10.1)
CHLORIDE SERPL-SCNC: 105 MMOL/L (ref 94–109)
CO2 SERPL-SCNC: 30 MMOL/L (ref 20–32)
COLOR UR AUTO: YELLOW
CREAT SERPL-MCNC: 0.85 MG/DL (ref 0.52–1.04)
ERYTHROCYTE [DISTWIDTH] IN BLOOD BY AUTOMATED COUNT: 12 % (ref 10–15)
GFR SERPL CREATININE-BSD FRML MDRD: 69 ML/MIN/{1.73_M2}
GLUCOSE SERPL-MCNC: 86 MG/DL (ref 70–99)
GLUCOSE UR STRIP-MCNC: NEGATIVE MG/DL
HCT VFR BLD AUTO: 35.5 % (ref 35–47)
HGB BLD-MCNC: 12 G/DL (ref 11.7–15.7)
HGB UR QL STRIP: NEGATIVE
KETONES UR STRIP-MCNC: ABNORMAL MG/DL
LEUKOCYTE ESTERASE UR QL STRIP: ABNORMAL
MCH RBC QN AUTO: 32.4 PG (ref 26.5–33)
MCHC RBC AUTO-ENTMCNC: 33.8 G/DL (ref 31.5–36.5)
MCV RBC AUTO: 96 FL (ref 78–100)
NITRATE UR QL: NEGATIVE
NON-SQ EPI CELLS #/AREA URNS LPF: ABNORMAL /LPF
PH UR STRIP: 6 PH (ref 5–7)
PLATELET # BLD AUTO: 230 10E9/L (ref 150–450)
POTASSIUM SERPL-SCNC: 5.1 MMOL/L (ref 3.4–5.3)
PROT SERPL-MCNC: 7 G/DL (ref 6.8–8.8)
RBC # BLD AUTO: 3.7 10E12/L (ref 3.8–5.2)
RBC #/AREA URNS AUTO: ABNORMAL /HPF
SODIUM SERPL-SCNC: 139 MMOL/L (ref 133–144)
SOURCE: ABNORMAL
SP GR UR STRIP: 1.02 (ref 1–1.03)
TSH SERPL DL<=0.005 MIU/L-ACNC: 2.91 MU/L (ref 0.4–4)
UROBILINOGEN UR STRIP-ACNC: 0.2 EU/DL (ref 0.2–1)
VIT B12 SERPL-MCNC: 1498 PG/ML (ref 193–986)
WBC # BLD AUTO: 5.3 10E9/L (ref 4–11)
WBC #/AREA URNS AUTO: ABNORMAL /HPF

## 2019-03-26 PROCEDURE — 80053 COMPREHEN METABOLIC PANEL: CPT | Performed by: NURSE PRACTITIONER

## 2019-03-26 PROCEDURE — 81001 URINALYSIS AUTO W/SCOPE: CPT | Performed by: NURSE PRACTITIONER

## 2019-03-26 PROCEDURE — 85027 COMPLETE CBC AUTOMATED: CPT | Performed by: NURSE PRACTITIONER

## 2019-03-26 PROCEDURE — 87086 URINE CULTURE/COLONY COUNT: CPT | Performed by: NURSE PRACTITIONER

## 2019-03-26 PROCEDURE — 99214 OFFICE O/P EST MOD 30 MIN: CPT | Performed by: NURSE PRACTITIONER

## 2019-03-26 PROCEDURE — 36415 COLL VENOUS BLD VENIPUNCTURE: CPT | Performed by: NURSE PRACTITIONER

## 2019-03-26 PROCEDURE — 82607 VITAMIN B-12: CPT | Performed by: NURSE PRACTITIONER

## 2019-03-26 PROCEDURE — 84443 ASSAY THYROID STIM HORMONE: CPT | Performed by: NURSE PRACTITIONER

## 2019-03-26 RX ORDER — MECLIZINE HYDROCHLORIDE 25 MG/1
25 TABLET ORAL 2 TIMES DAILY PRN
Qty: 30 TABLET | Refills: 0 | Status: SHIPPED | OUTPATIENT
Start: 2019-03-26 | End: 2020-05-08

## 2019-03-26 ASSESSMENT — MIFFLIN-ST. JEOR: SCORE: 979.68

## 2019-03-26 NOTE — TELEPHONE ENCOUNTER
Patient left before I could tell her I scheduled her Neurology consult.    This writer attempted to contact Vera on 03/26/19      Reason for call Scheduled patient for Neurology and left detailed message.      If patient calls back:   Relay message [facility] on 3/28/19 @ 12:45 , (read verbatim), document that pt called and close encounter        Margaret Hanson MA
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
## 2019-03-26 NOTE — PATIENT INSTRUCTIONS
Your provider has referred you for the following:   Consult at [facility]: [facility] [phone]   - ask for soonest available or get on cancellation list.    Meclizine: only take when you get dizzy

## 2019-03-27 ENCOUNTER — PATIENT OUTREACH (OUTPATIENT)
Dept: CARE COORDINATION | Facility: CLINIC | Age: 74
End: 2019-03-27

## 2019-03-27 ENCOUNTER — TELEPHONE (OUTPATIENT)
Dept: FAMILY MEDICINE | Facility: CLINIC | Age: 74
End: 2019-03-27

## 2019-03-27 LAB
BACTERIA SPEC CULT: NORMAL
SPECIMEN SOURCE: NORMAL

## 2019-03-27 NOTE — TELEPHONE ENCOUNTER
Notes recorded by Sheba Henriquez NP on 3/27/2019 at 3:32 PM CDT  Please call patient: urine culture is negative, do not feel that is the cause of your dizziness or memory issues. Your B12 is slightly elevated, if you are taking a B-complex, recommend taking that only 3x/week instead of daily. Other labs are stable and not showing a reason for your dizziness or memory loss. Follow up with Neurology tomorrow (3/28/19) and return to clinic within 7-10 days if symptoms not improving, otherwise return in 2 weeks for a check up. Call if further questions.  CLAYTON Gallagher, NP-C  [facility]      This writer attempted to contact patient on 03/27/19      Reason for call results and unable to leave message.      If patient calls back:   Registered Nurse called. Follow Triage Call workflow        Linda Coppola RN

## 2019-03-27 NOTE — TELEPHONE ENCOUNTER
This writer attempted to contact Regan on 03/27/19      Reason for call OV VISIT and left detailed message.      If patient calls back:   Relay message was [facility] on 3/28/19 @ 12:45, (read verbatim), document that pt called and close encounter        Margaret Hanson MA

## 2019-03-27 NOTE — PROGRESS NOTES
Clinic Care Coordination Contact    Patient was seen in PCP clinic on 3/26/19.    Appointment with [facility] was made for 3/28/19 at 12:45 pm.     Called patient and left a message with information. Called daughter Charles and requested a return call to provide her with the same information.     Arcelia Sullivan RN, CCM - Care Coordinator     3/27/2019    11:39 AM  [phone]

## 2019-03-28 ENCOUNTER — TRANSFERRED RECORDS (OUTPATIENT)
Dept: HEALTH INFORMATION MANAGEMENT | Facility: CLINIC | Age: 74
End: 2019-03-28

## 2019-03-28 NOTE — TELEPHONE ENCOUNTER
Called patient's number and daughter in law Charles answered, as patient left her cell phone at her house. There is consent to communicate on file. Writer asked if patient knew about her Neurology appointment for today at 12:45 PM, she states that patient is aware of this appointment.     Writer asked that she let patient know to call the clinic back to further go over results and information from Sheba Oreillymariah when she gets her phone back.     Jennifer Ma RN    This [facility]      Reason for call discuss results/recommendations (patient is attending Neurology visit today) and left message with patient's dtr in law for patient to return call.      If patient calls back:   Registered Nurse called. Follow Triage Call workflow    Jennifer Ma, RN

## 2019-03-29 NOTE — TELEPHONE ENCOUNTER
Call returned to [facility];   Below message read to patient verbatim;  States she will schedule follow up after  head scan that was ordered by neurologist; understands to decrease  B12   no further questions   Althea Patel RN  [facility]         Notes recorded by Sheba Henriquez NP on 3/27/2019 at 3:32 PM CDT  Please call patient: urine culture is negative, do not feel that is the cause of your dizziness or memory issues. Your B12 is slightly elevated, if you are taking a B-complex, recommend taking that only 3x/week instead of daily. Other labs are stable and not showing a reason for your dizziness or memory loss. Follow up with Neurology tomorrow (3/28/19) and return to clinic within 7-10 days if symptoms not improving, otherwise return in 2 weeks for a check up. Call if further questions.  CLAYTON Gallagher, CARLA-C  [facility] writer attempted to contact patient on 03/29/19      Reason for call results and left message.      If patient calls back:   Registered Nurse called. Follow Triage Call workflow        Linda Coppola RN

## 2019-05-01 ENCOUNTER — TELEPHONE (OUTPATIENT)
Dept: FAMILY MEDICINE | Facility: CLINIC | Age: 74
End: 2019-05-01

## 2019-05-08 DIAGNOSIS — F43.23 ADJUSTMENT DISORDER WITH MIXED ANXIETY AND DEPRESSED MOOD: ICD-10-CM

## 2019-05-08 DIAGNOSIS — I10 HYPERTENSION GOAL BP (BLOOD PRESSURE) < 130/80: ICD-10-CM

## 2019-05-08 NOTE — TELEPHONE ENCOUNTER
"Requested Prescriptions   Pending Prescriptions Disp Refills     lisinopril (PRINIVIL/ZESTRIL) 20 MG tablet [Pharmacy Med Name: LISINOPRIL 20 MG TABLET] 90 tablet 3     Sig: TAKE 1 TABLET BY MOUTH EVERY DAY       ACE Inhibitors (Including Combos) Protocol Failed - 5/8/2019  1:35 AM        Failed - Blood pressure under 140/90 in past 12 months     BP Readings from Last 3 Encounters:   03/26/19 (!) 161/91   11/12/18 120/75   07/05/18 124/80                 Passed - Recent (12 mo) or future (30 days) visit within the authorizing provider's specialty     Patient had office visit in the last 12 months or has a visit in the next 30 days with authorizing provider or within the authorizing provider's specialty.  See \"Patient Info\" tab in inbasket, or \"Choose Columns\" in Meds & Orders section of the refill encounter.              Passed - Medication is active on med list        Passed - Patient is age 18 or older        Passed - No active pregnancy on record        Passed - Normal serum creatinine on file in past 12 months     Recent Labs   Lab Test 03/26/19  1309   CR 0.85             Passed - Normal serum potassium on file in past 12 months     Recent Labs   Lab Test 03/26/19  1309   POTASSIUM 5.1             Passed - No positive pregnancy test within past 12 months        FLUoxetine (PROZAC) 20 MG capsule [Pharmacy Med Name: FLUOXETINE HCL 20 MG CAPSULE] 90 capsule 1     Sig: TAKE 1 CAPSULE (20 MG) BY MOUTH ONCE DAILY **OFFICE VISIT NEEDED PRIOR TO FURTHER REFILL**       SSRIs Protocol Passed - 5/8/2019  1:35 AM        Passed - Recent (12 mo) or future (30 days) visit within the authorizing provider's specialty     Patient had office visit in the last 12 months or has a visit in the next 30 days with authorizing provider or within the authorizing provider's specialty.  See \"Patient Info\" tab in inbasket, or \"Choose Columns\" in Meds & Orders section of the refill encounter.              Passed - Medication is active on med " list        Passed - Patient is age 18 or older        Passed - No active pregnancy on record        Passed - No positive pregnancy test in last 12 months        lisinopril (PRINIVIL/ZESTRIL) 20 MG tablet  Last Written Prescription Date:  7/5/18  Last Fill Quantity: 90,  # refills: 3   Last office visit: 3/26/2019 with prescribing provider:  Dr. Moreno   Future Office Visit:      FLUoxetine (PROZAC) 20 MG capsule  Last Written Prescription Date:  11/12/18  Last Fill Quantity: 90,  # refills: 1   Last office visit: 3/26/2019 with prescribing provider:  Dr. Moreno   Future Office Visit:      PHQ-9 SCORE 6/25/2018 7/5/2018 11/12/2018   PHQ-9 Total Score - - -   PHQ-9 Total Score 21 16 16     VENTURA-7 SCORE 6/25/2018 7/5/2018 11/12/2018   Total Score - - -   Total Score 18 15 13

## 2019-05-09 RX ORDER — LISINOPRIL 20 MG/1
TABLET ORAL
Qty: 90 TABLET | Refills: 1 | Status: SHIPPED | OUTPATIENT
Start: 2019-05-09 | End: 2019-09-04

## 2019-05-09 NOTE — TELEPHONE ENCOUNTER
Routing refill request to provider for review/approval because:  BP above goal  PHQ-9 score above 5  Requests fail protocol    Beba Kelley RN

## 2019-07-11 DIAGNOSIS — F43.23 ADJUSTMENT DISORDER WITH MIXED ANXIETY AND DEPRESSED MOOD: ICD-10-CM

## 2019-07-11 NOTE — TELEPHONE ENCOUNTER
"Requested Prescriptions   Pending Prescriptions Disp Refills     FLUoxetine (PROZAC) 20 MG capsule [Pharmacy Med Name: FLUOXETINE HCL 20 MG CAPSULE] 90 capsule 1     Sig: TAKE 1 CAPSULE (20 MG) BY MOUTH ONCE DAILY **OFFICE VISIT NEEDED PRIOR TO FURTHER REFILL**       SSRIs Protocol Failed - 7/11/2019  4:10 PM        Failed - PHQ-9 score less than 5 in past 6 months     Please review last PHQ-9 score.           Passed - Medication is active on med list        Passed - Patient is age 18 or older        Passed - No active pregnancy on record        Passed - No positive pregnancy test in last 12 months        Passed - Recent (6 mo) or future (30 days) visit within the authorizing provider's specialty     Patient had office visit in the last 6 months or has a visit in the next 30 days with authorizing provider or within the authorizing provider's specialty.  See \"Patient Info\" tab in inbasket, or \"Choose Columns\" in Meds & Orders section of the refill encounter.            FLUoxetine (PROZAC) 20 MG capsule  Last Written Prescription Date:  5/9/19  Last Fill Quantity: 90,  # refills: 1   Last office visit: 3/26/2019 with prescribing provider:  Dr. Moreno   Future Office Visit:      PHQ-9 score:    PHQ-9 SCORE 11/12/2018   PHQ-9 Total Score -   PHQ-9 Total Score 16             VENTURA-7 SCORE 6/25/2018 7/5/2018 11/12/2018   Total Score - - -   Total Score 18 15 13         "

## 2019-08-07 DIAGNOSIS — I63.9 CEREBROVASCULAR ACCIDENT (CVA), UNSPECIFIED MECHANISM (H): ICD-10-CM

## 2019-08-09 RX ORDER — CLOPIDOGREL BISULFATE 75 MG/1
TABLET ORAL
Qty: 90 TABLET | Refills: 1 | Status: SHIPPED | OUTPATIENT
Start: 2019-08-09 | End: 2020-02-07

## 2019-08-09 NOTE — TELEPHONE ENCOUNTER
Prescription approved per [facility] [place].    Vicki Shearer RN, [facility]

## 2019-08-13 ENCOUNTER — TELEPHONE (OUTPATIENT)
Dept: FAMILY MEDICINE | Facility: CLINIC | Age: 74
End: 2019-08-13

## 2019-08-13 DIAGNOSIS — I10 BENIGN ESSENTIAL HYPERTENSION: ICD-10-CM

## 2019-08-13 DIAGNOSIS — N18.2 CKD (CHRONIC KIDNEY DISEASE) STAGE 2, GFR 60-89 ML/MIN: ICD-10-CM

## 2019-08-13 DIAGNOSIS — R73.03 PREDIABETES: ICD-10-CM

## 2019-08-13 DIAGNOSIS — I63.9 CEREBROVASCULAR ACCIDENT (CVA), UNSPECIFIED MECHANISM (H): Primary | ICD-10-CM

## 2019-08-13 DIAGNOSIS — I10 HYPERTENSION GOAL BP (BLOOD PRESSURE) < 130/80: ICD-10-CM

## 2019-08-13 NOTE — TELEPHONE ENCOUNTER
Orders placed for fasting labs. Please update patient and assist with scheduling pre-visit fasting lab visit

## 2019-08-13 NOTE — TELEPHONE ENCOUNTER
Reason for Call:  Other prescription    Detailed comments: Patient has made the first available appointment for 9/4/2019 and she is asking if she needs to do any lab work before her appointment?    Phone Number Patient can be reached at: Home number on file [phone] (home)    Best Time: any    Can we leave a detailed message on this number? YES    Call taken on 8/13/2019 at 1:18 PM by Joyce Black

## 2019-08-21 DIAGNOSIS — I63.9 CEREBROVASCULAR ACCIDENT (CVA), UNSPECIFIED MECHANISM (H): ICD-10-CM

## 2019-08-21 DIAGNOSIS — N18.2 CKD (CHRONIC KIDNEY DISEASE) STAGE 2, GFR 60-89 ML/MIN: ICD-10-CM

## 2019-08-21 DIAGNOSIS — R73.03 PREDIABETES: ICD-10-CM

## 2019-08-21 DIAGNOSIS — I10 BENIGN ESSENTIAL HYPERTENSION: ICD-10-CM

## 2019-08-21 LAB
ALBUMIN SERPL-MCNC: 3.7 G/DL (ref 3.4–5)
ALP SERPL-CCNC: 48 U/L (ref 40–150)
ALT SERPL W P-5'-P-CCNC: 17 U/L (ref 0–50)
ANION GAP SERPL CALCULATED.3IONS-SCNC: 6 MMOL/L (ref 3–14)
AST SERPL W P-5'-P-CCNC: 19 U/L (ref 0–45)
BILIRUB SERPL-MCNC: 0.7 MG/DL (ref 0.2–1.3)
BUN SERPL-MCNC: 16 MG/DL (ref 7–30)
CALCIUM SERPL-MCNC: 9.4 MG/DL (ref 8.5–10.1)
CHLORIDE SERPL-SCNC: 107 MMOL/L (ref 94–109)
CHOLEST SERPL-MCNC: 215 MG/DL
CO2 SERPL-SCNC: 29 MMOL/L (ref 20–32)
CREAT SERPL-MCNC: 0.86 MG/DL (ref 0.52–1.04)
GFR SERPL CREATININE-BSD FRML MDRD: 67 ML/MIN/{1.73_M2}
GLUCOSE SERPL-MCNC: 95 MG/DL (ref 70–99)
HBA1C MFR BLD: 5.2 % (ref 0–5.6)
HDLC SERPL-MCNC: 69 MG/DL
LDLC SERPL CALC-MCNC: 131 MG/DL
NONHDLC SERPL-MCNC: 146 MG/DL
POTASSIUM SERPL-SCNC: 4.3 MMOL/L (ref 3.4–5.3)
PROT SERPL-MCNC: 6.9 G/DL (ref 6.8–8.8)
SODIUM SERPL-SCNC: 142 MMOL/L (ref 133–144)
TRIGL SERPL-MCNC: 75 MG/DL

## 2019-08-21 PROCEDURE — 83036 HEMOGLOBIN GLYCOSYLATED A1C: CPT | Performed by: FAMILY MEDICINE

## 2019-08-21 PROCEDURE — 80053 COMPREHEN METABOLIC PANEL: CPT | Performed by: FAMILY MEDICINE

## 2019-08-21 PROCEDURE — 36415 COLL VENOUS BLD VENIPUNCTURE: CPT | Performed by: FAMILY MEDICINE

## 2019-08-21 PROCEDURE — 80061 LIPID PANEL: CPT | Performed by: FAMILY MEDICINE

## 2019-09-04 ENCOUNTER — OFFICE VISIT (OUTPATIENT)
Dept: FAMILY MEDICINE | Facility: CLINIC | Age: 74
End: 2019-09-04
Payer: COMMERCIAL

## 2019-09-04 VITALS
HEIGHT: 61 IN | HEART RATE: 70 BPM | BODY MASS INDEX: 23.6 KG/M2 | RESPIRATION RATE: 16 BRPM | WEIGHT: 125 LBS | OXYGEN SATURATION: 97 % | TEMPERATURE: 97.9 F | SYSTOLIC BLOOD PRESSURE: 148 MMHG | DIASTOLIC BLOOD PRESSURE: 88 MMHG

## 2019-09-04 DIAGNOSIS — R79.89 ABNORMAL TSH: ICD-10-CM

## 2019-09-04 DIAGNOSIS — F43.23 ADJUSTMENT DISORDER WITH MIXED ANXIETY AND DEPRESSED MOOD: ICD-10-CM

## 2019-09-04 DIAGNOSIS — R53.83 FATIGUE, UNSPECIFIED TYPE: ICD-10-CM

## 2019-09-04 DIAGNOSIS — R42 DIZZINESS: ICD-10-CM

## 2019-09-04 DIAGNOSIS — R68.89 TEMPERATURE INTOLERANCE: ICD-10-CM

## 2019-09-04 DIAGNOSIS — M62.81 GENERALIZED MUSCLE WEAKNESS: ICD-10-CM

## 2019-09-04 DIAGNOSIS — N30.10 CHRONIC INTERSTITIAL CYSTITIS: ICD-10-CM

## 2019-09-04 DIAGNOSIS — N18.2 CKD (CHRONIC KIDNEY DISEASE) STAGE 2, GFR 60-89 ML/MIN: ICD-10-CM

## 2019-09-04 DIAGNOSIS — I10 HYPERTENSION GOAL BP (BLOOD PRESSURE) < 130/80: ICD-10-CM

## 2019-09-04 DIAGNOSIS — R41.9 COGNITIVE COMPLAINTS: Primary | ICD-10-CM

## 2019-09-04 DIAGNOSIS — D64.9 ANEMIA, UNSPECIFIED TYPE: ICD-10-CM

## 2019-09-04 DIAGNOSIS — I63.9 CEREBROVASCULAR ACCIDENT (CVA), UNSPECIFIED MECHANISM (H): ICD-10-CM

## 2019-09-04 LAB
BASOPHILS # BLD AUTO: 0 10E9/L (ref 0–0.2)
BASOPHILS NFR BLD AUTO: 0.6 %
CK SERPL-CCNC: 71 U/L (ref 30–225)
DIFFERENTIAL METHOD BLD: ABNORMAL
EOSINOPHIL # BLD AUTO: 0.1 10E9/L (ref 0–0.7)
EOSINOPHIL NFR BLD AUTO: 0.9 %
ERYTHROCYTE [DISTWIDTH] IN BLOOD BY AUTOMATED COUNT: 11.5 % (ref 10–15)
HCT VFR BLD AUTO: 34.5 % (ref 35–47)
HGB BLD-MCNC: 11.6 G/DL (ref 11.7–15.7)
LYMPHOCYTES # BLD AUTO: 1.5 10E9/L (ref 0.8–5.3)
LYMPHOCYTES NFR BLD AUTO: 27.1 %
MCH RBC QN AUTO: 32 PG (ref 26.5–33)
MCHC RBC AUTO-ENTMCNC: 33.6 G/DL (ref 31.5–36.5)
MCV RBC AUTO: 95 FL (ref 78–100)
MONOCYTES # BLD AUTO: 0.3 10E9/L (ref 0–1.3)
MONOCYTES NFR BLD AUTO: 6.2 %
NEUTROPHILS # BLD AUTO: 3.5 10E9/L (ref 1.6–8.3)
NEUTROPHILS NFR BLD AUTO: 65.2 %
PLATELET # BLD AUTO: 201 10E9/L (ref 150–450)
RBC # BLD AUTO: 3.63 10E12/L (ref 3.8–5.2)
T4 FREE SERPL-MCNC: 0.79 NG/DL (ref 0.76–1.46)
TSH SERPL DL<=0.005 MIU/L-ACNC: 4.24 MU/L (ref 0.4–4)
VIT B12 SERPL-MCNC: 687 PG/ML (ref 193–986)
WBC # BLD AUTO: 5.4 10E9/L (ref 4–11)

## 2019-09-04 PROCEDURE — 85025 COMPLETE CBC W/AUTO DIFF WBC: CPT | Performed by: FAMILY MEDICINE

## 2019-09-04 PROCEDURE — 82550 ASSAY OF CK (CPK): CPT | Performed by: FAMILY MEDICINE

## 2019-09-04 PROCEDURE — 84439 ASSAY OF FREE THYROXINE: CPT | Performed by: FAMILY MEDICINE

## 2019-09-04 PROCEDURE — 99215 OFFICE O/P EST HI 40 MIN: CPT | Performed by: FAMILY MEDICINE

## 2019-09-04 PROCEDURE — 84443 ASSAY THYROID STIM HORMONE: CPT | Performed by: FAMILY MEDICINE

## 2019-09-04 PROCEDURE — 36415 COLL VENOUS BLD VENIPUNCTURE: CPT | Performed by: FAMILY MEDICINE

## 2019-09-04 PROCEDURE — 82607 VITAMIN B-12: CPT | Performed by: FAMILY MEDICINE

## 2019-09-04 RX ORDER — LISINOPRIL 20 MG/1
40 TABLET ORAL DAILY
Qty: 1 TABLET | Refills: 0 | COMMUNITY
Start: 2019-09-04 | End: 2020-01-22

## 2019-09-04 ASSESSMENT — ANXIETY QUESTIONNAIRES
1. FEELING NERVOUS, ANXIOUS, OR ON EDGE: MORE THAN HALF THE DAYS
7. FEELING AFRAID AS IF SOMETHING AWFUL MIGHT HAPPEN: SEVERAL DAYS
IF YOU CHECKED OFF ANY PROBLEMS ON THIS QUESTIONNAIRE, HOW DIFFICULT HAVE THESE PROBLEMS MADE IT FOR YOU TO DO YOUR WORK, TAKE CARE OF THINGS AT HOME, OR GET ALONG WITH OTHER PEOPLE: VERY DIFFICULT
GAD7 TOTAL SCORE: 9
5. BEING SO RESTLESS THAT IT IS HARD TO SIT STILL: SEVERAL DAYS
2. NOT BEING ABLE TO STOP OR CONTROL WORRYING: SEVERAL DAYS
3. WORRYING TOO MUCH ABOUT DIFFERENT THINGS: SEVERAL DAYS
6. BECOMING EASILY ANNOYED OR IRRITABLE: NEARLY EVERY DAY

## 2019-09-04 ASSESSMENT — PATIENT HEALTH QUESTIONNAIRE - PHQ9
SUM OF ALL RESPONSES TO PHQ QUESTIONS 1-9: 19
5. POOR APPETITE OR OVEREATING: NOT AT ALL

## 2019-09-04 ASSESSMENT — MIFFLIN-ST. JEOR: SCORE: 1006.44

## 2019-09-04 NOTE — PROGRESS NOTES
Subjective     Anabelle Herbert is a 74 year old female who presents to clinic today for the following health issues:    HPI   Concern - Stroke   Onset: couple months     Description:   Memory concerns - foggy. She voices feelings of fainting. Denies chest pain. Fatigue, she has been sleeping a lot. She voices no energy and cannot seem to think straight. She would like to discuss her concerns with provider instead.       -She is frustrated about many things, what frustrates her most is her memory. She thinks slow, has a hard time thinking, remembering what day it is and what tasks she just did. She does not have difficulty with established information, just processing new information.   -Experiences extreme temperature swings daily. She does not think that this is similar to menopausal hot flashes.   -She has gained weight without explanation. She will go for long periods of time without eating and sleeps a lot. Only drinking 3-4 cups of water per day.   Wt Readings from Last 4 Encounters:   09/04/19 56.7 kg (125 lb)   03/26/19 53.5 kg (118 lb)   11/12/18 58.1 kg (128 lb)   07/05/18 61.7 kg (136 lb)     -The last time she saw the neurologist was in March 2019 for dizziness. Reviewed that note. she had much of the same complaints at that time. Had labs and MRI ordered by neurology. She thinks she got the scan done but can't remember the results. She was to f/u with neuro 1 mo after the visit but not sure if she did this. She thinks since she saw neurology her symptoms have become more intense but also not sure   -She is still dizzy and must be careful when she is walking so she doesn't fall. She feels light headed when she changes positions. Denies falls. Feels she leans to one side with walking.   -Denies syncope, focal weakness, feeling like things are spinning, changes in speech  -Feels that she has no energy or strength, doesn't feel like herself, and is easily irritable  - some worsening of vision but no new  "visual field deficit. Notes she just needs to constantly wear her glasses. After her stroke she had peripheral vision loss on both sides, but her left side has returned to normal, her right side is the same s/p stroke.  -Has had some constipation, managed with Miralax   -Intermittent leg pain throughout her whole leg, muscle and joint. Constant pain on the bottom of her feet that prevent her from walking  -Occasionally she feels like she has sores in her neck. Denies pain with swallowing or current sores.  -She has a hard time making a phone call because her son got her a new phone and she does not understand how to use it. She cannot ask her son to show her how to use it because he is short tempered.   -Thinks that it is possible the symptoms she experience could be mood-related. She is on fluoxetine but is unable to voice if she is consistently taking certain meds, she says she just takes everything that is in her pill box  -She is still driving, she has \"almost\" gotten lost before. When she drives she feels like she needs to put all of her energy into being focused on the road but notes she feels completely safe driving.    -Denies cp, breathing issues, diarrhea, fever, chills, UTI symptoms with cystitis, heartburn      Patient Active Problem List   Diagnosis     Chronic interstitial cystitis     Adjustment reaction with anxiety and depression     Abdominal pain     Benign essential hypertension     CKD (chronic kidney disease) stage 2, GFR 60-89 ml/min     Hyperlipidemia LDL goal <100     Mild persistent asthma     Vitamin D deficiency     GERD (gastroesophageal reflux disease)     Advance care planning     B12 deficiency     Transient cerebral ischemia     Carotid stenosis     Diverticulosis of colon     Colon polyp     Atrophic vaginitis     Interstitial cystitis     Chronic constipation     Cognitive complaints     Prediabetes     Osteoporosis     Cerebrovascular accident (CVA), unspecified mechanism (H)     " "SHANEKA (obstructive sleep apnea)     Past Surgical History:   Procedure Laterality Date     COLONOSCOPY       CYSTOSCOPY, BIOPSY BLADDER, COMBINED  1/6/2014    Procedure: COMBINED CYSTOSCOPY, BIOPSY BLADDER;;  Surgeon: Vandana Tang MD;  Location: [facility] OR     CYSTOSCOPY, INJECT COLLAGEN, COMBINED  1/6/2014    Procedure: COMBINED CYSTOSCOPY, INJECT BULKING AGENT;  IC cocktail, bladder biopsy, fulguration, heparin, gentamyacin, lidocaine & kenalog;  Surgeon: Vandana Tang MD;  Location: [facility] OR     GENITOURINARY SURGERY       NO HISTORY OF SURGERY         Social History     Tobacco Use     Smoking status: Never Smoker     Smokeless tobacco: Never Used   Substance Use Topics     Alcohol use: No     Family History   Problem Relation Age of Onset     Cancer Father         colon?     Heart Disease Father      Asthma Mother      Alzheimer Disease Mother 86     Unknown/Adopted Maternal Grandmother      Unknown/Adopted Maternal Grandfather      Unknown/Adopted Paternal Grandmother      Unknown/Adopted Paternal Grandfather      Asthma Sister      Allergies Sister      Unknown/Adopted Son              Reviewed and updated as needed this visit by Provider         Review of Systems   ROS COMP: Constitutional, HEENT, cardiovascular, pulmonary, gi and gu systems are negative, except as otherwise noted.    This document serves as a record of the services and decisions personally performed by KRISTIN ROTH. It was created on his/her behalf by Elvira Cobb, a trained medical scribe. The creation of this document is based on the provider's statements to the medical scribe. Elvira Cobb, September 4, 2019 2:15 PM        Objective    BP (!) 148/88 (BP Location: Right arm, Patient Position: Sitting, Cuff Size: Adult Regular)   Pulse 70   Temp 97.9  F (36.6  C) (Oral)   Resp 16   Ht 1.537 m (5' 0.5\")   Wt 56.7 kg (125 lb)   LMP  (LMP Unknown)   SpO2 97%   BMI 24.01 kg/m    Body mass index is 24.01 " kg/m .  Physical Exam   GENERAL: healthy, alert and no distress  NECK: no adenopathy, no asymmetry, masses, or scars and thyroid normal to palpation  EYES: Eyes grossly normal to inspection, PERRL and conjunctivae and sclerae normal  HENT: ear canals and TM's normal, nose and mouth without ulcers or lesions  RESP: lungs clear to auscultation - no rales, rhonchi or wheezes  CV: regular rate and rhythm, normal S1 S2, no S3 or S4, no murmur, click or rub, no peripheral edema and peripheral pulses strong  ABDOMEN: soft, nontender, no hepatosplenomegaly, no masses and bowel sounds normal  MS: no gross musculoskeletal defects noted, no edema  NEURO: Normal strength and tone, mentation intact and speech normal, gait is normal, unable to walk on tip toes, Romberg negative, mild tremor of thumbs bilaterally at rest, CN II-XII intact except visual field deficit (chronic) and mild weakness of bilateral UE  PSYCH: poor historian, decreased memory recall for distant activities, however, was oriented to person, place, and time    Component      Latest Ref Rng & Units 8/21/2019   Sodium      133 - 144 mmol/L 142   Potassium      3.4 - 5.3 mmol/L 4.3   Chloride      94 - 109 mmol/L 107   Carbon Dioxide      20 - 32 mmol/L 29   Anion Gap      3 - 14 mmol/L 6   Glucose      70 - 99 mg/dL 95   Urea Nitrogen      7 - 30 mg/dL 16   Creatinine      0.52 - 1.04 mg/dL 0.86   GFR Estimate      >60 mL/min/1.73:m2 67   GFR Estimate If Black      >60 mL/min/1.73:m2 78   Calcium      8.5 - 10.1 mg/dL 9.4   Bilirubin Total      0.2 - 1.3 mg/dL 0.7   Albumin      3.4 - 5.0 g/dL 3.7   Protein Total      6.8 - 8.8 g/dL 6.9   Alkaline Phosphatase      40 - 150 U/L 48   ALT      0 - 50 U/L 17   AST      0 - 45 U/L 19   Cholesterol      <200 mg/dL 215 (H)   Triglycerides      <150 mg/dL 75   HDL Cholesterol      >49 mg/dL 69   LDL Cholesterol Calculated      <100 mg/dL 131 (H)   Non HDL Cholesterol      <130 mg/dL 146 (H)   Hemoglobin A1C      0 - 5.6  % 5.2             Assessment & Plan       ICD-10-CM    1. Cognitive complaints R41.9 TSH with free T4 reflex     Vitamin B12     *UA reflex to Microscopic and Culture ([facility] (except [facility] [place] [facility])     T4 free     T4 free     CANCELED: UA reflex to Microscopic and Culture   2. Temperature intolerance R68.89    3. Generalized muscle weakness M62.81 TSH with free T4 reflex     Vitamin B12     CK total     CBC with platelets differential     *UA reflex to Microscopic and Culture ([facility] (except [place] [facility] [place])     CANCELED: UA reflex to Microscopic and Culture   4. Dizziness R42    5. CKD (chronic kidney disease) stage 2, GFR 60-89 ml/min N18.2    6. Adjustment disorder with mixed anxiety and depressed mood F43.23    7. Cerebrovascular accident (CVA), unspecified mechanism (H) I63.9    8. Chronic interstitial cystitis N30.10 *UA reflex to Microscopic and Culture ([facility] (except [place] and [place])     CANCELED: UA reflex to Microscopic and Culture   9. Fatigue, unspecified type R53.83    10. Hypertension goal BP (blood pressure) < 130/80 I10 lisinopril (PRINIVIL/ZESTRIL) 20 MG tablet      multiple concerns today, some of which are quite chronic for her. It's unclear what neurology f/u she has completed or if she has even done the MRI. Discussed most important at this point is f/u with neurology for further w/u. She agrees and will schedule.   Discussed not driving if she is feeling fatigued/drowsy or unable to focus. Might benefit from [facility] driving eval.   Unclear how much depression playing a role with her sx's. May consider adjustment of fluoxetine (? Also question if fluoxetine causing fatigue for her??).  Will have care coordination f/u to ensure she is getting f/u scheduled with neuro  Some orthostatic nature of her sx's- it sounds as she is eating infrequently and limited hydration. Discussed pushing fluids, regular  "meals or using a protein shake for meal replacement if not wanting to make meal instead of skipping it.   bp not controlled. I do ? A little her med compliance but she notes she has been taking \"meds\" as rx'd but unable to specify her medications by name.   Will plan for close f/u to review labs and recheck.   Patient Instructions     Make an appointment with neurology. This is important to help us determine the cause of your symptoms.  You have previously seen Dr. Roibnson Goldstein at the [facility]. To make an appointment call them at [phone].    Drink 6-8 glasses of water per day.     Eat three times per day. If you are not hungry for a meal drink a protein shake.     Increase lisinopril to 40 mg daily.  You can take 2 of the 20 mg tablets that you already have.    At [facility], we strive to deliver an exceptional experience to you, every time we see you.  If you receive a survey in the mail, please send us back your thoughts. We really do value your feedback.    Your care team:     Family Medicine   KYE Marks MD Emily Bunt, APRN CNP   S. MD Aida Covarrubias MD Angela Wermerskirchen, MD         Clinic hours: Monday - Wednesday 7 am-7 pm   Thursdays and Fridays 7 am-5 pm.     [facility]: Monday - Friday 11 am-9 pm,   Saturday and Sunday 9 am-5 pm.    [facility]: Monday -Thursday 8 am-8 pm; Friday 8 am-6 pm; Saturday and Sunday 9 am-5 pm.     [facility]: Monday - Thursday 8 am - 7 pm; Friday 8 am - 6 pm    Clinic: [phone]   [facility]: [phone]   [facility]: [phone]                Return in about 2 weeks (around 9/18/2019) for Medication check.  Length of visit was 40 minutes with more than 50 percent of that time used for discussing medical concerns and education    The information in this document, created by the " medical scribe for me, accurately reflects the services I personally performed and the decisions made by me. I have reviewed and approved this document for accuracy.   Jyoti Moreno MD  [facility]

## 2019-09-04 NOTE — LETTER
September 6, 2019      Anabelle Herbert  [address]  [place] [address]        Dear Anabelle,     It was a pleasure seeing you at your recent visit. Your labs have been reviewed and are attached.     Your blood count panel shows you have a very mild anemia currently. Also, the thyroid test was borderline abnormal. Both of these things could be contributing to your fatigue and memory but are not likely the main cause. I would like to pursue further evaluation of the anemia and abnormal thyroid test with further lab work. Please schedule a lab only visit prior to your next appointment to complete this. If it is hard for you to come in for the labs prior to the appointment, we can draw the blood work when I see you next.     Please continue with the plan to see your neurologist in the near future also.           Sincerely,        Jyoti Moreno MD    Results for orders placed or performed in visit on 09/04/19   TSH with free T4 reflex   Result Value Ref [facility]    TSH 4.24 (H) 0.40 - 4.00 mU/L   Vitamin B12   Result Value Ref [facility]    Vitamin B12 687 193 - 986 pg/mL   CK total   Result Value Ref [facility]    CK Total 71 30 - 225 U/L   CBC with platelets differential   Result Value Ref [facility]    WBC 5.4 4.0 - 11.0 10e9/L    RBC Count 3.63 (L) 3.8 - 5.2 10e12/L    Hemoglobin 11.6 (L) 11.7 - 15.7 g/dL    Hematocrit 34.5 (L) 35.0 - 47.0 %    MCV 95 78 - 100 fl    MCH 32.0 26.5 - 33.0 pg    MCHC 33.6 31.5 - 36.5 g/dL    RDW 11.5 10.0 - 15.0 %    Platelet Count 201 150 - 450 10e9/L    % Neutrophils 65.2 %    % Lymphocytes 27.1 %    % Monocytes 6.2 %    % Eosinophils 0.9 %    % Basophils 0.6 %    Absolute Neutrophil 3.5 1.6 - 8.3 10e9/L    Absolute Lymphocytes 1.5 0.8 - 5.3 10e9/L    Absolute Monocytes 0.3 0.0 - 1.3 10e9/L    Absolute Eosinophils 0.1 0.0 - 0.7 10e9/L    Absolute Basophils 0.0 0.0 - 0.2 10e9/L    Diff Method Automated Method    T4 free   Result Value Ref [facility]    T4 Free 0.79 0.76 - 1.46 ng/dL

## 2019-09-04 NOTE — PATIENT INSTRUCTIONS
Make an appointment with neurology. This is important to help us determine the cause of your symptoms.  You have previously seen Dr. Robinson Goldstein at the [facility]. To make an appointment call them at [phone].    Drink 6-8 glasses of water per day.     Eat three times per day. If you are not hungry for a meal drink a protein shake.     Increase lisinopril to 40 mg daily.  You can take 2 of the 20 mg tablets that you already have.    At [facility], we strive to deliver an exceptional experience to you, every time we see you.  If you receive a survey in the mail, please send us back your thoughts. We really do value your feedback.    Your care team:     Family Medicine   KYE Marks MD Emily Bunt, APRN CNP S. Matthew Hockett, MD Pamela Kolacz, MD Angela Wermerskirchen, MD         Clinic hours: Monday - Wednesday 7 am-7 pm   Thursdays and Fridays 7 am-5 pm.     [facility]: Monday - Friday 11 am-9 pm,   Saturday and Sunday 9 am-5 pm.    [facility]: Monday -Thursday 8 am-8 pm; Friday 8 am-6 pm; Saturday and Sunday 9 am-5 pm.     [facility]: Monday - Thursday 8 am - 7 pm; Friday 8 am - 6 pm    Clinic: [phone]   [facility]: [phone]   [facility]: [phone]

## 2019-09-05 ASSESSMENT — ASTHMA QUESTIONNAIRES: ACT_TOTALSCORE: 25

## 2019-09-05 ASSESSMENT — ANXIETY QUESTIONNAIRES: GAD7 TOTAL SCORE: 9

## 2019-09-06 ENCOUNTER — PATIENT OUTREACH (OUTPATIENT)
Dept: CARE COORDINATION | Facility: CLINIC | Age: 74
End: 2019-09-06

## 2019-09-06 DIAGNOSIS — R41.9 COGNITIVE COMPLAINTS: Primary | ICD-10-CM

## 2019-09-06 NOTE — PROGRESS NOTES
Clinic Care Coordination Contact  Care Team Conversations    Clinic care coordinator received a request form PCP to assist patient with seeing neurology for a follow up visit.     Patient was seen by Dr. Robinson Goldstein from [facility] in March of 2019. ([phone]) Patient needs follow up.    Called and left message on patient's voicemail. Requested return call to determine when she may be available for appointment.     Clinic care coordinator will await return call and if no response outreach in 3-5 business days.     Arcelia Sullivan RN, [facility] - Primary Care Clinic RN Coordinator  [facility]   9/6/2019    10:43 AM  [phone]

## 2019-09-09 ENCOUNTER — PATIENT OUTREACH (OUTPATIENT)
Dept: CARE COORDINATION | Facility: CLINIC | Age: 74
End: 2019-09-09

## 2019-09-09 NOTE — PROGRESS NOTES
Clinic Care Coordination Contact      Patient returned clinic care coordinator's call.    Explained to patient the reason for my call and PCP request for neurology follow up visit.     Offered to make appointment for patient. She would like to make this herself. Provided patient with Dr. Robinson Goldstein's contact information. She does remember that his office is in [place].  Patient states she is going to call and make appointment right away.      Clinic care coordinator will follow up in one month to assure appointment was made.     Arcelia Sullivan RN, [facility] - Primary Care Clinic RN Coordinator  [facility]   9/9/2019    3:31 PM  [phone]

## 2019-09-09 NOTE — PROGRESS NOTES
Clinic Care Coordination Contact    [facility]/Voicemail       Clinical Data: Care Coordinator Outreach    Outreach attempted x 2.  Left message on patient's voicemail with call back information and requested return call.    Patient needs assistance making an appointment with Dr. Robinson Goldstein ( [phone]) neuro, for cognitive assessment.     Plan:  Care Coordinator will try to reach patient again in 3-5 business days.    Arcelia Sullivan RN, CCM - Primary Care Clinic RN Coordinator  [facility]   9/9/2019    12:57 PM  [phone]

## 2019-09-16 ENCOUNTER — PATIENT OUTREACH (OUTPATIENT)
Dept: CARE COORDINATION | Facility: CLINIC | Age: 74
End: 2019-09-16

## 2019-09-16 NOTE — PROGRESS NOTES
Clinic Care Coordination Contact      Patient calling clinic care coordinator. Patient states she attempted to reach [facility], left a message but has not heard anything back. She is asking clinic care coordinator to assist is scheduling an appointment.    Patient states she is not able to go on Tuesday mornings or all day on Thursday.     Clinic care coordinator call [facility], [phone] and left a message with Dr. Angelita Prince's . Message indicates she will return call within 24 hours.     Will await response from Niki.     Arcelia Sullivan RN, [facility] - Primary Care Clinic RN Coordinator  [facility]   9/16/2019    11:56 AM  [phone]

## 2019-09-17 NOTE — PROGRESS NOTES
Clinic Care Coordination Contact  Care Team Conversations    Clinic care coordinator was finally able to get through to [facility].     Made an appointment for patient for Wednesday, September 25th at 3:30pm at the [facility].    Called patient and informed her of the appointment. She is happy with the date and time.    Patient states she is tired all the time. She states she just lies around on the couch all day.     Advised to see neurology, then make a lab appointment to have the blood work PCP ordered drawn, and then make PCP appointment to discuss the results of everything.  Patient agrees to plan.     Clinic care coordinator will follow up with patient in one month to ensure compliance with plan.     Arcelia Sullivan RN, [facility] - Primary Care Clinic RN Coordinator  [facility]   9/17/2019    1:46 PM  [phone]

## 2019-09-23 DIAGNOSIS — R53.83 FATIGUE, UNSPECIFIED TYPE: ICD-10-CM

## 2019-09-23 DIAGNOSIS — I10 BENIGN ESSENTIAL HYPERTENSION: ICD-10-CM

## 2019-09-23 DIAGNOSIS — N18.2 CKD (CHRONIC KIDNEY DISEASE) STAGE 2, GFR 60-89 ML/MIN: ICD-10-CM

## 2019-09-23 DIAGNOSIS — M62.81 GENERALIZED MUSCLE WEAKNESS: ICD-10-CM

## 2019-09-23 DIAGNOSIS — D64.9 ANEMIA, UNSPECIFIED TYPE: ICD-10-CM

## 2019-09-23 DIAGNOSIS — R68.89 TEMPERATURE INTOLERANCE: ICD-10-CM

## 2019-09-23 DIAGNOSIS — R79.89 ABNORMAL TSH: ICD-10-CM

## 2019-09-23 DIAGNOSIS — R41.9 COGNITIVE COMPLAINTS: ICD-10-CM

## 2019-09-23 DIAGNOSIS — N30.10 CHRONIC INTERSTITIAL CYSTITIS: ICD-10-CM

## 2019-09-23 DIAGNOSIS — R42 DIZZINESS: ICD-10-CM

## 2019-09-23 LAB
ALBUMIN UR-MCNC: 30 MG/DL
APPEARANCE UR: CLEAR
BASOPHILS # BLD AUTO: 0 10E9/L (ref 0–0.2)
BASOPHILS NFR BLD AUTO: 0.8 %
BILIRUB UR QL STRIP: NEGATIVE
COLOR UR AUTO: YELLOW
CREAT UR-MCNC: 104 MG/DL
DIFFERENTIAL METHOD BLD: ABNORMAL
EOSINOPHIL # BLD AUTO: 0.1 10E9/L (ref 0–0.7)
EOSINOPHIL NFR BLD AUTO: 2.1 %
ERYTHROCYTE [DISTWIDTH] IN BLOOD BY AUTOMATED COUNT: 11.4 % (ref 10–15)
FERRITIN SERPL-MCNC: 84 NG/ML (ref 8–252)
FOLATE SERPL-MCNC: 51.7 NG/ML
GLUCOSE UR STRIP-MCNC: NEGATIVE MG/DL
HCT VFR BLD AUTO: 35.2 % (ref 35–47)
HGB BLD-MCNC: 11.8 G/DL (ref 11.7–15.7)
HGB UR QL STRIP: ABNORMAL
IRON SATN MFR SERPL: 26 % (ref 15–46)
IRON SERPL-MCNC: 73 UG/DL (ref 35–180)
KETONES UR STRIP-MCNC: NEGATIVE MG/DL
LEUKOCYTE ESTERASE UR QL STRIP: NEGATIVE
LYMPHOCYTES # BLD AUTO: 1.4 10E9/L (ref 0.8–5.3)
LYMPHOCYTES NFR BLD AUTO: 27.1 %
MCH RBC QN AUTO: 32.2 PG (ref 26.5–33)
MCHC RBC AUTO-ENTMCNC: 33.5 G/DL (ref 31.5–36.5)
MCV RBC AUTO: 96 FL (ref 78–100)
MICROALBUMIN UR-MCNC: 238 MG/L
MICROALBUMIN/CREAT UR: 228.85 MG/G CR (ref 0–25)
MONOCYTES # BLD AUTO: 0.3 10E9/L (ref 0–1.3)
MONOCYTES NFR BLD AUTO: 6.6 %
NEUTROPHILS # BLD AUTO: 3.2 10E9/L (ref 1.6–8.3)
NEUTROPHILS NFR BLD AUTO: 63.4 %
NITRATE UR QL: NEGATIVE
NON-SQ EPI CELLS #/AREA URNS LPF: ABNORMAL /LPF
PH UR STRIP: 6 PH (ref 5–7)
PLATELET # BLD AUTO: 204 10E9/L (ref 150–450)
RBC # BLD AUTO: 3.67 10E12/L (ref 3.8–5.2)
RBC #/AREA URNS AUTO: ABNORMAL /HPF
RETICS # AUTO: 40.8 10E9/L (ref 25–95)
RETICS/RBC NFR AUTO: 1.1 % (ref 0.5–2)
SOURCE: ABNORMAL
SP GR UR STRIP: 1.02 (ref 1–1.03)
T4 FREE SERPL-MCNC: 0.82 NG/DL (ref 0.76–1.46)
TIBC SERPL-MCNC: 276 UG/DL (ref 240–430)
TSH SERPL DL<=0.005 MIU/L-ACNC: 5.39 MU/L (ref 0.4–4)
UROBILINOGEN UR STRIP-ACNC: 0.2 EU/DL (ref 0.2–1)
WBC # BLD AUTO: 5.1 10E9/L (ref 4–11)
WBC #/AREA URNS AUTO: ABNORMAL /HPF

## 2019-09-23 PROCEDURE — 83550 IRON BINDING TEST: CPT | Performed by: FAMILY MEDICINE

## 2019-09-23 PROCEDURE — 87086 URINE CULTURE/COLONY COUNT: CPT | Performed by: FAMILY MEDICINE

## 2019-09-23 PROCEDURE — 85025 COMPLETE CBC W/AUTO DIFF WBC: CPT | Performed by: FAMILY MEDICINE

## 2019-09-23 PROCEDURE — 84443 ASSAY THYROID STIM HORMONE: CPT | Performed by: FAMILY MEDICINE

## 2019-09-23 PROCEDURE — 82043 UR ALBUMIN QUANTITATIVE: CPT | Performed by: FAMILY MEDICINE

## 2019-09-23 PROCEDURE — 83540 ASSAY OF IRON: CPT | Performed by: FAMILY MEDICINE

## 2019-09-23 PROCEDURE — 81001 URINALYSIS AUTO W/SCOPE: CPT | Performed by: FAMILY MEDICINE

## 2019-09-23 PROCEDURE — 86800 THYROGLOBULIN ANTIBODY: CPT | Performed by: FAMILY MEDICINE

## 2019-09-23 PROCEDURE — 84439 ASSAY OF FREE THYROXINE: CPT | Performed by: FAMILY MEDICINE

## 2019-09-23 PROCEDURE — 85060 BLOOD SMEAR INTERPRETATION: CPT | Performed by: FAMILY MEDICINE

## 2019-09-23 PROCEDURE — 82728 ASSAY OF FERRITIN: CPT | Performed by: FAMILY MEDICINE

## 2019-09-23 PROCEDURE — 82746 ASSAY OF FOLIC ACID SERUM: CPT | Performed by: FAMILY MEDICINE

## 2019-09-23 PROCEDURE — 86376 MICROSOMAL ANTIBODY EACH: CPT | Performed by: FAMILY MEDICINE

## 2019-09-23 PROCEDURE — 36415 COLL VENOUS BLD VENIPUNCTURE: CPT | Performed by: FAMILY MEDICINE

## 2019-09-23 PROCEDURE — 85045 AUTOMATED RETICULOCYTE COUNT: CPT | Performed by: FAMILY MEDICINE

## 2019-09-24 LAB
THYROGLOB AB SERPL IA-ACNC: <20 IU/ML (ref 0–40)
THYROPEROXIDASE AB SERPL-ACNC: 16 IU/ML

## 2019-09-25 LAB
BACTERIA SPEC CULT: NORMAL
BACTERIA SPEC CULT: NORMAL
SPECIMEN SOURCE: NORMAL

## 2019-09-26 LAB — COPATH REPORT: NORMAL

## 2019-09-30 ENCOUNTER — PATIENT OUTREACH (OUTPATIENT)
Dept: CARE COORDINATION | Facility: CLINIC | Age: 74
End: 2019-09-30

## 2019-09-30 NOTE — PROGRESS NOTES
Clinic Care Coordination Contact  Care Team Conversations    Patient left message on clinic care coordinator's after hours voicemail (9/28/19)    Patient states she missed her appointment with Dr. Goldstein. She arrived at his office late and had left the clinic. Patient rescheduled the appointment for October 9th.     She states she has an appointment with PPC on 10/3 and wonders if she should change that to after she sees Dr. Goldstein. She indicates that is what she would like to do.    Returned call to patient. Left voicemail message. Informed her she can reschedule PCP appointment after her neurology appointment. Encouraged patient to return call to clinic care coordinator if she has questions.     Will follow up with patient in one month.     Arcelia Sullivan RN, [facility] - Primary Care Clinic RN Coordinator  [facility]   9/30/2019    11:47 AM  [phone]

## 2019-10-09 ENCOUNTER — TRANSFERRED RECORDS (OUTPATIENT)
Dept: HEALTH INFORMATION MANAGEMENT | Facility: CLINIC | Age: 74
End: 2019-10-09

## 2019-10-11 DIAGNOSIS — F43.23 ADJUSTMENT DISORDER WITH MIXED ANXIETY AND DEPRESSED MOOD: ICD-10-CM

## 2019-10-11 NOTE — TELEPHONE ENCOUNTER
"Requested Prescriptions   Pending Prescriptions Disp Refills     FLUoxetine (PROZAC) 20 MG capsule [Pharmacy Med Name: FLUOXETINE HCL 20 MG CAPSULE]  Last Written Prescription Date:  5/9/19  Last Fill Quantity: 90 capsule,  # refills: 1   Last office visit: 9/4/2019 with prescribing provider:  Dr. Moreno   Future Office Visit:   Next 5 appointments (look out 90 days)    Oct 21, 2019  1:40 PM CDT  Office Visit with Jyoti Moreno MD  [facility] ([address] [address] [phone]          90 capsule 1     Sig: TAKE 1 CAPSULE BY MOUTH EVERY DAY       SSRIs Protocol Failed - 10/11/2019 12:09 PM        Failed - PHQ-9 score less than 5 in past 6 months     Please review last PHQ-9 score.   PHQ-9 SCORE 7/5/2018 11/12/2018 9/4/2019   PHQ-9 Total Score - - -   PHQ-9 Total Score 16 16 19     VENTURA-7 SCORE 7/5/2018 11/12/2018 9/4/2019   Total Score - - -   Total Score 15 13 9                 Passed - Medication is active on med list        Passed - Patient is age 18 or older        Passed - No active pregnancy on record        Passed - No positive pregnancy test in last 12 months        Passed - Recent (6 mo) or future (30 days) visit within the authorizing provider's specialty     Patient had office visit in the last 6 months or has a visit in the next 30 days with authorizing provider or within the authorizing provider's specialty.  See \"Patient Info\" tab in inbasket, or \"Choose Columns\" in Meds & Orders section of the refill encounter.              "

## 2019-10-21 ENCOUNTER — OFFICE VISIT (OUTPATIENT)
Dept: FAMILY MEDICINE | Facility: CLINIC | Age: 74
End: 2019-10-21
Payer: COMMERCIAL

## 2019-10-21 VITALS
TEMPERATURE: 97.7 F | HEIGHT: 61 IN | DIASTOLIC BLOOD PRESSURE: 75 MMHG | WEIGHT: 119 LBS | SYSTOLIC BLOOD PRESSURE: 108 MMHG | OXYGEN SATURATION: 98 % | RESPIRATION RATE: 17 BRPM | BODY MASS INDEX: 22.47 KG/M2 | HEART RATE: 78 BPM

## 2019-10-21 DIAGNOSIS — Z53.9 ERRONEOUS ENCOUNTER--DISREGARD: Primary | ICD-10-CM

## 2019-10-21 RX ORDER — DONEPEZIL HYDROCHLORIDE 5 MG/1
5 TABLET, FILM COATED ORAL DAILY
Refills: 11 | COMMUNITY
Start: 2019-10-09 | End: 2021-02-09

## 2019-10-21 ASSESSMENT — ANXIETY QUESTIONNAIRES
IF YOU CHECKED OFF ANY PROBLEMS ON THIS QUESTIONNAIRE, HOW DIFFICULT HAVE THESE PROBLEMS MADE IT FOR YOU TO DO YOUR WORK, TAKE CARE OF THINGS AT HOME, OR GET ALONG WITH OTHER PEOPLE: VERY DIFFICULT
1. FEELING NERVOUS, ANXIOUS, OR ON EDGE: NEARLY EVERY DAY
6. BECOMING EASILY ANNOYED OR IRRITABLE: SEVERAL DAYS
7. FEELING AFRAID AS IF SOMETHING AWFUL MIGHT HAPPEN: NOT AT ALL
GAD7 TOTAL SCORE: 12
2. NOT BEING ABLE TO STOP OR CONTROL WORRYING: NEARLY EVERY DAY
5. BEING SO RESTLESS THAT IT IS HARD TO SIT STILL: NOT AT ALL
3. WORRYING TOO MUCH ABOUT DIFFERENT THINGS: NEARLY EVERY DAY

## 2019-10-21 ASSESSMENT — PAIN SCALES - GENERAL: PAINLEVEL: NO PAIN (0)

## 2019-10-21 ASSESSMENT — PATIENT HEALTH QUESTIONNAIRE - PHQ9
5. POOR APPETITE OR OVEREATING: MORE THAN HALF THE DAYS
SUM OF ALL RESPONSES TO PHQ QUESTIONS 1-9: 22

## 2019-10-21 ASSESSMENT — MIFFLIN-ST. JEOR: SCORE: 979.22

## 2019-10-21 NOTE — PROGRESS NOTES
"Subjective     Anabelle Herbert is a 74 year old female who presents to clinic today for the following health issues:    HPI   Hypertension Follow-up      Do you check your blood pressure regularly outside of the clinic? No     Are you following a low salt diet? Yes    Are your blood pressures ever more than 140 on the top number (systolic) OR more   than 90 on the bottom number (diastolic), for example 140/90? No     Depression and Anxiety Follow-Up    How are you doing with your depression since your last visit? Worsened     How are you doing with your anxiety since your last visit?  Worsened     Are you having other symptoms that might be associated with depression or anxiety? Yes:  fatigue    Have you had a significant life event? OTHER: \"probably\"     Do you have any concerns with your use of alcohol or other drugs? No    Social History     Tobacco Use     Smoking status: Never Smoker     Smokeless tobacco: Never Used   Substance Use Topics     Alcohol use: No     Drug use: No     PHQ 7/5/2018 11/12/2018 9/4/2019   PHQ-9 Total Score 16 16 19   Q9: Thoughts of better off dead/self-harm past 2 weeks Not at all Not at all Not at all     VENTURA-7 SCORE 7/5/2018 11/12/2018 9/4/2019   Total Score - - -   Total Score 15 13 9         Suicide Assessment Five-step Evaluation and Treatment (SAFE-T)      How many servings of fruits and vegetables do you eat daily?  0-1    On average, how many sweetened beverages do you drink each day (soda, juice, sweet tea, etc)?   0  How many days per week do you miss taking your medication? Occasionally misses     What makes it hard for you to take your medications?  having to eat with medication                  Review of Systems         Objective    /75 (BP Location: Right arm, Patient Position: Chair, Cuff Size: Adult Regular)   Pulse 78   Temp 97.7  F (36.5  C) (Oral)   Resp 17   Ht 1.537 m (5' 0.5\")   Wt 54 kg (119 lb)   LMP  (Exact Date)   SpO2 98%   Breastfeeding? No   " BMI 22.86 kg/m    Body mass index is 22.86 kg/m .  Physical Exam       Patient left prior to being seen. Too long of a wait.   This encounter was opened in error. Please disregard.

## 2019-10-22 ASSESSMENT — ANXIETY QUESTIONNAIRES: GAD7 TOTAL SCORE: 12

## 2019-11-05 ENCOUNTER — PATIENT OUTREACH (OUTPATIENT)
Dept: CARE COORDINATION | Facility: CLINIC | Age: 74
End: 2019-11-05

## 2019-11-05 NOTE — PROGRESS NOTES
Clinic Care Coordination Contact    Follow Up Progress Note      Assessment: Patient has followed up with neurology. Outcome was cognitive impairment most consistent with vascular dementia of mild severity, but remains independent and is high functioning overall.      Patient was started on Aricept 5 mg for vascular dementia    Goals addressed this encounter:   Goals Addressed    None     Plan:   Patient was scheduled to see PCP but left the office before being seen due to time restraints.   Patient will reschedule with PCP.     Care Coordinator will close at this time. Re-open as necessary.    Arcelia Sullivan RN, [facility] - Primary Care Clinic RN Coordinator  [facility]   11/5/2019    9:14 AM  [phone]

## 2019-11-08 DIAGNOSIS — I10 HYPERTENSION GOAL BP (BLOOD PRESSURE) < 130/80: ICD-10-CM

## 2019-11-08 NOTE — TELEPHONE ENCOUNTER
"Requested Prescriptions   Pending Prescriptions Disp Refills     lisinopril (PRINIVIL/ZESTRIL) 20 MG tablet [Pharmacy Med Name: LISINOPRIL 20 MG TABLET]  Last Written Prescription Date:  9/4/19  Last Fill Quantity: 1 tablet,  # refills: 0   Last office visit: No previous visit found with prescribing provider:  Dr. Moreno   Future Office Visit:     90 tablet 1     Sig: TAKE 1 TABLET BY MOUTH EVERY DAY       ACE Inhibitors (Including Combos) Protocol Passed - 11/8/2019  4:20 AM        Passed - Blood pressure under 140/90 in past 12 months     BP Readings from Last 3 Encounters:   10/21/19 108/75   09/04/19 (!) 148/88   03/26/19 (!) 161/91                 Passed - Recent (12 mo) or future (30 days) visit within the authorizing provider's specialty     Patient has had an office visit with the authorizing provider or a provider within the authorizing providers department within the previous 12 mos or has a future within next 30 days. See \"Patient Info\" tab in inbasket, or \"Choose Columns\" in Meds & Orders section of the refill encounter.              Passed - Medication is active on med list        Passed - Patient is age 18 or older        Passed - No active pregnancy on record        Passed - Normal serum creatinine on file in past 12 months     Recent Labs   Lab Test 08/21/19  1047   CR 0.86             Passed - Normal serum potassium on file in past 12 months     Recent Labs   Lab Test 08/21/19  1047   POTASSIUM 4.3             Passed - No positive pregnancy test within past 12 months          "

## 2019-11-11 RX ORDER — LISINOPRIL 40 MG/1
40 TABLET ORAL DAILY
Qty: 90 TABLET | Refills: 0 | Status: SHIPPED | OUTPATIENT
Start: 2019-11-11 | End: 2020-02-06

## 2019-11-11 RX ORDER — LISINOPRIL 20 MG/1
TABLET ORAL
Qty: 90 TABLET | Refills: 1 | OUTPATIENT
Start: 2019-11-11

## 2019-11-11 NOTE — TELEPHONE ENCOUNTER
Routing refill request to provider for review/approval because:  Per last OV note on 09/04/2019, patient was to double her dose of lisinopril to 40mg. The signature line on the 20mg prescription and the dosing needs to be altered for the new rx. Medication pending for review.     Loulou Whitaker RN

## 2020-01-22 ENCOUNTER — OFFICE VISIT (OUTPATIENT)
Dept: FAMILY MEDICINE | Facility: CLINIC | Age: 75
End: 2020-01-22
Payer: COMMERCIAL

## 2020-01-22 VITALS
BODY MASS INDEX: 25.13 KG/M2 | SYSTOLIC BLOOD PRESSURE: 132 MMHG | RESPIRATION RATE: 16 BRPM | TEMPERATURE: 98.4 F | OXYGEN SATURATION: 96 % | HEART RATE: 62 BPM | WEIGHT: 128 LBS | HEIGHT: 60 IN | DIASTOLIC BLOOD PRESSURE: 74 MMHG

## 2020-01-22 DIAGNOSIS — R80.9 PROTEINURIA, UNSPECIFIED TYPE: ICD-10-CM

## 2020-01-22 DIAGNOSIS — M79.661 PAIN IN BOTH LOWER LEGS: ICD-10-CM

## 2020-01-22 DIAGNOSIS — F01.50 VASCULAR DEMENTIA WITHOUT BEHAVIORAL DISTURBANCE (H): Primary | ICD-10-CM

## 2020-01-22 DIAGNOSIS — R68.89 TEMPERATURE INTOLERANCE: ICD-10-CM

## 2020-01-22 DIAGNOSIS — F43.23 ADJUSTMENT DISORDER WITH MIXED ANXIETY AND DEPRESSED MOOD: ICD-10-CM

## 2020-01-22 DIAGNOSIS — M79.662 PAIN IN BOTH LOWER LEGS: ICD-10-CM

## 2020-01-22 DIAGNOSIS — R20.2 PARESTHESIAS: ICD-10-CM

## 2020-01-22 DIAGNOSIS — R41.9 COGNITIVE COMPLAINTS: ICD-10-CM

## 2020-01-22 LAB
ALBUMIN UR-MCNC: NEGATIVE MG/DL
APPEARANCE UR: CLEAR
BASOPHILS # BLD AUTO: 0 10E9/L (ref 0–0.2)
BASOPHILS NFR BLD AUTO: 0.7 %
BILIRUB UR QL STRIP: NEGATIVE
COLOR UR AUTO: YELLOW
DIFFERENTIAL METHOD BLD: ABNORMAL
EOSINOPHIL # BLD AUTO: 0.1 10E9/L (ref 0–0.7)
EOSINOPHIL NFR BLD AUTO: 1.8 %
ERYTHROCYTE [DISTWIDTH] IN BLOOD BY AUTOMATED COUNT: 12 % (ref 10–15)
GLUCOSE UR STRIP-MCNC: NEGATIVE MG/DL
HCT VFR BLD AUTO: 34.2 % (ref 35–47)
HGB BLD-MCNC: 11.3 G/DL (ref 11.7–15.7)
HGB UR QL STRIP: NEGATIVE
HYALINE CASTS #/AREA URNS LPF: NORMAL /LPF
KETONES UR STRIP-MCNC: NEGATIVE MG/DL
LEUKOCYTE ESTERASE UR QL STRIP: ABNORMAL
LYMPHOCYTES # BLD AUTO: 2 10E9/L (ref 0.8–5.3)
LYMPHOCYTES NFR BLD AUTO: 31.8 %
MCH RBC QN AUTO: 31.7 PG (ref 26.5–33)
MCHC RBC AUTO-ENTMCNC: 33 G/DL (ref 31.5–36.5)
MCV RBC AUTO: 96 FL (ref 78–100)
MONOCYTES # BLD AUTO: 0.5 10E9/L (ref 0–1.3)
MONOCYTES NFR BLD AUTO: 8 %
NEUTROPHILS # BLD AUTO: 3.5 10E9/L (ref 1.6–8.3)
NEUTROPHILS NFR BLD AUTO: 57.7 %
NITRATE UR QL: NEGATIVE
NON-SQ EPI CELLS #/AREA URNS LPF: NORMAL /LPF
PH UR STRIP: 5.5 PH (ref 5–7)
PLATELET # BLD AUTO: 214 10E9/L (ref 150–450)
RBC # BLD AUTO: 3.56 10E12/L (ref 3.8–5.2)
RBC #/AREA URNS AUTO: NORMAL /HPF
SOURCE: ABNORMAL
SP GR UR STRIP: 1.02 (ref 1–1.03)
UROBILINOGEN UR STRIP-ACNC: 0.2 EU/DL (ref 0.2–1)
WBC # BLD AUTO: 6.1 10E9/L (ref 4–11)
WBC #/AREA URNS AUTO: NORMAL /HPF

## 2020-01-22 PROCEDURE — 86335 IMMUNFIX E-PHORSIS/URINE/CSF: CPT | Performed by: FAMILY MEDICINE

## 2020-01-22 PROCEDURE — 00000402 ZZHCL STATISTIC TOTAL PROTEIN: Performed by: FAMILY MEDICINE

## 2020-01-22 PROCEDURE — 81001 URINALYSIS AUTO W/SCOPE: CPT | Performed by: FAMILY MEDICINE

## 2020-01-22 PROCEDURE — 99214 OFFICE O/P EST MOD 30 MIN: CPT | Performed by: FAMILY MEDICINE

## 2020-01-22 PROCEDURE — 85025 COMPLETE CBC W/AUTO DIFF WBC: CPT | Performed by: FAMILY MEDICINE

## 2020-01-22 PROCEDURE — 84443 ASSAY THYROID STIM HORMONE: CPT | Performed by: FAMILY MEDICINE

## 2020-01-22 PROCEDURE — 80048 BASIC METABOLIC PNL TOTAL CA: CPT | Performed by: FAMILY MEDICINE

## 2020-01-22 PROCEDURE — 84165 PROTEIN E-PHORESIS SERUM: CPT | Performed by: FAMILY MEDICINE

## 2020-01-22 PROCEDURE — 36415 COLL VENOUS BLD VENIPUNCTURE: CPT | Performed by: FAMILY MEDICINE

## 2020-01-22 PROCEDURE — 82607 VITAMIN B-12: CPT | Performed by: FAMILY MEDICINE

## 2020-01-22 ASSESSMENT — ANXIETY QUESTIONNAIRES
IF YOU CHECKED OFF ANY PROBLEMS ON THIS QUESTIONNAIRE, HOW DIFFICULT HAVE THESE PROBLEMS MADE IT FOR YOU TO DO YOUR WORK, TAKE CARE OF THINGS AT HOME, OR GET ALONG WITH OTHER PEOPLE: VERY DIFFICULT
6. BECOMING EASILY ANNOYED OR IRRITABLE: SEVERAL DAYS
2. NOT BEING ABLE TO STOP OR CONTROL WORRYING: SEVERAL DAYS
1. FEELING NERVOUS, ANXIOUS, OR ON EDGE: SEVERAL DAYS
GAD7 TOTAL SCORE: 4
3. WORRYING TOO MUCH ABOUT DIFFERENT THINGS: SEVERAL DAYS
7. FEELING AFRAID AS IF SOMETHING AWFUL MIGHT HAPPEN: NOT AT ALL
5. BEING SO RESTLESS THAT IT IS HARD TO SIT STILL: NOT AT ALL

## 2020-01-22 ASSESSMENT — PAIN SCALES - GENERAL: PAINLEVEL: MILD PAIN (2)

## 2020-01-22 ASSESSMENT — PATIENT HEALTH QUESTIONNAIRE - PHQ9
5. POOR APPETITE OR OVEREATING: NOT AT ALL
SUM OF ALL RESPONSES TO PHQ QUESTIONS 1-9: 17

## 2020-01-22 ASSESSMENT — MIFFLIN-ST. JEOR: SCORE: 1012.1

## 2020-01-22 NOTE — LETTER
January 27, 2020      Anabelle Herbert  [address]  [facility] [address]        Dear Anabelle,     It was a pleasure seeing you at your recent visit. Your labs have been reviewed and are attached.     Overall, your labs looked good. No worrisome findings were seen in your kidneys, urine, blood count or thyroid tests. Please continue with the plan we developed in the office.         Sincerely,        Jyoti Moreno MD      Results for orders placed or performed in visit on 01/22/20   CBC with platelets differential     Status: Abnormal   Result Value Ref Range    WBC 6.1 4.0 - 11.0 10e9/L    RBC Count 3.56 (L) 3.8 - 5.2 10e12/L    Hemoglobin 11.3 (L) 11.7 - 15.7 g/dL    Hematocrit 34.2 (L) 35.0 - 47.0 %    MCV 96 78 - 100 fl    MCH 31.7 26.5 - 33.0 pg    MCHC 33.0 31.5 - 36.5 g/dL    RDW 12.0 10.0 - 15.0 %    Platelet Count 214 150 - 450 10e9/L    % Neutrophils 57.7 %    % Lymphocytes 31.8 %    % Monocytes 8.0 %    % Eosinophils 1.8 %    % Basophils 0.7 %    Absolute Neutrophil 3.5 1.6 - 8.3 10e9/L    Absolute Lymphocytes 2.0 0.8 - 5.3 10e9/L    Absolute Monocytes 0.5 0.0 - 1.3 10e9/L    Absolute Eosinophils 0.1 0.0 - 0.7 10e9/L    Absolute Basophils 0.0 0.0 - 0.2 10e9/L    Diff Method Automated Method    Basic metabolic panel     Status: None   Result Value Ref Range    Sodium 139 133 - 144 mmol/L    Potassium 3.8 3.4 - 5.3 mmol/L    Chloride 105 94 - 109 mmol/L    Carbon Dioxide 30 20 - 32 mmol/L    Anion Gap 4 3 - 14 mmol/L    Glucose 81 70 - 99 mg/dL    Urea Nitrogen 21 7 - 30 mg/dL    Creatinine 0.84 0.52 - 1.04 mg/dL    GFR Estimate 69 >60 mL/min/[1.73_m2]    GFR Estimate If Black 79 >60 mL/min/[1.73_m2]    Calcium 9.0 8.5 - 10.1 mg/dL   Vitamin B12     Status: None   Result Value Ref Range    Vitamin B12 655 193 - 986 pg/mL   TSH with free T4 reflex     Status: None   Result Value Ref Range    TSH 3.54 0.40 - 4.00 mU/L   UA reflex to Microscopic and Culture     Status: Abnormal   Result Value  Ref Range    Color Urine Yellow     Appearance Urine Clear     Glucose Urine Negative NEG^Negative mg/dL    Bilirubin Urine Negative NEG^Negative    Ketones Urine Negative NEG^Negative mg/dL    Specific Gravity Urine 1.020 1.003 - 1.035    Blood Urine Negative NEG^Negative    pH Urine 5.5 5.0 - 7.0 pH    Protein Albumin Urine Negative NEG^Negative mg/dL    Urobilinogen Urine 0.2 0.2 - 1.0 EU/dL    Nitrite Urine Negative NEG^Negative    Leukocyte Esterase Urine Trace (A) NEG^Negative    Source Midstream Urine    Protein immunofixation urine     Status: None   Result Value Ref Range    Immunofix ELP Urine       No monoclonal protein seen on immunofixation.  Pathological significance requires clinical   correlation.     Protein electrophoresis     Status: None   Result Value Ref Range    Albumin Fraction 4.2 3.7 - 5.1 g/dL    Alpha 1 Fraction 0.3 0.2 - 0.4 g/dL    Alpha 2 Fraction 0.6 0.5 - 0.9 g/dL    Beta Fraction 0.7 0.6 - 1.0 g/dL    Gamma Fraction 0.8 0.7 - 1.6 g/dL    Monoclonal Peak 0.0 0.0 g/dL    ELP Interpretation:       Essentially normal electrophoretic pattern.  No monoclonal protein seen. Pathologic   significance requires clinical correlation.  AMINAH Kang M.D., Ph.D., Pathologist ([phone]).     Urine Microscopic     Status: None   Result Value Ref Range    WBC Urine 0 - 5 OTO5^0 - 5 /HPF    RBC Urine O - 2 OTO2^O - 2 /HPF    Hyaline Casts O - 2 OTO2^O - 2 /LPF    Squamous Epithelial /LPF Urine Few FEW^Few /LPF

## 2020-01-22 NOTE — PATIENT INSTRUCTIONS
You can stop taking neuriva brain performance supplement and brain XR supplement because it contains most of the same vitamins you are already taking.    Schedule with a counselor. When you call to schedule ask if it is a patricia based office. I will look up some options for you and ask the  and mail you some recommendations. You can also ask at your [facility] what options they have if you feel comfortable. Check with your insurance about coverage.     Take 1000 mg Tylenol three times per day. You can add this to your pill box to help you remember to take it.     Schedule an appointment with neurology to follow-up. When you call ask them to see someone other than the last person you saw.     Physical therapy will call you to set up an appointment.

## 2020-01-22 NOTE — PROGRESS NOTES
"Subjective     Anabelle Herbert is a 74 year old female who presents to clinic today for the following health issues:    HPI   Concern - Fatigue  Onset:     Description:   Fatigue    Progression of Symptoms:  same    Accompanying Signs & Symptoms:  Body temperature changes,  leg and foot pain, low energy, forgetting , problems focusing    Previous history of similar problem:   yes    Precipitating factors:   Worsened by: unknonw    Alleviating factors:  Improved by: none    Therapies Tried and outcome: none    Neuro  -Pt had visit with neurologist Dr. Goldstein on 10/09/2019; thought that Anabelle is starting to experience memory loss due to hx of strokes, diagnosing \"vascular dementia with mild severity\". Dr. Goldstein recommended she start Aricept and follow-up in three months. She has been taking aricept, but didn't realize she was supposed to follow-up. She initially reported she was unaware of the dx. She would like to see a different neurologist.   -Is wondering if \"Neuriva brain performance\" and another \"Brain XR\" supplement would be a good supplement for her to take, brought it with her today along with all of her other medications.      Temp Regulation   -Is experiencing hot flashes, has occurred after eating and randomly. Is also experiencing times of feeling chilled.    Musculoskeletal   -Her legs ache from her knees down, feels pins and needles on the bottoms of her feet and in her pinky toes. This improves with rest.    -She intermittently has low central back pain, but it does not radiate anywhere and does not seem directly connected to the leg sx's..  -Struggles with her balance, she has not wanted to use a cane or walker.   -A friend gave her CDB oil for aches/pains and wondering if it is okay for her to use.     Mental Health  -Feels that she is depressed because she isn't working and feels \"out of the loop\". Has been taking 20 mg fluoxetine daily, but doesn't feel that it has helped her mood.   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 When she gets home in the afternoon she feels fatigued from working with the kids, but she enjoys it.   -She struggles with her relationship between her daughter and grandchildren.   -She is open to counseling, but would like to see a [facility] counselor.     Weight  -Pt is concerned about her weight gain. Thinks that it might be because she isn't as active as she used to be due to leg pain described above.   -States her appetite \"comes and goes\". Occasionally finds herself eating out of boredom; she started drinking protein drinks since last visit.  Wt Readings from Last 4 Encounters:   01/22/20 58.1 kg (128 lb)   10/21/19 54 kg (119 lb)   09/04/19 56.7 kg (125 lb)   03/26/19 53.5 kg (118 lb)     GI  -Feels that her stomach has become larger in the past few months. Her bowels can go \"from one extreme to the other\", but hasn't changed from her baseline.   -Denies nausea, vomiting     Patient Active Problem List   Diagnosis     Chronic interstitial cystitis     Adjustment reaction with anxiety and depression     Abdominal pain     Benign essential hypertension     CKD (chronic kidney disease) stage 2, GFR 60-89 ml/min     Hyperlipidemia LDL goal <100     Mild persistent asthma     Vitamin D deficiency     GERD (gastroesophageal reflux disease)     Advance care planning     B12 deficiency     Transient cerebral ischemia     Carotid stenosis     Diverticulosis of colon     Colon polyp     Atrophic vaginitis     Interstitial cystitis     Chronic constipation     Cognitive complaints     Prediabetes     Osteoporosis     Cerebrovascular accident (CVA), unspecified mechanism (H)     SHANEKA (obstructive sleep apnea)     Past Surgical History:   Procedure Laterality Date     COLONOSCOPY       CYSTOSCOPY, BIOPSY BLADDER, COMBINED  1/6/2014    Procedure: COMBINED CYSTOSCOPY, BIOPSY BLADDER;;  Surgeon: Vandana Tang MD;  Location:  OR     " CYSTOSCOPY, INJECT COLLAGEN, COMBINED  1/6/2014    Procedure: COMBINED CYSTOSCOPY, INJECT BULKING AGENT;  IC cocktail, bladder biopsy, fulguration, heparin, gentamyacin, lidocaine & kenalog;  Surgeon: Vandana Tang MD;  Location: [facility] OR     GENITOURINARY SURGERY       NO HISTORY OF SURGERY         Social History     Tobacco Use     Smoking status: Never Smoker     Smokeless tobacco: Never Used   Substance Use Topics     Alcohol use: No     Family History   Problem Relation Age of Onset     Cancer Father         colon?     Heart Disease Father      Asthma Mother      Alzheimer Disease Mother 86     Unknown/Adopted Maternal Grandmother      Unknown/Adopted Maternal Grandfather      Unknown/Adopted Paternal Grandmother      Unknown/Adopted Paternal Grandfather      Asthma Sister      Allergies Sister      Unknown/Adopted Son              Reviewed and updated as needed this visit by Provider         Review of Systems   ROS COMP: Constitutional, HEENT, cardiovascular, pulmonary, gi and gu systems are negative, except as otherwise noted.    This document serves as a record of the services and decisions personally performed by KRISTIN ROTH. It was created on his/her behalf by Elvira Cobb, a trained medical scribe. The creation of this document is based on the provider's statements to the medical scribe. Elvira Cobb, January 22, 2020 4:46 PM      Objective    /74 (BP Location: Right arm, Patient Position: Chair, Cuff Size: Adult Regular)   Pulse 62   Temp 98.4  F (36.9  C) (Oral)   Resp 16   Ht 1.524 m (5')   Wt 58.1 kg (128 lb)   LMP  (Exact Date)   SpO2 96%   Breastfeeding No   BMI 25.00 kg/m    Body mass index is 25 kg/m .  Physical Exam   GENERAL: healthy, alert and no distress  NECK: no adenopathy, no asymmetry, masses, or scars and thyroid normal to palpation  RESP: lungs clear to auscultation - no rales, rhonchi or wheezes  CV: regular rate and rhythm, normal S1 S2, no S3 or S4,  "no murmur, click or rub, no peripheral edema and peripheral pulses strong  ABDOMEN: soft, nontender, no hepatosplenomegaly, no masses and bowel sounds normal  MS: no gross musculoskeletal defects noted, no edema  SKIN: no suspicious lesions or rashes  Foot exam: intact distal pulses, no deformities, sensation intact to light touch, fine filament sensation normal except for mid foot plantar aspect on the left  NEURO: Normal strength and tone, mentation intact and speech normal  PSYCH: affect full, mood actually seems more upbeat than I have seen in the past, but she tended to perseverate on her supplements and appeared anxious over family situations and has limited understanding of her medical issues    Diagnostic Test Results:  Labs reviewed in Epic        Assessment & Plan       ICD-10-CM    1. Vascular dementia without behavioral disturbance (H) F01.50 CBC with platelets differential     Basic metabolic panel     Vitamin B12     TSH with free T4 reflex   2. Cognitive complaints R41.9 UA reflex to Microscopic and Culture     Protein immunofixation urine     Protein electrophoresis   3. Pain in both lower legs M79.661 UA reflex to Microscopic and Culture    M79.662 Protein immunofixation urine     Protein electrophoresis     [facility] PT[facility] HAND, AND CHIROPRACTIC REFERRAL     Urine Microscopic   4. Paresthesias R20.2 CBC with platelets differential     Basic metabolic panel     Vitamin B12     TSH with free T4 reflex     UA reflex to Microscopic and Culture     Protein immunofixation urine     Protein electrophoresis     [facility] HAND, AND CHIROPRACTIC REFERRAL   5. Adjustment disorder with mixed anxiety and depressed mood F43.23    6. Temperature intolerance R68.89    7. Proteinuria, unspecified type R80.9 UA reflex to Microscopic and Culture     Protein immunofixation urine     Protein electrophoresis     Discussed discontinuing the Neuriva brain performance or other \"brain supplements\" as it contains most of the vitamins " she is already taking. Encouraged to continue Aricept. She voices understanding and will schedule with neurology as recommended follow-up by her neurologist.    Discussed options for better mood control, including counseling vs increasing fluoxetine. Pt elected to start counseling, would prefer a [facility] patricia based group. I will contact the  and see if we can mail her recommendations. Told pt she needs to check with her insurance about coverage, she voices understanding.     Start physical therapy for paraesthesias and pain in the legs, ? Radicular vs neuropathy vs other. Discussed tylenol 1000 mg TID for management of symptoms. Hesitant to trial gabapentin with her fatigue. Pt voices understanding.     Follow-up in 3 months for routine visit.     BMI:   Estimated body mass index is 25 kg/m  as calculated from the following:    Height as of this encounter: 1.524 m (5').    Weight as of this encounter: 58.1 kg (128 lb).           Patient Instructions   You can stop taking neuriva brain performance supplement and brain XR supplement because it contains most of the same vitamins you are already taking.    Schedule with a counselor. When you call to schedule ask if it is a patricia based office. I will look up some options for you and ask the  and mail you some recommendations. You can also ask at your [employer] what options they have if you feel comfortable. Check with your insurance about coverage.     Take 1000 mg Tylenol three times per day. You can add this to your pill box to help you remember to take it.     Schedule an appointment with neurology to follow-up. When you call ask them to see someone other than the last person you saw.     Physical therapy will call you to set up an appointment.       Return in about 3 months (around 4/22/2020).  Length of visit was 30 minutes with more than 50 percent of that time used for discussing medical concerns and education    The information in this  document, created by the medical scribe for me, accurately reflects the services I personally performed and the decisions made by me. I have reviewed and approved this document for accuracy.   Jyoti Moreno MD  [facility]

## 2020-01-23 LAB
ANION GAP SERPL CALCULATED.3IONS-SCNC: 4 MMOL/L (ref 3–14)
BUN SERPL-MCNC: 21 MG/DL (ref 7–30)
CALCIUM SERPL-MCNC: 9 MG/DL (ref 8.5–10.1)
CHLORIDE SERPL-SCNC: 105 MMOL/L (ref 94–109)
CO2 SERPL-SCNC: 30 MMOL/L (ref 20–32)
CREAT SERPL-MCNC: 0.84 MG/DL (ref 0.52–1.04)
GFR SERPL CREATININE-BSD FRML MDRD: 69 ML/MIN/{1.73_M2}
GLUCOSE SERPL-MCNC: 81 MG/DL (ref 70–99)
POTASSIUM SERPL-SCNC: 3.8 MMOL/L (ref 3.4–5.3)
SODIUM SERPL-SCNC: 139 MMOL/L (ref 133–144)
TSH SERPL DL<=0.005 MIU/L-ACNC: 3.54 MU/L (ref 0.4–4)
VIT B12 SERPL-MCNC: 655 PG/ML (ref 193–986)

## 2020-01-23 ASSESSMENT — ASTHMA QUESTIONNAIRES: ACT_TOTALSCORE: 25

## 2020-01-23 ASSESSMENT — ANXIETY QUESTIONNAIRES: GAD7 TOTAL SCORE: 4

## 2020-01-24 ENCOUNTER — PATIENT OUTREACH (OUTPATIENT)
Dept: CARE COORDINATION | Facility: CLINIC | Age: 75
End: 2020-01-24

## 2020-01-24 LAB
ALBUMIN SERPL ELPH-MCNC: 4.2 G/DL (ref 3.7–5.1)
ALPHA1 GLOB SERPL ELPH-MCNC: 0.3 G/DL (ref 0.2–0.4)
ALPHA2 GLOB SERPL ELPH-MCNC: 0.6 G/DL (ref 0.5–0.9)
B-GLOBULIN SERPL ELPH-MCNC: 0.7 G/DL (ref 0.6–1)
GAMMA GLOB SERPL ELPH-MCNC: 0.8 G/DL (ref 0.7–1.6)
M PROTEIN SERPL ELPH-MCNC: 0 G/DL
PROT ELPH PNL UR ELPH: NORMAL
PROT PATTERN SERPL ELPH-IMP: NORMAL

## 2020-01-24 ASSESSMENT — ACTIVITIES OF DAILY LIVING (ADL): DEPENDENT_IADLS:: INDEPENDENT

## 2020-01-24 NOTE — PROGRESS NOTES
Clinic Care Coordination Contact  [facility]/Voicemail    Referral Source: PCP    Clinical Data: Care Coordinator Outreach    Outreach attempted. Left message on patient's voicemail with call back information and requested return call.    Patient was seen by PCP on 1/23/20. Patient has agreed to see counselor if it is a [facility] counselor. Care coordinator complied list of local [facility] counselors.    PCP also requested patient make a follow up appointment with [facility]      Plan: List of counselor and neurology appointment  Information mailed to patient as requested.     Care Coordinator will try to reach patient again in 1 month.    Arcelia Sullivan RN, [facility] - Primary Care Clinic RN Coordinator  [facility]   1/24/2020    10:03 AM  [phone]

## 2020-02-02 DIAGNOSIS — I10 HYPERTENSION GOAL BP (BLOOD PRESSURE) < 130/80: ICD-10-CM

## 2020-02-03 NOTE — TELEPHONE ENCOUNTER
"Requested Prescriptions   Pending Prescriptions Disp Refills     lisinopril (PRINIVIL/ZESTRIL) 40 MG tablet [Pharmacy Med Name: LISINOPRIL 40 MG TABLET] 90 tablet 0     Sig: TAKE 1 TABLET (40 MG) BY MOUTH DAILY DUE FOR OFFICE VISIT PRIOR TO FURTHER REFILL       ACE Inhibitors (Including Combos) Protocol Passed - 2/2/2020  1:49 PM        Passed - Blood pressure under 140/90 in past 12 months     BP Readings from Last 3 Encounters:   01/22/20 132/74   10/21/19 108/75   09/04/19 (!) 148/88                 Passed - Recent (12 mo) or future (30 days) visit within the authorizing provider's specialty     Patient has had an office visit with the authorizing provider or a provider within the authorizing providers department within the previous 12 mos or has a future within next 30 days. See \"Patient Info\" tab in inbasket, or \"Choose Columns\" in Meds & Orders section of the refill encounter.              Passed - Medication is active on med list        Passed - Patient is age 18 or older        Passed - No active pregnancy on record        Passed - Normal serum creatinine on file in past 12 months     Recent Labs   Lab Test 01/22/20  1745   CR 0.84             Passed - Normal serum potassium on file in past 12 months     Recent Labs   Lab Test 01/22/20  1745   POTASSIUM 3.8             Passed - No positive pregnancy test within past 12 months        lisinopril (PRINIVIL/ZESTRIL) 40 MG tablet  Last Written Prescription Date:  11/11/19  Last Fill Quantity: 90,  # refills: 0   Last office visit: 1/22/2020 with prescribing provider:  Dr. Moreno   Future Office Visit:      "

## 2020-02-05 DIAGNOSIS — I63.9 CEREBROVASCULAR ACCIDENT (CVA), UNSPECIFIED MECHANISM (H): ICD-10-CM

## 2020-02-05 NOTE — TELEPHONE ENCOUNTER
"Requested Prescriptions   Pending Prescriptions Disp Refills     clopidogrel (PLAVIX) 75 MG tablet [Pharmacy Med Name: CLOPIDOGREL 75 MG TABLET] 90 tablet 1     Sig: TAKE 1 TABLET BY MOUTH EVERY DAY       Plavix Failed - 2/5/2020  3:24 AM        Failed - Normal HGB on file in past 12 months     Recent Labs   Lab Test 01/22/20  1745   HGB 11.3*               Passed - No active PPI on record unless is Protonix        Passed - Normal Platelets on file in past 12 months     Recent Labs   Lab Test 01/22/20  1745                  Passed - Recent (12 mo) or future (30 days) visit within the authorizing provider's specialty     Patient has had an office visit with the authorizing provider or a provider within the authorizing providers department within the previous 12 mos or has a future within next 30 days. See \"Patient Info\" tab in inbasket, or \"Choose Columns\" in Meds & Orders section of the refill encounter.              Passed - Medication is active on med list        Passed - Patient is age 18 or older        Passed - No active pregnancy on record        Passed - No positive pregnancy test in past 12 months        Last Written Prescription Date:  8/9/19  Last Fill Quantity: 90,  # refills: 1   Last office visit: 1/22/2020 with prescribing provider:  Jyoti Moreno     Future Office Visit:      "

## 2020-02-06 RX ORDER — LISINOPRIL 40 MG/1
40 TABLET ORAL DAILY
Qty: 90 TABLET | Refills: 1 | Status: SHIPPED | OUTPATIENT
Start: 2020-02-06 | End: 2020-08-12

## 2020-02-06 NOTE — TELEPHONE ENCOUNTER
Prescription approved per G Refill Protocol.    Vicki Shearer RN, [facility] Triage

## 2020-02-07 RX ORDER — CLOPIDOGREL BISULFATE 75 MG/1
TABLET ORAL
Qty: 90 TABLET | Refills: 3 | Status: ON HOLD | OUTPATIENT
Start: 2020-02-07 | End: 2020-06-23

## 2020-02-07 NOTE — TELEPHONE ENCOUNTER
Routing refill request to provider for review/approval because:  Labs out of range:  Hemoglobin    Vicki Shearer RN, [facility] Triage

## 2020-02-24 ENCOUNTER — THERAPY VISIT (OUTPATIENT)
Dept: PHYSICAL THERAPY | Facility: CLINIC | Age: 75
End: 2020-02-24
Attending: FAMILY MEDICINE
Payer: COMMERCIAL

## 2020-02-24 DIAGNOSIS — M79.662 PAIN IN BOTH LOWER LEGS: ICD-10-CM

## 2020-02-24 DIAGNOSIS — M79.661 PAIN IN BOTH LOWER LEGS: ICD-10-CM

## 2020-02-24 DIAGNOSIS — R20.2 PARESTHESIAS: ICD-10-CM

## 2020-02-24 PROCEDURE — 97162 PT EVAL MOD COMPLEX 30 MIN: CPT | Mod: GP | Performed by: PHYSICAL THERAPIST

## 2020-02-24 PROCEDURE — 97110 THERAPEUTIC EXERCISES: CPT | Mod: GP | Performed by: PHYSICAL THERAPIST

## 2020-02-24 NOTE — PROGRESS NOTES
[facility] Initial Evaluation  Subjective:  The history is provided by the patient.   Patient Entered Health History - See Therapist Evaluation below  Anabelle Herbert being seen for Lower Leg and Foot Pain B.     Date of Onset: MD referral 1-22-20.   Problem occurred: Unknown  Pain score: ranges 0-10/10.  General health as reported by patient is fair.     Red flags:  None as reported by patient.                                  Therapist Generated HPI Evaluation  Problem details: Pain in lower legs and pins and needle sensation in bottom of feet. Denies LBP.  Feels very tired and can sleep all day and night. Also depressed (says her depression is being treated). .         Type of problem:  Bilateral feet (Whole lower leg B).    This is a chronic condition.    Where condition occurred: for unknown reasons.  Patient reports pain:  Longitudinal arch and lower leg.  and is intermittent.  Pain is the same all the time.  Since onset symptoms are unchanged.  Associated symptoms:  Numbness and tingling. Symptoms are exacerbated by nothing  and relieved by nothing.  Imaging testing: no imaging.    Barriers include:  Lives alone.    Physical Exam                    Objective:  System    Ankle/Foot Evaluation  ROM:      PROM:    Dorsiflexion:  Left:    5     Right:   5                 Strength is normal.  LIGAMENT TESTING: normal              SPECIAL TESTS: normal    PALPATION: normal    EDEMA: normal          MOBILITY TESTING: not assessed              FUNCTIONAL TESTS: not assessed                                                                  General Evaluation:        Lower Extremity Strength:  normal                    Reflexes:  normal                                                 ROS    Assessment/Plan:    Patient is a 74 year old female with both sides ankle complaints.    Patient has the following significant findings with corresponding treatment plan.                Diagnosis 1:  Lower Leg  Pain   Pain -  manual therapy, self management and education  Decreased ROM/flexibility - manual therapy, therapeutic exercise and home program  Impaired balance - neuro re-education, therapeutic activities and home program  Impaired muscle performance - neuro re-education and home program  Decreased function - therapeutic activities and home program    Therapy Evaluation Codes:   1) History comprised of:   Personal factors that impact the plan of care:      Time since onset of symptoms.    Comorbidity factors that impact the plan of care are:      Depression, Dizziness, Numbness/tingling, Sleep disorder/apnea and Stroke.     Medications impacting care: Anti-depressant and High blood pressure.  2) Examination of Body Systems comprised of:   Body structures and functions that impact the plan of care:      Lower Legs.   Activity limitations that impact the plan of care are:      Walking.  3) Clinical presentation characteristics are:   Evolving/Changing.  4) Decision-Making    Moderate complexity using standardized patient assessment instrument and/or measureable assessment of functional outcome.  Cumulative Therapy Evaluation is: Moderate complexity.    Previous and current functional limitations:  (See Goal Flow Sheet for this information)    Short term and Long term goals: (See Goal Flow Sheet for this information)     Communication ability:  Patient appears to be able to clearly communicate and understand verbal and written communication and follow directions correctly.  Treatment Explanation - The following has been discussed with the patient:   RX ordered/plan of care  Anticipated outcomes  Possible risks and side effects  This patient would benefit from PT intervention to resume normal activities.   Rehab potential is good.    Frequency:  1 X week, once daily  Duration:  for 6 weeks  Discharge Plan:  Achieve all LTG.  Independent in home treatment program.  Return to previous functional level by  discharge.  Reach maximal therapeutic benefit.    Please refer to the daily flowsheet for treatment today, total treatment time and time spent performing 1:1 timed codes.

## 2020-02-25 ENCOUNTER — PATIENT OUTREACH (OUTPATIENT)
Dept: CARE COORDINATION | Facility: CLINIC | Age: 75
End: 2020-02-25

## 2020-02-25 NOTE — PROGRESS NOTES
Clinic Care Coordination Contact  [facility]/Voicemail       Clinical Data: Care Coordinator Outreach    Outreach attempted x 2.  Left message on patient's voicemail with call back information and requested return call.    Requested feed back on list of [facility] behavioral health counselors. Sent on 1/24/20.    Per chart review patient did attend initial PT assessment at [facility] in [place], [place] for bilateral leg and foot pain. (2/24/20)     Plan: Will await response from patient.  Care Coordinator will try to reach patient again in 10 business days.      Arcelia Sullivan RN, [facility] - Primary Care Clinic RN Coordinator  [facility]   2/25/2020    2:33 PM  [phone]

## 2020-02-25 NOTE — PROGRESS NOTES
[place] [facility] Initial Evaluation  Subjective:    Patient Entered Health History - See Therapist Evaluation below           Pertinent medical history includes: depression, sleep disorder/apnea and stroke.            Current medications:  High blood pressure medication and anti-depressants.    Occupation: Retired.                     Physical Exam                    Objective:  System    Physical Exam    General     ROS    Assessment/Plan:

## 2020-02-27 ENCOUNTER — PATIENT OUTREACH (OUTPATIENT)
Dept: CARE COORDINATION | Facility: CLINIC | Age: 75
End: 2020-02-27

## 2020-02-27 NOTE — PROGRESS NOTES
Clinic Care Coordination Contact  Care Team Conversations    RN clinic care coordinator received a call from Anayeli Mcgee physical therapist with [facility]. ([phone])     Anayeli states she saw patient on 224/20 for initial  Assessment for therapy. Patient informed provider she was searching for a Sabianist counselor. She lost the list she was provided previously    Clinic care coordinator will send another list.  The centers for [facility]ist counseling in patients area are as follows:    1. [facility]   [address]   [address]   [place]  [phone]    2.[facility]    [address]   [address]   [place]  [phone]    3. [facility]     [address]   [address]   [place] [phone] ex:[phone]    4. [facility]   [address] [place] [address], [phone]     Left message with patient to expect new information.     Arcelia Sullivan RN, CCM - Primary Care Clinic RN Coordinator  [facility]   2/27/2020    9:16 AM  [phone]

## 2020-04-01 ENCOUNTER — PATIENT OUTREACH (OUTPATIENT)
Dept: CARE COORDINATION | Facility: CLINIC | Age: 75
End: 2020-04-01

## 2020-04-01 NOTE — PROGRESS NOTES
Clinic Care Coordination Contact    Follow Up Progress Note      Assessment: Outreach to patient.    Clinic care coordinator sent patient a list of Muslim counselors.     Patient states she called the counselors but none of them will take a Medicare patient.     Advised patient to call her [employer] that she is active in. Ask if the  does counseling or if there is someone else in the [employer] that patient could talk to.    Patient does not sound enthusiastic about calling the  of her [employer]. She states they are all off work now. (due to covid-19) Advised they would david patient back if she left a message with them. They are available just not face to face.    Intervention/Education provided during outreach: As above     Outreach Frequency: monthly    Plan:   Patient will call her [employer] community for support.    Care Coordinator will follow up in one month to determine if she followed through.     Arcelia Sullivan RN, [facility] - Primary Care Clinic RN Coordinator  [facility]   4/1/2020    1:37 PM  [phone]

## 2020-04-03 ENCOUNTER — TELEPHONE (OUTPATIENT)
Dept: PHYSICAL THERAPY | Facility: CLINIC | Age: 75
End: 2020-04-03

## 2020-04-03 NOTE — TELEPHONE ENCOUNTER
Spoke with patient. Pt reports no need for additional PT at this time. Provided pt with [facility] number ([phone]) and instructed to call if things changed.

## 2020-04-27 ENCOUNTER — TELEPHONE (OUTPATIENT)
Dept: FAMILY MEDICINE | Facility: CLINIC | Age: 75
End: 2020-04-27

## 2020-04-27 NOTE — TELEPHONE ENCOUNTER
Left detailed voice message with pharmacy that patient is to continue taking the lisinopril. Appears no changed have been made from reviewing patient's chart.      Nelda Terry RN, BSN

## 2020-04-27 NOTE — TELEPHONE ENCOUNTER
Estrellita from [facility] pharmacy is calling to request clarification regarding the Lisinopril. If she is continuing or stopping this medication. Please call [phone]

## 2020-05-01 ENCOUNTER — DOCUMENTATION ONLY (OUTPATIENT)
Dept: OTHER | Facility: CLINIC | Age: 75
End: 2020-05-01

## 2020-05-01 ENCOUNTER — E-VISIT (OUTPATIENT)
Dept: FAMILY MEDICINE | Facility: CLINIC | Age: 75
End: 2020-05-01
Payer: COMMERCIAL

## 2020-05-01 DIAGNOSIS — R41.9 COGNITIVE COMPLAINTS: Primary | ICD-10-CM

## 2020-05-01 PROCEDURE — 99207 ZZC NON-BILLABLE SERV PER CHARTING: CPT | Performed by: FAMILY MEDICINE

## 2020-05-05 ENCOUNTER — PATIENT OUTREACH (OUTPATIENT)
Dept: CARE COORDINATION | Facility: CLINIC | Age: 75
End: 2020-05-05

## 2020-05-05 DIAGNOSIS — F43.23 ADJUSTMENT DISORDER WITH MIXED ANXIETY AND DEPRESSED MOOD: ICD-10-CM

## 2020-05-05 NOTE — PROGRESS NOTES
Clinic Care Coordination Contact    Follow Up Progress Note      Assessment: Outreach to patient.    Patient states my memoroy is getting worse everyday. I am feeling overwhelmed, frustrated and anxious.    Patient did not follow up with her  or [facility].     Patient has involved her daughter. They have requested a visit with PCP to discuss ongoing concerns.  Patient states she wants to stay in her own home. She is open to the possibility of assistance in her home if needed.      Patient admits that she forgets to take her medications at times. She states she works very hard at remembering.     Goals addressed this encounter:   Goals Addressed    None     Intervention/Education provided during outreach: Discussed assistance at home versus alternative living arrangements.     Plan:   Patient and daughter will set up appointment with PCP to discuss her health conditions.       Clinic Care Coordinator will close at this time. Re-open as necessary.    Arcelia Sullivan RN, [facility] - Primary Care Clinic RN Coordinator  [facility]   5/5/2020    2:34 PM  [phone]

## 2020-05-06 ENCOUNTER — TELEPHONE (OUTPATIENT)
Dept: FAMILY MEDICINE | Facility: CLINIC | Age: 75
End: 2020-05-06

## 2020-05-06 NOTE — TELEPHONE ENCOUNTER
Reason for Call:  Same Day Appointment, Requested Provider:  Jyoti Moreno M.D.    PCP: Jyoti Moreno    Reason for visit: memory concerns / anxiety    Additional comments: Pt calling for she has been dealing with on going memory concerns and would like to see if Dr. Moreno can fit Pt into her 05/12/20 schedule    Can we leave a detailed message on this number? YES    Phone number patient can be reached at:a Home number on file [phone] (home)    Best Time: anytime    Call taken on 5/6/2020 at 1:27 PM by Nghia Brown

## 2020-05-06 NOTE — TELEPHONE ENCOUNTER
I  Am seeing patient's in clinic that week. Would not recommend face to face visit.  Best to start with virtual visit first. Can she be added this week to my virtual schedule?

## 2020-05-08 ENCOUNTER — VIRTUAL VISIT (OUTPATIENT)
Dept: FAMILY MEDICINE | Facility: CLINIC | Age: 75
End: 2020-05-08
Payer: COMMERCIAL

## 2020-05-08 DIAGNOSIS — R42 DIZZINESS: ICD-10-CM

## 2020-05-08 DIAGNOSIS — F43.23 ADJUSTMENT DISORDER WITH MIXED ANXIETY AND DEPRESSED MOOD: ICD-10-CM

## 2020-05-08 DIAGNOSIS — F01.50 VASCULAR DEMENTIA WITHOUT BEHAVIORAL DISTURBANCE (H): Primary | ICD-10-CM

## 2020-05-08 PROCEDURE — 99214 OFFICE O/P EST MOD 30 MIN: CPT | Mod: 95 | Performed by: FAMILY MEDICINE

## 2020-05-08 RX ORDER — MECLIZINE HYDROCHLORIDE 25 MG/1
25 TABLET ORAL 2 TIMES DAILY PRN
Qty: 30 TABLET | Refills: 2 | Status: SHIPPED | OUTPATIENT
Start: 2020-05-08

## 2020-05-08 ASSESSMENT — PAIN SCALES - GENERAL: PAINLEVEL: NO PAIN (0)

## 2020-05-08 NOTE — PROGRESS NOTES
"Anabelle Herbert is a 75 year old female who is being evaluated via a billable telephone visit.      The patient has been notified of following:     \"This telephone visit will be conducted via a call between you and your physician/provider. We have found that certain health care needs can be provided without the need for a physical exam.  This service lets us provide the care you need with a short phone conversation.  If a prescription is necessary we can send it directly to your pharmacy.  If lab work is needed we can place an order for that and you can then stop by our lab to have the test done at a later time.    Telephone visits are billed at different rates depending on your insurance coverage. During this emergency period, for some insurers they may be billed the same as an in-person visit.  Please reach out to your insurance provider with any questions.    If during the course of the call the physician/provider feels a telephone visit is not appropriate, you will not be charged for this service.\"    Patient has given verbal consent for Telephone visit?  Yes    What phone number would you like to be contacted at? [phone]- Pily is daughter and is with patient    How would you like to obtain your AVS? Mail a copy    Subjective     Anabelle Herbert is a 75 year old female who presents to clinic today for the following health issues:    HPI  Reviewed last visit with patient Jan 22, 2020. Was told to f/u with neuro, counseling, PHYSICAL THERAPY    Patient's daughter Pily reports that memory issues have been worsening over time.   Doesn't remember conversations from earlier in the day she has had with her   Reports Doesn't remember any of her visits with me     Daughter noted she has bottles of pills that were several months old and only partly taken- likely large amt of med non-compliance.   Severely depressed/panicky. More anxiety. Hands are shakey, hot and cold flashes, not hungry  Headaches that seem to " rotate in position on her head    daughter Pily reports she and her brother have decided to be more involved and Pily will start to help her more andset up her meds.  Hadn't been taking the fluoxetine but after daughter set up. She has taken more faithfully this last week.     Gait- continues to have intermittent gait instability that she has had for a long time. Requests refill of meclizine.       Patient Active Problem List   Diagnosis     Chronic interstitial cystitis     Adjustment reaction with anxiety and depression     Abdominal pain     Benign essential hypertension     CKD (chronic kidney disease) stage 2, GFR 60-89 ml/min     Hyperlipidemia LDL goal <100     Mild persistent asthma     Vitamin D deficiency     GERD (gastroesophageal reflux disease)     B12 deficiency     Transient cerebral ischemia     Carotid stenosis     Diverticulosis of colon     Colon polyp     Atrophic vaginitis     Interstitial cystitis     Chronic constipation     Cognitive complaints     Prediabetes     Osteoporosis     Cerebrovascular accident (CVA), unspecified mechanism (H)     SHANEKA (obstructive sleep apnea)     Pain in both lower legs     Past Surgical History:   Procedure Laterality Date     COLONOSCOPY       CYSTOSCOPY, BIOPSY BLADDER, COMBINED  1/6/2014    Procedure: COMBINED CYSTOSCOPY, BIOPSY BLADDER;;  Surgeon: Vandana Tang MD;  Location: [facility] OR     CYSTOSCOPY, INJECT COLLAGEN, COMBINED  1/6/2014    Procedure: COMBINED CYSTOSCOPY, INJECT BULKING AGENT;  IC cocktail, bladder biopsy, fulguration, heparin, gentamyacin, lidocaine & kenalog;  Surgeon: Vandana Tang MD;  Location: [facility] OR     GENITOURINARY SURGERY       NO HISTORY OF SURGERY         Social History     Tobacco Use     Smoking status: Never Smoker     Smokeless tobacco: Never Used   Substance Use Topics     Alcohol use: No     Family History   Problem Relation Age of Onset     Cancer Father         colon?     Heart Disease Father      Asthma Mother       Alzheimer Disease Mother 86     Unknown/Adopted Maternal Grandmother      Unknown/Adopted Maternal Grandfather      Unknown/Adopted Paternal Grandmother      Unknown/Adopted Paternal Grandfather      Asthma Sister      Allergies Sister      Unknown/Adopted Son          Current Outpatient Medications   Medication Sig Dispense Refill     Blood Pressure Monitoring (BLOOD PRESSURE CUFF) MISC 1 Device daily as needed 1 each 0     clopidogrel (PLAVIX) 75 MG tablet TAKE 1 TABLET BY MOUTH EVERY DAY 90 tablet 3     donepezil (ARICEPT) 5 MG tablet Take 5 mg by mouth daily  11     FLUoxetine (PROZAC) 20 MG capsule TAKE 1 CAPSULE BY MOUTH EVERY DAY 90 capsule 1     lisinopril (PRINIVIL/ZESTRIL) 40 MG tablet Take 1 tablet (40 mg) by mouth daily 90 tablet 1     MAGNESIUM CITRATE PO Take 200 mg by mouth daily        meclizine (ANTIVERT) 25 MG tablet Take 1 tablet (25 mg) by mouth 2 times daily as needed for dizziness 30 tablet 2     Multiple Vitamins-Minerals (ANTIOXIDANT PO) CONSULT HEALTH WOMENS WELLNESS       Polyethylene Glycol 3350 (MIRALAX PO) Take 1-2 capfuls by mouth as needed.        cholecalciferol (VITAMIN D3) 5000 units (125 mcg) CAPS capsule Take 5,000 Units by mouth daily       Allergies   Allergen Reactions     Contrast Dye      Burning, hematuria     Diatrizoate Other (See Comments)     Feels burning inside body     Dogs Unknown     Throat started to close up.      Sulfa Drugs Unknown     Bupropion Anxiety     Patient reports increased anxiety, panic, difficulty concentrating, and various aches and pains       Reviewed and updated as needed this visit by Provider         Review of Systems   ROS COMP: Constitutional, HEENT, cardiovascular, pulmonary, gi and gu systems are negative, except as otherwise noted.       Objective   Reported vitals:  There were no vitals taken for this visit.   healthy, alert and no distress  PSYCH: Alert and oriented times 3; coherent speech, normal   rate and volume, able to articulate  logical thoughts, forgetful to things I have told her earlier in the visit, no hallucinations   or delusions  Her affect is normal and pleasant  RESP: No cough, no audible wheezing, able to talk in full sentences  Remainder of exam unable to be completed due to telephone visits    Diagnostic Test Results:  Labs reviewed in Epic        Assessment/Plan:  1. Vascular dementia without behavioral disturbance (H)  Progressing. Could very much benefit for additional home support. Still leaving home unassisted so not likely to qualify for home care but discussed would benefit from it. Daughter will start to be more involved with appts and med set up for now.   Recommended no further driving unless have drivers eval through [facility]. Anabelle reports no issues with driving but does admit will get lost if she goes somewhere she is not familiar with.   Encouraged f/u with neuro- given scheduling info for this.     2. Adjustment disorder with mixed anxiety and depressed mood  Poorly controlled. Poor med compliance and has just started back of fluoxetine. We discussed will likely take several more weeks to get significant benefit from the medication she is currently taking. Consider dose adjustment int he future if persistent issues    3. Dizziness  Chronic since cva. Refilled meds.   - meclizine (ANTIVERT) 25 MG tablet; Take 1 tablet (25 mg) by mouth 2 times daily as needed for dizziness  Dispense: 30 tablet; Refill: 2    Reviewed red flag symptoms that would precipitate the need for routine, urgent or emergent f/u     Return in about 1 month (around 6/8/2020) for Medication check.      Phone call duration:  28 minutes    Jyoti Moreno MD

## 2020-05-08 NOTE — PATIENT INSTRUCTIONS
Schedule with neurology and counseling  (let me know if you need referral for the [place] counselor)    Schedule with your neurologist.     Recommend a driving evaluation through [facility] or to stop driving.               At [facility], we strive to deliver an exceptional experience to you, every time we see you. If you receive a survey, please complete it as we do value your feedback.  If you have MyChart, you can expect to receive results automatically within 24 hours of their completion.  Your provider will send a note interpreting your results as well.   If you do not have MyChart, you should receive your results in about a week by mail.    Your care team:     Family Medicine   KYE Marks APRN CNP S. MD Aida Covarrubias MD Angela Wermerskirchen, MD    Internal Medicine  Suhail Damian MD     Clinic hours: Monday - Wednesday 7 am-7 pm   Thursdays and Fridays 7 am-5 pm.     [facility]: Monday - Friday 11 am-9 pm,   Saturday and Sunday 9 am-5 pm.     [facility]: Monday - Thursday 8 am - 7 pm; Friday 8 am - 6 pm    Clinic: [phone]   [facility]: [phone]     Use [email or web address] for 24/7 diagnosis and treatment of dozens of conditions.

## 2020-05-15 ENCOUNTER — PATIENT OUTREACH (OUTPATIENT)
Dept: CARE COORDINATION | Facility: CLINIC | Age: 75
End: 2020-05-15

## 2020-05-15 NOTE — PROGRESS NOTES
Clinic Care Coordination Contact      RN Clinic care coordinator received call from patient's daughter, Pily.    Pily states she and her brother, Sang, just found out about their mothers dementia. She is very proud and has not shared her concerns with her children.     Pily states that patient's mother, passed away from Alzheimer's in May 2019. She had a very long journey and her mother is fearful the same will happen to her. She does not want to be in a nursing home. Pily states she and her brother have assured her that they will do everything they can to care for her.     Pily states her Dad lived with her and he also had dementia. She cared for him for five years before he passed away in November 2019.     Pily states her brother is working on the financial [employer] and she is handling the medical.     We discussed:  - Health Care Directives  - Lifeline  - Medication compliance. Pily is now calling her mother every night and reminding her to take her medications. Pily states since they have been doing this she has noticed a huge improvement in her mother's anxiety and abilities.   - Private pay home care. Her mother is not open to this currently.       - Assisted Living for the future  - Meals on wheels. Grocery delivery  - Technology in the home (Y[employer], cameras for safety, etc)   - Driving.  Pily states patient attempted to drive from [place] where she lives to her sister's home in [place]. She got lost and was over an hour late. Patient has not shared her diagnosis with her sister. They got in a fight over her being late and patient left and returned home.   Pily states she is going to have her brother remove the battery from her mother's car.     Pily is going to go to patient's home twice a week and do the housekeeping, laundry etc. She states her mother can spend time with her grand children as they are a good distraction.     Pily is going to make an appointment with PCP for follow up .       Pily will call RN clinic care coordinator if she had future questions.     Arcelia Sullivan RN, [facility] - Primary Care Clinic RN Coordinator  [facility]   5/15/2020    12:12 PM  [phone]

## 2020-05-20 ENCOUNTER — TRANSFERRED RECORDS (OUTPATIENT)
Dept: HEALTH INFORMATION MANAGEMENT | Facility: CLINIC | Age: 75
End: 2020-05-20

## 2020-05-29 ENCOUNTER — VIRTUAL VISIT (OUTPATIENT)
Dept: FAMILY MEDICINE | Facility: CLINIC | Age: 75
End: 2020-05-29
Payer: COMMERCIAL

## 2020-05-29 DIAGNOSIS — F01.50 VASCULAR DEMENTIA WITHOUT BEHAVIORAL DISTURBANCE (H): ICD-10-CM

## 2020-05-29 DIAGNOSIS — F43.23 ADJUSTMENT DISORDER WITH MIXED ANXIETY AND DEPRESSED MOOD: Primary | ICD-10-CM

## 2020-05-29 DIAGNOSIS — R63.0 DECREASED APPETITE: ICD-10-CM

## 2020-05-29 DIAGNOSIS — I10 BENIGN ESSENTIAL HYPERTENSION: ICD-10-CM

## 2020-05-29 DIAGNOSIS — R11.0 NAUSEA: ICD-10-CM

## 2020-05-29 DIAGNOSIS — R10.84 ABDOMINAL PAIN, GENERALIZED: ICD-10-CM

## 2020-05-29 DIAGNOSIS — R42 DIZZINESS: ICD-10-CM

## 2020-05-29 PROCEDURE — 99214 OFFICE O/P EST MOD 30 MIN: CPT | Mod: 95 | Performed by: FAMILY MEDICINE

## 2020-05-29 PROCEDURE — 96127 BRIEF EMOTIONAL/BEHAV ASSMT: CPT | Mod: 59 | Performed by: FAMILY MEDICINE

## 2020-05-29 RX ORDER — FLUOXETINE 10 MG/1
10 CAPSULE ORAL DAILY
Qty: 30 CAPSULE | Refills: 1 | Status: SHIPPED | OUTPATIENT
Start: 2020-05-29 | End: 2020-06-04

## 2020-05-29 ASSESSMENT — ANXIETY QUESTIONNAIRES
3. WORRYING TOO MUCH ABOUT DIFFERENT THINGS: NEARLY EVERY DAY
6. BECOMING EASILY ANNOYED OR IRRITABLE: NOT AT ALL
IF YOU CHECKED OFF ANY PROBLEMS ON THIS QUESTIONNAIRE, HOW DIFFICULT HAVE THESE PROBLEMS MADE IT FOR YOU TO DO YOUR WORK, TAKE CARE OF THINGS AT HOME, OR GET ALONG WITH OTHER PEOPLE: EXTREMELY DIFFICULT
1. FEELING NERVOUS, ANXIOUS, OR ON EDGE: NEARLY EVERY DAY
7. FEELING AFRAID AS IF SOMETHING AWFUL MIGHT HAPPEN: NEARLY EVERY DAY
5. BEING SO RESTLESS THAT IT IS HARD TO SIT STILL: NOT AT ALL
2. NOT BEING ABLE TO STOP OR CONTROL WORRYING: NEARLY EVERY DAY
GAD7 TOTAL SCORE: 15

## 2020-05-29 ASSESSMENT — PATIENT HEALTH QUESTIONNAIRE - PHQ9
SUM OF ALL RESPONSES TO PHQ QUESTIONS 1-9: 24
5. POOR APPETITE OR OVEREATING: NEARLY EVERY DAY

## 2020-05-29 ASSESSMENT — PAIN SCALES - GENERAL: PAINLEVEL: NO PAIN (0)

## 2020-05-29 NOTE — PROGRESS NOTES
"Anabelle Herbert is a 75 year old female who is being evaluated via a billable telephone visit.      The patient has been notified of following:     \"This telephone visit will be conducted via a call between you and your physician/provider. We have found that certain health care needs can be provided without the need for a physical exam.  This service lets us provide the care you need with a short phone conversation.  If a prescription is necessary we can send it directly to your pharmacy.  If lab work is needed we can place an order for that and you can then stop by our lab to have the test done at a later time.    Telephone visits are billed at different rates depending on your insurance coverage. During this emergency period, for some insurers they may be billed the same as an in-person visit.  Please reach out to your insurance provider with any questions.    If during the course of the call the physician/provider feels a telephone visit is not appropriate, you will not be charged for this service.\"    Patient has given verbal consent for Telephone visit?  Yes    What phone number would you like to be contacted at? [phone]    How would you like to obtain your AVS? Lilia Persaud     Anabelle Herbert is a 75 year old female who presents via phone visit today for the following health issues:  Her daughter is with her and on speaker phone with her for this conversation    HPI  Depression Followup    How are you doing with your depression since your last visit? Worsened     Are you having other symptoms that might be associated with depression? No    Have you had a significant life event?  Health Concerns     Are you feeling anxious or having panic attacks?   Yes:  Anxiety    Do you have any concerns with your use of alcohol or other drugs? No     Anxiety and depression seem to be getting worse  Taking meds daily now as her daughter sets them up for her..     Memory also seems to be worsening a bit.  " Oneyda noted yesterday had two coversations with her. Had to explain things several times and forgot about the earlier conversation    Reports upper abd pain last few days. daughter thinks due to increase in stress.   No constipation, diarrhea  +nausea, appetite is down. No vomiting.   No fever but continues to feel hot and cold  + rhinorhea.   No cough or dyspnea  No leg swelling.   Lay on the couch mostly, so when move her legs tend to hurt.   ? Uncertain if loosing wt. may be.  Has no scale.   Urgency of urination but then hesitancy. No dysuria.  Anabelle is unable to tell me if this is her chronic interstitial cystitis symptoms versus something new.  Generalized weakness.  This is been ongoing for some time but there is some question if this is progressive/worsening  Admits drinking and eating less. Thinks 3 glasses of water per day  Shaking more  Daughter thinks more unstable over last winter but not acute deterioration.        Reports she did have a follow-up appt with neurologist (video visit)-she was told to consider prozac increase. Told her dementia would be a slow progression.  There is daughter notes dementia is very scary for her since they have extended family members who have  of dementia.        Social History     Tobacco Use     Smoking status: Never Smoker     Smokeless tobacco: Never Used   Substance Use Topics     Alcohol use: No     Drug use: No     PHQ 10/21/2019 2020 2020   PHQ-9 Total Score 22 17 24   Q9: Thoughts of better off dead/self-harm past 2 weeks Not at all Not at all Not at all     VENTURA-7 SCORE 10/21/2019 2020 2020   Total Score - - -   Total Score 12 4 15     Last PHQ-9 2020   1.  Little interest or pleasure in doing things 3   2.  Feeling down, depressed, or hopeless 3   3.  Trouble falling or staying asleep, or sleeping too much 3   4.  Feeling tired or having little energy 3   5.  Poor appetite or overeating 3   6.  Feeling bad about yourself 3   7.   Trouble concentrating 3   8.  Moving slowly or restless 3   Q9: Thoughts of better off dead/self-harm past 2 weeks 0   PHQ-9 Total Score 24   Difficulty at work, home, or with people Extremely dIfficult     VENTURA-7  5/29/2020   1. Feeling nervous, anxious, or on edge 3   2. Not being able to stop or control worrying 3   3. Worrying too much about different things 3   4. Trouble relaxing 3   5. Being so restless that it is hard to sit still 0   6. Becoming easily annoyed or irritable 0   7. Feeling afraid, as if something awful might happen 3   VENTURA-7 Total Score 15   If you checked any problems, how difficult have they made it for you to do your work, take care of things at home, or get along with other people? Extremely difficult         Suicide Assessment Five-step Evaluation and Treatment (SAFE-T)      How many servings of fruits and vegetables do you eat daily?  2-3    On average, how many sweetened beverages do you drink each day (Examples: soda, juice, sweet tea, etc.  Do NOT count diet or artificially sweetened beverages)?   0    How many days per week do you exercise enough to make your heart beat faster? none    How many minutes a day do you exercise enough to make your heart beat faster? n/a  How many days per week do you miss taking your m      Patient Active Problem List   Diagnosis     Chronic interstitial cystitis     Adjustment reaction with anxiety and depression     Abdominal pain     Benign essential hypertension     CKD (chronic kidney disease) stage 2, GFR 60-89 ml/min     Hyperlipidemia LDL goal <100     Mild persistent asthma     Vitamin D deficiency     GERD (gastroesophageal reflux disease)     B12 deficiency     Transient cerebral ischemia     Carotid stenosis     Diverticulosis of colon     Colon polyp     Atrophic vaginitis     Interstitial cystitis     Chronic constipation     Cognitive complaints     Prediabetes     Osteoporosis     Cerebrovascular accident (CVA), unspecified mechanism (H)      SHANEKA (obstructive sleep apnea)     Pain in both lower legs     Past Surgical History:   Procedure Laterality Date     COLONOSCOPY       CYSTOSCOPY, BIOPSY BLADDER, COMBINED  1/6/2014    Procedure: COMBINED CYSTOSCOPY, BIOPSY BLADDER;;  Surgeon: Vandana Tang MD;  Location: [facility] OR     CYSTOSCOPY, INJECT COLLAGEN, COMBINED  1/6/2014    Procedure: COMBINED CYSTOSCOPY, INJECT BULKING AGENT;  IC cocktail, bladder biopsy, fulguration, heparin, gentamyacin, lidocaine & kenalog;  Surgeon: Vandana Tang MD;  Location: [facility] OR     GENITOURINARY SURGERY       NO HISTORY OF SURGERY         Social History     Tobacco Use     Smoking status: Never Smoker     Smokeless tobacco: Never Used   Substance Use Topics     Alcohol use: No     Family History   Problem Relation Age of Onset     Cancer Father         colon?     Heart Disease Father      Asthma Mother      Alzheimer Disease Mother 86     Unknown/Adopted Maternal Grandmother      Unknown/Adopted Maternal Grandfather      Unknown/Adopted Paternal Grandmother      Unknown/Adopted Paternal Grandfather      Asthma Sister      Allergies Sister      Unknown/Adopted Son          Current Outpatient Medications   Medication Sig Dispense Refill     Blood Pressure Monitoring (BLOOD PRESSURE CUFF) MISC 1 Device daily as needed 1 each 0     cholecalciferol (VITAMIN D3) 5000 units (125 mcg) CAPS capsule Take 5,000 Units by mouth daily       clopidogrel (PLAVIX) 75 MG tablet TAKE 1 TABLET BY MOUTH EVERY DAY 90 tablet 3     donepezil (ARICEPT) 5 MG tablet Take 5 mg by mouth daily  11     FLUoxetine (PROZAC) 20 MG capsule TAKE 1 CAPSULE BY MOUTH EVERY DAY 90 capsule 1     lisinopril (PRINIVIL/ZESTRIL) 40 MG tablet Take 1 tablet (40 mg) by mouth daily 90 tablet 1     MAGNESIUM CITRATE PO Take 200 mg by mouth daily        meclizine (ANTIVERT) 25 MG tablet Take 1 tablet (25 mg) by mouth 2 times daily as needed for dizziness 30 tablet 2     Multiple Vitamins-Minerals (ANTIOXIDANT PO)  CONSULT [email or web address] [facility]       Polyethylene Glycol 3350 (MIRALAX PO) Take 1-2 capfuls by mouth as needed.        Allergies   Allergen Reactions     Contrast Dye      Burning, hematuria     Diatrizoate Other (See Comments)     Feels burning inside body     Dogs Unknown     Throat started to close up.      Sulfa Drugs Unknown     Bupropion Anxiety     Patient reports increased anxiety, panic, difficulty concentrating, and various aches and pains       Reviewed and updated as needed this visit by Provider         Review of Systems   Constitutional, HEENT, cardiovascular, pulmonary, gi and gu systems are negative, except as otherwise noted.       Objective   Reported vitals:  There were no vitals taken for this visit.   healthy, alert and no distress  PSYCH: Alert and oriented times 3; coherent speech, normal   rate and volume, able to articulate logical thoughts but it is difficult for patient to give specifics and timeframes on her symptoms able   to abstract reason, no tangential thoughts, no hallucinations   or delusions  Her affect is normal  RESP: No cough, no audible wheezing, able to talk in full sentences  Remainder of exam unable to be completed due to telephone visits    Diagnostic Test Results:  none         Assessment/Plan:    ICD-10-CM    1. Adjustment disorder with mixed anxiety and depressed mood  F43.23 FLUoxetine (PROZAC) 10 MG capsule   2. Vascular dementia without behavioral disturbance (H)  F01.50    3. Dizziness  R42    4. Abdominal pain, generalized  R10.84    5. Nausea  R11.0    6. Benign essential hypertension  I10    7. Decreased appetite  R63.0      Challenging video visit with this patient.  In general it sounds that she has actually deteriorated since last visit, since taking her medications more regularly including her SSRI and blood pressure medicines.  -I do question if she is hypotensive (dizziness and weakness) given her weight is likely down and might need lower BP medication  -I  do wonder if she is having side effects from the serotonin specific reuptake inhibitor (fatigue, anxiety, nausea, shaking)  -It is unclear if her abdominal pain is possibly related to anxiety versus more pathologic process.  She does have a history of GERD but denies nury reflux symptoms currently.  We could consider adding PPI, but I do not want to make too many changes at once.    Ultimately I think she could benefit from a face-to-face visit to sort through these issues, get some vital signs, laboratory and exam done.  She is willing to see my partner Sheba Henriquez NP as I am not in the office next week.. For now we will keep with the plan as below    Reviewed red flag symptoms that would precipitate the need for routine, urgent or emergent f/u     Return in about 1 week (around 6/5/2020) for face to face visit and one month for virtual visit. .      Phone call duration:  28 minutes    Jyoti Moreno MD      Patient Instructions     Decreased lisinopril tablet to half tablet daily    Decrease fluoxetine(prozac) to 10 mg daily    Increase water intake, aim for 4 glasses or more a day    Start a daily protein drink such as boost or ensure.     claritin 10 mg once daily                At [facility], we strive to deliver an exceptional experience to you, every time we see you. If you receive a survey, please complete it as we do value your feedback.  If you have MyChart, you can expect to receive results automatically within 24 hours of their completion.  Your provider will send a note interpreting your results as well.   If you do not have MyChart, you should receive your results in about a week by mail.    Your care team:     Family Medicine   KYE Marks APRN CNP   S. MD Aida Covarrubias MD Angela Wermerskirchen, MD    Internal Medicine  Suhail Damian MD     Clinic hours: Monday - Wednesday 7 am-7 pm   Thursdays and Fridays 7 am-5 pm.      [facility]: Monday - Friday 11 am-9 pm,   Saturday and Sunday 9 am-5 pm.     [facility]: Monday - Thursday 8 am - 7 pm; Friday 8 am - 6 pm    Clinic: [phone]   [facility]: [phone]     Use [email or web address] for 24/7 diagnosis and treatment of dozens of conditions.

## 2020-05-29 NOTE — Clinical Note
Please arrange for office visit, face-to-face with Sheba Henriquez NP this week for weakness, anxiety and abdominal pain.  Patient's daughter Ratna is helping her to make appointments as patient has dementia.  Please contact Pily to schedule

## 2020-05-29 NOTE — PATIENT INSTRUCTIONS
Decreased lisinopril tablet to half tablet daily    Decrease fluoxetine(prozac) to 10 mg daily    Increase water intake, aim for 4 glasses or more a day    Start a daily protein drink such as boost or ensure.     claritin 10 mg once daily                At [facility], we strive to deliver an exceptional experience to you, every time we see you. If you receive a survey, please complete it as we do value your feedback.  If you have MyChart, you can expect to receive results automatically within 24 hours of their completion.  Your provider will send a note interpreting your results as well.   If you do not have MyChart, you should receive your results in about a week by mail.    Your care team:     Family Medicine   KYE Marks APRN CNP S. MD Aida Covarrubias MD Angela Wermerskirchen, MD    Internal Medicine  Suhail Damian MD     Clinic hours: Monday - Wednesday 7 am-7 pm   Thursdays and Fridays 7 am-5 pm.     [facility]: Monday - Friday 11 am-9 pm,   Saturday and Sunday 9 am-5 pm.     [facility]: Monday - Thursday 8 am - 7 pm; Friday 8 am - 6 pm    Clinic: [phone]   [facility]: [phone]     Use [email or web address] for 24/7 diagnosis and treatment of dozens of conditions.

## 2020-05-30 ASSESSMENT — ANXIETY QUESTIONNAIRES: GAD7 TOTAL SCORE: 15

## 2020-06-02 ENCOUNTER — TELEPHONE (OUTPATIENT)
Dept: FAMILY MEDICINE | Facility: CLINIC | Age: 75
End: 2020-06-02

## 2020-06-02 NOTE — TELEPHONE ENCOUNTER
Jyoti Moreno MD  [facility]               Please arrange for office visit, face-to-face with Sheba Henriquez NP this week for weakness, anxiety and abdominal pain.  Patient's daughter Ratna is helping her to make appointments as patient has dementia.  Please contact Pily to schedule

## 2020-06-03 ENCOUNTER — OFFICE VISIT (OUTPATIENT)
Dept: FAMILY MEDICINE | Facility: CLINIC | Age: 75
End: 2020-06-03
Payer: COMMERCIAL

## 2020-06-03 VITALS
WEIGHT: 127 LBS | TEMPERATURE: 98.3 F | HEART RATE: 59 BPM | OXYGEN SATURATION: 96 % | BODY MASS INDEX: 24.8 KG/M2 | SYSTOLIC BLOOD PRESSURE: 149 MMHG | DIASTOLIC BLOOD PRESSURE: 91 MMHG

## 2020-06-03 DIAGNOSIS — R35.0 FREQUENT URINATION: ICD-10-CM

## 2020-06-03 DIAGNOSIS — R14.0 BLOATING: ICD-10-CM

## 2020-06-03 DIAGNOSIS — F43.23 ADJUSTMENT REACTION WITH ANXIETY AND DEPRESSION: ICD-10-CM

## 2020-06-03 DIAGNOSIS — E55.9 VITAMIN D DEFICIENCY, UNSPECIFIED: ICD-10-CM

## 2020-06-03 DIAGNOSIS — R63.0 POOR APPETITE: ICD-10-CM

## 2020-06-03 DIAGNOSIS — R42 DIZZINESS: Primary | ICD-10-CM

## 2020-06-03 DIAGNOSIS — R68.81 EARLY SATIETY: ICD-10-CM

## 2020-06-03 DIAGNOSIS — Z86.39 PERSONAL HISTORY OF OTHER ENDOCRINE, NUTRITIONAL AND METABOLIC DISEASE: ICD-10-CM

## 2020-06-03 DIAGNOSIS — F01.50 VASCULAR DEMENTIA WITHOUT BEHAVIORAL DISTURBANCE (H): ICD-10-CM

## 2020-06-03 DIAGNOSIS — R53.83 OTHER FATIGUE: ICD-10-CM

## 2020-06-03 LAB
ALBUMIN SERPL-MCNC: 3.8 G/DL (ref 3.4–5)
ALBUMIN UR-MCNC: 100 MG/DL
ALP SERPL-CCNC: 54 U/L (ref 40–150)
ALT SERPL W P-5'-P-CCNC: 18 U/L (ref 0–50)
ANION GAP SERPL CALCULATED.3IONS-SCNC: 4 MMOL/L (ref 3–14)
APPEARANCE UR: CLEAR
AST SERPL W P-5'-P-CCNC: 23 U/L (ref 0–45)
BILIRUB SERPL-MCNC: 0.7 MG/DL (ref 0.2–1.3)
BILIRUB UR QL STRIP: NEGATIVE
BUN SERPL-MCNC: 16 MG/DL (ref 7–30)
CALCIUM SERPL-MCNC: 9.1 MG/DL (ref 8.5–10.1)
CHLORIDE SERPL-SCNC: 107 MMOL/L (ref 94–109)
CO2 SERPL-SCNC: 29 MMOL/L (ref 20–32)
COLOR UR AUTO: YELLOW
CREAT SERPL-MCNC: 0.85 MG/DL (ref 0.52–1.04)
ERYTHROCYTE [DISTWIDTH] IN BLOOD BY AUTOMATED COUNT: 11.2 % (ref 10–15)
GFR SERPL CREATININE-BSD FRML MDRD: 68 ML/MIN/{1.73_M2}
GLUCOSE SERPL-MCNC: 84 MG/DL (ref 70–99)
GLUCOSE UR STRIP-MCNC: NEGATIVE MG/DL
HBA1C MFR BLD: 5.6 % (ref 0–5.6)
HCT VFR BLD AUTO: 37.9 % (ref 35–47)
HGB BLD-MCNC: 12.9 G/DL (ref 11.7–15.7)
HGB UR QL STRIP: ABNORMAL
KETONES UR STRIP-MCNC: NEGATIVE MG/DL
LEUKOCYTE ESTERASE UR QL STRIP: NEGATIVE
MAGNESIUM SERPL-MCNC: 2 MG/DL (ref 1.6–2.3)
MCH RBC QN AUTO: 31.9 PG (ref 26.5–33)
MCHC RBC AUTO-ENTMCNC: 34 G/DL (ref 31.5–36.5)
MCV RBC AUTO: 94 FL (ref 78–100)
NITRATE UR QL: NEGATIVE
NON-SQ EPI CELLS #/AREA URNS LPF: NORMAL /LPF
PH UR STRIP: 6 PH (ref 5–7)
PLATELET # BLD AUTO: 205 10E9/L (ref 150–450)
POTASSIUM SERPL-SCNC: 4.4 MMOL/L (ref 3.4–5.3)
PROT SERPL-MCNC: 7 G/DL (ref 6.8–8.8)
RBC # BLD AUTO: 4.05 10E12/L (ref 3.8–5.2)
RBC #/AREA URNS AUTO: NORMAL /HPF
SODIUM SERPL-SCNC: 140 MMOL/L (ref 133–144)
SOURCE: ABNORMAL
SP GR UR STRIP: 1.02 (ref 1–1.03)
TSH SERPL DL<=0.005 MIU/L-ACNC: 2.69 MU/L (ref 0.4–4)
UROBILINOGEN UR STRIP-ACNC: 0.2 EU/DL (ref 0.2–1)
WBC # BLD AUTO: 4.9 10E9/L (ref 4–11)
WBC #/AREA URNS AUTO: NORMAL /HPF

## 2020-06-03 PROCEDURE — 99214 OFFICE O/P EST MOD 30 MIN: CPT | Performed by: NURSE PRACTITIONER

## 2020-06-03 PROCEDURE — 80053 COMPREHEN METABOLIC PANEL: CPT | Performed by: NURSE PRACTITIONER

## 2020-06-03 PROCEDURE — 84443 ASSAY THYROID STIM HORMONE: CPT | Performed by: NURSE PRACTITIONER

## 2020-06-03 PROCEDURE — 81001 URINALYSIS AUTO W/SCOPE: CPT | Performed by: NURSE PRACTITIONER

## 2020-06-03 PROCEDURE — 82306 VITAMIN D 25 HYDROXY: CPT | Performed by: NURSE PRACTITIONER

## 2020-06-03 PROCEDURE — 85027 COMPLETE CBC AUTOMATED: CPT | Performed by: NURSE PRACTITIONER

## 2020-06-03 PROCEDURE — 83036 HEMOGLOBIN GLYCOSYLATED A1C: CPT | Performed by: NURSE PRACTITIONER

## 2020-06-03 PROCEDURE — 87086 URINE CULTURE/COLONY COUNT: CPT | Performed by: NURSE PRACTITIONER

## 2020-06-03 PROCEDURE — 36415 COLL VENOUS BLD VENIPUNCTURE: CPT | Performed by: NURSE PRACTITIONER

## 2020-06-03 PROCEDURE — 83735 ASSAY OF MAGNESIUM: CPT | Performed by: NURSE PRACTITIONER

## 2020-06-03 RX ORDER — FEEDER CONTAINER WITH PUMP SET
1 EACH MISCELLANEOUS
Qty: 90 CAN | Refills: 3 | Status: SHIPPED | OUTPATIENT
Start: 2020-06-03 | End: 2022-05-24

## 2020-06-03 NOTE — PROGRESS NOTES
Subjective     Anabelle Herbert is a 75 year old female who presents to clinic today for the following health issues:    HPI   Memory and Fatigue   Onset: 1 month but has worsened in the last week     Description:   Patient presents with memory problems, fatigue, abdominal pain/bloating and dizziness. Patient is unsteady, off balance, struggling to remember what day it is. Abdominal pain with bloating which radiates to rt lower back, back is achey. No light or sound sensitivity per patient.     Intensity: moderate to severe     Progression of Symptoms:  worsening    Previous history of similar problem:   None     Therapies Tried and outcome: Meclizine patient cant remember if this helped or not-her daughter Pily did not seem to be aware this was ordered in May.  Josh Normna recommended cutting BP pill in half to help with dizziness, patient unsure if this has helped. Patient was seen in  Saturday and they did an IV, pt was dehydrated.      The past 1.5 weeks so exhausted wont get up. Memory much worse in the past 1-2 weeks. Has spent maybe 2 hours a day up, otherwise laying down sleeping. Says her stomach hurts, feels like it is full or bloated all the time so she isn't eating.  And her low back is achying. And very dizzy. Went to urgent care Sunday did check a urine- a little blood and should check again, was very dehydrated, gave her fluids.     Has to go to the bathroom a lot, and still feels like she has to go after urinating. On-going for about 3 weeks. No pain. Bowels moving okay. Denies injury or falling.  Denies kidney stones.     Saw neurology a month ago. Said to increase to 2 tabs daily, so aricept 10 mg daily total. Next follow up 1 year. Provider advised if they feel her memory is not improving and labs normal to go back to them about follow up.  Will defer imaging to neurology.     prozac is taking 5 mg not 10-is very depressed. Taking regularly the past month. Family is helping with set up of  "meds.     Also notes having some tremors in her body at times. New in the past month.   Also sometiems feels a \"stabbing or needle\" feeling in the foot.   She is still driving to [employer] or the store near by. Highly advised NO driving unless evaluated by [facility]. High risk of slow reflexes and getting into an accident. Daughter also heard this today.     Patient Active Problem List   Diagnosis     Chronic interstitial cystitis     Adjustment reaction with anxiety and depression     Abdominal pain     Benign essential hypertension     CKD (chronic kidney disease) stage 2, GFR 60-89 ml/min     Hyperlipidemia LDL goal <100     Mild persistent asthma     Vitamin D deficiency     GERD (gastroesophageal reflux disease)     B12 deficiency     Transient cerebral ischemia     Carotid stenosis     Diverticulosis of colon     Colon polyp     Atrophic vaginitis     Interstitial cystitis     Chronic constipation     Cognitive complaints     Prediabetes     Osteoporosis     Cerebrovascular accident (CVA), unspecified mechanism (H)     SHANEKA (obstructive sleep apnea)     Pain in both lower legs     Past Surgical History:   Procedure Laterality Date     COLONOSCOPY       CYSTOSCOPY, BIOPSY BLADDER, COMBINED  1/6/2014    Procedure: COMBINED CYSTOSCOPY, BIOPSY BLADDER;;  Surgeon: Vandana Tang MD;  Location: [facility] OR     CYSTOSCOPY, INJECT COLLAGEN, COMBINED  1/6/2014    Procedure: COMBINED CYSTOSCOPY, INJECT BULKING AGENT;  IC cocktail, bladder biopsy, fulguration, heparin, gentamyacin, lidocaine & kenalog;  Surgeon: Vandana Tang MD;  Location: [facility] OR     GENITOURINARY SURGERY       NO HISTORY OF SURGERY         Social History     Tobacco Use     Smoking status: Never Smoker     Smokeless tobacco: Never Used   Substance Use Topics     Alcohol use: No     Family History   Problem Relation Age of Onset     Cancer Father         colon?     Heart Disease Father      Asthma Mother      Alzheimer Disease Mother 86     " Unknown/Adopted Maternal Grandmother      Unknown/Adopted Maternal Grandfather      Unknown/Adopted Paternal Grandmother      Unknown/Adopted Paternal Grandfather      Asthma Sister      Allergies Sister      Unknown/Adopted Son      Diabetes No family hx of          Current Outpatient Medications   Medication Sig Dispense Refill     Blood Pressure Monitoring (BLOOD PRESSURE CUFF) MISC 1 Device daily as needed 1 each 0     cholecalciferol (VITAMIN D3) 5000 units (125 mcg) CAPS capsule Take 5,000 Units by mouth daily       clopidogrel (PLAVIX) 75 MG tablet TAKE 1 TABLET BY MOUTH EVERY DAY 90 tablet 3     donepezil (ARICEPT) 5 MG tablet Take 5 mg by mouth daily  11     FLUoxetine (PROZAC) 10 MG capsule Take 1 capsule (10 mg) by mouth daily 30 capsule 1     lisinopril (PRINIVIL/ZESTRIL) 40 MG tablet Take 1 tablet (40 mg) by mouth daily (Patient taking differently: Take 20 mg by mouth daily ) 90 tablet 1     MAGNESIUM CITRATE PO Take 200 mg by mouth daily        meclizine (ANTIVERT) 25 MG tablet Take 1 tablet (25 mg) by mouth 2 times daily as needed for dizziness 30 tablet 2     Multiple Vitamins-Minerals (ANTIOXIDANT PO) CONSULT [facility] WOMENS WELLNESS       Nutritional Supplements (ENSURE HIGH PROTEIN) Take 1 Can by mouth 3 times daily (with meals) 90 Can 3     Polyethylene Glycol 3350 (MIRALAX PO) Take 1-2 capfuls by mouth as needed.        Allergies   Allergen Reactions     Contrast Dye      Burning, hematuria     Diatrizoate Other (See Comments)     Feels burning inside body     Dogs Unknown     Throat started to close up.      Sulfa Drugs Unknown     Bupropion Anxiety     Patient reports increased anxiety, panic, difficulty concentrating, and various aches and pains       Reviewed and updated as needed this visit by Provider  Tobacco  Allergies  Meds  Problems  Med Hx  Surg Hx  Fam Hx         Review of Systems   Constitutional, HEENT-as above, cardiovascular, pulmonary, gi and gu/MS as above, neuro as above  systems are negative, except as otherwise noted.      Objective    BP (!) 149/91 (BP Location: Right arm, Patient Position: Chair, Cuff Size: Adult Regular)   Pulse 59   Temp 98.3  F (36.8  C) (Oral)   Wt 57.6 kg (127 lb)   SpO2 96%   BMI 24.80 kg/m    Body mass index is 24.8 kg/m .  Physical Exam   GENERAL: thin, frail appearing, alert and no distress  EYES: Eyes grossly normal to inspection, PERRL and conjunctivae and sclerae normal  HENT: ear canals and TM's normal, nose and mouth without ulcers or lesions  NECK: no adenopathy, no asymmetry, masses, or scars and thyroid normal to palpation  RESP: lungs clear to auscultation - no rales, rhonchi or wheezes  CV: regular rate and rhythm, normal S1 S2, no S3 or S4, no murmur, click or rub, no peripheral edema  ABDOMEN: soft, nontender, no hepatosplenomegaly, no masses and bowel sounds normal  MS: no gross musculoskeletal defects noted  NEURO: decreased overall strength and tone, mentation intact but forgetful, and speech normal  PSYCH: mentation appears normal, affect normal, admits to depression    Diagnostic Test Results:  Labs reviewed in Epic  Results for orders placed or performed in visit on 06/03/20   *UA reflex to Microscopic and Culture ([place] [facility] (except [place] and [place])     Status: Abnormal    Specimen: Midstream Urine   Result Value Ref Range    Color Urine Yellow     Appearance Urine Clear     Glucose Urine Negative NEG^Negative mg/dL    Bilirubin Urine Negative NEG^Negative    Ketones Urine Negative NEG^Negative mg/dL    Specific Gravity Urine 1.025 1.003 - 1.035    Blood Urine Trace (A) NEG^Negative    pH Urine 6.0 5.0 - 7.0 pH    Protein Albumin Urine 100 (A) NEG^Negative mg/dL    Urobilinogen Urine 0.2 0.2 - 1.0 EU/dL    Nitrite Urine Negative NEG^Negative    Leukocyte Esterase Urine Negative NEG^Negative    Source Midstream Urine    CBC with platelets     Status: None   Result Value Ref Range    WBC 4.9 4.0 - 11.0 10e9/L     RBC Count 4.05 3.8 - 5.2 10e12/L    Hemoglobin 12.9 11.7 - 15.7 g/dL    Hematocrit 37.9 35.0 - 47.0 %    MCV 94 78 - 100 fl    MCH 31.9 26.5 - 33.0 pg    MCHC 34.0 31.5 - 36.5 g/dL    RDW 11.2 10.0 - 15.0 %    Platelet Count 205 150 - 450 10e9/L   Urine Microscopic     Status: None   Result Value Ref Range    WBC Urine 0 - 5 OTO5^0 - 5 /HPF    RBC Urine O - 2 OTO2^O - 2 /HPF    Squamous Epithelial /LPF Urine Few FEW^Few /LPF   Hemoglobin A1c     Status: None   Result Value Ref Range    Hemoglobin A1C 5.6 0 - 5.6 %           Assessment & Plan     1. Dizziness  Likely partly related to dehydration and lack of nutrition or from small strokes she has had in the past. Consider imaging?  Consider EKG or orthostatic BPs next time?   - CBC with platelets  - Comprehensive metabolic panel (BMP + Alb, Alk Phos, ALT, AST, Total. Bili, TP)  - Magnesium    2. Frequent urination  UA okay, labs pending. Wonder if related to possible uterine prolapse? Recommend vaginal exam if not improving.   - *UA reflex to Microscopic and Culture ([place] [facility] (except [place] and [place])    3. Vascular dementia without behavioral disturbance (H)  Patient wants to consider MRI/CT of head as she has concerns she had a stroke again which could be part of the cause of her worsening memory. Will defer imaging orders to Neurology    - Comprehensive metabolic panel (BMP + Alb, Alk Phos, ALT, AST, Total. Bili, TP)  - Magnesium    4. Other fatigue  Unknown cause, likely related to dehydration   - TSH with free T4 reflex  - Vitamin D Deficiency    5. Vitamin D deficiency, unspecified   As above  - Vitamin D Deficiency    6. Adjustment reaction with anxiety and depression  Very depressed. prozac decreased last Friday to 5 mg. She also wasn't remembering to take her medication for some time but has been more consistent lately. Consider remeron for mood and appetite-we will start 7.5 mg at bedtime.  We will stop the Prozac due to drug  interaction.  Patient is to follow-up in 4 weeks for reevaluation    7. Poor appetite/bloating/early satiety  Has full feeling and bloated all the time. Wondering if CT needed-if not improving over the next week or 2 would consider this.  Last colonoscopy was 2017, unknown results.      8. Personal history of other endocrine, nutritional and metabolic disease   Negative for diabetes  - Hemoglobin A1c      Return in about 1 week (around 6/10/2020), or if symptoms worsen or fail to improve.   [employer] message also sent to patient and her daughter about additional follow-up information      CLAYTON Gallagher, NP-C  [facility]

## 2020-06-03 NOTE — Clinical Note
FYI: see plan. Wondering about using remeron for mood/appetitie, doing CT vs MRI for her symptoms (she really wants imaging done), other things to look for with her?  Thank you,  CLAYTON Gallagher, NP-C  [facility]

## 2020-06-04 ENCOUNTER — MYC MEDICAL ADVICE (OUTPATIENT)
Dept: FAMILY MEDICINE | Facility: CLINIC | Age: 75
End: 2020-06-04

## 2020-06-04 LAB
BACTERIA SPEC CULT: NORMAL
DEPRECATED CALCIDIOL+CALCIFEROL SERPL-MC: 54 UG/L (ref 20–75)
SPECIMEN SOURCE: NORMAL

## 2020-06-04 RX ORDER — MIRTAZAPINE 15 MG/1
7.5 TABLET, FILM COATED ORAL AT BEDTIME
Qty: 30 TABLET | Refills: 0 | Status: SHIPPED | OUTPATIENT
Start: 2020-06-04 | End: 2020-06-24

## 2020-06-04 RX ORDER — MIRTAZAPINE 15 MG/1
7.5 TABLET, FILM COATED ORAL AT BEDTIME
Qty: 30 TABLET | Refills: 0 | Status: CANCELLED | OUTPATIENT
Start: 2020-06-04

## 2020-06-05 NOTE — TELEPHONE ENCOUNTER
Please get patient scheduled with Dr. Moreno next Tuesday 6/9/2020 in the afternoon for urinary frequency and abdominal bloating. Please call patient then to let her know what time the appointment will be.     Thank you,  CLAYTON Gallagher, NP-C  [facility]

## 2020-06-09 ENCOUNTER — OFFICE VISIT (OUTPATIENT)
Dept: FAMILY MEDICINE | Facility: CLINIC | Age: 75
End: 2020-06-09
Payer: COMMERCIAL

## 2020-06-09 ENCOUNTER — ANCILLARY PROCEDURE (OUTPATIENT)
Dept: GENERAL RADIOLOGY | Facility: CLINIC | Age: 75
End: 2020-06-09
Attending: FAMILY MEDICINE
Payer: COMMERCIAL

## 2020-06-09 VITALS
SYSTOLIC BLOOD PRESSURE: 121 MMHG | DIASTOLIC BLOOD PRESSURE: 78 MMHG | HEART RATE: 62 BPM | HEIGHT: 60 IN | TEMPERATURE: 98.4 F | WEIGHT: 129 LBS | BODY MASS INDEX: 25.32 KG/M2 | OXYGEN SATURATION: 95 %

## 2020-06-09 DIAGNOSIS — R82.90 NONSPECIFIC FINDING ON EXAMINATION OF URINE: ICD-10-CM

## 2020-06-09 DIAGNOSIS — F43.23 ADJUSTMENT REACTION WITH ANXIETY AND DEPRESSION: ICD-10-CM

## 2020-06-09 DIAGNOSIS — R68.81 EARLY SATIETY: ICD-10-CM

## 2020-06-09 DIAGNOSIS — K21.9 GASTROESOPHAGEAL REFLUX DISEASE, ESOPHAGITIS PRESENCE NOT SPECIFIED: ICD-10-CM

## 2020-06-09 DIAGNOSIS — K59.00 CONSTIPATION, UNSPECIFIED CONSTIPATION TYPE: ICD-10-CM

## 2020-06-09 DIAGNOSIS — R14.0 BLOATING: ICD-10-CM

## 2020-06-09 DIAGNOSIS — F01.50 VASCULAR DEMENTIA WITHOUT BEHAVIORAL DISTURBANCE (H): ICD-10-CM

## 2020-06-09 DIAGNOSIS — R35.0 INCREASED FREQUENCY OF URINATION: Primary | ICD-10-CM

## 2020-06-09 DIAGNOSIS — N30.10 CHRONIC INTERSTITIAL CYSTITIS: ICD-10-CM

## 2020-06-09 LAB
ALBUMIN UR-MCNC: 100 MG/DL
APPEARANCE UR: CLEAR
BACTERIA #/AREA URNS HPF: ABNORMAL /HPF
BILIRUB UR QL STRIP: NEGATIVE
COLOR UR AUTO: YELLOW
GLUCOSE UR STRIP-MCNC: NEGATIVE MG/DL
HGB UR QL STRIP: ABNORMAL
HYALINE CASTS #/AREA URNS LPF: ABNORMAL /LPF
KETONES UR STRIP-MCNC: ABNORMAL MG/DL
LEUKOCYTE ESTERASE UR QL STRIP: NEGATIVE
MUCOUS THREADS #/AREA URNS LPF: PRESENT /LPF
NITRATE UR QL: NEGATIVE
NON-SQ EPI CELLS #/AREA URNS LPF: ABNORMAL /LPF
PH UR STRIP: 5.5 PH (ref 5–7)
RBC #/AREA URNS AUTO: ABNORMAL /HPF
SOURCE: ABNORMAL
SP GR UR STRIP: 1.02 (ref 1–1.03)
UROBILINOGEN UR STRIP-ACNC: 0.2 EU/DL (ref 0.2–1)
WBC #/AREA URNS AUTO: ABNORMAL /HPF

## 2020-06-09 PROCEDURE — 81001 URINALYSIS AUTO W/SCOPE: CPT | Performed by: FAMILY MEDICINE

## 2020-06-09 PROCEDURE — 74019 RADEX ABDOMEN 2 VIEWS: CPT | Mod: FY

## 2020-06-09 PROCEDURE — 87086 URINE CULTURE/COLONY COUNT: CPT | Performed by: FAMILY MEDICINE

## 2020-06-09 PROCEDURE — 99214 OFFICE O/P EST MOD 30 MIN: CPT | Performed by: FAMILY MEDICINE

## 2020-06-09 RX ORDER — NITROFURANTOIN 25; 75 MG/1; MG/1
100 CAPSULE ORAL 2 TIMES DAILY
Qty: 10 CAPSULE | Refills: 0 | Status: SHIPPED | OUTPATIENT
Start: 2020-06-09 | End: 2020-06-19

## 2020-06-09 RX ORDER — POLYETHYLENE GLYCOL 3350 17 G/17G
17 POWDER, FOR SOLUTION ORAL DAILY
Qty: 850 G | Refills: 11 | Status: SHIPPED | OUTPATIENT
Start: 2020-06-09

## 2020-06-09 ASSESSMENT — MIFFLIN-ST. JEOR: SCORE: 1011.64

## 2020-06-09 NOTE — PROGRESS NOTES
"Subjective     Anabelle Herbert is a 75 year old female who presents to clinic today for the following health issues:    HPI   URINARY TRACT SYMPTOMS  Onset: 2 weeks     Description:   Painful urination (Dysuria): YES- prior but no longer            Frequency: YES  Blood in urine (Hematuria): no   Delay in urine (Hesitency): no     Intensity: mild    Progression of Symptoms:  same    Accompanying Signs & Symptoms:  Fever/chills: no   Flank pain YES  Nausea and vomiting: YES- middle to right   Any vaginal symptoms: none  Abdominal/Pelvic Pain: Bloating sometimes     History:   History of frequent UTI's: YES  History of kidney stones: no   Sexually Active: no   Possibility of pregnancy: No    Precipitating factors:   None     Therapies Tried and outcome: None     Patient is here today with her daughter Pily to follow-up on recent health issues and her visit with [facility].    Continue to be frustrated that her memory is \"the pits\"  Seems to be more progressive loss in last 1.5 mo.  In fact her daughter notes that things had progressed so much that she brought Anabelle to live with her for the last few weeks.    Sleeping a lot. Up a few hours and then goes back to back.   Constantly getting up to go the bathroom at night.     Parent notes she has been having a fullness feeling in abd. Pressure.   Eating very little. Don't feel hungry  Daughter has been encouraging her to eat, protein shakes started few days ago and likes them.  Her weight is actually been stable for several months now  BM daily. occ hard, sometimes soft.  She has not been taking her MiraLAX    Fluoxetine discontinued and Remeron started last week.  This transition has seemed to go okay. Shaking/tremor is much improved  Mood seems \"the same\" the few hours she is awake per her daughter.  However, daughter also reports she seems less anxious but daughter is not sure if that is due to being at her daughters house and that home alone any longer.  The med change " doesn't seem to impact her fatigue.     No vaginal itching or burning.  No vaginal irritation    appt tomorrow with neurologist tomorrow also to review her decline in cognition    Anabelle and her daughter request that I send her lab results to send labs to [place] neurology for their review.     Taking 1/2 tablet lisinopril daily and doing well with this dose change.         Patient Active Problem List   Diagnosis     Chronic interstitial cystitis     Adjustment reaction with anxiety and depression     Abdominal pain     Benign essential hypertension     CKD (chronic kidney disease) stage 2, GFR 60-89 ml/min     Hyperlipidemia LDL goal <100     Mild persistent asthma     Vitamin D deficiency     GERD (gastroesophageal reflux disease)     B12 deficiency     Transient cerebral ischemia     Carotid stenosis     Diverticulosis of colon     Colon polyp     Atrophic vaginitis     Interstitial cystitis     Chronic constipation     Cognitive complaints     Prediabetes     Osteoporosis     Cerebrovascular accident (CVA), unspecified mechanism (H)     SHANEKA (obstructive sleep apnea)     Pain in both lower legs     Past Surgical History:   Procedure Laterality Date     COLONOSCOPY       CYSTOSCOPY, BIOPSY BLADDER, COMBINED  1/6/2014    Procedure: COMBINED CYSTOSCOPY, BIOPSY BLADDER;;  Surgeon: Vandana Tang MD;  Location: [facility] OR     CYSTOSCOPY, INJECT COLLAGEN, COMBINED  1/6/2014    Procedure: COMBINED CYSTOSCOPY, INJECT BULKING AGENT;  IC cocktail, bladder biopsy, fulguration, heparin, gentamyacin, lidocaine & kenalog;  Surgeon: Vandana Tang MD;  Location: [facility] OR     GENITOURINARY SURGERY       NO HISTORY OF SURGERY         Social History     Tobacco Use     Smoking status: Never Smoker     Smokeless tobacco: Never Used   Substance Use Topics     Alcohol use: No     Family History   Problem Relation Age of Onset     Cancer Father         colon?     Heart Disease Father      Asthma Mother      Alzheimer Disease Mother 86      Unknown/Adopted Maternal Grandmother      Unknown/Adopted Maternal Grandfather      Unknown/Adopted Paternal Grandmother      Unknown/Adopted Paternal Grandfather      Asthma Sister      Allergies Sister      Unknown/Adopted Son      Diabetes No family hx of          Current Outpatient Medications   Medication Sig Dispense Refill     Blood Pressure Monitoring (BLOOD PRESSURE CUFF) MISC 1 Device daily as needed 1 each 0     cholecalciferol (VITAMIN D3) 5000 units (125 mcg) CAPS capsule Take 5,000 Units by mouth daily       clopidogrel (PLAVIX) 75 MG tablet TAKE 1 TABLET BY MOUTH EVERY DAY 90 tablet 3     donepezil (ARICEPT) 5 MG tablet Take 5 mg by mouth daily  11     lisinopril (PRINIVIL/ZESTRIL) 40 MG tablet Take 1 tablet (40 mg) by mouth daily (Patient taking differently: Take 20 mg by mouth daily ) 90 tablet 1     MAGNESIUM CITRATE PO Take 200 mg by mouth daily        meclizine (ANTIVERT) 25 MG tablet Take 1 tablet (25 mg) by mouth 2 times daily as needed for dizziness 30 tablet 2     mirtazapine (REMERON) 15 MG tablet Take 0.5 tablets (7.5 mg) by mouth At Bedtime 30 tablet 0     Multiple Vitamins-Minerals (ANTIOXIDANT PO) CONSULT [facility] WOM[facility] WELLNESS       nitroFURantoin macrocrystal-monohydrate (MACROBID) 100 MG capsule Take 1 capsule (100 mg) by mouth 2 times daily for 5 days 10 capsule 0     Nutritional Supplements (ENSURE HIGH PROTEIN) Take 1 Can by mouth 3 times daily (with meals) 90 Can 3     omeprazole (PRILOSEC) 20 MG DR capsule Take 1 capsule (20 mg) by mouth daily 30 capsule 1     polyethylene glycol (MIRALAX) 17 GM/SCOOP powder Take 17 g by mouth daily To twice daily as needed for constipation. 850 g 11     Polyethylene Glycol 3350 (MIRALAX PO) Take 1-2 capfuls by mouth as needed.        Allergies   Allergen Reactions     Contrast Dye      Burning, hematuria     Diatrizoate Other (See Comments)     Feels burning inside body     Dogs Unknown     Throat started to close up.      Sulfa Drugs Unknown      Bupropion Anxiety     Patient reports increased anxiety, panic, difficulty concentrating, and various aches and pains       Reviewed and updated as needed this visit by Provider  Tobacco  Allergies  Meds  Problems  Med Hx  Surg Hx  Fam Hx         Review of Systems   Constitutional, HEENT, cardiovascular, pulmonary, gi and gu systems are negative, except as otherwise noted.      Objective    /78 (BP Location: Right arm, Patient Position: Chair, Cuff Size: Adult Regular)   Pulse 62   Temp 98.4  F (36.9  C) (Oral)   Ht 1.524 m (5')   Wt 58.5 kg (129 lb)   SpO2 95%   Breastfeeding No   BMI 25.19 kg/m    Body mass index is 25.19 kg/m .  Physical Exam   GENERAL: healthy, alert and no distress  NECK: no adenopathy, no asymmetry, masses, or scars and thyroid normal to palpation  RESP: lungs clear to auscultation - no rales, rhonchi or wheezes  CV: regular rate and rhythm, normal S1 S2, no S3 or S4, no murmur, click or rub, no peripheral edema and peripheral pulses strong  ABDOMEN: soft, nontender, no hepatosplenomegaly, no masses and bowel sounds normal  MS: no gross musculoskeletal defects noted, no edema  NEURO: Mentation is decreased around recent events (for example she cannot remember how long she has been staying at her daughter's house) and also forgetful over past medical history.  She is oriented to examiner and place.  PSYCH: mentation appears normal, affect normal/bright. . There is less perseverating than previous visits.  Affect also seems more full currently    Diagnostic Test Results:  Labs reviewed in Epic  Component      Latest Ref Rng & Units 6/3/2020   Sodium      133 - 144 mmol/L 140   Potassium      3.4 - 5.3 mmol/L 4.4   Chloride      94 - 109 mmol/L 107   Carbon Dioxide      20 - 32 mmol/L 29   Anion Gap      3 - 14 mmol/L 4   Glucose      70 - 99 mg/dL 84   Urea Nitrogen      7 - 30 mg/dL 16   Creatinine      0.52 - 1.04 mg/dL 0.85   GFR Estimate      >60 mL/min/1.73:m2 68    GFR Estimate If Black      >60 mL/min/1.73:m2 79   Calcium      8.5 - 10.1 mg/dL 9.1   Bilirubin Total      0.2 - 1.3 mg/dL 0.7   Albumin      3.4 - 5.0 g/dL 3.8   Protein Total      6.8 - 8.8 g/dL 7.0   Alkaline Phosphatase      40 - 150 U/L 54   ALT      0 - 50 U/L 18   AST      0 - 45 U/L 23   Color Urine       Yellow   Appearance Urine       Clear   Glucose Urine      NEG:Negative mg/dL Negative   Bilirubin Urine      NEG:Negative Negative   Ketones Urine      NEG:Negative mg/dL Negative   Specific Gravity Urine      1.003 - 1.035 1.025   Blood Urine      NEG:Negative Trace (A)   pH Urine      5.0 - 7.0 pH 6.0   Protein Albumin Urine      NEG:Negative mg/dL 100 (A)   Urobilinogen Urine      0.2 - 1.0 EU/dL 0.2   Nitrite Urine      NEG:Negative Negative   Leukocyte Esterase Urine      NEG:Negative Negative   Source       Midstream Urine   WBC      4.0 - 11.0 10e9/L 4.9   RBC Count      3.8 - 5.2 10e12/L 4.05   Hemoglobin      11.7 - 15.7 g/dL 12.9   Hematocrit      35.0 - 47.0 % 37.9   MCV      78 - 100 fl 94   MCH      26.5 - 33.0 pg 31.9   MCHC      31.5 - 36.5 g/dL 34.0   RDW      10.0 - 15.0 % 11.2   Platelet Count      150 - 450 10e9/L 205   WBC Urine      OTO5:0 - 5 /HPF 0 - 5   RBC Urine      OTO2:O - 2 /HPF O - 2   Squamous Epithelial /LPF Urine      FEW:Few /LPF Few   Specimen Description       Midstream Urine   Culture Micro       <10,000 colonies/mL . . .   Magnesium      1.6 - 2.3 mg/dL 2.0   TSH      0.40 - 4.00 mU/L 2.69   Vitamin D Deficiency screening      20 - 75 ug/L 54   Hemoglobin A1C      0 - 5.6 % 5.6       Abd xray: small-moderate stool present, no obstructive pattern         Assessment & Plan     1. Increased frequency of urination  2. Chronic interstitial cystitis  Previous testing negative for infection but will screen again.  She does have a long history of interstitial cystitis that tends to flare intermittently.  Patient has forgotten this.  She has worked with urology with last  visits a few years ago.  We will have her follow-up with urology for further management  - UA reflex to Microscopic and Culture  - Urine Microscopic  - UROLOGY ADULT REFERRAL    4. Vascular dementia without behavioral disturbance (H)  Progressive worsening of symptoms in the last 1-1/2 months.  Unclear etiology but working to rule out infection, and fully treat mood symptoms that could be contributing.  She does have follow-up with neurology tomorrow    5. Adjustment reaction with anxiety and depression  Her anxiety seems to be much better improved with the transition from fluoxetine to mirtazapine.  Her tremor has resolved.  She continues to be very fatigued but this does not seem to be directly related to the medication.  See instructions below    6. Early satiety  7. Bloating  8. Constipation, unspecified constipation type  She did have a large weight loss this last year but has now plateaued thankfully.  Her labs have been unremarkable for etiology.  We will have her restart her MiraLAX as she has had similar symptoms in the past due to constipation.  She likely needs further GI evaluation with CT abdomen and possible endoscopy if persistent symptoms.  See below  - XR Abdomen 2 Views; Future  - polyethylene glycol (MIRALAX) 17 GM/SCOOP powder; Take 17 g by mouth daily To twice daily as needed for constipation.  Dispense: 850 g; Refill: 11  - XR Abdomen 2 Views; Future    9. Gastroesophageal reflux disease, esophagitis presence not specified  History of.  Question if this is contributing to above.  Will trial restart of her PPI  - omeprazole (PRILOSEC) 20 MG DR capsule; Take 1 capsule (20 mg) by mouth daily  Dispense: 30 capsule; Refill: 1       Patient Instructions   Stay at 1/2 tablet of the Remeron (mirtazapine)  for one more week and then increase to full tablet.     Restart miralax one capful daily. Can titrate dose up and down as needed (example, can go up to one capsule twice a day).     Try to drink at  least 40 oz of water per day.     Trial prilosec (omeprazole) 20 mg once daily for a few weeks to see if this helps your hunger/feeling of fullness.     If persistent decreased appetite and feeling of fullness, we will need to check a ct scan of the abdomen.     Please call [facility] in [place] at [phone] to schedule with urology.           Return in about 3 weeks (around 6/30/2020).    Total time spent with patient is 30 min with over 50% counseling regarding above information    Jyoti Moreno MD  [facility]

## 2020-06-09 NOTE — PATIENT INSTRUCTIONS
Stay at 1/2 tablet of the Remeron (mirtazapine)  for one more week and then increase to full tablet.     Restart miralax one capful daily. Can titrate dose up and down as needed (example, can go up to one capsule twice a day).     Try to drink at least 40 oz of water per day.     Trial prilosec (omeprazole) 20 mg once daily for a few weeks to see if this helps your hunger/feeling of fullness.     If persistent decreased appetite and feeling of fullness, we will need to check a ct scan of the abdomen.     Please call [facility] in [place] at [phone] to schedule with urology.

## 2020-06-10 ENCOUNTER — TELEPHONE (OUTPATIENT)
Dept: FAMILY MEDICINE | Facility: CLINIC | Age: 75
End: 2020-06-10

## 2020-06-10 ENCOUNTER — TRANSFERRED RECORDS (OUTPATIENT)
Dept: HEALTH INFORMATION MANAGEMENT | Facility: CLINIC | Age: 75
End: 2020-06-10

## 2020-06-10 DIAGNOSIS — R68.81 EARLY SATIETY: Primary | ICD-10-CM

## 2020-06-10 DIAGNOSIS — R31.29 MICROSCOPIC HEMATURIA: ICD-10-CM

## 2020-06-10 DIAGNOSIS — R14.0 BLOATING: ICD-10-CM

## 2020-06-10 LAB
BACTERIA SPEC CULT: NORMAL
SPECIMEN SOURCE: NORMAL

## 2020-06-10 NOTE — TELEPHONE ENCOUNTER
Update to below message:  Urine culture returned showing only a very small amount of bacterial growth in the urine, not the typical amount we typically see to officially call it an infection.    I would recommend a few things.  1.  Finish antibiotics as prescribed given the inflammation that was seen on the original urine test and the small amount of bacteria present.  2.  Schedule CT scan of the abdomen at [facility] in [place] to check for kidney stone and other causes of blood in her urine.  Also will be able to evaluate the abdominal organs and intestines given her current GI concerns.

## 2020-06-10 NOTE — TELEPHONE ENCOUNTER
This writer attempted to contact Ratna on 06/10/20      Reason for call lab results and left detailed message.      If patient calls back:   Relay message below, (read verbatim), document that pt called and close encounter        Nelda Terry RN, BSN, PHN          Please call patient's daughter Ratna to update her that preliminary tests looks like uti for Vera.   Awaiting culture, but want her to start abx   Will send in Rx for macrobid twice daily x 5 days for her.   Continue with plan to f/u with urology if sx's persistent despite the treatment

## 2020-06-11 NOTE — TELEPHONE ENCOUNTER
This writer attempted to contact Ratna on 06/11/20      Reason for call urine culture results and provider message and left detailed message.      If patient calls back:   Relay message below, (read verbatim), document that pt called and close encounter      Update to below message:  Urine culture returned showing only a very small amount of bacterial growth in the urine, not the typical amount we typically see to officially call it an infection.     I would recommend a few things.  1.  Finish antibiotics as prescribed given the inflammation that was seen on the original urine test and the small amount of bacteria present.  2.  Schedule CT scan of the abdomen at [facility] in [place] to check for kidney stone and other causes of blood in her urine.  Also will be able to evaluate the abdominal organs and intestines given her current GI concerns.          Nelda Terry RN, BSN, PHN

## 2020-06-15 ENCOUNTER — TRANSFERRED RECORDS (OUTPATIENT)
Dept: HEALTH INFORMATION MANAGEMENT | Facility: CLINIC | Age: 75
End: 2020-06-15

## 2020-06-16 ENCOUNTER — TRANSFERRED RECORDS (OUTPATIENT)
Dept: HEALTH INFORMATION MANAGEMENT | Facility: CLINIC | Age: 75
End: 2020-06-16

## 2020-06-17 ENCOUNTER — TELEPHONE (OUTPATIENT)
Dept: FAMILY MEDICINE | Facility: CLINIC | Age: 75
End: 2020-06-17

## 2020-06-17 NOTE — TELEPHONE ENCOUNTER
Panel Management Review   One phone call and send letter if unable to reach them or [employer] message and send letter if not read after 2 weeks (You will get a message to your inbasket)      BP Readings from Last 1 Encounters:   06/09/20 121/78        Health Maintenance Due   Topic Date Due     ZOSTER IMMUNIZATION (1 of 2) 04/21/1997     MAMMO SCREENING  05/08/2015     PNEUMOCOCCAL IMMUNIZATION 65+ LOW/MEDIUM RISK (2 of 2 - PPSV23) 05/28/2016     ASTHMA ACTION PLAN  01/07/2017     MEDICARE ANNUAL WELLNESS VISIT  11/12/2019        Fail List measure: Mammogram        Patient is due/failing the following:   MAMMOGRAM    Action needed:   Patient needs office visit for Mammogram.    Type of outreach:    Phone, spoke to patient.  I spoke to patient's daughter and I gave her scheduling departement's phone number.    Questions for provider review:    None                                                                                       Chart routed to RONAL .                                            Russ, Faarax

## 2020-06-19 ENCOUNTER — APPOINTMENT (OUTPATIENT)
Dept: CT IMAGING | Facility: CLINIC | Age: 75
DRG: 087 | End: 2020-06-19
Attending: EMERGENCY MEDICINE
Payer: COMMERCIAL

## 2020-06-19 ENCOUNTER — HOSPITAL ENCOUNTER (INPATIENT)
Facility: CLINIC | Age: 75
LOS: 3 days | Discharge: HOME HEALTH | DRG: 087 | End: 2020-06-23
Attending: EMERGENCY MEDICINE | Admitting: HOSPITALIST
Payer: COMMERCIAL

## 2020-06-19 DIAGNOSIS — N32.89 BLADDER WALL THICKENING: ICD-10-CM

## 2020-06-19 DIAGNOSIS — I62.9 INTRACRANIAL HEMORRHAGE (H): Primary | ICD-10-CM

## 2020-06-19 DIAGNOSIS — S06.6X1A TRAUMATIC SUBARACHNOID HEMORRHAGE WITH LOSS OF CONSCIOUSNESS OF 30 MINUTES OR LESS, INITIAL ENCOUNTER (H): ICD-10-CM

## 2020-06-19 DIAGNOSIS — F03.90 DEMENTIA WITHOUT BEHAVIORAL DISTURBANCE, UNSPECIFIED DEMENTIA TYPE: ICD-10-CM
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
## 2020-06-19 LAB
ALBUMIN UR-MCNC: 10 MG/DL
ANION GAP SERPL CALCULATED.3IONS-SCNC: 5 MMOL/L (ref 3–14)
APPEARANCE UR: CLEAR
BASOPHILS # BLD AUTO: 0 10E9/L (ref 0–0.2)
BASOPHILS NFR BLD AUTO: 0.4 %
BILIRUB UR QL STRIP: NEGATIVE
BUN SERPL-MCNC: 29 MG/DL (ref 7–30)
CALCIUM SERPL-MCNC: 9.6 MG/DL (ref 8.5–10.1)
CHLORIDE SERPL-SCNC: 108 MMOL/L (ref 94–109)
CO2 SERPL-SCNC: 25 MMOL/L (ref 20–32)
COLOR UR AUTO: ABNORMAL
CREAT SERPL-MCNC: 0.94 MG/DL (ref 0.52–1.04)
DIFFERENTIAL METHOD BLD: NORMAL
EOSINOPHIL # BLD AUTO: 0.1 10E9/L (ref 0–0.7)
EOSINOPHIL NFR BLD AUTO: 1.7 %
ERYTHROCYTE [DISTWIDTH] IN BLOOD BY AUTOMATED COUNT: 11.9 % (ref 10–15)
GFR SERPL CREATININE-BSD FRML MDRD: 60 ML/MIN/{1.73_M2}
GLUCOSE SERPL-MCNC: 113 MG/DL (ref 70–99)
GLUCOSE UR STRIP-MCNC: NEGATIVE MG/DL
HCT VFR BLD AUTO: 35.4 % (ref 35–47)
HGB BLD-MCNC: 12.1 G/DL (ref 11.7–15.7)
HGB UR QL STRIP: ABNORMAL
IMM GRANULOCYTES # BLD: 0 10E9/L (ref 0–0.4)
IMM GRANULOCYTES NFR BLD: 0.1 %
KETONES UR STRIP-MCNC: 10 MG/DL
LEUKOCYTE ESTERASE UR QL STRIP: NEGATIVE
LYMPHOCYTES # BLD AUTO: 2 10E9/L (ref 0.8–5.3)
LYMPHOCYTES NFR BLD AUTO: 26 %
MCH RBC QN AUTO: 31.7 PG (ref 26.5–33)
MCHC RBC AUTO-ENTMCNC: 34.2 G/DL (ref 31.5–36.5)
MCV RBC AUTO: 93 FL (ref 78–100)
MONOCYTES # BLD AUTO: 0.5 10E9/L (ref 0–1.3)
MONOCYTES NFR BLD AUTO: 7.1 %
NEUTROPHILS # BLD AUTO: 4.9 10E9/L (ref 1.6–8.3)
NEUTROPHILS NFR BLD AUTO: 64.7 %
NITRATE UR QL: NEGATIVE
NRBC # BLD AUTO: 0 10*3/UL
NRBC BLD AUTO-RTO: 0 /100
PH UR STRIP: 7 PH (ref 5–7)
PLATELET # BLD AUTO: 209 10E9/L (ref 150–450)
POTASSIUM SERPL-SCNC: 3.4 MMOL/L (ref 3.4–5.3)
RBC # BLD AUTO: 3.82 10E12/L (ref 3.8–5.2)
RBC #/AREA URNS AUTO: 73 /HPF (ref 0–2)
SODIUM SERPL-SCNC: 138 MMOL/L (ref 133–144)
SOURCE: ABNORMAL
SP GR UR STRIP: 1.03 (ref 1–1.03)
SQUAMOUS #/AREA URNS AUTO: <1 /HPF (ref 0–1)
UROBILINOGEN UR STRIP-MCNC: NORMAL MG/DL (ref 0–2)
WBC # BLD AUTO: 7.6 10E9/L (ref 4–11)
WBC #/AREA URNS AUTO: 1 /HPF (ref 0–5)

## 2020-06-19 PROCEDURE — 74177 CT ABD & PELVIS W/CONTRAST: CPT

## 2020-06-19 PROCEDURE — 96375 TX/PRO/DX INJ NEW DRUG ADDON: CPT

## 2020-06-19 PROCEDURE — 72125 CT NECK SPINE W/O DYE: CPT

## 2020-06-19 PROCEDURE — 81001 URINALYSIS AUTO W/SCOPE: CPT | Performed by: EMERGENCY MEDICINE

## 2020-06-19 PROCEDURE — 25000128 H RX IP 250 OP 636: Performed by: EMERGENCY MEDICINE

## 2020-06-19 PROCEDURE — 99291 CRITICAL CARE FIRST HOUR: CPT | Mod: 25

## 2020-06-19 PROCEDURE — 80048 BASIC METABOLIC PNL TOTAL CA: CPT | Performed by: EMERGENCY MEDICINE

## 2020-06-19 PROCEDURE — 99222 1ST HOSP IP/OBS MODERATE 55: CPT | Performed by: PHYSICIAN ASSISTANT

## 2020-06-19 PROCEDURE — U0003 INFECTIOUS AGENT DETECTION BY NUCLEIC ACID (DNA OR RNA); SEVERE ACUTE RESPIRATORY SYNDROME CORONAVIRUS 2 (SARS-COV-2) (CORONAVIRUS DISEASE [COVID-19]), AMPLIFIED PROBE TECHNIQUE, MAKING USE OF HIGH THROUGHPUT TECHNOLOGIES AS DESCRIBED BY CMS-2020-01-R: HCPCS | Performed by: EMERGENCY MEDICINE

## 2020-06-19 PROCEDURE — C9803 HOPD COVID-19 SPEC COLLECT: HCPCS

## 2020-06-19 PROCEDURE — 70450 CT HEAD/BRAIN W/O DYE: CPT

## 2020-06-19 PROCEDURE — 25000125 ZZHC RX 250: Performed by: EMERGENCY MEDICINE

## 2020-06-19 PROCEDURE — 99223 1ST HOSP IP/OBS HIGH 75: CPT | Mod: AI | Performed by: HOSPITALIST

## 2020-06-19 PROCEDURE — 99292 CRITICAL CARE ADDL 30 MIN: CPT

## 2020-06-19 PROCEDURE — 96365 THER/PROPH/DIAG IV INF INIT: CPT | Mod: 59

## 2020-06-19 PROCEDURE — 85025 COMPLETE CBC W/AUTO DIFF WBC: CPT | Performed by: EMERGENCY MEDICINE

## 2020-06-19 RX ORDER — MORPHINE SULFATE 2 MG/ML
4 INJECTION, SOLUTION INTRAMUSCULAR; INTRAVENOUS ONCE
Status: COMPLETED | OUTPATIENT
Start: 2020-06-19 | End: 2020-06-19

## 2020-06-19 RX ORDER — LABETALOL HYDROCHLORIDE 5 MG/ML
20 INJECTION, SOLUTION INTRAVENOUS ONCE
Status: COMPLETED | OUTPATIENT
Start: 2020-06-19 | End: 2020-06-19

## 2020-06-19 RX ORDER — IOPAMIDOL 755 MG/ML
65 INJECTION, SOLUTION INTRAVASCULAR ONCE
Status: COMPLETED | OUTPATIENT
Start: 2020-06-19 | End: 2020-06-19

## 2020-06-19 RX ADMIN — MORPHINE SULFATE 2 MG: 2 INJECTION, SOLUTION INTRAMUSCULAR; INTRAVENOUS at 20:05

## 2020-06-19 RX ADMIN — LABETALOL HYDROCHLORIDE 20 MG: 5 INJECTION, SOLUTION INTRAVENOUS at 22:43

## 2020-06-19 RX ADMIN — SODIUM CHLORIDE 60 ML: 9 INJECTION, SOLUTION INTRAVENOUS at 22:05

## 2020-06-19 RX ADMIN — IOPAMIDOL 65 ML: 755 INJECTION, SOLUTION INTRAVENOUS at 22:05

## 2020-06-19 RX ADMIN — NICARDIPINE HYDROCHLORIDE 2.5 MG/HR: 0.2 INJECTION, SOLUTION INTRAVENOUS at 23:32

## 2020-06-20 ENCOUNTER — APPOINTMENT (OUTPATIENT)
Dept: CT IMAGING | Facility: CLINIC | Age: 75
DRG: 087 | End: 2020-06-20
Attending: PHYSICIAN ASSISTANT
Payer: COMMERCIAL

## 2020-06-20 PROBLEM — I62.9 INTRACRANIAL HEMORRHAGE (H): Status: ACTIVE | Noted: 2020-06-20

## 2020-06-20 LAB
ANION GAP SERPL CALCULATED.3IONS-SCNC: 4 MMOL/L (ref 3–14)
BUN SERPL-MCNC: 23 MG/DL (ref 7–30)
CALCIUM SERPL-MCNC: 8.9 MG/DL (ref 8.5–10.1)
CHLORIDE SERPL-SCNC: 107 MMOL/L (ref 94–109)
CO2 SERPL-SCNC: 26 MMOL/L (ref 20–32)
CREAT SERPL-MCNC: 0.84 MG/DL (ref 0.52–1.04)
ERYTHROCYTE [DISTWIDTH] IN BLOOD BY AUTOMATED COUNT: 12 % (ref 10–15)
GFR SERPL CREATININE-BSD FRML MDRD: 69 ML/MIN/{1.73_M2}
GLUCOSE BLDC GLUCOMTR-MCNC: 148 MG/DL (ref 70–99)
GLUCOSE SERPL-MCNC: 131 MG/DL (ref 70–99)
HCT VFR BLD AUTO: 35.4 % (ref 35–47)
HGB BLD-MCNC: 12.1 G/DL (ref 11.7–15.7)
MCH RBC QN AUTO: 32 PG (ref 26.5–33)
MCHC RBC AUTO-ENTMCNC: 34.2 G/DL (ref 31.5–36.5)
MCV RBC AUTO: 94 FL (ref 78–100)
PLATELET # BLD AUTO: 217 10E9/L (ref 150–450)
POTASSIUM SERPL-SCNC: 4 MMOL/L (ref 3.4–5.3)
RBC # BLD AUTO: 3.78 10E12/L (ref 3.8–5.2)
SARS-COV-2 RNA SPEC QL NAA+PROBE: NOT DETECTED
SODIUM SERPL-SCNC: 137 MMOL/L (ref 133–144)
SPECIMEN SOURCE: NORMAL
WBC # BLD AUTO: 8.2 10E9/L (ref 4–11)

## 2020-06-20 PROCEDURE — 99231 SBSQ HOSP IP/OBS SF/LOW 25: CPT | Performed by: PHYSICIAN ASSISTANT

## 2020-06-20 PROCEDURE — 36415 COLL VENOUS BLD VENIPUNCTURE: CPT | Performed by: HOSPITALIST

## 2020-06-20 PROCEDURE — 80048 BASIC METABOLIC PNL TOTAL CA: CPT | Performed by: HOSPITALIST

## 2020-06-20 PROCEDURE — 70450 CT HEAD/BRAIN W/O DYE: CPT

## 2020-06-20 PROCEDURE — 25000125 ZZHC RX 250: Performed by: EMERGENCY MEDICINE

## 2020-06-20 PROCEDURE — 85027 COMPLETE CBC AUTOMATED: CPT | Performed by: HOSPITALIST

## 2020-06-20 PROCEDURE — 25000128 H RX IP 250 OP 636: Performed by: HOSPITALIST

## 2020-06-20 PROCEDURE — 25000132 ZZH RX MED GY IP 250 OP 250 PS 637: Performed by: INTERNAL MEDICINE

## 2020-06-20 PROCEDURE — 99207 ZZC MOONLIGHTING INDICATOR: CPT | Performed by: INTERNAL MEDICINE

## 2020-06-20 PROCEDURE — 00000146 ZZHCL STATISTIC GLUCOSE BY METER IP

## 2020-06-20 PROCEDURE — 99233 SBSQ HOSP IP/OBS HIGH 50: CPT | Performed by: INTERNAL MEDICINE

## 2020-06-20 PROCEDURE — 12000000 ZZH R&B MED SURG/OB

## 2020-06-20 PROCEDURE — 25800030 ZZH RX IP 258 OP 636: Performed by: HOSPITALIST

## 2020-06-20 RX ORDER — POLYETHYLENE GLYCOL 3350 17 G/17G
17 POWDER, FOR SOLUTION ORAL DAILY
Status: DISCONTINUED | OUTPATIENT
Start: 2020-06-20 | End: 2020-06-23 | Stop reason: HOSPADM

## 2020-06-20 RX ORDER — DONEPEZIL HYDROCHLORIDE 5 MG/1
5 TABLET, FILM COATED ORAL DAILY
Status: DISCONTINUED | OUTPATIENT
Start: 2020-06-20 | End: 2020-06-23 | Stop reason: HOSPADM

## 2020-06-20 RX ORDER — LABETALOL HYDROCHLORIDE 5 MG/ML
10-20 INJECTION, SOLUTION INTRAVENOUS EVERY 10 MIN PRN
Status: DISCONTINUED | OUTPATIENT
Start: 2020-06-20 | End: 2020-06-23 | Stop reason: HOSPADM

## 2020-06-20 RX ORDER — AMOXICILLIN 250 MG
1 CAPSULE ORAL 2 TIMES DAILY PRN
Status: DISCONTINUED | OUTPATIENT
Start: 2020-06-20 | End: 2020-06-23 | Stop reason: HOSPADM

## 2020-06-20 RX ORDER — MIRTAZAPINE 7.5 MG/1
7.5 TABLET, FILM COATED ORAL AT BEDTIME
Status: DISCONTINUED | OUTPATIENT
Start: 2020-06-20 | End: 2020-06-23 | Stop reason: HOSPADM

## 2020-06-20 RX ORDER — BISACODYL 10 MG
10 SUPPOSITORY, RECTAL RECTAL DAILY PRN
Status: DISCONTINUED | OUTPATIENT
Start: 2020-06-20 | End: 2020-06-23 | Stop reason: HOSPADM

## 2020-06-20 RX ORDER — AMOXICILLIN 250 MG
2 CAPSULE ORAL 2 TIMES DAILY PRN
Status: DISCONTINUED | OUTPATIENT
Start: 2020-06-20 | End: 2020-06-23 | Stop reason: HOSPADM

## 2020-06-20 RX ORDER — POTASSIUM CHLORIDE 7.45 MG/ML
10 INJECTION INTRAVENOUS
Status: DISCONTINUED | OUTPATIENT
Start: 2020-06-20 | End: 2020-06-23 | Stop reason: HOSPADM

## 2020-06-20 RX ORDER — NALOXONE HYDROCHLORIDE 0.4 MG/ML
.1-.4 INJECTION, SOLUTION INTRAMUSCULAR; INTRAVENOUS; SUBCUTANEOUS
Status: DISCONTINUED | OUTPATIENT
Start: 2020-06-20 | End: 2020-06-23 | Stop reason: HOSPADM

## 2020-06-20 RX ORDER — POTASSIUM CHLORIDE 1.5 G/1.58G
20-40 POWDER, FOR SOLUTION ORAL
Status: DISCONTINUED | OUTPATIENT
Start: 2020-06-20 | End: 2020-06-23 | Stop reason: HOSPADM

## 2020-06-20 RX ORDER — LIDOCAINE 40 MG/G
CREAM TOPICAL
Status: DISCONTINUED | OUTPATIENT
Start: 2020-06-20 | End: 2020-06-23 | Stop reason: HOSPADM

## 2020-06-20 RX ORDER — OXYCODONE HYDROCHLORIDE 5 MG/1
5 TABLET ORAL EVERY 4 HOURS PRN
Status: DISCONTINUED | OUTPATIENT
Start: 2020-06-20 | End: 2020-06-23 | Stop reason: HOSPADM

## 2020-06-20 RX ORDER — ONDANSETRON 2 MG/ML
4 INJECTION INTRAMUSCULAR; INTRAVENOUS EVERY 6 HOURS PRN
Status: DISCONTINUED | OUTPATIENT
Start: 2020-06-20 | End: 2020-06-23 | Stop reason: HOSPADM

## 2020-06-20 RX ORDER — MECLIZINE HCL 12.5 MG 12.5 MG/1
25 TABLET ORAL 2 TIMES DAILY PRN
Status: DISCONTINUED | OUTPATIENT
Start: 2020-06-20 | End: 2020-06-23 | Stop reason: HOSPADM

## 2020-06-20 RX ORDER — POTASSIUM CL/LIDO/0.9 % NACL 10MEQ/0.1L
10 INTRAVENOUS SOLUTION, PIGGYBACK (ML) INTRAVENOUS
Status: DISCONTINUED | OUTPATIENT
Start: 2020-06-20 | End: 2020-06-23 | Stop reason: HOSPADM

## 2020-06-20 RX ORDER — MULTIPLE VITAMINS W/ MINERALS TAB 9MG-400MCG
1 TAB ORAL DAILY
Status: DISCONTINUED | OUTPATIENT
Start: 2020-06-20 | End: 2020-06-23 | Stop reason: HOSPADM

## 2020-06-20 RX ORDER — ONDANSETRON 4 MG/1
4 TABLET, ORALLY DISINTEGRATING ORAL EVERY 6 HOURS PRN
Status: DISCONTINUED | OUTPATIENT
Start: 2020-06-20 | End: 2020-06-23 | Stop reason: HOSPADM

## 2020-06-20 RX ORDER — ACETAMINOPHEN 325 MG/1
650 TABLET ORAL EVERY 4 HOURS PRN
Status: DISCONTINUED | OUTPATIENT
Start: 2020-06-20 | End: 2020-06-20 | Stop reason: DRUGHIGH

## 2020-06-20 RX ORDER — POTASSIUM CHLORIDE 1500 MG/1
20-40 TABLET, EXTENDED RELEASE ORAL
Status: DISCONTINUED | OUTPATIENT
Start: 2020-06-20 | End: 2020-06-23 | Stop reason: HOSPADM

## 2020-06-20 RX ORDER — HYDROMORPHONE HYDROCHLORIDE 1 MG/ML
.3-.5 INJECTION, SOLUTION INTRAMUSCULAR; INTRAVENOUS; SUBCUTANEOUS
Status: DISCONTINUED | OUTPATIENT
Start: 2020-06-20 | End: 2020-06-23 | Stop reason: HOSPADM

## 2020-06-20 RX ORDER — MAGNESIUM SULFATE HEPTAHYDRATE 40 MG/ML
4 INJECTION, SOLUTION INTRAVENOUS EVERY 4 HOURS PRN
Status: DISCONTINUED | OUTPATIENT
Start: 2020-06-20 | End: 2020-06-23 | Stop reason: HOSPADM

## 2020-06-20 RX ORDER — POLYETHYLENE GLYCOL 3350 17 G/17G
17 POWDER, FOR SOLUTION ORAL DAILY PRN
Status: DISCONTINUED | OUTPATIENT
Start: 2020-06-20 | End: 2020-06-23 | Stop reason: HOSPADM

## 2020-06-20 RX ORDER — POTASSIUM CHLORIDE 29.8 MG/ML
20 INJECTION INTRAVENOUS
Status: DISCONTINUED | OUTPATIENT
Start: 2020-06-20 | End: 2020-06-23 | Stop reason: HOSPADM

## 2020-06-20 RX ORDER — ACETAMINOPHEN 500 MG
1000 TABLET ORAL EVERY 6 HOURS PRN
Status: DISCONTINUED | OUTPATIENT
Start: 2020-06-20 | End: 2020-06-23 | Stop reason: HOSPADM

## 2020-06-20 RX ORDER — SODIUM CHLORIDE 9 MG/ML
INJECTION, SOLUTION INTRAVENOUS CONTINUOUS
Status: DISCONTINUED | OUTPATIENT
Start: 2020-06-20 | End: 2020-06-21

## 2020-06-20 RX ORDER — HYDRALAZINE HYDROCHLORIDE 20 MG/ML
10-20 INJECTION INTRAMUSCULAR; INTRAVENOUS
Status: DISCONTINUED | OUTPATIENT
Start: 2020-06-20 | End: 2020-06-23 | Stop reason: HOSPADM

## 2020-06-20 RX ORDER — ACETAMINOPHEN 650 MG/1
650 SUPPOSITORY RECTAL EVERY 4 HOURS PRN
Status: DISCONTINUED | OUTPATIENT
Start: 2020-06-20 | End: 2020-06-23 | Stop reason: HOSPADM

## 2020-06-20 RX ADMIN — HYDROMORPHONE HYDROCHLORIDE 0.3 MG: 1 INJECTION, SOLUTION INTRAMUSCULAR; INTRAVENOUS; SUBCUTANEOUS at 08:30

## 2020-06-20 RX ADMIN — MIRTAZAPINE 7.5 MG: 7.5 TABLET, FILM COATED ORAL at 21:17

## 2020-06-20 RX ADMIN — OMEPRAZOLE 20 MG: 20 CAPSULE, DELAYED RELEASE ORAL at 17:29

## 2020-06-20 RX ADMIN — SODIUM CHLORIDE: 9 INJECTION, SOLUTION INTRAVENOUS at 01:35

## 2020-06-20 RX ADMIN — HYDROMORPHONE HYDROCHLORIDE 0.3 MG: 1 INJECTION, SOLUTION INTRAMUSCULAR; INTRAVENOUS; SUBCUTANEOUS at 04:07

## 2020-06-20 RX ADMIN — ACETAMINOPHEN 1000 MG: 500 TABLET, FILM COATED ORAL at 12:43

## 2020-06-20 RX ADMIN — DONEPEZIL HYDROCHLORIDE 5 MG: 5 TABLET ORAL at 21:17

## 2020-06-20 RX ADMIN — CHOLECALCIFEROL TAB 125 MCG (5000 UNIT) 125 MCG: 125 TAB at 17:29

## 2020-06-20 RX ADMIN — SODIUM CHLORIDE: 9 INJECTION, SOLUTION INTRAVENOUS at 12:17

## 2020-06-20 ASSESSMENT — ACTIVITIES OF DAILY LIVING (ADL)
ADLS_ACUITY_SCORE: 11
ADLS_ACUITY_SCORE: 11
ADLS_ACUITY_SCORE: 16
ADLS_ACUITY_SCORE: 11
ADLS_ACUITY_SCORE: 16

## 2020-06-20 NOTE — PHARMACY-ADMISSION MEDICATION HISTORY
Pharmacy Medication History  Admission medication history interview status for the 6/19/2020  admission is complete. See EPIC admission navigator for prior to admission medications     Medication history sources: Patient's family/friend (Daughter Ratna)  Medication history source reliability: Good  Adherence assessment: Good      Additional medication history information:   Prescription meds qPM, Vitamins qAM     Medication reconciliation completed by provider prior to medication history? No    Time spent in this activity: 15 minutes      Prior to Admission medications    Medication Sig Last Dose Taking? Auth Provider   cholecalciferol (VITAMIN D3) 5000 units (125 mcg) CAPS capsule Take 5,000 Units by mouth daily 6/19/2020 at AM Yes Reported, Patient   clopidogrel (PLAVIX) 75 MG tablet TAKE 1 TABLET BY MOUTH EVERY DAY 6/18/2020 at PM Yes Jyoti Moreno MD   donepezil (ARICEPT) 5 MG tablet Take 5 mg by mouth daily 6/18/2020 at PM Yes Reported, Patient   lisinopril (PRINIVIL/ZESTRIL) 40 MG tablet Take 1 tablet (40 mg) by mouth daily  Patient taking differently: Take 20 mg by mouth daily 1/2 x 40 mg 6/18/2020 at PM Yes Jyoti Moreno MD   mirtazapine (REMERON) 15 MG tablet Take 0.5 tablets (7.5 mg) by mouth At Bedtime 6/18/2020 at PM Yes Sheba Henriquez NP   Multiple Vitamins-Minerals (ANTIOXIDANT PO) Take 1 tablet by mouth daily CONSULT [facility] WELLNESS 6/19/2020 at AM Yes Reported, Patient   omeprazole (PRILOSEC) 20 MG DR capsule Take 1 capsule (20 mg) by mouth daily 6/18/2020 at PM Yes Jyoti Moreno MD   polyethylene glycol (MIRALAX) 17 GM/SCOOP powder Take 17 g by mouth daily To twice daily as needed for constipation.  at PRN Yes Jyoti Moreno MD   Blood Pressure Monitoring (BLOOD PRESSURE CUFF) MISC 1 Device daily as needed   Jyoti Moreno MD   meclizine (ANTIVERT) 25 MG tablet Take 1 tablet (25 mg) by mouth 2 times daily as needed for  dizziness  at [facility], Jyoti Mendieta MD   Nutritional Supplements (ENSURE HIGH PROTEIN) Take 1 Can by mouth 3 times daily (with meals)   Sheba Henriquez, NP

## 2020-06-20 NOTE — H&P
[facility]    History and Physical  Hospitalist       Date of Admission:  6/19/2020  Date of Service (when I saw the patient): 06/19/20    Assessment & Plan   Anabelle Herbert is a 75 year old female who presents with fall.  Admitted for further evaluation and treatment.    Fall with intracranial hemorrhage: Patient with small volume presumed posttraumatic subarachnoid hemorrhage in several locations, extensive soft tissue swelling over the left side of the calvarium without calvarial fracture, small chronic infarct left occipital lobe and right parietal lobe, underlying mild to moderate age-related changes, no CT evidence for acute fracture of the cervical spine, degenerative changes are present, see report for further details.  Electrolytes in the emergency department are normal range with a creatinine of 0.94.  White blood cell count is 7.6 with a hemoglobin of 12.1.  Patient with a history of memory loss and cerebrovascular accident with maintenance on Plavix, presenting with head injury.  Patient was reported to be on her grandchild in a wagon when she tripped and fell striking her head.  Patient is on Plavix.  Patient does have a report of loss of consciousness according to her daughter for up to 4 to 5 minutes.  Patient states that she has had abdominal and bladder discomfort.  Patient does have a history of memory impairment and therefore history of present illness and review of systems may be incomplete.  -ICU admission.  -Blood pressure control.  -Neurology consulted.  -Neurosurgery consulted.  -Close neurologic monitoring.  -Gentle IV fluids.  -Close hemodynamic monitoring.  -Hold prior to admission Plavix. Plavix reversal decision deferred to Neurosurgery and Neurocritical care team.     Thickening of the anterior urinary bladder: Patient with CT showing inflammatory changes and thickening of the anterior urinary bladder, cystitis or malignancy included in the differential, see report for  further details.  -Urinalysis pending.  -Consider antibiotic therapy as clinically indicated.  -Consider urology consultation as clinically indicated.    Dementia: Stable.  -Continue prior to admission donepezil when verified by pharmacy.    Cardiac, hypertension, history of cerebral artery occlusion with cerebral infarction: Patient previously maintained on lisinopril.  -Continue prior to admission lisinopril when verified by pharmacy.  -Hold prior to admission Plavix.    Anxiety, depression: Stable.  -Continue prior to admission mirtazapine when verified by pharmacy.    GERD: Stable.  -Continue prior to admission omeprazole when verified by pharmacy.    DVT Prophylaxis: Pneumatic Compression Devices  Code Status: Full Code    Disposition: ICU admission.    Dr. Ion Herbert D.O.  [facility] Hospitalist  Pager [phone]    Primary Care Physician   Jyoti Moreno    Chief Complaint   Fall with head trauma    History is obtained from the patient and medical records.     History of Present Illness   Anabelle Herbert is a 75 year old female who presents with fall.  Admitted for further evaluation and treatment. Patient with small volume presumed posttraumatic subarachnoid hemorrhage in several locations, extensive soft tissue swelling over the left side of the calvarium without calvarial fracture, small chronic infarct left occipital lobe and right parietal lobe, underlying mild to moderate age-related changes, no CT evidence for acute fracture of the cervical spine, degenerative changes are present, see report for further details.  Electrolytes in the emergency department are normal range with a creatinine of 0.94.  White blood cell count is 7.6 with a hemoglobin of 12.1.  Patient with a history of memory loss and cerebrovascular accident with maintenance on Plavix, presenting with head injury.  Patient was reported to be on her grandchild in a wagon when she tripped and fell striking her  head.  Patient is on Plavix.  Patient does have a report of loss of consciousness according to her daughter for up to 4 to 5 minutes.  Patient states that she has had abdominal and bladder discomfort.  Patient does have a history of memory impairment and therefore history of present illness and review of systems may be incomplete.    Past Medical History    I have reviewed this patient's medical history and updated it with pertinent information if needed.   Past Medical History:   Diagnosis Date     Acute sinusitis, unspecified      Allergic rhinitis, cause unspecified      Candidiasis of unspecified site      Candidiasis of vulva and vagina      Diplopia      Dyspnea     following inhalation of cleaning solution. Treated with inhaler/neb.      Essential hypertension, benign      Hypercalcemia      Memory loss      Osteoporosis, unspecified     defined by chiropractor who did xrays during tx for back pain     Other B-complex deficiencies      Other, multiple and ill-defined closed fractures of lower limb 1990    prolonged healing     Unspecified cerebral artery occlusion with cerebral infarction      Unspecified disorder of skin and subcutaneous tissue      Urinary hesitancy      Urinary tract infection, site not specified        Past Surgical History   I have reviewed this patient's surgical history and updated it with pertinent information if needed.  Past Surgical History:   Procedure Laterality Date     COLONOSCOPY       CYSTOSCOPY, BIOPSY BLADDER, COMBINED  1/6/2014    Procedure: COMBINED CYSTOSCOPY, BIOPSY BLADDER;;  Surgeon: Vandana Tang MD;  Location: [facility] OR     CYSTOSCOPY, INJECT COLLAGEN, COMBINED  1/6/2014    Procedure: COMBINED CYSTOSCOPY, INJECT BULKING AGENT;  IC cocktail, bladder biopsy, fulguration, heparin, gentamyacin, lidocaine & kenalog;  Surgeon: Vandana Tang MD;  Location: [facility] OR     GENITOURINARY SURGERY       NO HISTORY OF SURGERY         Prior to Admission Medications   Prior to  Admission Medications   Prescriptions Last Dose Informant Patient Reported? Taking?   Blood Pressure Monitoring (BLOOD PRESSURE CUFF) MISC   No No   Si Device daily as needed   MAGNESIUM CITRATE PO   Yes No   Sig: Take 200 mg by mouth daily    Multiple Vitamins-Minerals (ANTIOXIDANT PO)   Yes No   Sig: CONSULT [facility]   Nutritional Supplements (ENSURE HIGH PROTEIN)   No No   Sig: Take 1 Can by mouth 3 times daily (with meals)   Polyethylene Glycol 3350 (MIRALAX PO)   Yes No   Sig: Take 1-2 capfuls by mouth as needed.    cholecalciferol (VITAMIN D3) 5000 units (125 mcg) CAPS capsule   Yes No   Sig: Take 5,000 Units by mouth daily   clopidogrel (PLAVIX) 75 MG tablet   No No   Sig: TAKE 1 TABLET BY MOUTH EVERY DAY   donepezil (ARICEPT) 5 MG tablet   Yes No   Sig: Take 5 mg by mouth daily   lisinopril (PRINIVIL/ZESTRIL) 40 MG tablet   No No   Sig: Take 1 tablet (40 mg) by mouth daily   Patient taking differently: Take 20 mg by mouth daily    meclizine (ANTIVERT) 25 MG tablet   No No   Sig: Take 1 tablet (25 mg) by mouth 2 times daily as needed for dizziness   mirtazapine (REMERON) 15 MG tablet   No No   Sig: Take 0.5 tablets (7.5 mg) by mouth At Bedtime   nitroFURantoin macrocrystal-monohydrate (MACROBID) 100 MG capsule   No No   Sig: Take 1 capsule (100 mg) by mouth 2 times daily for 5 days   omeprazole (PRILOSEC) 20 MG DR capsule   No No   Sig: Take 1 capsule (20 mg) by mouth daily   polyethylene glycol (MIRALAX) 17 GM/SCOOP powder   No No   Sig: Take 17 g by mouth daily To twice daily as needed for constipation.      Facility-Administered Medications: None     Allergies   Allergies   Allergen Reactions     Contrast Dye      Burning, hematuria     Diatrizoate Other (See Comments)     Feels burning inside body     Dogs Unknown     Throat started to close up.      Sulfa Drugs Unknown     Bupropion Anxiety     Patient reports increased anxiety, panic, difficulty concentrating, and various aches and  pains       Social History   I have reviewed this patient's social history and updated it with pertinent information if needed. Anabelle Herbert  reports that she has never smoked. She has never used smokeless tobacco. She reports that she does not drink alcohol or use drugs.    Family History   I have reviewed this patient's family history and updated it with pertinent information if needed.   Family History   Problem Relation Age of Onset     Cancer Father         colon?     Heart Disease Father      Asthma Mother      Alzheimer Disease Mother 86     Unknown/Adopted Maternal Grandmother      Unknown/Adopted Maternal Grandfather      Unknown/Adopted Paternal Grandmother      Unknown/Adopted Paternal Grandfather      Asthma Sister      Allergies Sister      Unknown/Adopted Son      Diabetes No family hx of        Review of Systems   The 10 point Review of Systems is negative other than noted in the HPI or here. Patient does have a history of memory impairment and therefore history of present illness and review of systems may be incomplete.    Physical Exam       BP: (!) 195/140 Pulse: 73 Heart Rate: 80 Resp: 9 SpO2: 99 %      Vital Signs with Ranges  Pulse:  [] 73  Heart Rate:  [] 80  Resp:  [9-20] 9  BP: (188-220)/(115-140) 195/140  SpO2:  [98 %-100 %] 99 %  0 lbs 0 oz    GENERAL: Alert and confused. NAD. Conversational, appropriate.   HEENT: Normocephalic. EOMI. No icterus or injection. Nares normal.  Swelling to scalp, no bony step-off  LUNGS: Clear to auscultation. No dyspnea at rest.   HEART: Regular rate. Extremities perfused.   ABDOMEN: Soft, nontender, and nondistended. Positive bowel sounds.   EXTREMITIES: No LE edema noted.   NEUROLOGIC: Moves extremities x4 on command. No acute focal neurologic abnormalities noted.     Data   Data reviewed today:  I personally reviewed both laboratory and imaging data.   Recent Labs   Lab 06/19/20 2007   WBC 7.6   HGB 12.1   MCV 93          POTASSIUM 3.4   CHLORIDE 108   CO2 25   BUN 29   CR 0.94   ANIONGAP 5   ESTEE 9.6   *       Recent Results (from the past 24 hour(s))   CT Head w/o Contrast    Narrative    EXAM: CT HEAD W/O CONTRAST, CT CERVICAL SPINE W/O CONTRAST  LOCATION: [facility]  DATE/TIME: 6/19/2020 9:16 PM    INDICATION: Head and neck injury  COMPARISON: None.  TECHNIQUE:   HEAD CT: Without IV contrast. Multiplanar reformats. Dose reduction techniques were used.  CERVICAL SPINE CT: Routine without IV contrast. Multiplanar reformats. Dose reduction techniques were used.    FINDINGS:   HEAD CT:   INTRACRANIAL CONTENTS: Small volume presumed posttraumatic subarachnoid hemorrhage is demonstrated in several locations including at the left temporoparietal junction, at the left temporal occipital junction, in the interpeduncular cistern, in the left   posterior perimesencephalic cistern and within a right frontal sulcus. No CT evidence of acute infarct. Mild to moderate presumed chronic small vessel ischemic changes. Mild generalized volume loss. No hydrocephalus. There is a small chronic infarct in   the left occipital lobe. There is a small chronic infarct in the right parietal lobe.    VISUALIZED ORBITS/SINUSES/MASTOIDS: No intraorbital abnormality. No paranasal sinus mucosal disease. No middle ear or mastoid effusion.    BONES/SOFT TISSUES: There is extensive soft tissue swelling over the left side of the calvarium. No calvarial fracture.    CERVICAL SPINE CT:   VERTEBRA: There is mild chronic appearing loss of height along the inferior endplates from C3 through C6. Vertebral bodies otherwise normal in height. The alignment is within normal limits. No fracture or posttraumatic subluxation.     CANAL/FORAMINA: Posterior disc bulging at the C4-C5 and C5-C6 levels contributes to mild canal stenosis. Disc spaces are relatively well-maintained for age. No appreciable high-grade foraminal stenosis.    PARASPINAL: No extraspinal  abnormality. Visualized lung fields are clear.      Impression    IMPRESSION:  HEAD CT:  1.  There is small volume, presumed posttraumatic subarachnoid hemorrhage in several locations as outlined above.  2.  There is extensive soft tissue swelling over the left side of the calvarium without calvarial fracture.  3.  Small chronic infarct left occipital lobe and right parietal lobe.  4.  Underlying mild to moderate age-related changes.    CERVICAL SPINE CT:  1.  No CT evidence for acute fracture or post traumatic subluxation.  2.  Degenerative changes as highlighted above.    [Critical Result: Multifocal small volume presumed posttraumatic subarachnoid hemorrhage.]    Finding was identified on 6/19/2020 10:08 PM.     Dr. Hamida Vega was contacted by me on 6/19/2020 10:18 PM and verbalized understanding of the critical result.    Cervical spine CT w/o contrast    Narrative    EXAM: CT HEAD W/O CONTRAST, CT CERVICAL SPINE W/O CONTRAST  LOCATION: [facility]  DATE/TIME: 6/19/2020 9:16 PM    INDICATION: Head and neck injury  COMPARISON: None.  TECHNIQUE:   HEAD CT: Without IV contrast. Multiplanar reformats. Dose reduction techniques were used.  CERVICAL SPINE CT: Routine without IV contrast. Multiplanar reformats. Dose reduction techniques were used.    FINDINGS:   HEAD CT:   INTRACRANIAL CONTENTS: Small volume presumed posttraumatic subarachnoid hemorrhage is demonstrated in several locations including at the left temporoparietal junction, at the left temporal occipital junction, in the interpeduncular cistern, in the left   posterior perimesencephalic cistern and within a right frontal sulcus. No CT evidence of acute infarct. Mild to moderate presumed chronic small vessel ischemic changes. Mild generalized volume loss. No hydrocephalus. There is a small chronic infarct in   the left occipital lobe. There is a small chronic infarct in the right parietal lobe.    VISUALIZED ORBITS/SINUSES/MASTOIDS: No  intraorbital abnormality. No paranasal sinus mucosal disease. No middle ear or mastoid effusion.    BONES/SOFT TISSUES: There is extensive soft tissue swelling over the left side of the calvarium. No calvarial fracture.    CERVICAL SPINE CT:   VERTEBRA: There is mild chronic appearing loss of height along the inferior endplates from C3 through C6. Vertebral bodies otherwise normal in height. The alignment is within normal limits. No fracture or posttraumatic subluxation.     CANAL/FORAMINA: Posterior disc bulging at the C4-C5 and C5-C6 levels contributes to mild canal stenosis. Disc spaces are relatively well-maintained for age. No appreciable high-grade foraminal stenosis.    PARASPINAL: No extraspinal abnormality. Visualized lung fields are clear.      Impression    IMPRESSION:  HEAD CT:  1.  There is small volume, presumed posttraumatic subarachnoid hemorrhage in several locations as outlined above.  2.  There is extensive soft tissue swelling over the left side of the calvarium without calvarial fracture.  3.  Small chronic infarct left occipital lobe and right parietal lobe.  4.  Underlying mild to moderate age-related changes.    CERVICAL SPINE CT:  1.  No CT evidence for acute fracture or post traumatic subluxation.  2.  Degenerative changes as highlighted above.    [Critical Result: Multifocal small volume presumed posttraumatic subarachnoid hemorrhage.]    Finding was identified on 6/19/2020 10:08 PM.     Dr. Hamida Vega was contacted by me on 6/19/2020 10:18 PM and verbalized understanding of the critical result.    Abd/pelvis CT,  IV  contrast only TRAUMA / AAA    Narrative    EXAM: CT ABDOMEN PELVIS W CONTRAST  LOCATION: [facility]  DATE/TIME: 6/19/2020 9:14 PM    INDICATION: Pain after fall. Hematuria.  COMPARISON: None.  TECHNIQUE: CT scan of the abdomen and pelvis was performed following injection of IV contrast. Multiplanar reformats were obtained. Dose reduction techniques were  used.  CONTRAST: 65 mL [id number].    FINDINGS:   LOWER CHEST: Normal.    HEPATOBILIARY: Normal.    PANCREAS: Normal.    SPLEEN: Normal.    ADRENAL GLANDS: Normal.    KIDNEYS/BLADDER: Small cortical cyst at the upper pole of the left kidney. Cortical scarring at the lower pole left kidney. No hydronephrosis on either side. There is wall thickening in the urinary bladder anteriorly. Fluid stranding in the fat anterior   to the bladder.    BOWEL: There are colonic diverticula without acute diverticulitis. No bowel obstruction. The stomach is distended with fluid and air. No free intraperitoneal gas or fluid.    LYMPH NODES: Normal.    VASCULATURE: Atherosclerotic calcification of the aorta and its branches. No aneurysm.    PELVIC ORGANS: Normal.    MUSCULOSKELETAL: No acute bone fracture.      Impression    IMPRESSION:   1.  There is thickening of the anterior urinary bladder wall and associated inflammatory change. This could be cystitis or malignancy.  2.  No bowel obstruction or inflammation. There are colonic diverticula without acute diverticulitis.

## 2020-06-20 NOTE — CONSULTS
[facility]    Urology Consultation     Date of Admission:  6/19/2020    Assessment & Plan   Anabelle Herbert is a 75 year old female who was admitted on 6/19/2020 for fall with intracranial hemorrhage. I was asked to see the patient for incidental finding of bladder thickening on CT. Microscopic hematuria. Ucx negative. No hydronephrosis.     Plan:     Monitor for PVRs. Straigth cath/herr per routine protocol.    Will need CT urogram and outpatient cystoscopy in 2-4 weeks after discharge (AVS updated)    Urology will sign off for now. Please re-consult if we can help in any way.       Evan Lewis PA-C 6/20/2020 3:06 PM  [facility]  Mon-Fri, 7am - 4pm  Office: [phone]      Code Status    Full Code    Reason for Consult   Reason for consult: Bladder thickening    Primary Care Physician   Jyoti Moreno    Chief Complaint   Fall    History is obtained from the patient and electronic health record    History of Present Illness   Anabelle Herbert is a 75 year old female who presents with fall and intracranial hemorrhage. Noted to have anterior wall thickening on CT. She notes some voiding urgency and weak stream. No dysuria, hematuria. No Hx of bladder cancer, stones, or kidney cancer. Infrequent UTI's. No Fhx of bladder cancer or stones.     Per H&P:   Anabelle Herbert is a 75 year old female who presents with fall.  Admitted for further evaluation and treatment. Patient with small volume presumed posttraumatic subarachnoid hemorrhage in several locations, extensive soft tissue swelling over the left side of the calvarium without calvarial fracture, small chronic infarct left occipital lobe and right parietal lobe, underlying mild to moderate age-related changes, no CT evidence for acute fracture of the cervical spine, degenerative changes are present, see report for further details.  Electrolytes in the emergency department are normal range with a creatinine of 0.94.   White blood cell count is 7.6 with a hemoglobin of 12.1.  Patient with a history of memory loss and cerebrovascular accident with maintenance on Plavix, presenting with head injury.  Patient was reported to be on her grandchild in a wagon when she tripped and fell striking her head.  Patient is on Plavix.  Patient does have a report of loss of consciousness according to her daughter for up to 4 to 5 minutes.  Patient states that she has had abdominal and bladder discomfort.  Patient does have a history of memory impairment and therefore history of present illness and review of systems may be incomplete.    Past Medical History   I have reviewed this patient's medical history and updated it with pertinent information if needed.   Past Medical History:   Diagnosis Date     Acute sinusitis, unspecified      Allergic rhinitis, cause unspecified      Candidiasis of unspecified site      Candidiasis of vulva and vagina      Diplopia      Dyspnea     following inhalation of cleaning solution. Treated with inhaler/neb.      Essential hypertension, benign      Hypercalcemia      Memory loss      Osteoporosis, unspecified     defined by chiropractor who did xrays during tx for back pain     Other B-complex deficiencies      Other, multiple and ill-defined closed fractures of lower limb 1990    prolonged healing     Unspecified cerebral artery occlusion with cerebral infarction      Unspecified disorder of skin and subcutaneous tissue      Urinary hesitancy      Urinary tract infection, site not specified        Past Surgical History   I have reviewed this patient's surgical history and updated it with pertinent information if needed.  Past Surgical History:   Procedure Laterality Date     COLONOSCOPY       CYSTOSCOPY, BIOPSY BLADDER, COMBINED  1/6/2014    Procedure: COMBINED CYSTOSCOPY, BIOPSY BLADDER;;  Surgeon: Vandana aTng MD;  Location: [facility] OR     CYSTOSCOPY, INJECT COLLAGEN, COMBINED  1/6/2014    Procedure: COMBINED  CYSTOSCOPY, INJECT BULKING AGENT;  IC cocktail, bladder biopsy, fulguration, heparin, gentamyacin, lidocaine & kenalog;  Surgeon: Vandana Tang MD;  Location: [facility] OR     GENITOURINARY SURGERY       NO HISTORY OF SURGERY         Prior to Admission Medications   Prior to Admission Medications   Prescriptions Last Dose Informant Patient Reported? Taking?   Blood Pressure Monitoring (BLOOD PRESSURE CUFF) MISC  Daughter No No   Si Device daily as needed   Multiple Vitamins-Minerals (ANTIOXIDANT PO) 2020 at AM Daughter Yes Yes   Sig: Take 1 tablet by mouth daily [facility]   Nutritional Supplements (ENSURE HIGH PROTEIN)  Daughter No No   Sig: Take 1 Can by mouth 3 times daily (with meals)   cholecalciferol (VITAMIN D3) 5000 units (125 mcg) CAPS capsule 2020 at AM Daughter Yes Yes   Sig: Take 5,000 Units by mouth daily   clopidogrel (PLAVIX) 75 MG tablet 2020 at PM Daughter No Yes   Sig: TAKE 1 TABLET BY MOUTH EVERY DAY   donepezil (ARICEPT) 5 MG tablet 2020 at PM Daughter Yes Yes   Sig: Take 5 mg by mouth daily   lisinopril (PRINIVIL/ZESTRIL) 40 MG tablet 2020 at PM Daughter No Yes   Sig: Take 1 tablet (40 mg) by mouth daily   Patient taking differently: Take 20 mg by mouth daily 1/2 x 40 mg   meclizine (ANTIVERT) 25 MG tablet  at PRN Daughter No No   Sig: Take 1 tablet (25 mg) by mouth 2 times daily as needed for dizziness   mirtazapine (REMERON) 15 MG tablet 2020 at PM Daughter No Yes   Sig: Take 0.5 tablets (7.5 mg) by mouth At Bedtime   omeprazole (PRILOSEC) 20 MG DR capsule 2020 at PM Daughter No Yes   Sig: Take 1 capsule (20 mg) by mouth daily   polyethylene glycol (MIRALAX) 17 GM/SCOOP powder  at PRN Daughter No Yes   Sig: Take 17 g by mouth daily To twice daily as needed for constipation.      Facility-Administered Medications: None     Allergies   Allergies   Allergen Reactions     Contrast Dye      Burning, hematuria     Diatrizoate Other (See  Comments)     Feels burning inside body     Dogs Unknown     Throat started to close up.      Sulfa Drugs Unknown     Bupropion Anxiety     Patient reports increased anxiety, panic, difficulty concentrating, and various aches and pains       Social History   I have reviewed this patient's social history and updated it with pertinent information if needed. Anabelle Herbert  reports that she has never smoked. She has never used smokeless tobacco. She reports that she does not drink alcohol or use drugs.    Family History   I have reviewed this patient's family history and updated it with pertinent information if needed.   Family History   Problem Relation Age of Onset     Cancer Father         colon?     Heart Disease Father      Asthma Mother      Alzheimer Disease Mother 86     Unknown/Adopted Maternal Grandmother      Unknown/Adopted Maternal Grandfather      Unknown/Adopted Paternal Grandmother      Unknown/Adopted Paternal Grandfather      Asthma Sister      Allergies Sister      Unknown/Adopted Son      Diabetes No family hx of        Review of Systems   The 10 point Review of Systems is negative other than noted in the HPI or here.     Physical Exam   Temp: 98.1  F (36.7  C) Temp src: Oral BP: 125/70 Pulse: 60 Heart Rate: 61 Resp: 17 SpO2: 93 % O2 Device: Nasal cannula Oxygen Delivery: 2 LPM  Vital Signs with Ranges  Temp:  [98.1  F (36.7  C)-99.1  F (37.3  C)] 98.1  F (36.7  C)  Pulse:  [] 60  Heart Rate:  [] 61  Resp:  [7-26] 17  BP: (104-220)/() 125/70  SpO2:  [89 %-100 %] 93 %  134 lbs 11.22 oz    General: Patient awake, alert, NAD, sitting up in chair  Head: Normocephalic, atraumatic  Eyes: No icterus  Neck: Symmetric  Respiratory: Breathing unlabored  Cardiac: Skin well-perfused  GI: Soft, NT, non-distended, no SP tenderness, no CVA tenderness  Skin: No visible rashes   Neurologic: No focal deficits  Neuropsychiatric: A&O x 3, responding appropriately      Data   Results for orders placed  or performed during the hospital encounter of 06/19/20 (from the past 24 hour(s))   CBC with platelets differential   Result Value Ref Range    WBC 7.6 4.0 - 11.0 10e9/L    RBC Count 3.82 3.8 - 5.2 10e12/L    Hemoglobin 12.1 11.7 - 15.7 g/dL    Hematocrit 35.4 35.0 - 47.0 %    MCV 93 78 - 100 fl    MCH 31.7 26.5 - 33.0 pg    MCHC 34.2 31.5 - 36.5 g/dL    RDW 11.9 10.0 - 15.0 %    Platelet Count 209 150 - 450 10e9/L    Diff Method Automated Method     % Neutrophils 64.7 %    % Lymphocytes 26.0 %    % Monocytes 7.1 %    % Eosinophils 1.7 %    % Basophils 0.4 %    % Immature Granulocytes 0.1 %    Nucleated RBCs 0 0 /100    Absolute Neutrophil 4.9 1.6 - 8.3 10e9/L    Absolute Lymphocytes 2.0 0.8 - 5.3 10e9/L    Absolute Monocytes 0.5 0.0 - 1.3 10e9/L    Absolute Eosinophils 0.1 0.0 - 0.7 10e9/L    Absolute Basophils 0.0 0.0 - 0.2 10e9/L    Abs Immature Granulocytes 0.0 0 - 0.4 10e9/L    Absolute Nucleated RBC 0.0    Basic metabolic panel   Result Value Ref Range    Sodium 138 133 - 144 mmol/L    Potassium 3.4 3.4 - 5.3 mmol/L    Chloride 108 94 - 109 mmol/L    Carbon Dioxide 25 20 - 32 mmol/L    Anion Gap 5 3 - 14 mmol/L    Glucose 113 (H) 70 - 99 mg/dL    Urea Nitrogen 29 7 - 30 mg/dL    Creatinine 0.94 0.52 - 1.04 mg/dL    GFR Estimate 60 (L) >60 mL/min/[1.73_m2]    GFR Estimate If Black 70 >60 mL/min/[1.73_m2]    Calcium 9.6 8.5 - 10.1 mg/dL   CT Head w/o Contrast    Narrative    EXAM: CT HEAD W/O CONTRAST, CT CERVICAL SPINE W/O CONTRAST  LOCATION: [facility]  DATE/TIME: 6/19/2020 9:16 PM    INDICATION: Head and neck injury  COMPARISON: None.  TECHNIQUE:   HEAD CT: Without IV contrast. Multiplanar reformats. Dose reduction techniques were used.  CERVICAL SPINE CT: Routine without IV contrast. Multiplanar reformats. Dose reduction techniques were used.    FINDINGS:   HEAD CT:   INTRACRANIAL CONTENTS: Small volume presumed posttraumatic subarachnoid hemorrhage is demonstrated in several locations  including at the left temporoparietal junction, at the left temporal occipital junction, in the interpeduncular cistern, in the left   posterior perimesencephalic cistern and within a right frontal sulcus. No CT evidence of acute infarct. Mild to moderate presumed chronic small vessel ischemic changes. Mild generalized volume loss. No hydrocephalus. There is a small chronic infarct in   the left occipital lobe. There is a small chronic infarct in the right parietal lobe.    VISUALIZED ORBITS/SINUSES/MASTOIDS: No intraorbital abnormality. No paranasal sinus mucosal disease. No middle ear or mastoid effusion.    BONES/SOFT TISSUES: There is extensive soft tissue swelling over the left side of the calvarium. No calvarial fracture.    CERVICAL SPINE CT:   VERTEBRA: There is mild chronic appearing loss of height along the inferior endplates from C3 through C6. Vertebral bodies otherwise normal in height. The alignment is within normal limits. No fracture or posttraumatic subluxation.     CANAL/FORAMINA: Posterior disc bulging at the C4-C5 and C5-C6 levels contributes to mild canal stenosis. Disc spaces are relatively well-maintained for age. No appreciable high-grade foraminal stenosis.    PARASPINAL: No extraspinal abnormality. Visualized lung fields are clear.      Impression    IMPRESSION:  HEAD CT:  1.  There is small volume, presumed posttraumatic subarachnoid hemorrhage in several locations as outlined above.  2.  There is extensive soft tissue swelling over the left side of the calvarium without calvarial fracture.  3.  Small chronic infarct left occipital lobe and right parietal lobe.  4.  Underlying mild to moderate age-related changes.    CERVICAL SPINE CT:  1.  No CT evidence for acute fracture or post traumatic subluxation.  2.  Degenerative changes as highlighted above.    [Critical Result: Multifocal small volume presumed posttraumatic subarachnoid hemorrhage.]    Finding was identified on 6/19/2020 10:08  PM.     Dr. Hamida Vega was contacted by me on 6/19/2020 10:18 PM and verbalized understanding of the critical result.    Cervical spine CT w/o contrast    Narrative    EXAM: CT HEAD W/O CONTRAST, CT CERVICAL SPINE W/O CONTRAST  LOCATION: [facility]  DATE/TIME: 6/19/2020 9:16 PM    INDICATION: Head and neck injury  COMPARISON: None.  TECHNIQUE:   HEAD CT: Without IV contrast. Multiplanar reformats. Dose reduction techniques were used.  CERVICAL SPINE CT: Routine without IV contrast. Multiplanar reformats. Dose reduction techniques were used.    FINDINGS:   HEAD CT:   INTRACRANIAL CONTENTS: Small volume presumed posttraumatic subarachnoid hemorrhage is demonstrated in several locations including at the left temporoparietal junction, at the left temporal occipital junction, in the interpeduncular cistern, in the left   posterior perimesencephalic cistern and within a right frontal sulcus. No CT evidence of acute infarct. Mild to moderate presumed chronic small vessel ischemic changes. Mild generalized volume loss. No hydrocephalus. There is a small chronic infarct in   the left occipital lobe. There is a small chronic infarct in the right parietal lobe.    VISUALIZED ORBITS/SINUSES/MASTOIDS: No intraorbital abnormality. No paranasal sinus mucosal disease. No middle ear or mastoid effusion.    BONES/SOFT TISSUES: There is extensive soft tissue swelling over the left side of the calvarium. No calvarial fracture.    CERVICAL SPINE CT:   VERTEBRA: There is mild chronic appearing loss of height along the inferior endplates from C3 through C6. Vertebral bodies otherwise normal in height. The alignment is within normal limits. No fracture or posttraumatic subluxation.     CANAL/FORAMINA: Posterior disc bulging at the C4-C5 and C5-C6 levels contributes to mild canal stenosis. Disc spaces are relatively well-maintained for age. No appreciable high-grade foraminal stenosis.    PARASPINAL: No extraspinal  abnormality. Visualized lung fields are clear.      Impression    IMPRESSION:  HEAD CT:  1.  There is small volume, presumed posttraumatic subarachnoid hemorrhage in several locations as outlined above.  2.  There is extensive soft tissue swelling over the left side of the calvarium without calvarial fracture.  3.  Small chronic infarct left occipital lobe and right parietal lobe.  4.  Underlying mild to moderate age-related changes.    CERVICAL SPINE CT:  1.  No CT evidence for acute fracture or post traumatic subluxation.  2.  Degenerative changes as highlighted above.    [Critical Result: Multifocal small volume presumed posttraumatic subarachnoid hemorrhage.]    Finding was identified on 6/19/2020 10:08 PM.     Dr. Hamida Vega was contacted by me on 6/19/2020 10:18 PM and verbalized understanding of the critical result.    Abd/pelvis CT,  IV  contrast only TRAUMA / AAA    Narrative    EXAM: CT ABDOMEN PELVIS W CONTRAST  LOCATION: [facility]  DATE/TIME: 6/19/2020 9:14 PM    INDICATION: Pain after fall. Hematuria.  COMPARISON: None.  TECHNIQUE: CT scan of the abdomen and pelvis was performed following injection of IV contrast. Multiplanar reformats were obtained. Dose reduction techniques were used.  CONTRAST: 65 mL Isovue-[id number].    FINDINGS:   LOWER CHEST: Normal.    HEPATOBILIARY: Normal.    PANCREAS: Normal.    SPLEEN: Normal.    ADRENAL GLANDS: Normal.    KIDNEYS/BLADDER: Small cortical cyst at the upper pole of the left kidney. Cortical scarring at the lower pole left kidney. No hydronephrosis on either side. There is wall thickening in the urinary bladder anteriorly. Fluid stranding in the fat anterior   to the bladder.    BOWEL: There are colonic diverticula without acute diverticulitis. No bowel obstruction. The stomach is distended with fluid and air. No free intraperitoneal gas or fluid.    LYMPH NODES: Normal.    VASCULATURE: Atherosclerotic calcification of the aorta and its branches. No  aneurysm.    PELVIC ORGANS: Normal.    MUSCULOSKELETAL: No acute bone fracture.      Impression    IMPRESSION:   1.  There is thickening of the anterior urinary bladder wall and associated inflammatory change. This could be cystitis or malignancy.  2.  No bowel obstruction or inflammation. There are colonic diverticula without acute diverticulitis.     UA reflex to Microscopic and Culture    Specimen: Catheterized Urine   Result Value Ref Range    Color Urine Light Yellow     Appearance Urine Clear     Glucose Urine Negative NEG^Negative mg/dL    Bilirubin Urine Negative NEG^Negative    Ketones Urine 10 (A) NEG^Negative mg/dL    Specific Gravity Urine 1.032 1.003 - 1.035    Blood Urine Small (A) NEG^Negative    pH Urine 7.0 5.0 - 7.0 pH    Protein Albumin Urine 10 (A) NEG^Negative mg/dL    Urobilinogen mg/dL Normal 0.0 - 2.0 mg/dL    Nitrite Urine Negative NEG^Negative    Leukocyte Esterase Urine Negative NEG^Negative    Source Catheterized Urine     RBC Urine 73 (H) 0 - 2 /HPF    WBC Urine 1 0 - 5 /HPF    Squamous Epithelial /HPF Urine <1 0 - 1 /HPF   Neurology IP Consult: Critical Care (patient in or going to ICU); Patient to be seen: Routine - within 24 hours; Intracranial hemorrhage; Consultant may enter orders: Yes; Requesting provider? Hospitalist (if different from attending physician)    Narrative    Alexa Tabor CNP     6/20/2020  3:16 PM    [facility]    Stroke Consult Note    Reason for Consult: ICH    Chief Complaint: Fall       HPI  Anabelle Herbert is a 75 year old female with past medical history   significant for HTN, CVA (currently on Plavix), and cognitive   impairment who was brought to the [facility] ED 6/19 for   evaluation after a fall.  She was reportedly helping her child   into a wagon when she tripped, hitting her head and losing   consciousness for up to 4 to 5 minutes.  Initial CT head showed   presumed posttraumatic subarachnoid hemorrhage in the left  "  temporoparietal junction, left temporal occipital junction and   the right frontal sulcus.  A repeat CT head this morning shows   stable SAH with a possible trace subdural hemorrhage along the   anterior interhemispheric falx.    Today on exam she cannot recall why she is in the hospital and   notes that she has a headache. Called and updated daughter,   Ratna.    Impression  Subarachnoid Hemorrhage, likely traumatic resulting from fall    Recommendations  - Goal SBP <150 per neurosurgery  - Continue to hold PTA Plavix  - No indication for AEDs at this time  - Therapies  - Okay to transfer to floor with Q4 hour checks from a neurocrit   perspective.     Patient Follow-up    - final recommendation pending work-up    Thank you for this consult. We will continue to follow.     CLAYTON Reno, CNP  Neurology  06/20/2020 3:16 PM  To page stroke neurology after hours or on a subsequent day,   click here: [facility]  Choose \"On Call\" tab at top, then search dropdown box for   \"Neurology Adult\" & press Enter, look for Neuro ICU/Stroke       _____________________________________________________    Past Medical History   Past Medical History:   Diagnosis Date     Acute sinusitis, unspecified      Allergic rhinitis, cause unspecified      Candidiasis of unspecified site      Candidiasis of vulva and vagina      Diplopia      Dyspnea     following inhalation of cleaning solution. Treated with   inhaler/neb.      Essential hypertension, benign      Hypercalcemia      Memory loss      Osteoporosis, unspecified     defined by chiropractor who did xrays during tx for back pain     Other B-complex deficiencies      Other, multiple and ill-defined closed fractures of lower limb   1990    prolonged healing     Unspecified cerebral artery occlusion with cerebral infarction        Unspecified disorder of skin and subcutaneous tissue      Urinary hesitancy      Urinary tract infection, site not specified      Past Surgical History "   Past Surgical History:   Procedure Laterality Date     COLONOSCOPY       CYSTOSCOPY, BIOPSY BLADDER, COMBINED  1/6/2014    Procedure: COMBINED CYSTOSCOPY, BIOPSY BLADDER;;  Surgeon:   Vandana Tang MD;  Location: [facility] OR     CYSTOSCOPY, INJECT COLLAGEN, COMBINED  1/6/2014    Procedure: COMBINED CYSTOSCOPY, INJECT BULKING AGENT;  IC   cocktail, bladder biopsy, fulguration, heparin, gentamyacin,   lidocaine & kenalog;  Surgeon: Vandana Tang MD;  Location:   [facility] OR     GENITOURINARY SURGERY       NO HISTORY OF SURGERY       Medications   Home Meds  Prior to Admission medications    Medication Sig Start Date End Date Taking? Authorizing Provider   cholecalciferol (VITAMIN D3) 5000 units (125 mcg) CAPS capsule   Take 5,000 Units by mouth daily   Yes Reported, Patient   clopidogrel (PLAVIX) 75 MG tablet TAKE 1 TABLET BY MOUTH EVERY   DAY 2/7/20  Yes Jyoti Moreno MD   donepezil (ARICEPT) 5 MG tablet Take 5 mg by mouth daily 10/9/19    Yes Reported, Patient   lisinopril (PRINIVIL/ZESTRIL) 40 MG tablet Take 1 tablet (40 mg)   by mouth daily  Patient taking differently: Take 20 mg by mouth daily 1/2 x 40 mg   2/6/20  Yes Jyoti Moreno MD   mirtazapine (REMERON) 15 MG tablet Take 0.5 tablets (7.5 mg) by   mouth At Bedtime 6/4/20  Yes Sheba Henriquez NP   Multiple Vitamins-Minerals (ANTIOXIDANT PO) Take 1 tablet by   mouth daily CONSULT [facility]   Yes Reported,   Patient   omeprazole (PRILOSEC) 20 MG DR capsule Take 1 capsule (20 mg) by   mouth daily 6/9/20  Yes Jyoti Moreno MD   polyethylene glycol (MIRALAX) 17 GM/SCOOP powder Take 17 g by   mouth daily To twice daily as needed for constipation. 6/9/20    Yes Jyoti Moreno MD   Blood Pressure Monitoring (BLOOD PRESSURE CUFF) MISC 1 Device   daily as needed 3/20/17   Jyoti Moreno MD   meclizine (ANTIVERT) 25 MG tablet Take 1 tablet (25 mg) by mouth   2 times daily as needed for  dizziness 5/8/20   Jyoti Moreno MD   Nutritional Supplements (ENSURE HIGH PROTEIN) Take 1 Can by mouth   3 times daily (with meals) 6/3/20   Sheba Henriquez NP       Scheduled Meds    donepezil  5 mg Oral Daily     mirtazapine  7.5 mg Oral At Bedtime     multivitamin w/minerals  1 tablet Oral Daily     omeprazole  20 mg Oral Daily     polyethylene glycol  17 g Oral Daily     sodium chloride (PF)  3 mL Intracatheter Q8H     Vitamin D3  125 mcg Oral Daily       Infusion Meds    niCARdipine 40 mg in 200 mL 0.9% NaCl Stopped (06/20/20 1100)     sodium chloride 75 mL/hr at 06/20/20 1217       PRN Meds  acetaminophen, acetaminophen, bisacodyl, hydrALAZINE,   HYDROmorphone, labetalol, lidocaine 4%, lidocaine (buffered or   not buffered), magnesium sulfate, meclizine, melatonin, naloxone,   ondansetron **OR** ondansetron, oxyCODONE, polyethylene glycol,   potassium chloride, potassium chloride with lidocaine, potassium   chloride, potassium chloride, potassium chloride, senna-docusate   **OR** senna-docusate, sodium chloride (PF)    Allergies   Allergies   Allergen Reactions     Contrast Dye      Burning, hematuria     Diatrizoate Other (See Comments)     Feels burning inside body     Dogs Unknown     Throat started to close up.      Sulfa Drugs Unknown     Bupropion Anxiety     Patient reports increased anxiety, panic, difficulty   concentrating, and various aches and pains     Family History   Family History   Problem Relation Age of Onset     Cancer Father         colon?     Heart Disease Father      Asthma Mother      Alzheimer Disease Mother 86     Unknown/Adopted Maternal Grandmother      Unknown/Adopted Maternal Grandfather      Unknown/Adopted Paternal Grandmother      Unknown/Adopted Paternal Grandfather      Asthma Sister      Allergies Sister      Unknown/Adopted Son      Diabetes No family hx of      Social History   Social History     Tobacco Use     Smoking status: Never Smoker     Smokeless  tobacco: Never Used   Substance Use Topics     Alcohol use: No     Drug use: No       Review of Systems   The 10 point Review of Systems is negative other than noted in   the HPI or here.        PHYSICAL EXAMINATION   Temp:  [98.1  F (36.7  C)-99.1  F (37.3  C)] 98.1  F (36.7  C)  Pulse:  [] 60  Heart Rate:  [] 61  Resp:  [7-26] 17  BP: (104-220)/() 125/70  SpO2:  [89 %-100 %] 93 %    Neurologic  Mental Status:  follows commands, speech clear and fluent, alert,   unable to recall why she is in the hospital but can name month  Cranial Nerves:  EOMI with normal smooth pursuit, facial   movements symmetric, hearing not formally tested but intact to   conversation, no dysarthria, shoulder shrug equal bilaterally,   tongue protrusion midline  Motor:  no abnormal movements, slight LUE drift but  strength   equal and strong per nursing, bilateral LEs without drift  Reflexes:  unable to test (telestroke)  Sensory:  light touch sensation intact and symmetric throughout   upper and lower extremities (assessed by nurse)  Coordination:  no dysmetria on FTN  Station/Gait:  unable to test due to telestroke    Dysphagia Screen  Per Nursing      Imaging  I personally reviewed all imaging; relevant findings per HPI.    Labs Data   CBC  Recent Labs   Lab 06/20/20 0451 06/19/20 2007   WBC 8.2 7.6   RBC 3.78* 3.82   HGB 12.1 12.1   HCT 35.4 35.4    209     Basic Metabolic Panel   Recent Labs   Lab 06/20/20  0451 06/19/20 2007    138   POTASSIUM 4.0 3.4   CHLORIDE 107 108   CO2 26 25   BUN 23 29   CR 0.84 0.94   * 113*   ESTEE 8.9 9.6     Liver Panel  Recent Labs   Lab Test 06/03/20  1206 08/21/19  1047 03/26/19  1309   PROTTOTAL 7.0 6.9 7.0   ALBUMIN 3.8 3.7 4.0   BILITOTAL 0.7 0.7 0.5   ALKPHOS 54 48 45   AST 23 19 24   ALT 18 17 24     INR  Recent Labs   Lab Test 06/30/15  1539   INR 0.95      Lipid Profile  Recent Labs   Lab Test 08/21/19  1047 11/09/18  1322 05/30/18  1425  06/30/15  1444  11/13/14  1541 07/26/13  0938   CHOL 215* 215* 243*   < > 146 200* 115   HDL 69 62 73   < > 53 74 48*   * 136* 156*   < > 76 114 55   TRIG 75 87 71   < > 87 59 55   CHOLHDLRATIO  --   --   --   --  2.8 2.7 2.4    < > = values in this interval not displayed.     A1C  Recent Labs   Lab Test 06/03/20  1206 08/21/19  1047 10/31/16  1001   A1C 5.6 5.2 5.6     Troponin Lillie results for input(s): TROPI in the last 168 hours.       Stroke Code / Stroke Consult Data Data Telestroke Service Details    (for non-emergent stroke consult with tele)  Video start time 06/20/20   1207   Video end time 06/20/20   1213   Type of service telemedicine diagnostic assessment of acute   neurological changes   Reason telemedicine is appropriate patient requires assessment   with a specialist for diagnosis and treatment of neurological   symptoms   Mode of transmission secure interactive audio and video   communication per Abby   Originating site (patient location) [facility]    Distant site (provider location) [facility]        Glucose by meter   Result Value Ref Range    Glucose 148 (H) 70 - 99 mg/dL   Basic metabolic panel   Result Value Ref Range    Sodium 137 133 - 144 mmol/L    Potassium 4.0 3.4 - 5.3 mmol/L    Chloride 107 94 - 109 mmol/L    Carbon Dioxide 26 20 - 32 mmol/L    Anion Gap 4 3 - 14 mmol/L    Glucose 131 (H) 70 - 99 mg/dL    Urea Nitrogen 23 7 - 30 mg/dL    Creatinine 0.84 0.52 - 1.04 mg/dL    GFR Estimate 69 >60 mL/min/[1.73_m2]    GFR Estimate If Black 80 >60 mL/min/[1.73_m2]    Calcium 8.9 8.5 - 10.1 mg/dL   CBC with platelets   Result Value Ref Range    WBC 8.2 4.0 - 11.0 10e9/L    RBC Count 3.78 (L) 3.8 - 5.2 10e12/L    Hemoglobin 12.1 11.7 - 15.7 g/dL    Hematocrit 35.4 35.0 - 47.0 %    MCV 94 78 - 100 fl    MCH 32.0 26.5 - 33.0 pg    MCHC 34.2 31.5 - 36.5 g/dL    RDW 12.0 10.0 - 15.0 %    Platelet Count 217 150 - 450 10e9/L   CT Head w/o Contrast    Narrative    EXAM: CT  HEAD WITHOUT CONTRAST  LOCATION: [facility]  DATE/TIME: 06/20/2020, 5:21 AM    INDICATION: Intracranial hemorrhage, known, follow-up.  COMPARISON: 06/19/2020.  TECHNIQUE: Routine without IV contrast. Multiplanar reformats. Dose reduction techniques were used.    FINDINGS:  INTRACRANIAL CONTENTS: Redemonstration of scattered small volume acute subarachnoid hemorrhage which is presumably posttraumatic. The overall amount is not significantly changed. Questionable trace amount of subdural hemorrhage along the anterior   interhemispheric falx with maximal thickness of 1 mm. Chronic infarcts in in the parasagittal left parieto-occipital region and the right parietal lobe. Underlying cerebral volume loss and presumed chronic small vessel ischemic changes.     VISUALIZED ORBITS/SINUSES/MASTOIDS: No intraorbital abnormality. No paranasal sinus mucosal disease. No middle ear or mastoid effusion.    BONES/SOFT TISSUES: No acute abnormality.      Impression    IMPRESSION:  1.  Stable multifocal small volume presumed posttraumatic subarachnoid hemorrhage.  2.  There is questionably a new trace amount of subdural hemorrhage along the anterior interhemispheric falx.

## 2020-06-20 NOTE — PROGRESS NOTES
Pt arrived via EMS. Pt was pulling her grandchildren in a way down a driveway with an incline.  Pt fell and hit the L side of her body including her head.  Pt had LOC until EMS arrived. Pt presented with intense urge to urinate.  Pt was unable to urine and became agitated.  Pt started to masturbate stating it would help her urinate and defecate.  Bladder scan was  ml.  Pt was incontinent of urine and stool x 3, urination x 1.  Morphine 4 mg was given, pain was relieved. O2 at 2L NC due to O2 saturation 88-89% after Morphine given.  Pt was appropriate with conversation, unable to state where she was.  Does not recall fall.   Alethea Rose RN, BSN  Emergency Department  [facility]

## 2020-06-20 NOTE — PROGRESS NOTES
[facility]    Hospitalist Progress Note    Assessment & Plan   Anabelle Herbert is a 75 year old female who presents with fall.  Admitted for further evaluation and treatment.     Fall with intracranial hemorrhage: Patient with small volume presumed posttraumatic subarachnoid hemorrhage in several locations, extensive soft tissue swelling over the left side of the calvarium without calvarial fracture, small chronic infarct left occipital lobe and right parietal lobe, underlying mild to moderate age-related changes, no CT evidence for acute fracture of the cervical spine, degenerative changes are present, see report for further details.  Electrolytes in the emergency department are normal range with a creatinine of 0.94.  White blood cell count is 7.6 with a hemoglobin of 12.1.  Patient with a history of memory loss and cerebrovascular accident with maintenance on Plavix, presenting with head injury.  Patient was reported to be on her grandchild in a wagon when she tripped and fell striking her head.  Patient is on Plavix.  Patient does have a report of loss of consciousness according to her daughter for up to 4 to 5 minutes.  Patient states that she has had abdominal and bladder discomfort. Patient does have a history of memory impairment and therefore history of present illness and review of systems may be incomplete.  -Blood pressure control.  -Neurology consulted.  -Neurosurgery consulted.  -Close neurologic monitoring.  -Gentle IV fluids.  -Close hemodynamic monitoring.  -Hold prior to admission Plavix. Plavix reversal decision deferred to Neurosurgery and Neurocritical care team.      Thickening of the anterior urinary bladder: Patient with CT showing inflammatory changes and thickening of the anterior urinary bladder, cystitis or malignancy included in the differential, see report for further details.  -Urinalysis pending.  -Consider antibiotic therapy as clinically indicated.  -urology  consultation      Dementia: Stable.  -Continue prior to admission donepezil when verified by pharmacy.     Cardiac, hypertension, history of cerebral artery occlusion with cerebral infarction: Patient previously maintained on lisinopril.  -Continue prior to admission lisinopril when verified by pharmacy.  -Hold prior to admission Plavix.     Anxiety, depression: Stable.  -Continue prior to admission mirtazapine when verified by pharmacy.     GERD: Stable.  -Continue prior to admission omeprazole when verified by pharmacy.     DVT Prophylaxis: Pneumatic Compression Devices  Code Status: Full Code     Disposition: 2+ days.       Gregory Martinez MD   Text Page (7am to 6pm)    Interval History   Nicardipine to keep less 140-160.  SBP kept 120 range. Repeat head CT with:    1.  Stable multifocal small volume presumed posttraumatic subarachnoid hemorrhage.  2.  There is questionably a new trace amount of subdural hemorrhage along the anterior interhemispheric falx.    -Data reviewed today: I reviewed all new labs and imaging results over the last 24 hours. I personally reviewed the EKG tracing showing NSR.    Physical Exam   Temp: 99.1  F (37.3  C) Temp src: Oral BP: 109/59 Pulse: 62 Heart Rate: 62 Resp: 24 SpO2: 94 % O2 Device: Nasal cannula Oxygen Delivery: 2 LPM  Vitals:    06/20/20 0055   Weight: 61.1 kg (134 lb 11.2 oz)     Vital Signs with Ranges  Temp:  [98.9  F (37.2  C)-99.1  F (37.3  C)] 99.1  F (37.3  C)  Pulse:  [] 62  Heart Rate:  [] 62  Resp:  [7-26] 24  BP: (104-220)/() 109/59  SpO2:  [89 %-100 %] 94 %  I/O last 3 completed shifts:  In: 369.37 [I.V.:369.37]  Out: 400 [Urine:400]    GENERAL: Alert and confused. NAD. Conversational, appropriate.   HEENT: Normocephalic. EOMI. No icterus or injection. Nares normal.  Swelling to scalp, no bony step-off  LUNGS: Clear to auscultation. No dyspnea at rest.   HEART: Regular rate. Extremities perfused.   ABDOMEN: Soft, nontender, and nondistended.  Positive bowel sounds.   EXTREMITIES: No LE edema noted.   NEUROLOGIC: Moves extremities x4 on command. No acute focal neurologic abnormalities noted.     Medications     niCARdipine 40 mg in 200 mL 0.9% NaCl Stopped (06/20/20 1100)     sodium chloride 75 mL/hr at 06/20/20 1217       donepezil  5 mg Oral Daily     mirtazapine  7.5 mg Oral At Bedtime     multivitamin w/minerals  1 tablet Oral Daily     omeprazole  20 mg Oral Daily     polyethylene glycol  17 g Oral Daily     sodium chloride (PF)  3 mL Intracatheter Q8H     Vitamin D3  125 mcg Oral Daily       Data   Recent Labs   Lab 06/20/20  0451 06/19/20 2007   WBC 8.2 7.6   HGB 12.1 12.1   MCV 94 93    209    138   POTASSIUM 4.0 3.4   CHLORIDE 107 108   CO2 26 25   BUN 23 29   CR 0.84 0.94   ANIONGAP 4 5   ESTEE 8.9 9.6   * 113*       Imaging:   Recent Results (from the past 24 hour(s))   CT Head w/o Contrast    Narrative    EXAM: CT HEAD W/O CONTRAST, CT CERVICAL SPINE W/O CONTRAST  LOCATION: [facility]  DATE/TIME: 6/19/2020 9:16 PM    INDICATION: Head and neck injury  COMPARISON: None.  TECHNIQUE:   HEAD CT: Without IV contrast. Multiplanar reformats. Dose reduction techniques were used.  CERVICAL SPINE CT: Routine without IV contrast. Multiplanar reformats. Dose reduction techniques were used.    FINDINGS:   HEAD CT:   INTRACRANIAL CONTENTS: Small volume presumed posttraumatic subarachnoid hemorrhage is demonstrated in several locations including at the left temporoparietal junction, at the left temporal occipital junction, in the interpeduncular cistern, in the left   posterior perimesencephalic cistern and within a right frontal sulcus. No CT evidence of acute infarct. Mild to moderate presumed chronic small vessel ischemic changes. Mild generalized volume loss. No hydrocephalus. There is a small chronic infarct in   the left occipital lobe. There is a small chronic infarct in the right parietal lobe.    VISUALIZED  ORBITS/SINUSES/MASTOIDS: No intraorbital abnormality. No paranasal sinus mucosal disease. No middle ear or mastoid effusion.    BONES/SOFT TISSUES: There is extensive soft tissue swelling over the left side of the calvarium. No calvarial fracture.    CERVICAL SPINE CT:   VERTEBRA: There is mild chronic appearing loss of height along the inferior endplates from C3 through C6. Vertebral bodies otherwise normal in height. The alignment is within normal limits. No fracture or posttraumatic subluxation.     CANAL/FORAMINA: Posterior disc bulging at the C4-C5 and C5-C6 levels contributes to mild canal stenosis. Disc spaces are relatively well-maintained for age. No appreciable high-grade foraminal stenosis.    PARASPINAL: No extraspinal abnormality. Visualized lung fields are clear.      Impression    IMPRESSION:  HEAD CT:  1.  There is small volume, presumed posttraumatic subarachnoid hemorrhage in several locations as outlined above.  2.  There is extensive soft tissue swelling over the left side of the calvarium without calvarial fracture.  3.  Small chronic infarct left occipital lobe and right parietal lobe.  4.  Underlying mild to moderate age-related changes.    CERVICAL SPINE CT:  1.  No CT evidence for acute fracture or post traumatic subluxation.  2.  Degenerative changes as highlighted above.    [Critical Result: Multifocal small volume presumed posttraumatic subarachnoid hemorrhage.]    Finding was identified on 6/19/2020 10:08 PM.     Dr. Hamida Vega was contacted by me on 6/19/2020 10:18 PM and verbalized understanding of the critical result.    Cervical spine CT w/o contrast    Narrative    EXAM: CT HEAD W/O CONTRAST, CT CERVICAL SPINE W/O CONTRAST  LOCATION: [facility]  DATE/TIME: 6/19/2020 9:16 PM    INDICATION: Head and neck injury  COMPARISON: None.  TECHNIQUE:   HEAD CT: Without IV contrast. Multiplanar reformats. Dose reduction techniques were used.  CERVICAL SPINE CT: Routine without  IV contrast. Multiplanar reformats. Dose reduction techniques were used.    FINDINGS:   HEAD CT:   INTRACRANIAL CONTENTS: Small volume presumed posttraumatic subarachnoid hemorrhage is demonstrated in several locations including at the left temporoparietal junction, at the left temporal occipital junction, in the interpeduncular cistern, in the left   posterior perimesencephalic cistern and within a right frontal sulcus. No CT evidence of acute infarct. Mild to moderate presumed chronic small vessel ischemic changes. Mild generalized volume loss. No hydrocephalus. There is a small chronic infarct in   the left occipital lobe. There is a small chronic infarct in the right parietal lobe.    VISUALIZED ORBITS/SINUSES/MASTOIDS: No intraorbital abnormality. No paranasal sinus mucosal disease. No middle ear or mastoid effusion.    BONES/SOFT TISSUES: There is extensive soft tissue swelling over the left side of the calvarium. No calvarial fracture.    CERVICAL SPINE CT:   VERTEBRA: There is mild chronic appearing loss of height along the inferior endplates from C3 through C6. Vertebral bodies otherwise normal in height. The alignment is within normal limits. No fracture or posttraumatic subluxation.     CANAL/FORAMINA: Posterior disc bulging at the C4-C5 and C5-C6 levels contributes to mild canal stenosis. Disc spaces are relatively well-maintained for age. No appreciable high-grade foraminal stenosis.    PARASPINAL: No extraspinal abnormality. Visualized lung fields are clear.      Impression    IMPRESSION:  HEAD CT:  1.  There is small volume, presumed posttraumatic subarachnoid hemorrhage in several locations as outlined above.  2.  There is extensive soft tissue swelling over the left side of the calvarium without calvarial fracture.  3.  Small chronic infarct left occipital lobe and right parietal lobe.  4.  Underlying mild to moderate age-related changes.    CERVICAL SPINE CT:  1.  No CT evidence for acute fracture  or post traumatic subluxation.  2.  Degenerative changes as highlighted above.    [Critical Result: Multifocal small volume presumed posttraumatic subarachnoid hemorrhage.]    Finding was identified on 6/19/2020 10:08 PM.     Dr. Hamida Vega was contacted by me on 6/19/2020 10:18 PM and verbalized understanding of the critical result.    Abd/pelvis CT,  IV  contrast only TRAUMA / AAA    Narrative    EXAM: CT ABDOMEN PELVIS W CONTRAST  LOCATION: [facility]  DATE/TIME: 6/19/2020 9:14 PM    INDICATION: Pain after fall. Hematuria.  COMPARISON: None.  TECHNIQUE: CT scan of the abdomen and pelvis was performed following injection of IV contrast. Multiplanar reformats were obtained. Dose reduction techniques were used.  CONTRAST: 65 mL [id number].    FINDINGS:   LOWER CHEST: Normal.    HEPATOBILIARY: Normal.    PANCREAS: Normal.    SPLEEN: Normal.    ADRENAL GLANDS: Normal.    KIDNEYS/BLADDER: Small cortical cyst at the upper pole of the left kidney. Cortical scarring at the lower pole left kidney. No hydronephrosis on either side. There is wall thickening in the urinary bladder anteriorly. Fluid stranding in the fat anterior   to the bladder.    BOWEL: There are colonic diverticula without acute diverticulitis. No bowel obstruction. The stomach is distended with fluid and air. No free intraperitoneal gas or fluid.    LYMPH NODES: Normal.    VASCULATURE: Atherosclerotic calcification of the aorta and its branches. No aneurysm.    PELVIC ORGANS: Normal.    MUSCULOSKELETAL: No acute bone fracture.      Impression    IMPRESSION:   1.  There is thickening of the anterior urinary bladder wall and associated inflammatory change. This could be cystitis or malignancy.  2.  No bowel obstruction or inflammation. There are colonic diverticula without acute diverticulitis.     CT Head w/o Contrast    Narrative    EXAM: CT HEAD WITHOUT CONTRAST  LOCATION: [facility]  DATE/TIME: 06/20/2020, 5:21  AM    INDICATION: Intracranial hemorrhage, known, follow-up.  COMPARISON: 06/19/2020.  TECHNIQUE: Routine without IV contrast. Multiplanar reformats. Dose reduction techniques were used.    FINDINGS:  INTRACRANIAL CONTENTS: Redemonstration of scattered small volume acute subarachnoid hemorrhage which is presumably posttraumatic. The overall amount is not significantly changed. Questionable trace amount of subdural hemorrhage along the anterior   interhemispheric falx with maximal thickness of 1 mm. Chronic infarcts in in the parasagittal left parieto-occipital region and the right parietal lobe. Underlying cerebral volume loss and presumed chronic small vessel ischemic changes.     VISUALIZED ORBITS/SINUSES/MASTOIDS: No intraorbital abnormality. No paranasal sinus mucosal disease. No middle ear or mastoid effusion.    BONES/SOFT TISSUES: No acute abnormality.      Impression    IMPRESSION:  1.  Stable multifocal small volume presumed posttraumatic subarachnoid hemorrhage.  2.  There is questionably a new trace amount of subdural hemorrhage along the anterior interhemispheric falx.

## 2020-06-20 NOTE — CONSULTS
"  [facility]    Stroke Consult Note    Reason for Consult: ICH    Chief Complaint: Fall       HPI  Anabelle Herbert is a 75 year old female with past medical history significant for HTN, CVA (currently on Plavix), and cognitive impairment who was brought to the [facility] ED 6/19 for evaluation after a fall.  She was reportedly helping her grandchild into a wagon when she tripped, hitting her head and losing consciousness for up to 4 to 5 minutes.  Initial CT head showed presumed posttraumatic subarachnoid hemorrhage in the left temporoparietal junction, left temporal occipital junction and the right frontal sulcus.  A repeat CT head this morning shows stable SAH with a possible trace subdural hemorrhage along the anterior interhemispheric falx.    Today on exam she cannot recall why she is in the hospital and notes that she has a headache. Called and updated daughter, Ratna.    Impression  Subarachnoid Hemorrhage, likely traumatic resulting from fall    Recommendations  - Goal SBP <150 per neurosurgery  - Continue to hold PTA Plavix  - No indication for AEDs at this time  - Therapies  - Okay to transfer to floor with Q4 hour checks from a neurocrit perspective.     Patient Follow-up    - follow-up per neurosurgery    Thank you for this consult. No further stroke evaluation is recommended, so we will sign off. Please contact us with any additional questions.    CLAYTON Reno, CNP  Neurology  06/20/2020 3:16 PM  To page stroke neurology after hours or on a subsequent day, click here: [facility]COM  Choose \"On Call\" tab at top, then search dropdown box for \"Neurology Adult\" & press Enter, look for Neuro ICU/Stroke       _____________________________________________________    Past Medical History   Past Medical History:   Diagnosis Date     Acute sinusitis, unspecified      Allergic rhinitis, cause unspecified      Candidiasis of unspecified site      Candidiasis of vulva and vagina      Diplopia      " Dyspnea     following inhalation of cleaning solution. Treated with inhaler/neb.      Essential hypertension, benign      Hypercalcemia      Memory loss      Osteoporosis, unspecified     defined by chiropractor who did xrays during tx for back pain     Other B-complex deficiencies      Other, multiple and ill-defined closed fractures of lower limb 1990    prolonged healing     Unspecified cerebral artery occlusion with cerebral infarction      Unspecified disorder of skin and subcutaneous tissue      Urinary hesitancy      Urinary tract infection, site not specified      Past Surgical History   Past Surgical History:   Procedure Laterality Date     COLONOSCOPY       CYSTOSCOPY, BIOPSY BLADDER, COMBINED  1/6/2014    Procedure: COMBINED CYSTOSCOPY, BIOPSY BLADDER;;  Surgeon: Vandana Tang MD;  Location: [facility] OR     CYSTOSCOPY, INJECT COLLAGEN, COMBINED  1/6/2014    Procedure: COMBINED CYSTOSCOPY, INJECT BULKING AGENT;  IC cocktail, bladder biopsy, fulguration, heparin, gentamyacin, lidocaine & kenalog;  Surgeon: Vandana Tang MD;  Location: [facility] OR     GENITOURINARY SURGERY       NO HISTORY OF SURGERY       Medications   Home Meds  Prior to Admission medications    Medication Sig Start Date End Date Taking? Authorizing Provider   cholecalciferol (VITAMIN D3) 5000 units (125 mcg) CAPS capsule Take 5,000 Units by mouth daily   Yes Reported, Patient   clopidogrel (PLAVIX) 75 MG tablet TAKE 1 TABLET BY MOUTH EVERY DAY 2/7/20  Yes Jyoti Moreno MD   donepezil (ARICEPT) 5 MG tablet Take 5 mg by mouth daily 10/9/19  Yes Reported, Patient   lisinopril (PRINIVIL/ZESTRIL) 40 MG tablet Take 1 tablet (40 mg) by mouth daily  Patient taking differently: Take 20 mg by mouth daily 1/2 x 40 mg 2/6/20  Yes Jyoti Moreno MD   mirtazapine (REMERON) 15 MG tablet Take 0.5 tablets (7.5 mg) by mouth At Bedtime 6/4/20  Yes Sheba Henriquez NP   Multiple Vitamins-Minerals (ANTIOXIDANT PO) Take 1 tablet by mouth  daily CONSULT HEALTH WOMENS WELLNESS   Yes Reported, Patient   omeprazole (PRILOSEC) 20 MG DR capsule Take 1 capsule (20 mg) by mouth daily 6/9/20  Yes Jyoti Moreno MD   polyethylene glycol (MIRALAX) 17 GM/SCOOP powder Take 17 g by mouth daily To twice daily as needed for constipation. 6/9/20  Yes Jyoti Moreno MD   Blood Pressure Monitoring (BLOOD PRESSURE CUFF) MISC 1 Device daily as needed 3/20/17   Jyoti Moreno MD   meclizine (ANTIVERT) 25 MG tablet Take 1 tablet (25 mg) by mouth 2 times daily as needed for dizziness 5/8/20   Jyoti Moreno MD   Nutritional Supplements (ENSURE HIGH PROTEIN) Take 1 Can by mouth 3 times daily (with meals) 6/3/20   Sheba Henriquez, CARLA       Scheduled Meds    donepezil  5 mg Oral Daily     mirtazapine  7.5 mg Oral At Bedtime     multivitamin w/minerals  1 tablet Oral Daily     omeprazole  20 mg Oral Daily     polyethylene glycol  17 g Oral Daily     sodium chloride (PF)  3 mL Intracatheter Q8H     Vitamin D3  125 mcg Oral Daily       Infusion Meds    niCARdipine 40 mg in 200 mL 0.9% NaCl Stopped (06/20/20 1100)     sodium chloride 75 mL/hr at 06/20/20 1217       PRN Meds  acetaminophen, acetaminophen, bisacodyl, hydrALAZINE, HYDROmorphone, labetalol, lidocaine 4%, lidocaine (buffered or not buffered), magnesium sulfate, meclizine, melatonin, naloxone, ondansetron **OR** ondansetron, oxyCODONE, polyethylene glycol, potassium chloride, potassium chloride with lidocaine, potassium chloride, potassium chloride, potassium chloride, senna-docusate **OR** senna-docusate, sodium chloride (PF)    Allergies   Allergies   Allergen Reactions     Contrast Dye      Burning, hematuria     Diatrizoate Other (See Comments)     Feels burning inside body     Dogs Unknown     Throat started to close up.      Sulfa Drugs Unknown     Bupropion Anxiety     Patient reports increased anxiety, panic, difficulty concentrating, and various aches and pains      Family History   Family History   Problem Relation Age of Onset     Cancer Father         colon?     Heart Disease Father      Asthma Mother      Alzheimer Disease Mother 86     Unknown/Adopted Maternal Grandmother      Unknown/Adopted Maternal Grandfather      Unknown/Adopted Paternal Grandmother      Unknown/Adopted Paternal Grandfather      Asthma Sister      Allergies Sister      Unknown/Adopted Son      Diabetes No family hx of      Social History   Social History     Tobacco Use     Smoking status: Never Smoker     Smokeless tobacco: Never Used   Substance Use Topics     Alcohol use: No     Drug use: No       Review of Systems   The 10 point Review of Systems is negative other than noted in the HPI or here.        PHYSICAL EXAMINATION   Temp:  [98.1  F (36.7  C)-99.1  F (37.3  C)] 98.1  F (36.7  C)  Pulse:  [] 60  Heart Rate:  [] 61  Resp:  [7-26] 17  BP: (104-220)/() 125/70  SpO2:  [89 %-100 %] 93 %    Neurologic  Mental Status:  follows commands, speech clear and fluent, alert, unable to recall why she is in the hospital but can name month  Cranial Nerves:  EOMI with normal smooth pursuit, facial movements symmetric, hearing not formally tested but intact to conversation, no dysarthria, shoulder shrug equal bilaterally, tongue protrusion midline  Motor:  no abnormal movements, slight LUE drift but  strength equal and strong per nursing, bilateral LEs without drift  Reflexes:  unable to test (telestroke)  Sensory:  light touch sensation intact and symmetric throughout upper and lower extremities (assessed by nurse)  Coordination:  no dysmetria on FTN  Station/Gait:  unable to test due to telestroke    Dysphagia Screen  Per Nursing      Imaging  I personally reviewed all imaging; relevant findings per HPI.    Labs Data   CBC  Recent Labs   Lab 06/20/20  0451 06/19/20 2007   WBC 8.2 7.6   RBC 3.78* 3.82   HGB 12.1 12.1   HCT 35.4 35.4    209     Basic Metabolic Panel   Recent  Labs   Lab 06/20/20  0451 06/19/20 2007    138   POTASSIUM 4.0 3.4   CHLORIDE 107 108   CO2 26 25   BUN 23 29   CR 0.84 0.94   * 113*   ESTEE 8.9 9.6     Liver Panel  Recent Labs   Lab Test 06/03/20  1206 08/21/19  1047 03/26/19  1309   PROTTOTAL 7.0 6.9 7.0   ALBUMIN 3.8 3.7 4.0   BILITOTAL 0.7 0.7 0.5   ALKPHOS 54 48 45   AST 23 19 24   ALT 18 17 24     INR  Recent Labs   Lab Test 06/30/15  1539   INR 0.95      Lipid Profile  Recent Labs   Lab Test 08/21/19  1047 11/09/18  1322 05/30/18  1425  06/30/15  1442 11/13/14  1541 07/26/13  0938   CHOL 215* 215* 243*   < > 146 200* 115   HDL 69 62 73   < > 53 74 48*   * 136* 156*   < > 76 114 55   TRIG 75 87 71   < > 87 59 55   CHOLHDLRATIO  --   --   --   --  2.8 2.7 2.4    < > = values in this interval not displayed.     A1C  Recent Labs   Lab Test 06/03/20  1206 08/21/19  1047 10/31/16  1001   A1C 5.6 5.2 5.6     Troponin Lillie results for input(s): TROPI in the last 168 hours.       Stroke Code / Stroke Consult Data Data Telestroke Service Details  (for non-emergent stroke consult with tele)  Video start time 06/20/20   1207   Video end time 06/20/20   1213   Type of service telemedicine diagnostic assessment of acute neurological changes   Reason telemedicine is appropriate patient requires assessment with a specialist for diagnosis and treatment of neurological symptoms   Mode of transmission secure interactive audio and video communication per Abby   Originating site (patient location) [facility]    Distant site (provider location) [facility]

## 2020-06-20 NOTE — CONSULTS
NEUROSURGERY CONSULT    Neurosurgery was asked by Dr. Vega to consult for a head in injury.    ASSESSMENT:    Posttraumatic subarachnoid hemorrhages.    PLAN:  Ms. Herbert's head CT exhibits posttraumatic subarachnoid hemorrhages in several areas including at the left temporoparietal junction, at the left temporal occipital junction, in the interpeduncular cistern, in the left posterior   perimesencephalic cistern and within a right frontal sulcuso. From a Neurosurgical standpoint, we feel that it would be in her best interest to be admitted to the [facility] Intensive Care Unit by the Medical team. The head of the bed should be at 30 degrees at all times and the systolic blood pressure should be maintained at 150 mmHg or less at all times. We will repeat a head CT tomorrow morning to assess the stability of the bleed.     She appeared to have a good understanding of the situation and asked appropriate questions which we answered. We did discuss signs of a worsening problem that a repeat evaluation should be obtained.     It has been a pleasure meeting Anabelle Herbert. Thank you for having us be involved in her care.  ______________________________________________________________________    HPI:    Anabelle Herbert is a pleasant 75 year old female who presented to the [facility] Emergency Department via EMS for consultation due to a fall resulting in an head injury. Apparently she was pushing her grandchild in a wagon when she unfortunately tripped and fell. I do believe there was a loss of consciousness. She does have dementia making her a poor historian. There are no bowel or bladder changes. No other concerns are voiced.    She is anticoagulated on Plavix due a CVA history.     Past Medical History:   Diagnosis Date     Acute sinusitis, unspecified      Allergic rhinitis, cause unspecified      Candidiasis of unspecified site      Candidiasis of vulva and vagina      Diplopia      Dyspnea      following inhalation of cleaning solution. Treated with inhaler/neb.      Essential hypertension, benign      Hypercalcemia      Memory loss      Osteoporosis, unspecified     defined by chiropractor who did xrays during tx for back pain     Other B-complex deficiencies      Other, multiple and ill-defined closed fractures of lower limb 1990    prolonged healing     Unspecified cerebral artery occlusion with cerebral infarction      Unspecified disorder of skin and subcutaneous tissue      Urinary hesitancy      Urinary tract infection, site not specified        Past Surgical History:   Procedure Laterality Date     COLONOSCOPY       CYSTOSCOPY, BIOPSY BLADDER, COMBINED  1/6/2014    Procedure: COMBINED CYSTOSCOPY, BIOPSY BLADDER;;  Surgeon: Vandana Tang MD;  Location: [facility] OR     CYSTOSCOPY, INJECT COLLAGEN, COMBINED  1/6/2014    Procedure: COMBINED CYSTOSCOPY, INJECT BULKING AGENT;  IC cocktail, bladder biopsy, fulguration, heparin, gentamyacin, lidocaine & kenalog;  Surgeon: Vandana Tang MD;  Location: [facility] OR     GENITOURINARY SURGERY       NO HISTORY OF SURGERY         Allergies   Allergen Reactions     Contrast Dye      Burning, hematuria     Diatrizoate Other (See Comments)     Feels burning inside body     Dogs Unknown     Throat started to close up.      Sulfa Drugs Unknown     Bupropion Anxiety     Patient reports increased anxiety, panic, difficulty concentrating, and various aches and pains       Social History     Tobacco Use     Smoking status: Never Smoker     Smokeless tobacco: Never Used   Substance Use Topics     Alcohol use: No       Family History   Problem Relation Age of Onset     Cancer Father         colon?     Heart Disease Father      Asthma Mother      Alzheimer Disease Mother 86     Unknown/Adopted Maternal Grandmother      Unknown/Adopted Maternal Grandfather      Unknown/Adopted Paternal Grandmother      Unknown/Adopted Paternal Grandfather      Asthma Sister      Allergies  Sister      Unknown/Adopted Son      Diabetes No family hx of      Home Medications  Plavix  Aricept  Lisinopril   Antivert  Remeron  Prilosec  Miralax     ROS: 10 point ROS neg other than the symptoms noted above in the HPI.    Vitals:    BP (!) 195/140   Pulse 73   Resp 9   SpO2 99%   There is no height or weight on file to calculate BMI.  No intake/output data recorded.    Exam:    Pt examined in ED03. Pt appears comfortable and in no apparent distress, moving all extremities.     Head: Normocephalic, without obvious abnormality, atraumatic, no facial asymmetry.   Eyes: conjunctivae/corneas clear. PERRL, EOM's intact.   Throat: lips, mucosa, and tongue normal; teeth and gums normal.   Neck: supple, symmetrical, trachea midline, no adenopathy and thyroid: not enlarged, symmetric, no tenderness/mass/nodules.   Lungs: clear to auscultation bilaterally.   Heart: regular rate and rhythm.   Abdomen: soft, non-tender; bowel sounds normal; no masses, no organomegaly.   Pulses: 2+ and symmetric.   Skin: Skin color, texture, turgor normal. No rashes or lesions.     Alert and oriented to date and time. Able to recall the current president of [place].   CN II: Able to read nametag, VF full with gross confrontation.   CN III,IV,VI: MARCELLUS, Extraocular movements full, absent for nystagmus, absent for ptosis.   CN V: Full sensation in V1,V2,V3, jaw clench symmetrical.   CN VII: Face symmetrical and with equal strength, able to puff cheeks out.   CN VIII: Able to hear conversation.   CN IX: Able to push tongue against bilateral cheeks.   CN X: Palate elevates symmetrically, uvula midline.   CN XI: Elevate shoulders symmetrical, full strength when turning head from side to side.   CNXII: Tongue protrudes midline, absent for fasciculation.   Able to name 3/3 objects.   Finger to nose slow and accurate.   Rapid hand movements intact.   Absent for upper and lower extremity drift.     GCS:   Eye: eyes open spontaneously (4)    Motor: obeys commands (6)   Verbal: oriented (5)   Composite score: 15     Bilateral upper extremities 5/5. Bilateral bicep and triceps reflexes 2/4. Sensation intact throughout. Normal ROM.   Bilateral lower extremities 5/5. Normal sensation t/o bilaterally. Bilateral patellar 2/4 and achilles reflex 1/4.   Calves soft and non-tender.     Imaging: CT HEAD W/O CONTRAST  Impression per radiology read - I have personally reviewed the images with the patient.    1.  There is small volume, presumed posttraumatic subarachnoid hemorrhage in several locations as outlined above.  2.  There is extensive soft tissue swelling over the left side of the calvarium without calvarial fracture.  3.  Small chronic infarct left occipital lobe and right parietal lobe.  4.  Underlying mild to moderate age-related changes.    CT CERVICAL SPINE W/O CONTRAST  1.  No CT evidence for acute fracture or post traumatic subluxation.  2.  Degenerative changes as highlighted above.    Available labs at time of consult:       Recent Labs   Lab 06/19/20 2007   WBC 7.6   HGB 12.1   HCT 35.4   MCV 93        Recent Labs   Lab 06/19/20 2007   WBC 7.6   HGB 12.1   HCT 35.4   MCV 93        Recent Labs   Lab 06/19/20 2007   HGB 12.1     No results for input(s): INR in the last 168 hours.  Recent Labs   Lab 06/19/20 2007        Recent Labs   Lab 06/19/20 2007   WBC 7.6         Respectfully,    DENA Mathews, KYE  [facility] Neurosurgery  [facility]     Tel: [phone]  Pager: [phone]    All imaging, physical findings, and the above plan have been reviewed with Dr. Rodríguez.

## 2020-06-20 NOTE — PLAN OF CARE
Pt A&O X4.  C/o H.A. X1 PRN dilaudid given effective.  Pure wick in place, BM X1.  Nicardipine to keep less 140-160.  SBP kept 120 range.  Neuro checks intact.  Repeat Head CT this a.m. completed.

## 2020-06-20 NOTE — ED NOTES
Bed: ED03  Expected date: 6/19/20  Expected time: 7:14 PM  Means of arrival: Ambulance  Comments:  451 75f fall, head injury ETA 1919

## 2020-06-20 NOTE — PROGRESS NOTES
Pt prepped and transferred to planned repeat CT of head.  2lNC and monitor in place along with Flying squad RN and RONAL.

## 2020-06-20 NOTE — PROGRESS NOTES
Neurosurgery Progress     Dx:     Posttraumatic subarachnoid hemorrhages - stable imaging.    Neuro stable.     TODAY'S PLAN:     From our perspective, Ms. Herbert may transfer out of the ICU to the 7th floor once the NCC and Medical teams feel that it is appropriate.      In the event that patient's symptoms worsen or change we would appreciate being contacted. ________________________________________________________________     Ms. Herbert overall feels well and denies any significant discomfort.     /59   Pulse 62   Temp 99.1  F (37.3  C) (Oral)   Resp 24   Wt 61.1 kg (134 lb 11.2 oz)   SpO2 94%   BMI 26.31 kg/m       Pt in bed. Appears comfortable and in no apparent distress, moving all extremities.   Dementia  CN II-XII intact, alert and appropriate with conversation and following commands.   Bilateral upper and lower extremities with appropriate strength.   Calves soft and non-tender bilaterally.     All pertinent labs and updated imaging reviewed in [facility].     CT HEAD WITHOUT CONTRAST - 06/20/2020, 5:21 AM  1.  Stable multifocal small volume presumed posttraumatic subarachnoid hemorrhage.  2.  There is questionably a new trace amount of subdural hemorrhage along the anterior interhemispheric falx.    Reza Crocker PA-C   Neurosurgery   [phone] (P)     Reviewed with Dr. Rodríguez.

## 2020-06-20 NOTE — PLAN OF CARE
Sleepy but arouse to voice, upon awakening she questions where she is, reoriented to time and place no recall of event, MARCELLUS, moving all extremities equally , slight drift with right arm on 1230 exam, up to bedside commode to void, up with the assist of 1, steady on feet , impulsive not using call when needs to void

## 2020-06-20 NOTE — ED PROVIDER NOTES
History     Chief Complaint:  Fall    The history is provided by the patient and the EMS personnel. History limited by: dementia.      Anabelle Herbert is a 75 year old female with history of transient cerebral ischemia, CVA, memory loss, cognitive complaints amongst others as noted below, currently on Plavix, who presents alone via EMS for evaluation of a fall and associated head injury.  She apparently was pulling her grandchild in a wagon when she tripped and fell.  She apparently hit her head and she is on Plavix.  There was transient loss of consciousness according to the daughter of possibly up to 4 5 minutes.  She is brought in for further evaluation.  The only complaint she has is that she has pain in her vagina.  She said she feels like she needs to go to the bathroom and have a bowel movement.  No other associated signs or symptoms.  History is limited due to the patient's dementia.    I talked with her daughter who states that she saw her fall but then had to go around the car to get to her and could not tell if the wagon handle could have injured her in the groin area.  She also states that she had a UTI this month and was treated with antibiotics for 10 days but has been off them for a while.  She has complained chronically of some discomfort urinating.    Allergies:  Contrast dye  Diatrizoate  Sulfa drugs  Biupropion     Medications:    Plavix  Aricept  Lisinopril   Antivert  Remeron  Prilosec  Miralax     Past Medical History:    Diplopia  Hypertension  Memory loss  Hypercalcemia  Cerebral artery occlusion with cerebral infarction  SHANEKA  Osteoporosis  Cognitive complaints   Transient cerebral ischemia  Carotid Stenosis   GERD  CKD, stage II  Hyperlipidemia  Adjustment reaction with anxiety and depression   Chronic cystitis     Past Surgical History:    Colonoscopy  Cystoscopy, biopsy bladder, combined  Cytoscopy, insert collagen, combined  Genitourinary surgery     Family History:    Father - cancer,  heart disease  Mother - asthma, Alzheimer disease  Sister(s) - asthma, allergies    Social History:  The patient was accompanied to the ED by EMS.  Smoking Status: Never  Smokeless Tobacco: Never  Alcohol Use: No  Drug Use: No   Marital Status:  Single 1    Review of Systems   Unable to perform ROS: Dementia   Genitourinary: Positive for pelvic pain.       Physical Exam     Patient Vitals for the past 24 hrs:   BP Pulse Heart Rate Resp SpO2   06/19/20 2100 (!) 195/140 73 80 9 99 %   06/19/20 2030 (!) 196/117 80 73 9 100 %   06/19/20 2000 (!) 188/115 -- 86 13 98 %   06/19/20 1939 (!) 220/134 120 120 20 99 %       Physical Exam  Nursing note and vitals reviewed.    Constitutional:  Patient seems agitated, in a cervical collar, complaining of pain in her vagina.  HENT:     She has a hematoma over the left temporal and parietal scalp with some swelling and tenderness.  No back sign, no hemotympanum.  Eyes:    Conjunctivae are normal without injection.     Pupils are equal.  Cardiovascular:  Normal rate, regular rhythm with normal S1 and S2.      Normal heart sounds and peripheral pulses 2+ and equal.       No murmur or ousmane.  Pulmonary:  Effort normal and breath sounds clear to auscultation bilaterally.     No respiratory distress.  No stridor.     No wheezes. No rales.   No chest wall tenderness.  GI:    Soft. No distension and no mass. No tenderness.      No rebound and no guarding.   :   General area appears normal without evidence of bruising or trauma.  There is no blood at the genital opening.  No blood around the perianal area.  Musculoskeletal:  Normal range of motion. No extremity deformity.     No edema and no tenderness.    Neurological:   Alert and oriented to name and address.  She knew she was at [facility].     Exhibits good muscle tone. Coordination normal.      GCS eye subscore is 4. GCS verbal subscore is 5.      GCS motor subscore is 6.   Skin:    Skin is warm and dry. No rash noted.   Bruising over the left parietal temporal scalp.  Psychiatric:   Patient is somewhat agitated complaining of pain in her vagina.  She does have dementia.  She is oriented and appropriate to simple questions.      Emergency Department Course     Imaging:    Abd/pelvis CT,  IV  contrast only TRAUMA / AAA   Preliminary Result   IMPRESSION:    1.  There is thickening of the anterior urinary bladder wall and associated inflammatory change. This could be cystitis or malignancy.   2.  No bowel obstruction or inflammation. There are colonic diverticula without acute diverticulitis.         Cervical spine CT w/o contrast   Final Result   IMPRESSION:   HEAD CT:   1.  There is small volume, presumed posttraumatic subarachnoid hemorrhage in several locations as outlined above.   2.  There is extensive soft tissue swelling over the left side of the calvarium without calvarial fracture.   3.  Small chronic infarct left occipital lobe and right parietal lobe.   4.  Underlying mild to moderate age-related changes.      CERVICAL SPINE CT:   1.  No CT evidence for acute fracture or post traumatic subluxation.   2.  Degenerative changes as highlighted above.      [Critical Result: Multifocal small volume presumed posttraumatic subarachnoid hemorrhage.]      Finding was identified on 6/19/2020 10:08 PM.       Dr. Hamida Vega was contacted by me on 6/19/2020 10:18 PM and verbalized understanding of the critical result.       CT Head w/o Contrast   Final Result   IMPRESSION:   HEAD CT:   1.  There is small volume, presumed posttraumatic subarachnoid hemorrhage in several locations as outlined above.   2.  There is extensive soft tissue swelling over the left side of the calvarium without calvarial fracture.   3.  Small chronic infarct left occipital lobe and right parietal lobe.   4.  Underlying mild to moderate age-related changes.      CERVICAL SPINE CT:   1.  No CT evidence for acute fracture or post traumatic subluxation.   2.   Degenerative changes as highlighted above.      [Critical Result: Multifocal small volume presumed posttraumatic subarachnoid hemorrhage.]      Finding was identified on 6/19/2020 10:08 PM.       Dr. Hamida Vega was contacted by me on 6/19/2020 10:18 PM and verbalized understanding of the critical result.           Laboratory:  Laboratory findings were communicated with the patient who voiced understanding of the findings.    CBC: AWNL (WBC 7.6, HGB 12.1, )  BMP: Glucose 113 (H), GFR Estimate 60 (L) o/w WNL (Creatinine 0.94)    UA reflex to Microscopic and Culture: pending    Interventions:  2005 Morphine 2 mg IV  2011 Morphine 2 mg IV    Medications   niCARdipine 40 mg in 200 mL 0.9% NaCl (CARDENE) infusion (has no administration in time range)   morphine (PF) injection 4 mg (2 mg Intravenous Given 6/19/20 2005)   iopamidol (ISOVUE-370) solution 65 mL (65 mLs Intravenous Given 6/19/20 2205)   Saline Flush (60 mLs Intravenous Given 6/19/20 2205)   labetalol (NORMODYNE/TRANDATE) injection 20 mg (20 mg Intravenous Given 6/19/20 2243)       Emergency Department Course:  Past medical records, nursing notes, and vitals reviewed.    (1950)   I performed an exam of the patient as documented above. History obtained from patient, though limited secondary to dementia. Additional history obtained from EMS personnel.    IV was inserted and blood was drawn for laboratory testing, results above.    The patient provided a urine sample here in the emergency department. This was sent for laboratory testing, findings above.    The patient was sent for a Abd/pelvis CT IV contrast, CT Head w/o Contrast, Cervical Spine CT w/o Contrast while in the emergency department, results above.      (2203)   I spoke with Douglas Crocker PA-C of the Neurosurgery service regarding patient's presentation, findings, and plan of care.      2218  Dr Hutchinson from [facility] called with CT of head and c-spine findings.    (2233)   I spoke  with Dr. Rm of the Neurocritical service regarding patient's presentation, findings, and plan of care.      (2238)   I rechecked the patient's daughter and discussed the results of her workup thus far.    (2235)   I spoke with Dr. Herbert of the Hospitalist service regarding patient's presentation, findings, and plan of care, who agrees to accept patient for further care, evaluation and monitoring.     I personally reviewed the laboratory and imaging results with the Patient and answered all related questions prior to admission.     Impression & Plan     Covid-19  Anabelle Herbert was evaluated during a global COVID-19 pandemic, which necessitated consideration that the patient might be at risk for infection with the SARS-CoV-2 virus that causes COVID-19.   Applicable protocols for evaluation were followed during the patient's care.   COVID-19 was considered as part of the patient's evaluation. The plan for testing is: a test was obtained during this visit.    Medical Decision Making:  Patient comes in after falling pulling a wagon.  A c-collar was placed and she had a large contusion left side of her head.  She was complaining of pain in her vagina but I could not see any evidence of trauma.  She had no bruising, no bleeding.  She had a bowel movement which look normal.  I sent her down for a head and neck CT and ordered blood work.  I also ordered a abdominal and pelvic CT with contrast.  I talked to her daughter and she says she does not remember her having any serious problems with CT contrast.  The head CT shows small subarachnoid hemorrhage from the trauma and C-spine is negative.  Her basic metabolic panel is normal other than a borderline GFR of 60, CBC is normal with a hemoglobin of 12.1.  Abdominal CT with contrast shows inflammation of the anterior bladder wall but otherwise normal.  Her blood pressure is running fairly high and after I learned of the subarachnoid hemorrhage, I have ordered 20 mg  of labetalol IV and then to start a nicardipine drip to keep systolic blood pressure around 150.  She has not shown any signs of a change in mental status or seizures.  I talked with the neuro critical care physician who agreed with the plan of the blood pressure control and getting her to the ICU.  These are small subarachnoid hemorrhages and will hold on Keppra at this time.  Dr. Herbert from the hospital service will be admitting the patient.  I have ordered a catheterized urine specimen and will treat if positive for infection.  Otherwise based upon the CT report questioning possible malignancy in the bladder, would consider urology consultation.  I have ordered an asymptomatic COVID swab.  I did call the daughter and updated her.    Critical care time minus procedures: 70 minutes        Diagnosis:    ICD-10-CM    1. Traumatic subarachnoid hemorrhage with loss of consciousness of 30 minutes or less, initial encounter (H)  S06.6X1A    2. Fall, initial encounter  W19.XXXA    3. Bladder wall thickening  N32.89    4. Dementia without behavioral disturbance, unspecified dementia type (H)  F03.90        Disposition:  Admitted to the ICU to Dr. Rik Hrebert.  Neuro critical care consultation, keep blood pressure around 150 on a nicardipine drip.  Repeat head CT in the morning.  Awaiting urine and consideration of urology consultation.      Scribe Disclosure:  I, Tegan Velasco, am serving as a scribe at 7:37 PM on 6/19/2020 to document services personally performed by Hamida Vega MD based on my observations and the provider's statements to me.   6/19/2020    EMERGENCY DEPARTMENT       Hamida Vega MD  06/19/20 3870

## 2020-06-21 LAB
ALBUMIN SERPL-MCNC: 3.1 G/DL (ref 3.4–5)
ALP SERPL-CCNC: 47 U/L (ref 40–150)
ALT SERPL W P-5'-P-CCNC: 33 U/L (ref 0–50)
ANION GAP SERPL CALCULATED.3IONS-SCNC: 4 MMOL/L (ref 3–14)
AST SERPL W P-5'-P-CCNC: 37 U/L (ref 0–45)
BASOPHILS # BLD AUTO: 0 10E9/L (ref 0–0.2)
BASOPHILS NFR BLD AUTO: 0.6 %
BILIRUB SERPL-MCNC: 0.5 MG/DL (ref 0.2–1.3)
BUN SERPL-MCNC: 16 MG/DL (ref 7–30)
CALCIUM SERPL-MCNC: 8.7 MG/DL (ref 8.5–10.1)
CHLORIDE SERPL-SCNC: 113 MMOL/L (ref 94–109)
CO2 SERPL-SCNC: 24 MMOL/L (ref 20–32)
CREAT SERPL-MCNC: 0.91 MG/DL (ref 0.52–1.04)
DIFFERENTIAL METHOD BLD: ABNORMAL
EOSINOPHIL # BLD AUTO: 0.2 10E9/L (ref 0–0.7)
EOSINOPHIL NFR BLD AUTO: 3.1 %
ERYTHROCYTE [DISTWIDTH] IN BLOOD BY AUTOMATED COUNT: 12.2 % (ref 10–15)
GFR SERPL CREATININE-BSD FRML MDRD: 62 ML/MIN/{1.73_M2}
GLUCOSE SERPL-MCNC: 90 MG/DL (ref 70–99)
HCT VFR BLD AUTO: 33.4 % (ref 35–47)
HGB BLD-MCNC: 11.2 G/DL (ref 11.7–15.7)
IMM GRANULOCYTES # BLD: 0 10E9/L (ref 0–0.4)
IMM GRANULOCYTES NFR BLD: 0.2 %
LYMPHOCYTES # BLD AUTO: 1.7 10E9/L (ref 0.8–5.3)
LYMPHOCYTES NFR BLD AUTO: 31.8 %
MCH RBC QN AUTO: 31.7 PG (ref 26.5–33)
MCHC RBC AUTO-ENTMCNC: 33.5 G/DL (ref 31.5–36.5)
MCV RBC AUTO: 95 FL (ref 78–100)
MONOCYTES # BLD AUTO: 0.5 10E9/L (ref 0–1.3)
MONOCYTES NFR BLD AUTO: 9.6 %
NEUTROPHILS # BLD AUTO: 3 10E9/L (ref 1.6–8.3)
NEUTROPHILS NFR BLD AUTO: 54.7 %
NRBC # BLD AUTO: 0 10*3/UL
NRBC BLD AUTO-RTO: 0 /100
PLATELET # BLD AUTO: 197 10E9/L (ref 150–450)
POTASSIUM SERPL-SCNC: 4.1 MMOL/L (ref 3.4–5.3)
PROT SERPL-MCNC: 6.3 G/DL (ref 6.8–8.8)
RBC # BLD AUTO: 3.53 10E12/L (ref 3.8–5.2)
SODIUM SERPL-SCNC: 141 MMOL/L (ref 133–144)
WBC # BLD AUTO: 5.4 10E9/L (ref 4–11)

## 2020-06-21 PROCEDURE — 36415 COLL VENOUS BLD VENIPUNCTURE: CPT | Performed by: INTERNAL MEDICINE

## 2020-06-21 PROCEDURE — 99207 ZZC MOONLIGHTING INDICATOR: CPT | Performed by: INTERNAL MEDICINE

## 2020-06-21 PROCEDURE — 85025 COMPLETE CBC W/AUTO DIFF WBC: CPT | Performed by: INTERNAL MEDICINE

## 2020-06-21 PROCEDURE — 25800030 ZZH RX IP 258 OP 636: Performed by: INTERNAL MEDICINE

## 2020-06-21 PROCEDURE — 99233 SBSQ HOSP IP/OBS HIGH 50: CPT | Performed by: INTERNAL MEDICINE

## 2020-06-21 PROCEDURE — 80053 COMPREHEN METABOLIC PANEL: CPT | Performed by: INTERNAL MEDICINE

## 2020-06-21 PROCEDURE — 25000132 ZZH RX MED GY IP 250 OP 250 PS 637: Performed by: INTERNAL MEDICINE

## 2020-06-21 PROCEDURE — 12000000 ZZH R&B MED SURG/OB

## 2020-06-21 RX ORDER — LISINOPRIL 20 MG/1
20 TABLET ORAL DAILY
Status: DISCONTINUED | OUTPATIENT
Start: 2020-06-22 | End: 2020-06-22

## 2020-06-21 RX ADMIN — OMEPRAZOLE 20 MG: 20 CAPSULE, DELAYED RELEASE ORAL at 08:36

## 2020-06-21 RX ADMIN — MULTIPLE VITAMINS W/ MINERALS TAB 1 TABLET: TAB at 08:36

## 2020-06-21 RX ADMIN — DONEPEZIL HYDROCHLORIDE 5 MG: 5 TABLET ORAL at 20:47

## 2020-06-21 RX ADMIN — CHOLECALCIFEROL TAB 125 MCG (5000 UNIT) 125 MCG: 125 TAB at 08:36

## 2020-06-21 RX ADMIN — SODIUM CHLORIDE: 9 INJECTION, SOLUTION INTRAVENOUS at 01:10

## 2020-06-21 RX ADMIN — POLYETHYLENE GLYCOL 3350 17 G: 17 POWDER, FOR SOLUTION ORAL at 08:36

## 2020-06-21 RX ADMIN — MIRTAZAPINE 7.5 MG: 7.5 TABLET, FILM COATED ORAL at 20:47

## 2020-06-21 ASSESSMENT — ACTIVITIES OF DAILY LIVING (ADL)
ADLS_ACUITY_SCORE: 13
ADLS_ACUITY_SCORE: 11
ADLS_ACUITY_SCORE: 11
ADLS_ACUITY_SCORE: 13
ADLS_ACUITY_SCORE: 11
ADLS_ACUITY_SCORE: 13

## 2020-06-21 NOTE — PROGRESS NOTES
[facility]    Hospitalist Progress Note    Assessment & Plan   Anabelle Herbert is a 75 year old female who presents with fall.  Admitted for further evaluation and treatment.     Fall with intracranial hemorrhage: Patient with small volume presumed posttraumatic subarachnoid hemorrhage in several locations, extensive soft tissue swelling over the left side of the calvarium without calvarial fracture, small chronic infarct left occipital lobe and right parietal lobe, underlying mild to moderate age-related changes, no CT evidence for acute fracture of the cervical spine, degenerative changes are present, see report for further details.  Electrolytes in the emergency department are normal range with a creatinine of 0.94.  White blood cell count is 7.6 with a hemoglobin of 12.1.  Patient with a history of memory loss and cerebrovascular accident with maintenance on Plavix, presenting with head injury.  Patient was reported to be on her grandchild in a wagon when she tripped and fell striking her head.  Patient is on Plavix.  Patient does have a report of loss of consciousness according to her daughter for up to 4 to 5 minutes.  Patient states that she has had abdominal and bladder discomfort. Patient does have a history of memory impairment and therefore history of present illness and review of systems may be incomplete.  -Blood pressure control.  -Neurology consulted. Have signed off.   -Neurosurgery consulted. Will have her follow up with an updated head CT in one month.   -Close neurologic monitoring.  -Gentle IV fluids.  -Close hemodynamic monitoring.  -Hold prior to admission Plavix. Plavix reversal decision deferred to Neurosurgery and Neurocritical care team.      Thickening of the anterior urinary bladder: Patient with CT showing inflammatory changes and thickening of the anterior urinary bladder, cystitis or malignancy included in the differential, see report for further details.  -urology  consultation, have signed off.      Dementia: Stable.  -Continue prior to admission donepezil when verified by pharmacy.     Cardiac, hypertension, history of cerebral artery occlusion with cerebral infarction: Patient previously maintained on lisinopril.  -Continue prior to admission lisinopril when verified by pharmacy.  -Hold prior to admission Plavix.     Anxiety, depression: Stable.  -Continue prior to admission mirtazapine when verified by pharmacy.     GERD: Stable.  -Continue prior to admission omeprazole when verified by pharmacy.     DVT Prophylaxis: Pneumatic Compression Devices  Code Status: Full Code     Disposition: 2+ days.       Gregory Martinez MD   Text Page (7am to 6pm)    Interval History   Off nicardapine. Posttraumatic subarachnoid hemorrhages - stable imaging. Neuro exams stable.     -Data reviewed today: I reviewed all new labs and imaging results over the last 24 hours. I personally reviewed the EKG tracing showing NSR.    Physical Exam   Temp: 98.2  F (36.8  C) Temp src: Oral BP: 119/61 Pulse: 89 Heart Rate: 71 Resp: 16 SpO2: 96 % O2 Device: None (Room air)    Vitals:    06/20/20 0055   Weight: 61.1 kg (134 lb 11.2 oz)     Vital Signs with Ranges  Temp:  [97.6  F (36.4  C)-98.4  F (36.9  C)] 98.2  F (36.8  C)  Pulse:  [60-89] 89  Heart Rate:  [48-91] 71  Resp:  [9-24] 16  BP: (104-145)/(53-78) 119/61  SpO2:  [93 %-99 %] 96 %  I/O last 3 completed shifts:  In: 1572.5 [P.O.:660; I.V.:912.5]  Out: 260 [Urine:260]    GENERAL: Alert and confused. NAD. Conversational, appropriate.   HEENT: Normocephalic. EOMI. No icterus or injection. Nares normal.  Swelling to scalp, no bony step-off  LUNGS: Clear to auscultation. No dyspnea at rest.   HEART: Regular rate. Extremities perfused.   ABDOMEN: Soft, nontender, and nondistended. Positive bowel sounds.   EXTREMITIES: No LE edema noted.   NEUROLOGIC: Moves extremities x4 on command. No acute focal neurologic abnormalities noted.     Medications     sodium  chloride 75 mL/hr at 06/21/20 0110       donepezil  5 mg Oral Daily     mirtazapine  7.5 mg Oral At Bedtime     multivitamin w/minerals  1 tablet Oral Daily     omeprazole  20 mg Oral Daily     polyethylene glycol  17 g Oral Daily     sodium chloride (PF)  3 mL Intracatheter Q8H     Vitamin D3  125 mcg Oral Daily       Data   Recent Labs   Lab 06/21/20  0727 06/20/20  0451 06/19/20 2007   WBC 5.4 8.2 7.6   HGB 11.2* 12.1 12.1   MCV 95 94 93    217 209    137 138   POTASSIUM 4.1 4.0 3.4   CHLORIDE 113* 107 108   CO2 24 26 25   BUN 16 23 29   CR 0.91 0.84 0.94   ANIONGAP 4 4 5   ESTEE 8.7 8.9 9.6   GLC 90 131* 113*   ALBUMIN 3.1*  --   --    PROTTOTAL 6.3*  --   --    BILITOTAL 0.5  --   --    ALKPHOS 47  --   --    ALT 33  --   --    AST 37  --   --        Imaging:   No results found for this or any previous visit (from the past 24 hour(s)).

## 2020-06-21 NOTE — PROGRESS NOTES
Neurosurgery Progress     Dx:     Posttraumatic subarachnoid hemorrhages - stable imaging.    Neuro stable.     TODAY'S PLAN:     Ms. Herbert is doing well this morning. From our perspective she may discharge when the Medical team feels that it is appropriate. Sounds like she is currently under the care of Urology as well for inflammatory changes and thickening of the anterior urinary bladder - have signed off. We will have her follow up with an updated head CT in one month.     In the event that patient's symptoms worsen or change we would appreciate being contacted.     /76   Pulse 89   Temp 98.4  F (36.9  C) (Oral)   Resp 16   Wt 61.1 kg (134 lb 11.2 oz)   SpO2 94%   BMI 26.31 kg/m       Pt in bed. Appears comfortable and in no apparent distress, moving all extremities.   Dementia.  CN II-XII intact, alert and appropriate with conversation and following commands.   Bilateral upper and lower extremities with appropriate strength.   Acceptable ROM.   Calves soft and non-tender bilaterally.     All pertinent labs and updated imaging reviewed in EPIC.     Reza Crocker PA-C   Neurosurgery   [phone] (P)     Reviewed with Dr. Rodríguez.

## 2020-06-21 NOTE — PROGRESS NOTES
MD Notification    Notified Person: MD    Notified Person Name: Michelle    Notification Date/Time: 6/21/20 3827    Notification Interaction: Paged    Purpose of Notification: Pt lives alone and has been unsteady/ambulating with assist here. Would PT/OT consults be appropriate?    Orders Received: PT/OT/SW consult placed.     Comments:

## 2020-06-21 NOTE — PLAN OF CARE
Pt here with  traumatic SAH. Disoriented to situation. STM, forgetful, otherwise Neuros intact. VSS, SBP < 150. Tele SB. Regular diet with thin liquids. Up with SBA/GB. Swelling and scab to left eyebrow.  Denies pain. Pt scoring green on the Aggression Stop Light Tool. Plan for Urology consult.

## 2020-06-21 NOTE — PLAN OF CARE
Pt here with fall with intracranial hemorrhage. Disoriented to place and situation. Forgetful. Hx of dementia and short term memory loss. HR sometimes rajeev. Left lower eyelid with bruise and swollen from fall intact. Abrasion on left eyebrow and left last small finger open to air CDI. Tele SB. Regular diet with thin liquids. Takes pills whole. HOB 30 degrees all time. Up with 1 with belt to use the bathroom. Continent of bladder. Last PVR done was 39. Denies pain. Pt scoring yellow on the Aggression Stop Light Tool. Alarm on zone 2. Plan urology consult. Discharge pending.

## 2020-06-21 NOTE — PLAN OF CARE
A&O x3-4. Short term memory. VSS on RA. Denies pain. CMS intact. Neuro intact-forgetfulness. HOB must be greater than 30 degrees. Up A1 with gait belt. Regular diet. Tolerating well. PIV SL. LS clear. Tele SB. BS active, + Flatus. BM this shift. Voiding adequately in BR. Progressing toward plan of care. Discharge pending therapy assessment.

## 2020-06-22 ENCOUNTER — APPOINTMENT (OUTPATIENT)
Dept: CT IMAGING | Facility: CLINIC | Age: 75
DRG: 087 | End: 2020-06-22
Attending: INTERNAL MEDICINE
Payer: COMMERCIAL

## 2020-06-22 ENCOUNTER — APPOINTMENT (OUTPATIENT)
Dept: PHYSICAL THERAPY | Facility: CLINIC | Age: 75
DRG: 087 | End: 2020-06-22
Attending: INTERNAL MEDICINE
Payer: COMMERCIAL

## 2020-06-22 DIAGNOSIS — I62.9 INTRACRANIAL HEMORRHAGE (H): Primary | ICD-10-CM

## 2020-06-22 LAB
ALBUMIN SERPL-MCNC: 3.1 G/DL (ref 3.4–5)
ALP SERPL-CCNC: 47 U/L (ref 40–150)
ALT SERPL W P-5'-P-CCNC: 30 U/L (ref 0–50)
ANION GAP SERPL CALCULATED.3IONS-SCNC: 4 MMOL/L (ref 3–14)
AST SERPL W P-5'-P-CCNC: 30 U/L (ref 0–45)
BASOPHILS # BLD AUTO: 0 10E9/L (ref 0–0.2)
BASOPHILS NFR BLD AUTO: 0.8 %
BILIRUB SERPL-MCNC: 0.5 MG/DL (ref 0.2–1.3)
BUN SERPL-MCNC: 22 MG/DL (ref 7–30)
CALCIUM SERPL-MCNC: 9 MG/DL (ref 8.5–10.1)
CHLORIDE SERPL-SCNC: 107 MMOL/L (ref 94–109)
CO2 SERPL-SCNC: 27 MMOL/L (ref 20–32)
CREAT SERPL-MCNC: 0.88 MG/DL (ref 0.52–1.04)
DIFFERENTIAL METHOD BLD: ABNORMAL
EOSINOPHIL # BLD AUTO: 0.3 10E9/L (ref 0–0.7)
EOSINOPHIL NFR BLD AUTO: 4.7 %
ERYTHROCYTE [DISTWIDTH] IN BLOOD BY AUTOMATED COUNT: 12.1 % (ref 10–15)
GFR SERPL CREATININE-BSD FRML MDRD: 65 ML/MIN/{1.73_M2}
GLUCOSE SERPL-MCNC: 89 MG/DL (ref 70–99)
HCT VFR BLD AUTO: 33.8 % (ref 35–47)
HGB BLD-MCNC: 11.5 G/DL (ref 11.7–15.7)
IMM GRANULOCYTES # BLD: 0 10E9/L (ref 0–0.4)
IMM GRANULOCYTES NFR BLD: 0 %
LYMPHOCYTES # BLD AUTO: 1.4 10E9/L (ref 0.8–5.3)
LYMPHOCYTES NFR BLD AUTO: 27.1 %
MCH RBC QN AUTO: 32 PG (ref 26.5–33)
MCHC RBC AUTO-ENTMCNC: 34 G/DL (ref 31.5–36.5)
MCV RBC AUTO: 94 FL (ref 78–100)
MONOCYTES # BLD AUTO: 0.6 10E9/L (ref 0–1.3)
MONOCYTES NFR BLD AUTO: 11.4 %
NEUTROPHILS # BLD AUTO: 3 10E9/L (ref 1.6–8.3)
NEUTROPHILS NFR BLD AUTO: 56 %
NRBC # BLD AUTO: 0 10*3/UL
NRBC BLD AUTO-RTO: 0 /100
PLATELET # BLD AUTO: 190 10E9/L (ref 150–450)
POTASSIUM SERPL-SCNC: 4.1 MMOL/L (ref 3.4–5.3)
PROT SERPL-MCNC: 6.3 G/DL (ref 6.8–8.8)
RBC # BLD AUTO: 3.59 10E12/L (ref 3.8–5.2)
SODIUM SERPL-SCNC: 138 MMOL/L (ref 133–144)
WBC # BLD AUTO: 5.3 10E9/L (ref 4–11)

## 2020-06-22 PROCEDURE — 85025 COMPLETE CBC W/AUTO DIFF WBC: CPT | Performed by: INTERNAL MEDICINE

## 2020-06-22 PROCEDURE — 99232 SBSQ HOSP IP/OBS MODERATE 35: CPT | Performed by: INTERNAL MEDICINE

## 2020-06-22 PROCEDURE — 99231 SBSQ HOSP IP/OBS SF/LOW 25: CPT | Performed by: PHYSICIAN ASSISTANT

## 2020-06-22 PROCEDURE — 25000132 ZZH RX MED GY IP 250 OP 250 PS 637: Performed by: INTERNAL MEDICINE

## 2020-06-22 PROCEDURE — 70450 CT HEAD/BRAIN W/O DYE: CPT

## 2020-06-22 PROCEDURE — 80053 COMPREHEN METABOLIC PANEL: CPT | Performed by: INTERNAL MEDICINE

## 2020-06-22 PROCEDURE — 97116 GAIT TRAINING THERAPY: CPT | Mod: GP

## 2020-06-22 PROCEDURE — 12000000 ZZH R&B MED SURG/OB

## 2020-06-22 PROCEDURE — 97530 THERAPEUTIC ACTIVITIES: CPT | Mod: GP

## 2020-06-22 PROCEDURE — 97162 PT EVAL MOD COMPLEX 30 MIN: CPT | Mod: GP

## 2020-06-22 PROCEDURE — 36415 COLL VENOUS BLD VENIPUNCTURE: CPT | Performed by: INTERNAL MEDICINE

## 2020-06-22 RX ORDER — LISINOPRIL 20 MG/1
20 TABLET ORAL 2 TIMES DAILY
Status: DISCONTINUED | OUTPATIENT
Start: 2020-06-22 | End: 2020-06-23 | Stop reason: HOSPADM

## 2020-06-22 RX ADMIN — MULTIPLE VITAMINS W/ MINERALS TAB 1 TABLET: TAB at 08:19

## 2020-06-22 RX ADMIN — OMEPRAZOLE 20 MG: 20 CAPSULE, DELAYED RELEASE ORAL at 08:19

## 2020-06-22 RX ADMIN — LISINOPRIL 20 MG: 20 TABLET ORAL at 21:27

## 2020-06-22 RX ADMIN — DONEPEZIL HYDROCHLORIDE 5 MG: 5 TABLET ORAL at 21:27

## 2020-06-22 RX ADMIN — POLYETHYLENE GLYCOL 3350 17 G: 17 POWDER, FOR SOLUTION ORAL at 08:19

## 2020-06-22 RX ADMIN — CHOLECALCIFEROL TAB 125 MCG (5000 UNIT) 125 MCG: 125 TAB at 08:20

## 2020-06-22 RX ADMIN — LISINOPRIL 20 MG: 20 TABLET ORAL at 08:19

## 2020-06-22 RX ADMIN — MIRTAZAPINE 7.5 MG: 7.5 TABLET, FILM COATED ORAL at 21:27

## 2020-06-22 ASSESSMENT — ACTIVITIES OF DAILY LIVING (ADL)
ADLS_ACUITY_SCORE: 13

## 2020-06-22 NOTE — CONSULTS
Discussed possibility of HHPT ordered at discharge for patient with patient's daughter Ratna. Patient will be staying at daughter's house with 24/7 assist. She wishes to use services from [facility] if ordered. Referral sent to [facility]     NSG follow up & Head CT scheduled

## 2020-06-22 NOTE — PROVIDER NOTIFICATION
"Text sent to Dr. Islas: \"[address] Liu saw this patient and ordered an MRI. I spoke with her daughter who states she has had a right field cut since her occipital stroke in 2017. Would you still like MRI? Please call Lalitha BARRETT [phone]\"    Received return call from Alexa.  MRI cancelled.  "

## 2020-06-22 NOTE — PLAN OF CARE
Discharge Planner PT   Patient plan for discharge: discharge to [facility], per pt dtr will be available 24/7   Current status:    Eval complete, treatment indicated. Time spent monitoring BP, HTN however within parameters. Pt pleasant, forgetful, slightly impulsive. Supine > sit IND. STS with no AD and CGA. Pt assisted into BR with min A x 1 and no AD, pt unsteady and reaching for objects. Able to perform pericares. STS with CGA from elevated toilet. Pt able to wash hands at sink without LOB.       Pt ambulated 60' with no AD, required CGA- min A x 1, unsteady, NBOS, reaching for objects. Very guarded posture. Unable to correct with cues. Trial of FWW, pt demonstrates improved stability and MEI as well as gait speed, CGA  provided, pt ambulated 130' with FWW, limited by fatigue     Pt encouraged to sit up in chair post ambulation however pt declining. Trial STS from low chair with FWW, CGA provided no overt LOB. end of seession sit > supine IND. Pt left supine with alarm on, assist to set up meal tray.     Barriers to return to prior living situation: Level of assist, cognition, balance, fall risk  Recommendations for discharge: discharge to [facility], use of FWW at all times, HHPT  Rationale for recommendations: Predict pt will progress to be safe to discharge to [facility] home with HHPT. Pt qualifies for HHPT as pt currently requires A x 1 and assistive device, limited activity tolerance. Pt lives alone and IND at baseline         Entered by: Naima Swain 06/22/2020 12:03 PM

## 2020-06-22 NOTE — PROGRESS NOTES
06/22/20 1147   Quick Adds   Type of Visit Initial PT Evaluation   Living Environment   Lives With alone   Living Arrangements house   Living Environment Comment Pt lives alone in house, will be staying at dtrs house, unsure number of stairs, reports she will have to do stairs    Self-Care   Usual Activity Tolerance good   Current Activity Tolerance fair   Activity/Exercise/Self-Care Comment IND at baseline   Functional Level Prior   Ambulation 0-->independent   Transferring 0-->independent   Toileting 0-->independent   Bathing 0-->independent   Communication 0-->understands/communicates without difficulty   Prior Functional Level Comment IND at baseline   General Information   Onset of Illness/Injury or Date of Surgery - Date 06/19/20   Referring Physician Gregory Martinez MD    Pertinent History of Current Problem (include personal factors and/or comorbidities that impact the POC) Anabelle Herbert is a 75 year old female who presents with fall.  Admitted for further evaluation and treatment.   Precautions/Limitations fall precautions   Cognitive Status Examination   Orientation orientation to person, place and time   Level of Consciousness alert   Cognitive Comment Pt demonstrates impaired memory, forgetful   Posture    Posture Forward head position   Range of Motion (ROM)   ROM Comment BLE WFL   Strength   Strength Comments BLE > 3/5 strength no focal deficits identified   Bed Mobility   Bed Mobility Comments Supine > sit IND   Transfer Skills   Transfer Comments STS with no AD and CGA   Gait   Gait Comments Pt ambulates with no AD, min A x 1, unsteady, reaching for objects   Balance   Balance Comments Impaired dynamic standing balance   Sensory Examination   Sensory Perception Comments Denies N/T in BLE    General Therapy Interventions   Planned Therapy Interventions balance training;gait training;strengthening;transfer training;neuromuscular re-education   Clinical Impression   Criteria for Skilled Therapeutic  "Intervention yes, treatment indicated   PT Diagnosis impaired IND with functional mobility from baseline   Influenced by the following impairments impaired balance, impaired activity tolerance, functional weakness   Functional limitations due to impairments see above   Clinical Presentation Evolving/Changing   Clinical Presentation Rationale lives alone, co morbidities, clinical judgement   Clinical Decision Making (Complexity) Moderate complexity   Therapy Frequency Daily   Predicted Duration of Therapy Intervention (days/wks) 5 days   Anticipated Discharge Disposition Home with Assist;Home with Home Therapy   Risk & Benefits of therapy have been explained Yes   Patient, Family & other staff in agreement with plan of care Yes   [facility] AM-PAC  \"6 Clicks\" V.2 Basic Mobility Inpatient Short Form   1. Turning from your back to your side while in a flat bed without using bedrails? 4 - None   2. Moving from lying on your back to sitting on the side of a flat bed without using bedrails? 4 - None   3. Moving to and from a bed to a chair (including a wheelchair)? 4 - None   4. Standing up from a chair using your arms (e.g., wheelchair, or bedside chair)? 3 - A Little   5. To walk in hospital room? 3 - A Little   6. Climbing 3-5 steps with a railing? 3 - A Little   Basic Mobility Raw Score (Score out of 24.Lower scores equate to lower levels of function) 21   Total Evaluation Time   Total Evaluation Time (Minutes) 15     "

## 2020-06-22 NOTE — PROGRESS NOTES
[facility]    Hospitalist Progress Note    Assessment & Plan   Anabelle Herbert is a 75 year old female who presents with fall.  Admitted for further evaluation and treatment.     Fall with intracranial hemorrhage: Patient with small volume presumed posttraumatic subarachnoid hemorrhage in several locations, extensive soft tissue swelling over the left side of the calvarium without calvarial fracture, small chronic infarct left occipital lobe and right parietal lobe, underlying mild to moderate age-related changes, no CT evidence for acute fracture of the cervical spine, degenerative changes are present, see report for further details.  Electrolytes in the emergency department are normal range with a creatinine of 0.94.  White blood cell count is 7.6 with a hemoglobin of 12.1.  Patient with a history of memory loss and cerebrovascular accident with maintenance on Plavix, presenting with head injury.  Patient was reported to be on her grandchild in a wagon when she tripped and fell striking her head.  Patient is on Plavix.  Patient does have a report of loss of consciousness according to her daughter for up to 4 to 5 minutes.  Patient states that she has had abdominal and bladder discomfort. Patient does have a history of memory impairment and therefore history of present illness and review of systems may be incomplete.  -Blood pressure control.  -Neurology consulted. Have signed off.   -Neurosurgery consulted. Will have her follow up with an updated head CT in one month.   -Close neurologic monitoring.  -Gentle IV fluids.  -Close hemodynamic monitoring.  -Hold prior to admission Plavix. .   CT head repeat today showed stable bleed. No new changes on CT        Thickening of the anterior urinary bladder: Patient with CT showing inflammatory changes and thickening of the anterior urinary bladder, cystitis or malignancy included in the differential, see report for further details.  [facility]    ? Will need CT urogram and outpatient cystoscopy in 2-4 weeks after discharge (AVS updated)       Dementia: Stable.  -Continue prior to admission donepezil when verified by pharmacy.     Cardiac, hypertension, history of cerebral artery occlusion with cerebral infarction: Patient previously maintained on lisinopril.  -Continue prior to admission lisinopril when verified by pharmacy.  -Hold prior to admission Plavix.     Anxiety, depression: Stable.  -Continue prior to admission mirtazapine when verified by pharmacy.     GERD: Stable.  -Continue prior to admission omeprazole when verified by pharmacy.     DVT Prophylaxis: Pneumatic Compression Devices  Code Status: Full Code     Disposition: today versus tomorrow. PT/OT eval pending        Leanna Goldberg MD      Interval History    Posttraumatic subarachnoid hemorrhages - stable imaging. Neuro exams stable. NO new issues     -Data reviewed today: I reviewed all new labs and imaging results over the last 24 hours. I personally reviewed the EKG tracing showing NSR.    Physical Exam   Temp: 97.9  F (36.6  C) Temp src: Oral BP: (!) 146/82 Pulse: 72 Heart Rate: 67 Resp: 16 SpO2: 99 % O2 Device: None (Room air)    Vitals:    06/20/20 0055 06/22/20 0208   Weight: 61.1 kg (134 lb 11.2 oz) 62.5 kg (137 lb 12.6 oz)     Vital Signs with Ranges  Temp:  [97.7  F (36.5  C)-99.4  F (37.4  C)] 97.9  F (36.6  C)  Pulse:  [72] 72  Heart Rate:  [60-71] 67  Resp:  [16] 16  BP: (119-154)/(61-90) 146/82  SpO2:  [94 %-99 %] 99 %  I/O last 3 completed shifts:  In: 300 [P.O.:300]  Out: 750 [Urine:750]    GENERAL: Alert and confused. NAD. Conversational, appropriate.   HEENT: Normocephalic. EOMI. No icterus or injection. Nares normal.  Swelling to scalp  LUNGS: Clear to auscultation. No dyspnea at rest.   HEART: Regular rate. Extremities perfused.   ABDOMEN: Soft, nontender, and nondistended. Positive bowel sounds.   EXTREMITIES: No LE edema noted.   NEUROLOGIC: Moves extremities x4 on  command. No acute focal neurologic abnormalities noted.     Medications       donepezil  5 mg Oral Daily     lisinopril  20 mg Oral BID     mirtazapine  7.5 mg Oral At Bedtime     multivitamin w/minerals  1 tablet Oral Daily     omeprazole  20 mg Oral Daily     polyethylene glycol  17 g Oral Daily     sodium chloride (PF)  3 mL Intracatheter Q8H     Vitamin D3  125 mcg Oral Daily       Data   Recent Labs   Lab 06/22/20  0756 06/21/20  0727 06/20/20  0451   WBC 5.3 5.4 8.2   HGB 11.5* 11.2* 12.1   MCV 94 95 94    197 217    141 137   POTASSIUM 4.1 4.1 4.0   CHLORIDE 107 113* 107   CO2 27 24 26   BUN 22 16 23   CR 0.88 0.91 0.84   ANIONGAP 4 4 4   ESTEE 9.0 8.7 8.9   GLC 89 90 131*   ALBUMIN 3.1* 3.1*  --    PROTTOTAL 6.3* 6.3*  --    BILITOTAL 0.5 0.5  --    ALKPHOS 47 47  --    ALT 30 33  --    AST 30 37  --        Imaging:   Recent Results (from the past 24 hour(s))   CT Head w/o Contrast    Narrative    CT SCAN OF THE HEAD WITHOUT CONTRAST   6/22/2020 10:41 AM     HISTORY: Patient with intracranial hemorrhage with right-sided visual  field cut.    TECHNIQUE:  Axial images of the head and coronal reformations without  IV contrast material.  Radiation dose for this scan was reduced using  automated exposure control, adjustment of the mA and/or kV according  to patient size, or iterative reconstruction technique.    COMPARISON: 6/20/2020    FINDINGS: Again seen is some subarachnoid hemorrhage in the left  sylvian fissure and in the frontal parietal subarachnoid spaces over  the lateral convexity posterior to the sylvian fissure. This area of  subarachnoid hemorrhage is relatively unchanged. There is also a small  amount of subarachnoid hemorrhage in the region of the right frontal  convexity which is slightly less conspicuous since prior exam. A focal  area of encephalomalacia is again noted in the medial right parietal  lobe. Cerebral atrophy and patchy white matter changes are noted.  There is no  evidence for new hemorrhage, acute infarct, mass effect,  or skull fracture. Visualized paranasal sinuses and mastoid air cells  are clear.      Impression    IMPRESSION:  1. Small volume amount of subarachnoid hemorrhage left greater than  right again noted. There is no evidence for any new hemorrhage.  2. Old area of encephalomalacia in left parietal lobe.  3. Cerebral atrophy with some chronic-appearing white matter changes.

## 2020-06-22 NOTE — PROGRESS NOTES
Hospitalist cross cover    Paged RE changing neuro checks to Q 8 hours to allow sleep at night. Neurosurgery has signed off. D/w Dr. Martinez who rounded earlier today, ok with changing to Q 8 checks. Orders placed.    Cherelle Paul (Shaw), PAJeniferC  Hospitalist [facility]  Pager: [phone]

## 2020-06-22 NOTE — PLAN OF CARE
Pt here with  traumatic SAH. Disoriented to situation at times. Neuros intact ex forgetful, STM, and new RLQ field cut. MD aware, stat head CT completed. VSS. Tele SR. Regular diet with thin liquids. Up with SBA/GB. Swelling, ecchymosis,  and scab to left eyebrow.  Denies pain. Pt scoring green on the Aggression Stop Light Tool. PT recommending home PT. Waiting on OT consult for discharge planning.

## 2020-06-22 NOTE — PROGRESS NOTES
[facility]    Neurosurgery Progress Note    Date of Service (when I saw the patient): 06/22/2020     Assessment & Plan   Anabelle Herbert is a 75 year old female who was admitted on 6/19/2020 after a fall. Head CT with posttraumatic SAH in several areas. Repeat scan stable. Today, she is lying in bed and states she is feeling well. New field cut noted per RN and repeat CT was ordered, which is stable. Will plan for follow up in 1 month with repeat head CT. NS will sign off.    Active Problems:    Intracranial hemorrhage (H)    Assessment: stable posttraumatic SAH in several areas    Plan:   -Follow up in 1 month with repeat head CT  -NS will sign off, please contact if further needed      I have discussed the following assessment and plan with Dr. Rodríguez who is in agreement with initial plan and will follow up with further consultation recommendations.      Tatyana Field PA-C  [facility] Neurosurgery  [facility]  [address] [place] [address]  [address]  [place] [address]    Tel [phone]  Pager [phone]      Interval History   Stable    Physical Exam   Temp: 98  F (36.7  C) Temp src: Oral BP: (!) 154/90 Pulse: 72 Heart Rate: 64 Resp: 16 SpO2: 96 % O2 Device: None (Room air)    Vitals:    06/20/20 0055 06/22/20 0208   Weight: 61.1 kg (134 lb 11.2 oz) 62.5 kg (137 lb 12.6 oz)     Vital Signs with Ranges  Temp:  [97.7  F (36.5  C)-99.4  F (37.4  C)] 98  F (36.7  C)  Pulse:  [72] 72  Heart Rate:  [60-71] 64  Resp:  [16] 16  BP: (119-154)/(61-90) 154/90  SpO2:  [94 %-97 %] 96 %  I/O last 3 completed shifts:  In: 300 [P.O.:300]  Out: 750 [Urine:750]    Heart Rate: 64, Blood pressure (!) 154/90, pulse 72, temperature 98  F (36.7  C), temperature source Oral, resp. rate 16, weight 62.5 kg (137 lb 12.6 oz), SpO2 96 %, not currently breastfeeding.  137 lbs 12.6 oz  HEENT:  Normocephalic, atraumatic.  PERRLA.  EOM s intact.    Neck:  Supple, non-tender, without lymphadenopathy.  Heart:  No  peripheral edema  Lungs:  No SOB  Skin:  Warm and dry.  Extremities:  No edema, cyanosis or clubbing.    NEUROLOGICAL EXAMINATION:   Mental status:  Awake and alert, baseline dementia  Cranial nerves:  II-XII intact.   Motor:  HUMPHREYS  Sensation:  intact    Medications       donepezil  5 mg Oral Daily     lisinopril  20 mg Oral BID     mirtazapine  7.5 mg Oral At Bedtime     multivitamin w/minerals  1 tablet Oral Daily     omeprazole  20 mg Oral Daily     polyethylene glycol  17 g Oral Daily     sodium chloride (PF)  3 mL Intracatheter Q8H     Vitamin D3  125 mcg Oral Daily       Data   CBC RESULTS:   Recent Labs   Lab Test 06/22/20  0756   WBC 5.3   RBC 3.59*   HGB 11.5*   HCT 33.8*   MCV 94   MCH 32.0   MCHC 34.0   RDW 12.1        Basic Metabolic Panel:  Lab Results   Component Value Date     06/22/2020      Lab Results   Component Value Date    POTASSIUM 4.1 06/22/2020     Lab Results   Component Value Date    CHLORIDE 107 06/22/2020     Lab Results   Component Value Date    ESTEE 9.0 06/22/2020     Lab Results   Component Value Date    CO2 27 06/22/2020     Lab Results   Component Value Date    BUN 22 06/22/2020     Lab Results   Component Value Date    CR 0.88 06/22/2020     Lab Results   Component Value Date    GLC 89 06/22/2020     INR:  Lab Results   Component Value Date    INR 0.95 06/30/2015

## 2020-06-22 NOTE — PLAN OF CARE
Pt here with  traumatic SAH. Disoriented to situation at times. STM, forgetful, otherwise Neuros intact. VSS. Tele SB. Regular diet with thin liquids. Up with SBA/GB. Swelling, ecchymosis,  and scab to left eyebrow.  Denies pain. Pt scoring green on the Aggression Stop Light Tool. Plan PT/OT/SW consult to assist in discharge planning.

## 2020-06-22 NOTE — PROGRESS NOTES
MD Notification     Notified Person: MD     Notified Person Name: Dr. Ashlyn Murcia     Notification Date/Time:0933 6/22     Notification Interaction: Page     Purpose of Notification:701 bed 1  I know you signed off on this pt. but this morning I noticed a new right sided field cut. Please advise. Thanks!     Orders Received:Repeat head CT     Comments:

## 2020-06-23 ENCOUNTER — APPOINTMENT (OUTPATIENT)
Dept: PHYSICAL THERAPY | Facility: CLINIC | Age: 75
DRG: 087 | End: 2020-06-23
Payer: COMMERCIAL

## 2020-06-23 ENCOUNTER — APPOINTMENT (OUTPATIENT)
Dept: OCCUPATIONAL THERAPY | Facility: CLINIC | Age: 75
DRG: 087 | End: 2020-06-23
Attending: INTERNAL MEDICINE
Payer: COMMERCIAL

## 2020-06-23 VITALS
RESPIRATION RATE: 16 BRPM | SYSTOLIC BLOOD PRESSURE: 140 MMHG | DIASTOLIC BLOOD PRESSURE: 87 MMHG | TEMPERATURE: 98.1 F | BODY MASS INDEX: 26.91 KG/M2 | WEIGHT: 137.79 LBS | HEART RATE: 72 BPM | OXYGEN SATURATION: 94 %

## 2020-06-23 DIAGNOSIS — F43.23 ADJUSTMENT DISORDER WITH MIXED ANXIETY AND DEPRESSED MOOD: ICD-10-CM

## 2020-06-23 PROCEDURE — 97166 OT EVAL MOD COMPLEX 45 MIN: CPT | Mod: GO | Performed by: OCCUPATIONAL THERAPIST

## 2020-06-23 PROCEDURE — 25000132 ZZH RX MED GY IP 250 OP 250 PS 637: Performed by: INTERNAL MEDICINE

## 2020-06-23 PROCEDURE — 97530 THERAPEUTIC ACTIVITIES: CPT | Mod: GP | Performed by: PHYSICAL THERAPIST

## 2020-06-23 PROCEDURE — 99239 HOSP IP/OBS DSCHRG MGMT >30: CPT | Performed by: INTERNAL MEDICINE

## 2020-06-23 PROCEDURE — 97535 SELF CARE MNGMENT TRAINING: CPT | Mod: GO | Performed by: OCCUPATIONAL THERAPIST

## 2020-06-23 PROCEDURE — 97116 GAIT TRAINING THERAPY: CPT | Mod: GP | Performed by: PHYSICAL THERAPIST

## 2020-06-23 RX ORDER — AMOXICILLIN 250 MG
1 CAPSULE ORAL 2 TIMES DAILY PRN
Qty: 30 TABLET | Refills: 1 | Status: SHIPPED | OUTPATIENT
Start: 2020-06-23 | End: 2021-11-17

## 2020-06-23 RX ORDER — ACETAMINOPHEN 500 MG
1000 TABLET ORAL EVERY 8 HOURS PRN
COMMUNITY
Start: 2020-06-23 | End: 2022-05-24

## 2020-06-23 RX ORDER — OXYCODONE HYDROCHLORIDE 5 MG/1
5 TABLET ORAL EVERY 4 HOURS PRN
Qty: 10 TABLET | Refills: 0 | Status: SHIPPED | OUTPATIENT
Start: 2020-06-23 | End: 2020-06-24

## 2020-06-23 RX ADMIN — LISINOPRIL 20 MG: 20 TABLET ORAL at 08:34

## 2020-06-23 RX ADMIN — CHOLECALCIFEROL TAB 125 MCG (5000 UNIT) 125 MCG: 125 TAB at 08:34

## 2020-06-23 RX ADMIN — MULTIPLE VITAMINS W/ MINERALS TAB 1 TABLET: TAB at 08:34

## 2020-06-23 RX ADMIN — ACETAMINOPHEN 1000 MG: 500 TABLET, FILM COATED ORAL at 01:40

## 2020-06-23 RX ADMIN — OMEPRAZOLE 20 MG: 20 CAPSULE, DELAYED RELEASE ORAL at 08:34

## 2020-06-23 RX ADMIN — ACETAMINOPHEN 1000 MG: 500 TABLET, FILM COATED ORAL at 08:34

## 2020-06-23 ASSESSMENT — ACTIVITIES OF DAILY LIVING (ADL)
ADLS_ACUITY_SCORE: 13
ADLS_ACUITY_SCORE: 13
PREVIOUS_RESPONSIBILITIES: MEAL PREP;HOUSEKEEPING;LAUNDRY;SHOPPING;MEDICATION MANAGEMENT;FINANCES
ADLS_ACUITY_SCORE: 13
ADLS_ACUITY_SCORE: 13

## 2020-06-23 NOTE — PLAN OF CARE
Pt here with traumatic SAH. Disoriented to situation at times/ Forgetful. Neuros intact except RLQ cut, per daughter it is not new. Regular diet, thin liquids. SBA/GB.Left eyebrow: swelling, ecchymosis, scab. Plan to discharge home tomorrow.

## 2020-06-23 NOTE — PLAN OF CARE
Discharge Planner PT   Patient plan for discharge: To daughters home.   Current status: Patient reports she doesn't remember yesterday at all. Oriented to name, month year and and place but not to day of week or date. Transfers up to/from sit independently. Sit to stand with CGA. Unsteady with dynamic standing, gait without AD. Much improved balance with ww. Gait with ww 150 feet with CGA initially progressing to SBA. Needs min assist up and down steps.   Barriers to return to prior living situation: Lives alone, high fall risk, decreased cognition, impaired balnace  Recommendations for discharge: home with assist of daughter on stairs, and use of FWW at all times  Rationale for recommendations:Pateint safeto discharge to dtrs home with HHPT and assist of daughter on steps. Pt qualifies for HHPT as pt currently requires A x 1 and assistive device, limited activity tolerance. Pt lives alone and IND at baseline      Entered by: Olimpia Ndiaye 06/23/2020 9:22 AM    Physical Therapy Discharge Summary    Reason for therapy discharge:    Discharged to Daughters home.    Progress towards therapy goal(s). See goals on Care Plan in [facility] health record for goal details.  Goals partially met.  Barriers to achieving goals:   discharge from facility.    Therapy recommendation(s):    Continued therapy is recommended.  Rationale/Recommendations:  Patient would benefit from continued skilled PT in home setting to progress her balance and to eventually amb without AD. .

## 2020-06-23 NOTE — DISCHARGE INSTRUCTIONS
Call 911 or return to the emergency room if you experience any of the  following symptoms:      Clear or bloody drainage from your nose or ears    Worsening headache    Changes in vision or differently sized pupils    Seizure activity or jerking / twitching of the face, arms, or legs     Increased Sleepiness or difficulty waking up    Memory loss    Irritability    Nausea or vomiting that won t stop    Confusion or difficulty talking    A fever above 100 degrees F    Arm, leg, or facial weakness    Difficulty walking, loss of balance, and dizziness    Stiff neck  HOMECARE NOTE:   Your doctor has ordered home care to help you after your hospital stay.  The staff will contact you to schedule your first visit.  This service will be provided by [facility].  If you have any question, or have not received a call within 48 hours of discharge, please call them at [phone] or [phone].  *please see homecare quality ratings for all homecares in your area at www.medicare.gov

## 2020-06-23 NOTE — PLAN OF CARE
Pt here with ICH d/t fall. A&Ox4, but short term memory & forgetfulness. Neuros intact ex L big toe numbness & generalized weakness. VSS ex mild HTN. Tele SB. Regular diet, thin liquids. Takes pills whole. Up with SBA & walker.  C/o head & LLE pain, managed with tylenol.  Discharge instructions & medications reviewed with pt & her daughter. Questions answered. Explained recommendations for walker use to pt & daughter per therapy. Pt requires 24/7 supervision until deemed otherwise by home therapies. Daughter agreeable. Belongings sent with pt.

## 2020-06-23 NOTE — DISCHARGE SUMMARY
[facility]    Discharge Summary  Hospitalist    Date of Admission:  6/19/2020  Date of Discharge:  6/23/2020  1:39 PM  Discharging Provider: Sheila De La O MD    Discharge Diagnoses   Intracranial hemorrhage    History of Present Illness   Please review admission history and physical.    Hospital Course   Anabelle Herbert was admitted on 6/19/2020.  The following problems were addressed during her hospitalization:    Active Problems:    Intracranial hemorrhage (H)    Fall with a intracranial hemorrhage  Patient with a small volume preserved posttraumatic subarachnoid hemorrhage in several locations, extensive soft tissue swelling over the left side of the calvarium without calvarial fracture small, small chronic infarct left occipital lobe and right parietal lobe with mild to moderate age-related changes noted, there was no CT evidence of acute fracture of the cervical spine, degenerative changes are present, please review the imaging results for details.  Patient with a history of memory loss and CVA with maintenance on Plavix, she is presenting with a head injury, she was on a regular with her grandchild when she tripped and fell striking her head.  Her Plavix for now was on hold during her stay here, she had associated loss of consciousness when she fell and that lasted 4 to 5 minutes, following admission, neurology services were consulted, currently they have signed off, plan is to have better blood pressure control, neurosurgery was consulted, repeat head CT was obtained without any new changes, plan is to do follow-up with them in 1 month.  Patient was closely monitored, PT/OT evaluated the patient, recommended home PT and home OT, this was discussed with family and family wanted to take patient home, patient discharged in stable condition.  Next    Thickening of the anterior urinary bladder: Patient with CT showing inflammatory changes and thickening of the anterior urinary bladder, cystitis  or malignancy included in the differential, see report for further details.  [facility]   ? Will need CT urogram and outpatient cystoscopy in 2-4 weeks after discharge (AVS updated)        Dementia: Stable.  -Continue prior to admission donepezil when verified by pharmacy.     Cardiac, hypertension, history of cerebral artery occlusion with cerebral infarction: Patient previously maintained on lisinopril.  -Continue prior to admission lisinopril when verified by pharmacy.  -Hold prior to admission Plavix.     Anxiety, depression: Stable.  -Continue prior to admission mirtazapine when verified by pharmacy.     GERD: Stable.  -Continue prior to admission omeprazole when verified by pharmacy.    Sheila De La O MD, MD    Significant Results and Procedures       Pending Results     Unresulted Labs Ordered in the Past 30 Days of this Admission     No orders found from 5/20/2020 to 6/20/2020.          Code Status   Full Code       Primary Care Physician   Jyoti Moreno    Physical Exam   Temp: 97.8  F (36.6  C) Temp src: Oral BP: (!) 144/85   Heart Rate: 61 Resp: 16 SpO2: 93 % O2 Device: None (Room air)    Vitals:    06/20/20 0055 06/22/20 0208   Weight: 61.1 kg (134 lb 11.2 oz) 62.5 kg (137 lb 12.6 oz)     Vital Signs with Ranges  Temp:  [97.8  F (36.6  C)-98.9  F (37.2  C)] 97.8  F (36.6  C)  Heart Rate:  [55-68] 61  Resp:  [16] 16  BP: (124-149)/(72-85) 144/85  SpO2:  [93 %-99 %] 93 %  I/O last 3 completed shifts:  In: 480 [P.O.:480]  Out: -     The patient was examined on the day of discharge.    Discharge Disposition   Discharged to home with home care  Condition at discharge: Stable    Consultations This Hospital Stay   NEUROLOGY IP CONSULT  NEUROSURGERY IP CONSULT  UROLOGY IP CONSULT  PHYSICAL THERAPY ADULT IP CONSULT  OCCUPATIONAL THERAPY ADULT IP CONSULT  SOCIAL WORK IP CONSULT  CARE TRANSITION RN/SW IP CONSULT    Time Spent on this Encounter   ISheila MD, personally saw the  patient today and spent greater than 30 minutes discharging this patient.    Discharge Orders      CT Head w/o contrast*     Home Care PT Referral for Hospital Discharge      Follow-up and recommended labs and tests     You will have a follow up appointment with [facility]  in 2-4 weeks to evaluate your bladder. Our office will call you specific day and time.    [facility]  [address] [place]. [address]  You may call [phone] with any questions or concerns.   Central Appointment #: [phone]     Follow-up and recommended labs and tests     Please follow up at [facility] in 1 month with head CT prior.     1. You are scheduled for a Head CT Monday June 20 at 11:45 am. Please arrive at 11:30. No restrictions on food/fluids. You will need to wear a mask  Please arrive to the Welcome Desk in the [facility] Lobby ( door #2) at [facility]    2. You have an appointment scheduled Monday June 20 at 1:00 (following the Head CT)  [facility]  [address]  [place], [place]  [phone]     Reason for your hospital stay    Fall , intracranial bleed     Follow-up and recommended labs and tests     Follow up with primary care provider, Jyoti Moreno, within 7 days to evaluate medication change and for hospital follow- up.  No follow up labs or test are needed.follow up with neurosurgery and urology as scheduled.     Activity    Your activity upon discharge: activity as tolerated     MD face to face encounter    Documentation of Face to Face and Certification for Home Health Services    I certify that patient: Anabelle Herbert is under my care and that I, or a nurse practitioner or physician's assistant working with me, had a face-to-face encounter that meets the physician face-to-face encounter requirements with this patient on: 6/23/2020.    This encounter with the patient was in whole, or in part, for  the following medical condition, which is the primary reason for home health care: .    I certify that, based on my findings, the following services are medically necessary home health services: Physical Therapy.    My clinical findings support the need for the above services because: Physical Therapy Services are needed to assess and treat the following functional impairments: following fall and intracranial bleed .    Further, I certify that my clinical findings support that this patient is homebound (i.e. absences from home require considerable and taxing effort and are for medical reasons or [employer] or infrequently or of short duration when for other reasons) because: Requires assistance of another person or specialized equipment to access medical services because patient: Is unable to operate assistive equipment on their own...    Based on the above findings. I certify that this patient is confined to the home and needs intermittent skilled nursing care, physical therapy and/or speech therapy.  The patient is under my care, and I have initiated the establishment of the plan of care.  This patient will be followed by a physician who will periodically review the plan of care.  Physician/Provider to provide follow up care: Jyoti Moreno    Attending hospital physician (the Medicare certified [place] provider): Sheila De La O MD  Physician Signature: See electronic signature associated with these discharge orders.  Date: 6/23/2020     Full Code     [place]    DME Documentation:   Describe the reason for need to support medical necessity: Gait instability.     I, the undersigned, certify that the above prescribed supplies are medically necessary for this patient and is both reasonable and necessary in reference to accepted standards of medical and necessary in reference to accepted standards of medical practice in the treatment of this patient's condition and is not prescribed as a convenience.      Diet    Follow this diet upon discharge: Orders Placed This Encounter      Regular Diet Adult     Discharge Medications   Current Discharge Medication List      START taking these medications    Details   acetaminophen (TYLENOL) 500 MG tablet Take 2 tablets (1,000 mg) by mouth every 8 hours as needed for mild pain      oxyCODONE (ROXICODONE) 5 MG tablet Take 1 tablet (5 mg) by mouth every 4 hours as needed for moderate to severe pain  Qty: 10 tablet, Refills: 0    Associated Diagnoses: Intracranial hemorrhage (H)      senna-docusate (SENOKOT-S/PERICOLACE) 8.6-50 MG tablet Take 1 tablet by mouth 2 times daily as needed for constipation  Qty: 30 tablet, Refills: 1    Associated Diagnoses: Intracranial hemorrhage (H)         CONTINUE these medications which have NOT CHANGED    Details   cholecalciferol (VITAMIN D3) 5000 units (125 mcg) CAPS capsule Take 5,000 Units by mouth daily      donepezil (ARICEPT) 5 MG tablet Take 5 mg by mouth daily  Refills: 11      lisinopril (PRINIVIL/ZESTRIL) 40 MG tablet Take 1 tablet (40 mg) by mouth daily  Qty: 90 tablet, Refills: 1    Associated Diagnoses: Hypertension goal BP (blood pressure) < 130/80      mirtazapine (REMERON) 15 MG tablet Take 0.5 tablets (7.5 mg) by mouth At Bedtime  Qty: 30 tablet, Refills: 0    Associated Diagnoses: Adjustment reaction with anxiety and depression      Multiple Vitamins-Minerals (ANTIOXIDANT PO) Take 1 tablet by mouth daily [facility]ULT [employer] WOM[employer] WELLNESS      omeprazole (PRILOSEC) 20 MG DR capsule Take 1 capsule (20 mg) by mouth daily  Qty: 30 capsule, Refills: 1    Associated Diagnoses: Gastroesophageal reflux disease, esophagitis presence not specified      polyethylene glycol (MIRALAX) 17 GM/SCOOP powder Take 17 g by mouth daily To twice daily as needed for constipation.  Qty: 850 g, Refills: 11    Associated Diagnoses: Bloating; Constipation, unspecified constipation type      Blood Pressure Monitoring (BLOOD PRESSURE CUFF) MISC 1 Device  daily as needed  Qty: 1 each, Refills: 0    Associated Diagnoses: Hypertension goal BP (blood pressure) < 130/80      meclizine (ANTIVERT) 25 MG tablet Take 1 tablet (25 mg) by mouth 2 times daily as needed for dizziness  Qty: 30 tablet, Refills: 2    Associated Diagnoses: Dizziness      Nutritional Supplements (ENSURE HIGH PROTEIN) Take 1 Can by mouth 3 times daily (with meals)  Qty: 90 Can, Refills: 3    Associated Diagnoses: Poor appetite         STOP taking these medications       clopidogrel (PLAVIX) 75 MG tablet Comments:   Reason for Stopping:             Allergies   Allergies   Allergen Reactions     Contrast Dye      Burning, hematuria     Diatrizoate Other (See Comments)     Feels burning inside body     Dogs Unknown     Throat started to close up.      Sulfa Drugs Unknown     Bupropion Anxiety     Patient reports increased anxiety, panic, difficulty concentrating, and various aches and pains     Data   Most Recent 3 CBC's:  Recent Labs   Lab Test 06/22/20  0756 06/21/20  0727 06/20/20  0451   WBC 5.3 5.4 8.2   HGB 11.5* 11.2* 12.1   MCV 94 95 94    197 217      Most Recent 3 BMP's:  Recent Labs   Lab Test 06/22/20  0756 06/21/20  0727 06/20/20  0451    141 137   POTASSIUM 4.1 4.1 4.0   CHLORIDE 107 113* 107   CO2 27 24 26   BUN 22 16 23   CR 0.88 0.91 0.84   ANIONGAP 4 4 4   ESTEE 9.0 8.7 8.9   GLC 89 90 131*     Most Recent 2 LFT's:  Recent Labs   Lab Test 06/22/20  0756 06/21/20  0727   AST 30 37   ALT 30 33   ALKPHOS 47 47   BILITOTAL 0.5 0.5     Most Recent INR's and Anticoagulation Dosing History:  Anticoagulation Dose History     Recent Dosing and Labs Latest Ref Rng & Units 6/30/2015    INR 0.86 - 1.14 0.95        Most Recent 3 Troponin's:No lab results found.  Most Recent Cholesterol Panel:  Recent Labs   Lab Test 08/21/19  1047   CHOL 215*   *   HDL 69   TRIG 75     Most Recent 6 Bacteria Isolates From Any Culture (See EPIC Reports for Culture Details):  Recent Labs   Lab Test  06/09/20  1415 06/03/20  1240 09/23/19  1523 03/26/19  1745 12/26/13  1227 12/03/13  1122   CULT <10,000 colonies/mL  mixed urogenital bonnie  Susceptibility testing not routinely done   <10,000 colonies/mL  mixed urogenital bonnie  Susceptibility testing not routinely done   <10,000 colonies/mL  urogenital bonnie    Susceptibility testing not routinely done <10,000 colonies/mL  urogenital bonnie  Susceptibility testing not routinely done   No growth <10,000 colonies/mL Gram positive cocci No further identification Susceptibility testing not routinely done     Most Recent TSH, T4 and A1c Labs:  Recent Labs   Lab Test 06/03/20  1206  09/23/19  1304   TSH 2.69   < > 5.39*   T4  --   --  0.82   A1C 5.6  --   --     < > = values in this interval not displayed.     Results for orders placed or performed during the hospital encounter of 06/19/20   CT Head w/o Contrast    Narrative    EXAM: CT HEAD W/O CONTRAST, CT CERVICAL SPINE W/O CONTRAST  LOCATION: [facility]  DATE/TIME: 6/19/2020 9:16 PM    INDICATION: Head and neck injury  COMPARISON: None.  TECHNIQUE:   HEAD CT: Without IV contrast. Multiplanar reformats. Dose reduction techniques were used.  CERVICAL SPINE CT: Routine without IV contrast. Multiplanar reformats. Dose reduction techniques were used.    FINDINGS:   HEAD CT:   INTRACRANIAL CONTENTS: Small volume presumed posttraumatic subarachnoid hemorrhage is demonstrated in several locations including at the left temporoparietal junction, at the left temporal occipital junction, in the interpeduncular cistern, in the left   posterior perimesencephalic cistern and within a right frontal sulcus. No CT evidence of acute infarct. Mild to moderate presumed chronic small vessel ischemic changes. Mild generalized volume loss. No hydrocephalus. There is a small chronic infarct in   the left occipital lobe. There is a small chronic infarct in the right parietal lobe.    VISUALIZED ORBITS/SINUSES/MASTOIDS: No  intraorbital abnormality. No paranasal sinus mucosal disease. No middle ear or mastoid effusion.    BONES/SOFT TISSUES: There is extensive soft tissue swelling over the left side of the calvarium. No calvarial fracture.    CERVICAL SPINE CT:   VERTEBRA: There is mild chronic appearing loss of height along the inferior endplates from C3 through C6. Vertebral bodies otherwise normal in height. The alignment is within normal limits. No fracture or posttraumatic subluxation.     CANAL/FORAMINA: Posterior disc bulging at the C4-C5 and C5-C6 levels contributes to mild canal stenosis. Disc spaces are relatively well-maintained for age. No appreciable high-grade foraminal stenosis.    PARASPINAL: No extraspinal abnormality. Visualized lung fields are clear.      Impression    IMPRESSION:  HEAD CT:  1.  There is small volume, presumed posttraumatic subarachnoid hemorrhage in several locations as outlined above.  2.  There is extensive soft tissue swelling over the left side of the calvarium without calvarial fracture.  3.  Small chronic infarct left occipital lobe and right parietal lobe.  4.  Underlying mild to moderate age-related changes.    CERVICAL SPINE CT:  1.  No CT evidence for acute fracture or post traumatic subluxation.  2.  Degenerative changes as highlighted above.    [Critical Result: Multifocal small volume presumed posttraumatic subarachnoid hemorrhage.]    Finding was identified on 6/19/2020 10:08 PM.     Dr. Hamida Vega was contacted by me on 6/19/2020 10:18 PM and verbalized understanding of the critical result.    Cervical spine CT w/o contrast    Narrative    EXAM: CT HEAD W/O CONTRAST, CT CERVICAL SPINE W/O CONTRAST  LOCATION: [facility]  DATE/TIME: 6/19/2020 9:16 PM    INDICATION: Head and neck injury  COMPARISON: None.  TECHNIQUE:   HEAD CT: Without IV contrast. Multiplanar reformats. Dose reduction techniques were used.  CERVICAL SPINE CT: Routine without IV contrast. Multiplanar  reformats. Dose reduction techniques were used.    FINDINGS:   HEAD CT:   INTRACRANIAL CONTENTS: Small volume presumed posttraumatic subarachnoid hemorrhage is demonstrated in several locations including at the left temporoparietal junction, at the left temporal occipital junction, in the interpeduncular cistern, in the left   posterior perimesencephalic cistern and within a right frontal sulcus. No CT evidence of acute infarct. Mild to moderate presumed chronic small vessel ischemic changes. Mild generalized volume loss. No hydrocephalus. There is a small chronic infarct in   the left occipital lobe. There is a small chronic infarct in the right parietal lobe.    VISUALIZED ORBITS/SINUSES/MASTOIDS: No intraorbital abnormality. No paranasal sinus mucosal disease. No middle ear or mastoid effusion.    BONES/SOFT TISSUES: There is extensive soft tissue swelling over the left side of the calvarium. No calvarial fracture.    CERVICAL SPINE CT:   VERTEBRA: There is mild chronic appearing loss of height along the inferior endplates from C3 through C6. Vertebral bodies otherwise normal in height. The alignment is within normal limits. No fracture or posttraumatic subluxation.     CANAL/FORAMINA: Posterior disc bulging at the C4-C5 and C5-C6 levels contributes to mild canal stenosis. Disc spaces are relatively well-maintained for age. No appreciable high-grade foraminal stenosis.    PARASPINAL: No extraspinal abnormality. Visualized lung fields are clear.      Impression    IMPRESSION:  HEAD CT:  1.  There is small volume, presumed posttraumatic subarachnoid hemorrhage in several locations as outlined above.  2.  There is extensive soft tissue swelling over the left side of the calvarium without calvarial fracture.  3.  Small chronic infarct left occipital lobe and right parietal lobe.  4.  Underlying mild to moderate age-related changes.    CERVICAL SPINE CT:  1.  No CT evidence for acute fracture or post traumatic  subluxation.  2.  Degenerative changes as highlighted above.    [Critical Result: Multifocal small volume presumed posttraumatic subarachnoid hemorrhage.]    Finding was identified on 6/19/2020 10:08 PM.     Dr. Hamida Vega was contacted by me on 6/19/2020 10:18 PM and verbalized understanding of the critical result.    Abd/pelvis CT,  IV  contrast only TRAUMA / AAA    Narrative    EXAM: CT ABDOMEN PELVIS W CONTRAST  LOCATION: [facility]  DATE/TIME: 6/19/2020 9:14 PM    INDICATION: Pain after fall. Hematuria.  COMPARISON: None.  TECHNIQUE: CT scan of the abdomen and pelvis was performed following injection of IV contrast. Multiplanar reformats were obtained. Dose reduction techniques were used.  CONTRAST: 65 mL [id number].    FINDINGS:   LOWER CHEST: Normal.    HEPATOBILIARY: Normal.    PANCREAS: Normal.    SPLEEN: Normal.    ADRENAL GLANDS: Normal.    KIDNEYS/BLADDER: Small cortical cyst at the upper pole of the left kidney. Cortical scarring at the lower pole left kidney. No hydronephrosis on either side. There is wall thickening in the urinary bladder anteriorly. Fluid stranding in the fat anterior   to the bladder.    BOWEL: There are colonic diverticula without acute diverticulitis. No bowel obstruction. The stomach is distended with fluid and air. No free intraperitoneal gas or fluid.    LYMPH NODES: Normal.    VASCULATURE: Atherosclerotic calcification of the aorta and its branches. No aneurysm.    PELVIC ORGANS: Normal.    MUSCULOSKELETAL: No acute bone fracture.      Impression    IMPRESSION:   1.  There is thickening of the anterior urinary bladder wall and associated inflammatory change. This could be cystitis or malignancy.  2.  No bowel obstruction or inflammation. There are colonic diverticula without acute diverticulitis.     CT Head w/o Contrast    Narrative    EXAM: CT HEAD WITHOUT CONTRAST  LOCATION: [facility]  DATE/TIME: 06/20/2020, 5:21 AM    INDICATION: Intracranial  hemorrhage, known, follow-up.  COMPARISON: 06/19/2020.  TECHNIQUE: Routine without IV contrast. Multiplanar reformats. Dose reduction techniques were used.    FINDINGS:  INTRACRANIAL CONTENTS: Redemonstration of scattered small volume acute subarachnoid hemorrhage which is presumably posttraumatic. The overall amount is not significantly changed. Questionable trace amount of subdural hemorrhage along the anterior   interhemispheric falx with maximal thickness of 1 mm. Chronic infarcts in in the parasagittal left parieto-occipital region and the right parietal lobe. Underlying cerebral volume loss and presumed chronic small vessel ischemic changes.     VISUALIZED ORBITS/SINUSES/MASTOIDS: No intraorbital abnormality. No paranasal sinus mucosal disease. No middle ear or mastoid effusion.    BONES/SOFT TISSUES: No acute abnormality.      Impression    IMPRESSION:  1.  Stable multifocal small volume presumed posttraumatic subarachnoid hemorrhage.  2.  There is questionably a new trace amount of subdural hemorrhage along the anterior interhemispheric falx.     CT Head w/o Contrast    Narrative    CT SCAN OF THE HEAD WITHOUT CONTRAST   6/22/2020 10:41 AM     HISTORY: Patient with intracranial hemorrhage with right-sided visual  field cut.    TECHNIQUE:  Axial images of the head and coronal reformations without  IV contrast material.  Radiation dose for this scan was reduced using  automated exposure control, adjustment of the mA and/or kV according  to patient size, or iterative reconstruction technique.    COMPARISON: 6/20/2020    FINDINGS: Again seen is some subarachnoid hemorrhage in the left  sylvian fissure and in the frontal parietal subarachnoid spaces over  the lateral convexity posterior to the sylvian fissure. This area of  subarachnoid hemorrhage is relatively unchanged. There is also a small  amount of subarachnoid hemorrhage in the region of the right frontal  convexity which is slightly less conspicuous since  prior exam. A focal  area of encephalomalacia is again noted in the medial right parietal  lobe. Cerebral atrophy and patchy white matter changes are noted.  There is no evidence for new hemorrhage, acute infarct, mass effect,  or skull fracture. Visualized paranasal sinuses and mastoid air cells  are clear.      Impression    IMPRESSION:  1. Small volume amount of subarachnoid hemorrhage left greater than  right again noted. There is no evidence for any new hemorrhage.  2. Old area of encephalomalacia in left parietal lobe.  3. Cerebral atrophy with some chronic-appearing white matter changes.    TERENCE SHI MD

## 2020-06-23 NOTE — PROVIDER NOTIFICATION
"Paged Dr. De La O, \"Pt earlier today c/o lateral leg pain. But now c/o L calf pain. Doesn't appear swollen or red. Verifying ok to discharge or if further testing needed. THanks    No further testing or imaging needed & okay for discharge per Dr. De La O  "

## 2020-06-23 NOTE — PLAN OF CARE
Discharge Planner OT   Patient plan for discharge: home to daughters house with home care per chart,   Current status: OT eval completed and treatment initiated. Pt lives alone at baseline and reports being I with ADL's and IADl's prior to fall. States she uses a med box for her medications and recently told her son he would be in charge of finances and her daughter to manage medical but she can't remember now why she did that. Currently needing SBA with mobility, forgetful of walker use. Demo's decreased STM, repeatedly ask if she had fallen on her L side. Completed SLUMS exam and pt scored 18/30. 1-19 indicates severe impairment. Concerns with STM in regards to med mgmt, finances, and cooking. Completed tub and toilet transfer with SBA  Barriers to return to prior living situation: decreased cognition  Recommendations for discharge: home with daughter and 24/7 assist due to decreased cognition and continue with home OT for cognition  Rationale for recommendations: pt not safe to discharge home alone at this time. If cognition does not improve, may need to transition to more supportive environment.        Entered by: Lizzette Hutton 06/23/2020 11:39 AM     Occupational Therapy Discharge Summary    Reason for therapy discharge:    Discharged to home with home therapy.    Progress towards therapy goal(s). See goals on Care Plan in [facility] electronic health record for goal details.  Goals not met.  Barriers to achieving goals:   discharge on same date as initial evaluation.    Therapy recommendation(s):    Continued therapy is recommended.  Rationale/Recommendations:  recommend 24 hour supervision due to decreased cognition and home OT to follow up on cog/home safety.  Continue home exercise program.

## 2020-06-23 NOTE — PROGRESS NOTES
06/23/20 1105   Quick Adds   Type of Visit Initial Occupational Therapy Evaluation   Living Environment   Lives With alone   Living Arrangements house   Home Accessibility stairs to enter home   Number of Stairs, Main Entrance 2   Living Environment Comment pt will be going to stay with her daughter at discharge and will have 24 hour assist.   Self-Care   Usual Activity Tolerance good   Current Activity Tolerance fair   Equipment Currently Used at Home none   Activity/Exercise/Self-Care Comment reports was I at baseline with ADLs and IADL's   General Information   Onset of Illness/Injury or Date of Surgery - Date 06/19/20   Referring Physician Gregory Martinez MD   Patient/Family Goals Statement to go home   Additional Occupational Profile Info/Pertinent History of Current Problem pt admitted with fall and ICH   Precautions/Limitations fall precautions   General Observations pt sleeping, woke to voice, agreeable to OT eval   Cognitive Status Examination   Orientation person;place  (knew day of the week, and year. not date)   Level of Consciousness alert   Follows Commands (Cognition) WNL   Memory impaired   Cognitive Comment see SLUMS daily note   Visual Perception   Visual Perception No deficits were identified   Visual Perception Comments denies blurry or double vision   Sensory Examination   Sensory Quick Adds No deficits were identified   Pain Assessment   Patient Currently in Pain Yes, see Vital Sign flowsheet   Integumentary/Edema   Integumentary/Edema no deficits were identifed   Integumentary/Edema Comments complains of L calf pain, passed on to nursing   Range of Motion (ROM)   ROM Quick Adds No deficits were identified   Strength   Manual Muscle Testing Quick Adds No deficits were identified   Strength Comments was functional with ADL's, not formally tested   Hand Strength   Hand Strength Comments WFL   Muscle Tone Assessment   Muscle Tone Quick Adds No deficits were identified   Transfer Skill: Bed to  Chair/Chair to Bed   Level of Aurelia: Bed to Chair stand-by assist   Physical Assist/Nonphysical Assist: Bed to Chair verbal cues   Assistive Device - Transfer Skill Bed to Chair Chair to Bed Rehab Eval rolling walker   Transfer Skill: Sit to Stand   Level of Aurelia: Sit/Stand stand-by assist   Physical Assist/Nonphysical Assist: Sit/Stand supervision;nonverbal cues (demo/gestures)   Assistive Device for Transfer: Sit/Stand rolling walker   Transfer Skill: Toilet Transfer   Level of Aurelia: Toilet stand-by assist   Physical Assist/Nonphysical Assist: Toilet supervision   Assistive Device rolling walker   Lower Body Dressing   Level of Aurelia: Dress Lower Body stand-by assist   Physical Assist/Nonphysical Assist: Dress Lower Body supervision   Toileting   Level of Aurelia: Toilet independent   Grooming   Level of Aurelia: Grooming stand-by assist   Physical Assist/Nonphysical Assist: Grooming supervision   Eating/Self Feeding   Level of Aurelia: Eating independent   Instrumental Activities of Daily Living (IADL)   Previous Responsibilities meal prep;housekeeping;laundry;shopping;medication management;finances   Activities of Daily Living Analysis   Impairments Contributing to Impaired Activities of Daily Living cognition impaired;flexibility decreased;balance impaired   General Therapy Interventions   Planned Therapy Interventions ADL retraining;cognition;transfer training;strengthening;progressive activity/exercise   Clinical Impression   Criteria for Skilled Therapeutic Interventions Met yes, treatment indicated   OT Diagnosis decreased I with ADL's and functional mobility   Influenced by the following impairments decreased cognition, decreased I with BR transfers   Assessment of Occupational Performance 3-5 Performance Deficits   Identified Performance Deficits decreased bathing, home mgmt, social skills   Clinical Decision Making (Complexity) Moderate complexity   Therapy  "Frequency Daily   Predicted Duration of Therapy Intervention (days/wks) 2 days   Anticipated Discharge Disposition Home with Home Therapy  (24 hour superivison and assist with ADL's)   Risks and Benefits of Treatment have been explained. Yes   Patient, Family & other staff in agreement with plan of care Yes   [facility] TM \"6 Clicks\"   2016, Trustees of [facility], under license to [employer].  All rights reserved.   6 Clicks Short Forms Daily Activity Inpatient Short Form   [facility]  \"6 Clicks\" Daily Activity Inpatient Short Form   1. Putting on and taking off regular lower body clothing? 3 - A Little   2. Bathing (including washing, rinsing, drying)? 3 - A Little   3. Toileting, which includes using toilet, bedpan or urinal? 3 - A Little   4. Putting on and taking off regular upper body clothing? 4 - None   5. Taking care of personal grooming such as brushing teeth? 3 - A Little   6. Eating meals? 4 - None   Daily Activity Raw Score (Score out of 24.Lower scores equate to lower levels of function) 20   Total Evaluation Time   Total Evaluation Time (Minutes) 40     "

## 2020-06-23 NOTE — PLAN OF CARE
Pt here with ICH. A&O3 disoriented to time. Neuros intact ex right lower field cut. VSS ex HTN + rajeev; 93% on RA. Tele NSR. Regular diet, thin liquids. Takes pills PO. Up with SBA GB. Pain 5/10 head pain: [id number]. Pt scoring Green on the Aggression Stop Light Tool. Discharge 6.23.20, today, early AM discharge.   PIV SL.

## 2020-06-24 ENCOUNTER — MEDICAL CORRESPONDENCE (OUTPATIENT)
Dept: HEALTH INFORMATION MANAGEMENT | Facility: CLINIC | Age: 75
End: 2020-06-24

## 2020-06-24 ENCOUNTER — DOCUMENTATION ONLY (OUTPATIENT)
Dept: FAMILY MEDICINE | Facility: CLINIC | Age: 75
End: 2020-06-24

## 2020-06-24 ENCOUNTER — VIRTUAL VISIT (OUTPATIENT)
Dept: FAMILY MEDICINE | Facility: CLINIC | Age: 75
End: 2020-06-24
Payer: COMMERCIAL

## 2020-06-24 ENCOUNTER — TELEPHONE (OUTPATIENT)
Dept: FAMILY MEDICINE | Facility: CLINIC | Age: 75
End: 2020-06-24

## 2020-06-24 DIAGNOSIS — F43.23 ADJUSTMENT REACTION WITH ANXIETY AND DEPRESSION: ICD-10-CM

## 2020-06-24 DIAGNOSIS — R68.81 EARLY SATIETY: ICD-10-CM

## 2020-06-24 DIAGNOSIS — F01.50 VASCULAR DEMENTIA WITHOUT BEHAVIORAL DISTURBANCE (H): ICD-10-CM

## 2020-06-24 DIAGNOSIS — I62.9 INTRACRANIAL HEMORRHAGE (H): Primary | ICD-10-CM

## 2020-06-24 DIAGNOSIS — K21.9 GASTROESOPHAGEAL REFLUX DISEASE, ESOPHAGITIS PRESENCE NOT SPECIFIED: ICD-10-CM

## 2020-06-24 DIAGNOSIS — R14.0 BLOATING: ICD-10-CM

## 2020-06-24 DIAGNOSIS — N30.10 CHRONIC INTERSTITIAL CYSTITIS: ICD-10-CM

## 2020-06-24 PROCEDURE — 99495 TRANSJ CARE MGMT MOD F2F 14D: CPT | Mod: 95 | Performed by: FAMILY MEDICINE

## 2020-06-24 RX ORDER — FLUOXETINE 10 MG/1
CAPSULE ORAL
Qty: 30 CAPSULE | Refills: 1 | OUTPATIENT
Start: 2020-06-24

## 2020-06-24 RX ORDER — MIRTAZAPINE 15 MG/1
15 TABLET, FILM COATED ORAL AT BEDTIME
Qty: 90 TABLET | Refills: 1 | Status: SHIPPED | OUTPATIENT
Start: 2020-06-24 | End: 2020-10-22

## 2020-06-24 NOTE — PROGRESS NOTES
Ok for orders.   Jyoti Moreno MD      Team: please call patient's daughter Ratna to have her stop the fish oil right now as this is a slight blood thinner (noted by note below). Thanks!

## 2020-06-24 NOTE — PROGRESS NOTES
[facility] now requests orders and shares concerns for some patients through [employer] [facility].  Please REPLY TO THIS MESSAGE in order to give authorization for orders. This is considered a verbal order, you will still receive a faxed copy of orders for signature.     MEDICATION CONCERN  Pt is taking one fish oil capsule (1000 mg) daily, which is not on her med list.  Wondering if that is okay given desire to limit blood thinning after her fall with intracranial hemorrhage.  Please have one of the nurses call dtr Pily at [phone] to let her know if pt should take the fish oil or not.    ORTHOSTATIC HYPOTENSION  Pt is having symptomatic (lightheaded, dizzy feeling) orthostatic hypotension, which is the primary thing affecting her mobility.  At the start of our visit, her BP was 142/84 seated and 114/80 standing.  At the end of the visit, I retook it and it was 136/84 seated and 112/80 standing.  I talked with her and her dtr at length about making sure she is drinking enough fluids to keep her well-hydrated as well as taking her time when going from lying or sitting to standing.    ORDERS REQUESTED  PT SOC/eval completed 6/24/20.  Continue PT an additional 1w2 to monitor and tx orthostatic hypotension, gait and balance training, falls risk assessment and prevention plan, monitor and tx pain, monitor skin integrity, to achieve the following goals.  GOALS TO BE MET BY 7/11/20  1.  Pt will be indep with sit to/from stand with no AD, with less than 10 point drop in systolic BP for decreased risk of falling.  2.  Pt will be indep ambulating 1000 ft with no AD on varied outdoor surfaces including blacktop, grass and inclines for safe return to community mobility.  3.  Pt will demo understanding of recs made to decrease her risk of falling (at both her dtr's and her house) and to decrease her risk of skin breakdown on her sacrum/coccyx.  4.  Pt will be indep with home ex program to allow her to continue to make  or maintain gains in strength and balance once her PT visits have ended.    OT eval and tx for bathroom safety (at dtr's home and pt's home), cognitive assessment to guidefamily in providing adequate supervision.    Thank you for your assistance in improving collaboration for our patients.    Urbano Negrete PT  [phone]  [email or web address]

## 2020-06-24 NOTE — PROGRESS NOTES
"Anabelle Herbert is a 75 year old female who is being evaluated via a billable telephone visit.      The patient has been notified of following:     \"This telephone visit will be conducted via a call between you and your physician/provider. We have found that certain health care needs can be provided without the need for a physical exam.  This service lets us provide the care you need with a short phone conversation.  If a prescription is necessary we can send it directly to your pharmacy.  If lab work is needed we can place an order for that and you can then stop by our lab to have the test done at a later time.    Telephone visits are billed at different rates depending on your insurance coverage. During this emergency period, for some insurers they may be billed the same as an in-person visit.  Please reach out to your insurance provider with any questions.    If during the course of the call the physician/provider feels a telephone visit is not appropriate, you will not be charged for this service.\"    Patient has given verbal consent for Telephone visit?  Yes    What phone number would you like to be contacted at? [phone]    How would you like to obtain your AVS? Lilia Persaud     Anabelle Herbert is a 75 year old female who presents via phone visit today for the following health issues:  I spoke with patient and her daughter Pily who assisted with the history during the call    HPI    Hospital Follow-up Visit:    Hospital/Nursing Home/IP Rehab Facility: [facility]  Date of Admission: 6/19/20  Date of Discharge: 6/23/20  Reason(s) for Admission: Fall      Was your hospitalization related to COVID-19? No   Problems taking medications regularly:  None  Medication changes since discharge: Updated  Problems adhering to non-medication therapy:  None    Summary of hospitalization:  Discharge summary not available but reviewed discharge instructions and last progress note from " stay  Diagnostic Tests/Treatments reviewed.  Follow up needed: neurosurgery 1 mo and urology in 2-4 weeks    Other Healthcare Providers Involved in Patient s Care:         Homecare, Care Coordination, Specialist appointment - neurosurgery, urology, Physical Therapy and Imaging  Update since discharge: improved.   Post Discharge Medication Reconciliation: discharge medications reconciled, continue medications without change.  Plan of care communicated with patient, family and caregiver          Few days prior to her fall seemed to be improve quite a bit. More alert/improved mood/better memory.   Neurology recommended decreasing aricept back to 5 mg and did this few days prior to the fall and seemed to make a big improvement in her memory.     Last Friday (6 days ago) tripped and fell while pulling grandkids in [facility]  Had LOC and SAH. Was followed by neurology, neurosurgery, urology  Released yesterday.  Patient notes she is still tired and sleepy but otherwise doing ok  No current pain.   Off plavix.    Yesterday daughter notes she was excited to be home and fairly active. Also reporting she was remembering events of hospital stay and people calling which is large improvement in her memory.     Also better, anxiety is much improved. Mirtazapine was increased last visit. No longer shakey. Seems to be tolerating this.     Home PHYSICAL THERAPY/OT planned.   Staying with her daughter.   Had orthostatic vitals: 142/84, standing  112/80  Push more water was recommended by therapist. .     Appetite is improved. Seems to eat if offerred. If by herself will drink shake as alternative.   Constipation- started senna in addition to miralax. Very feels constipation is better.   Taking PPI and thinking stomach is feeling better. No longer feeling full.     No fever, chills  No dyspnea, cough  Still feel cold a lot.   No swelling in legs.   Bladder- s/p treatment for uti. Bladder thickening on CT scan and  f/u with urology in 2-4  weeks to be scheduled. No longer complaining of sx's. (frequency much improved, no dysuria)          Patient Active Problem List   Diagnosis     Chronic interstitial cystitis     Adjustment reaction with anxiety and depression     Abdominal pain     Benign essential hypertension     CKD (chronic kidney disease) stage 2, GFR 60-89 ml/min     Hyperlipidemia LDL goal <100     Mild persistent asthma     Vitamin D deficiency     GERD (gastroesophageal reflux disease)     B12 deficiency     Transient cerebral ischemia     Carotid stenosis     Diverticulosis of colon     Colon polyp     Atrophic vaginitis     Interstitial cystitis     Chronic constipation     Cognitive complaints     Prediabetes     Osteoporosis     Cerebrovascular accident (CVA), unspecified mechanism (H)     SHANEKA (obstructive sleep apnea)     Pain in both lower legs     Intracranial hemorrhage (H)     Past Surgical History:   Procedure Laterality Date     COLONOSCOPY       CYSTOSCOPY, BIOPSY BLADDER, COMBINED  1/6/2014    Procedure: COMBINED CYSTOSCOPY, BIOPSY BLADDER;;  Surgeon: Vandana Tang MD;  Location: [facility] OR     CYSTOSCOPY, INJECT COLLAGEN, COMBINED  1/6/2014    Procedure: COMBINED CYSTOSCOPY, INJECT BULKING AGENT;  IC cocktail, bladder biopsy, fulguration, heparin, gentamyacin, lidocaine & kenalog;  Surgeon: Vandana Tang MD;  Location: [facility] OR     GENITOURINARY SURGERY       NO HISTORY OF SURGERY         Social History     Tobacco Use     Smoking status: Never Smoker     Smokeless tobacco: Never Used   Substance Use Topics     Alcohol use: No     Family History   Problem Relation Age of Onset     Cancer Father         colon?     Heart Disease Father      Asthma Mother      Alzheimer Disease Mother 86     Unknown/Adopted Maternal Grandmother      Unknown/Adopted Maternal Grandfather      Unknown/Adopted Paternal Grandmother      Unknown/Adopted Paternal Grandfather      Asthma Sister      Allergies Sister      Unknown/Adopted Son       Diabetes No family hx of          Current Outpatient Medications   Medication Sig Dispense Refill     acetaminophen (TYLENOL) 500 MG tablet Take 2 tablets (1,000 mg) by mouth every 8 hours as needed for mild pain       Blood Pressure Monitoring (BLOOD PRESSURE CUFF) MISC 1 Device daily as needed 1 each 0     cholecalciferol (VITAMIN D3) 5000 units (125 mcg) CAPS capsule Take 5,000 Units by mouth daily       donepezil (ARICEPT) 5 MG tablet Take 5 mg by mouth daily  11     lisinopril (PRINIVIL/ZESTRIL) 40 MG tablet Take 1 tablet (40 mg) by mouth daily (Patient taking differently: Take 20 mg by mouth daily 1/2 x 40 mg) 90 tablet 1     meclizine (ANTIVERT) 25 MG tablet Take 1 tablet (25 mg) by mouth 2 times daily as needed for dizziness 30 tablet 2     mirtazapine (REMERON) 15 MG tablet Take 0.5 tablets (7.5 mg) by mouth At Bedtime 30 tablet 0     Multiple Vitamins-Minerals (ANTIOXIDANT PO) Take 1 tablet by mouth daily CONSULT [facility] WOMENS WELLNESS       Nutritional Supplements (ENSURE HIGH PROTEIN) Take 1 Can by mouth 3 times daily (with meals) 90 Can 3     omeprazole (PRILOSEC) 20 MG DR capsule Take 1 capsule (20 mg) by mouth daily 30 capsule 1     oxyCODONE (ROXICODONE) 5 MG tablet Take 1 tablet (5 mg) by mouth every 4 hours as needed for moderate to severe pain 10 tablet 0     polyethylene glycol (MIRALAX) 17 GM/SCOOP powder Take 17 g by mouth daily To twice daily as needed for constipation. 850 g 11     senna-docusate (SENOKOT-S/PERICOLACE) 8.6-50 MG tablet Take 1 tablet by mouth 2 times daily as needed for constipation 30 tablet 1     Allergies   Allergen Reactions     Contrast Dye      Burning, hematuria     Diatrizoate Other (See Comments)     Feels burning inside body     Dogs Unknown     Throat started to close up.      Sulfa Drugs Unknown     Bupropion Anxiety     Patient reports increased anxiety, panic, difficulty concentrating, and various aches and pains       Reviewed and updated as needed this visit by  Provider         Review of Systems   Constitutional, HEENT, cardiovascular, pulmonary, gi and gu systems are negative, except as otherwise noted.       Objective   Reported vitals:  There were no vitals taken for this visit.   healthy, alert and no distress  PSYCH: Alert and oriented times 3; coherent speech, normal   rate and volume, able to articulate logical thoughts, able   to abstract reason, no tangential thoughts, no hallucinations   or delusions  Her affect is normal  RESP: No cough, no audible wheezing, able to talk in full sentences  Remainder of exam unable to be completed due to telephone visits    Diagnostic Test Results:  Labs reviewed in Epic        Assessment/Plan:  1. Intracranial hemorrhage (H)  Doing remarkably well currently. Pain controlled. Home OT/PT starting. F/u with neurosurgery 1 mo. Holding plavix    2. Vascular dementia without behavioral disturbance (H)  Improvement in memory recently with dropping of aricept dose, treatment of uti, change of prozac to mirtazapine    3. Adjustment reaction with anxiety and depression  Doing well with increased mirtazapine dosage. Continue current dosing. Seems to be tolerating.   - mirtazapine (REMERON) 15 MG tablet; Take 1 tablet (15 mg) by mouth At Bedtime  Dispense: 90 tablet; Refill: 1    4. Chronic interstitial cystitis  No current sx's but bladder thickening on CT. F/u with urology planned for cystoscopy and repeat ct    5. Bloating  6. Early satiety  Sx's have resolved with tx of constipation and gerd.   Continue current regimin    7. Gastroesophageal reflux disease, esophagitis presence not specified  Controlled now back on PPI      Return in about 2 months (around 8/24/2020).      Phone call duration:  18 minutes    Jyoti Moreno MD

## 2020-06-24 NOTE — TELEPHONE ENCOUNTER
Patient discharged from Inpatient.     Discharge location: [facility]  Discharge date: 6/23/2020  Diagnosis: Traumatic Subarachnoid Hemorrhage With Loss Of Consciousness Of 30 Minutes Or Less, Initial Encounter (H), Intracra    Please follow up as appropriate. If no follow up required, please close encounter.      Diane BRASHER, Patient Care

## 2020-06-24 NOTE — TELEPHONE ENCOUNTER
"Routing refill request to provider for review/approval because:  Drug not active on patient's medication list      Requested Prescriptions   Pending Prescriptions Disp Refills     FLUoxetine (PROZAC) 10 MG capsule [Pharmacy Med Name: FLUOXETINE HCL 10 MG CAPSULE] 30 capsule 1     Sig: TAKE 1 CAPSULE BY MOUTH EVERY DAY       SSRIs Protocol Failed - 6/24/2020  5:47 PM        Failed - PHQ-9 score less than 5 in past 6 months     Please review last PHQ-9 score.           Failed - Medication is active on med list        Passed - Patient is age 18 or older        Passed - No active pregnancy on record        Passed - No positive pregnancy test in last 12 months        Passed - Recent (6 mo) or future (30 days) visit within the authorizing provider's specialty     Patient had office visit in the last 6 months or has a visit in the next 30 days with authorizing provider or within the authorizing provider's specialty.  See \"Patient Info\" tab in inbasket, or \"Choose Columns\" in Meds & Orders section of the refill encounter.                       Nelda Terry RN, BSN, PHN    "

## 2020-06-25 ENCOUNTER — PATIENT OUTREACH (OUTPATIENT)
Dept: CARE COORDINATION | Facility: CLINIC | Age: 75
End: 2020-06-25

## 2020-06-25 ASSESSMENT — ACTIVITIES OF DAILY LIVING (ADL): DEPENDENT_IADLS:: INDEPENDENT

## 2020-06-25 NOTE — LETTER
[place]ARE [facility]ORDIN[facility]  [address]  [place] [address]    June 25, 2020    Anabelle Herbert  [address]  [place] [address]      Dear Anabelle,    I am a clinic care coordinator who works with Jyoti Moreno MD at [facility]. I wanted to thank your daughter, Ratna, for spending the time to talk with me.  Below is a description of clinic care coordination and how I can further assist you.      The clinic care coordination team is made up of a registered nurse,  and community health worker who understand the health care system. The goal of clinic care coordination is to help you manage your health and improve access to the health care system in the most efficient manner. The team can assist you in meeting your health care goals by providing education, coordinating services, strengthening the communication among your providers and supporting you with any resource needs.    Please feel free to contact me at [phone] with any questions or concerns. We are focused on providing you with the highest-quality healthcare experience possible and that all starts with you.     Sincerely,     Arcelia Sullivan, RN, [facility] RN Coordinator  [facility]   [phone]

## 2020-06-25 NOTE — LETTER
[facility]  Complex Care Plan  About Me:    Patient Name:  Anabelle Greene    YOB: 1945  Age:         75 year old   [place] MRN:    [id number] Telephone Information:  Home Phone [phone]   Mobile [phone]       Address:  [address] [place] [address] Email address:  [email or web address]      Emergency Contact(s)    Name Relationship Lgl Grd Work Phone Home Phone Mobile Phone   1. TAY CHEN* Daughter No  [phone]    2. EFREN GREENE* Son No   [phone]   3. PARISA RUTHERFORD Sister No   [phone]           Primary language:  English     needed? No   [place] [employer]:  [phone] op. 1  Other communication barriers: Glasses  Preferred Method of Communication:  Mail  Current living arrangement: I live alone  Mobility Status/ Medical Equipment: Independent    Health Maintenance  Health Maintenance Reviewed:      My Access Plan  Medical Emergency 911   Primary Clinic Line [facility] [phone]   24 Hour Appointment Line [phone] or  [phone] ([phone]) (toll-free)   24 Hour Nurse Line [phone] (toll-free)   Preferred Urgent Care     Preferred Hospital [facility]  [phone]   Preferred Pharmacy [id number] [place] [id number] Behavioral Health Crisis Line The National Suicide Prevention Lifeline at 1-829.659.4553 or 911             My Care Team Members  Patient Care Team       Relationship Specialty Notifications Start End    Jyoti Moreno MD PCP - General   4/27/07     Phone: [phone] Fax: [phone] [address] [facility] [address]    Jyoti Moreno MD Assigned PCP   10/4/12     Phone: [phone] Fax: [phone] [address] [facility] [address]    Arcelia Sullivan RN Lead Care Coordinator Primary Care - CC Admissions 6/25/20     Phone: [phone]                   Care Plans  Self Management and Treatment Plan  Goals and (Comments)  Goals        General    Medication 1 (pt-stated)     Notes - Note created  6/25/2020 11:12 AM by Arcelia Sullivan RN    Goal Statement: I will take my medications as prescribed    Date Goal set: 6/25/2020    Barriers: Memory loss    Strengths: Family support    Date to Achieve By: October 2020    Patient expressed understanding of goal: yes    Action steps to achieve this goal:  1. I will allow my daughter to set up my medications for me  2. I will write down if I took my medications               Action Plans on File:            Depression          Advance Care Plans/Directives Type:   Type Advanced Care Plans/Directives: POLST    My Medical and Care Information  Problem List   Patient Active Problem List   Diagnosis     Chronic interstitial cystitis     Adjustment reaction with anxiety and depression     Abdominal pain     Benign essential hypertension     CKD (chronic kidney disease) stage 2, GFR 60-89 ml/min     Hyperlipidemia LDL goal <100     Mild persistent asthma     Vitamin D deficiency     GERD (gastroesophageal reflux disease)     B12 deficiency     Transient cerebral ischemia     Carotid stenosis     Diverticulosis of colon     Colon polyp     Atrophic vaginitis     Interstitial cystitis     Chronic constipation     Cognitive complaints     Prediabetes     Osteoporosis     Cerebrovascular accident (CVA), unspecified mechanism (H)     SHANEKA (obstructive sleep apnea)     Pain in both lower legs     Intracranial hemorrhage (H)      Current Medications and Allergies:  See printed Medication Report.    Care Coordination Start Date: No linked episodes   Frequency of Care Coordination: monthly   Form Last Updated: 06/25/2020

## 2020-06-25 NOTE — PROGRESS NOTES
Clinic Care Coordination Contact    Clinic Care Coordination Contact  OUTREACH    Referral Information:  Referral Source: IP Report    Primary Diagnosis: Cognitive Impairment  Chief Complaint   Patient presents with     Clinic Care Coordination - [facility]     RN      Universal Utilization: Inpatient at [facility] from 6/19/20 to 6/23/20  Clinic Utilization  Difficulty keeping appointments:: No  Compliance Concerns: No  No-Show Concerns: No  No PCP office visit in Past Year: No  Utilization    Last refreshed: 6/25/2020  9:25 AM:  Hospital Admissions 1           Last refreshed: 6/25/2020  9:25 AM:  ED Visits 1           Last refreshed: 6/25/2020  9:25 AM:  No Show Count (past year) 4              Current as of: 6/25/2020  9:25 AM          Clinical Concerns:    Current Medical Concerns:  Spoke with patient's daughter, Ratna who patient is currently staying with.     Ratna states patient was pulling her grandchildren in a wagon when she tripped and fell hitting her head. Patient sustained an intracranial hemorrhage.     Ratna states patient is doing well. She states she is much less confused and depressed than she was a month ago. She states she is back to her baseline of a year ago.  We discussed the importance of patient taking her medications. It was felt that when she was alone at home she did not always remember to take her meds.     Patient was found to have thickening of her bladder. She is to follow up with urology in 2-4 weeks for further work up.     Patient has orders for home care. Olaf states they are scheduled to come 6/26/20.        Current Behavioral Concerns: No concerns currently      Education Provided to patient: Role of care coordination.      Pain  Pain (GOAL):: No    Health Maintenance Reviewed:    Health Maintenance Due   Topic Date Due     ZOSTER IMMUNIZATION (1 of 2) 04/21/1997     MAMMO SCREENING  05/08/2015     PNEUMOCOCCAL IMMUNIZATION 65+ LOW/MEDIUM RISK (2 of 2 - PPSV23)  05/28/2016     ASTHMA ACTION PLAN  01/07/2017     MEDICARE ANNUAL WELLNESS VISIT  11/12/2019     ASTHMA CONTROL TEST  07/22/2020     Clinical Pathway: None    Medication Management:  Medication reconciliation status: Medications reviewed and reconciled.  Continue medications without change. Daughter is currently setting up and monitoring administration of medications.     Patient did remember that Mirallax was supposed to be added to her glass of water in the morning.     Functional Status:  Dependent ADLs:: Ambulation-no assistive device  Dependent IADLs:: Independent  Bed or wheelchair confined:: No  Mobility Status: Independent  Fallen 2 or more times in the past year?: No  Any fall with injury in the past year?: Yes    Living Situation:  Current living arrangement:: I live alone  [facility]    Lifestyle & Psychosocial Needs:        Diet:: Regular  Inadequate nutrition (GOAL):: No  Tube Feeding: No  Inadequate activity/exercise (GOAL):: No  Significant changes in sleep pattern (GOAL): No  Transportation means:: Accessible car, Family     Druze or spiritual beliefs that impact treatment:: No  Mental health DX:: Yes  Mental health DX how managed:: Medication  Mental health management concern (GOAL):: No  Informal Support system:: Children   Socioeconomic History     Marital status: Single     Spouse name: Not on file     Number of children: Not on file     Years of education: Not on file     Highest education level: Not on file     Tobacco Use     Smoking status: Never Smoker     Smokeless tobacco: Never Used   Substance and Sexual Activity     Alcohol use: No     Drug use: No     Sexual activity: Not Currently     Partners: Male     Currently staying with daughter Ratna, with home care services.  Patient wants to return to home in the future.  Once home care is complete and patient returns to her own home, will consider EMS program for follow up.      Resources and  Interventions:  Current Resources: [facility]  List of home care services:: Skilled Nursing, Physicial Therapy, Occupational Therapy  Community Resources: Home Care     Equipment Currently Used at Home: none    Advance Care Plan/Directive  Advanced Care Plans/Directives on file:: Yes  Type Advanced Care Plans/Directives: POLST    Referrals Placed: None     Goals:   Goals        General    Medication 1 (pt-stated)     Notes - Note created  6/25/2020 11:12 AM by Arcelia Sullivan RN    Goal Statement: I will take my medications as prescribed    Date Goal set: 6/25/2020    Barriers: Memory loss    Strengths: Family support    Date to Achieve By: October 2020    Patient expressed understanding of goal: yes    Action steps to achieve this goal:  1. I will allow my daughter to set up my medications for me  2. I will write down if I took my medications          Patient/Caregiver understanding: Caregiver has good understanding    Outreach Frequency: monthly  Future Appointments              In 3 weeks [facility]    In 3 weeks Nohemi Morataya PA-C [facility], [facility]    In 1 month Jyoti Moreno MD [facility]          Plan: Will follow up in on month.    Arcelia Sullivan RN, [facility] - Primary Care Clinic RN Coordinator  [facility]   6/25/2020    11:25 AM  [phone]

## 2020-06-25 NOTE — PROGRESS NOTES
This writer attempted to contact Ratna on 06/25/20      Reason for call discontinue fish oil  and left message.      If patient calls back:   Registered Nurse called. Follow Triage Call workflow        Yumiko Diamond RN    Consent to communicate on file to speak to Ratna called and left her VM to call back. Regarding fish oil.        Spoke with Urbano Clarence mitch. She had already seen verbal ok for PT orders in Epic.

## 2020-06-26 PROBLEM — F01.50 VASCULAR DEMENTIA WITHOUT BEHAVIORAL DISTURBANCE (H): Status: ACTIVE | Noted: 2020-06-26

## 2020-07-01 ENCOUNTER — TELEPHONE (OUTPATIENT)
Dept: FAMILY MEDICINE | Facility: CLINIC | Age: 75
End: 2020-07-01

## 2020-07-02 DIAGNOSIS — Z53.9 DIAGNOSIS NOT YET DEFINED: Primary | ICD-10-CM

## 2020-07-02 DIAGNOSIS — K21.9 GASTROESOPHAGEAL REFLUX DISEASE, ESOPHAGITIS PRESENCE NOT SPECIFIED: ICD-10-CM

## 2020-07-02 PROCEDURE — G0180 MD CERTIFICATION HHA PATIENT: HCPCS | Performed by: FAMILY MEDICINE

## 2020-07-03 NOTE — TELEPHONE ENCOUNTER
Should have refills on file at pharmacy. Patient to contact pharmacy.     Erika Brock RN  [facility]

## 2020-07-06 ENCOUNTER — TELEPHONE (OUTPATIENT)
Dept: FAMILY MEDICINE | Facility: CLINIC | Age: 75
End: 2020-07-06

## 2020-07-06 NOTE — TELEPHONE ENCOUNTER
This writer attempted to contact Thao on 07/06/20      Reason for call triage- is patient having symptoms? and left message.      If patient calls back:   Registered Nurse called. Follow Triage Call workflow        Erika Brock RN

## 2020-07-06 NOTE — TELEPHONE ENCOUNTER
Reason for call:  Thao with [facility] PT reporting a symptom    Symptom or request: Higher than normal BP, 150/90 sitting down and 160/100 standing up    Duration (how long have symptoms been present): Today    Have you been treated for this before? N/A    Additional comments: Would like call back from RN    Phone Number patient can be reached at:  Thao with [facility] -[phone]    Best Time:  Any    Can we leave a detailed message on this number:  YES    Call taken on 7/6/2020 at 1:54 PM by John Dahl

## 2020-07-07 NOTE — TELEPHONE ENCOUNTER
This writer attempted to contact Ratna, patient's daughter on 07/07/20      Reason for call provider's recommendation and left message.      If patient calls back:   Registered Nurse called. Follow Triage Call workflow        Linda Coppola RN

## 2020-07-07 NOTE — TELEPHONE ENCOUNTER
Took call back from ARNOLD Osuna, PT with  Home Care calling back.  She had called to report elevated blood pressures from yesterday's visit.  Thao noted that patient's blood pressures have been higher than usual lately. Last few visits, has been running 130-140's/90.   Yesterday, was 150/90 while sitting down, and went up to 160/100 with standing.  Patient previously was dealing with orthostatic hypotension, after having her intracranial hemorrhage. Would usually get lightheaded and dizzy and pressures would go low, with standing up.  Not happening now, BP is now going in other direction, is getting higher.  Thao wanted to update provider.   She will be seeing patient on Friday for another visit and will check BP again then.  Does not report patient is having any symptoms with higher BP, at this time.  Patient's next scheduled visit with PCP is 8/11/20.    Routing to provider to update and advise.    Beba Kelley RN  [facility]

## 2020-07-07 NOTE — TELEPHONE ENCOUNTER
Please have Vera increase lisinopril back to 40 mg (full tablet). Please update her daughter Ratna who is her caregiver with this plan   They should update us if any orthostatic sx's return (light-headedness with standing up)

## 2020-07-08 NOTE — TELEPHONE ENCOUNTER
Thao from [facility] updated on the message from Dr. Moreno.  She will text patient's daughter.  Lately patient had not had orthostatic symptoms.    Christina Wilkerson RN

## 2020-07-09 ENCOUNTER — MYC MEDICAL ADVICE (OUTPATIENT)
Dept: FAMILY MEDICINE | Facility: CLINIC | Age: 75
End: 2020-07-09

## 2020-07-16 ENCOUNTER — PATIENT OUTREACH (OUTPATIENT)
Dept: NURSING | Facility: CLINIC | Age: 75
End: 2020-07-16
Payer: COMMERCIAL

## 2020-07-16 ASSESSMENT — ACTIVITIES OF DAILY LIVING (ADL): DEPENDENT_IADLS:: INDEPENDENT

## 2020-07-16 NOTE — PROGRESS NOTES
Clinic Care Coordination Contact    Follow Up Progress Note      Assessment: patient continues to stay with her daughter, Ratna.     Home care is finished. Patient would like to return to her independent living home. Daughter is interested in private pay assistance in patient's home.      Goals addressed this encounter:   Goals Addressed                 This Visit's Progress      Medication 1 (pt-stated)   60%     Goal Statement: I will take my medications as prescribed    Date Goal set: 6/25/2020    Barriers: Memory loss    Strengths: Family support    Date to Achieve By: October 2020    Patient expressed understanding of goal: yes    Action steps to achieve this goal:  1. I will allow my daughter to set up my medications for me  2. I will write down if I took my medications             Intervention/Education provided during outreach: Outreach to daughterRatna to discuss resources.      Outreach Frequency: monthly    Plan:   Ratna is currently occupied, she will call clinic care coordinator back with a time to discuss resources for patient.    Care Coordinator will follow up in one month, if no return call from Ratna.     Arcelia Sullivan RN, [facility] - [facility] RN Coordinator  [facility]   7/16/2020    10:02 AM  [phone]

## 2020-07-20 ENCOUNTER — HOSPITAL ENCOUNTER (OUTPATIENT)
Dept: CT IMAGING | Facility: CLINIC | Age: 75
End: 2020-07-20
Attending: PHYSICIAN ASSISTANT
Payer: COMMERCIAL

## 2020-07-20 ENCOUNTER — OFFICE VISIT (OUTPATIENT)
Dept: NEUROSURGERY | Facility: CLINIC | Age: 75
End: 2020-07-20
Attending: PHYSICIAN ASSISTANT
Payer: COMMERCIAL

## 2020-07-20 VITALS
OXYGEN SATURATION: 97 % | HEIGHT: 62 IN | TEMPERATURE: 97.7 F | WEIGHT: 141 LBS | HEART RATE: 76 BPM | SYSTOLIC BLOOD PRESSURE: 151 MMHG | DIASTOLIC BLOOD PRESSURE: 80 MMHG | BODY MASS INDEX: 25.95 KG/M2

## 2020-07-20 DIAGNOSIS — I62.9 INTRACRANIAL HEMORRHAGE (H): Primary | ICD-10-CM

## 2020-07-20 DIAGNOSIS — I62.9 INTRACRANIAL HEMORRHAGE (H): ICD-10-CM

## 2020-07-20 PROCEDURE — 70450 CT HEAD/BRAIN W/O DYE: CPT

## 2020-07-20 PROCEDURE — 99214 OFFICE O/P EST MOD 30 MIN: CPT | Performed by: PHYSICIAN ASSISTANT

## 2020-07-20 PROCEDURE — G0463 HOSPITAL OUTPT CLINIC VISIT: HCPCS

## 2020-07-20 ASSESSMENT — MIFFLIN-ST. JEOR: SCORE: 1097.82

## 2020-07-20 ASSESSMENT — PAIN SCALES - GENERAL: PAINLEVEL: NO PAIN (0)

## 2020-07-20 NOTE — LETTER
7/20/2020         RE: Anabelle Herbert  [address]  [place] [facility] [address]        Dear Colleague,    Thank you for referring your patient, Anabelle Herbert, to the [facility] NEUROSURGERY [facility]IC. Please see a copy of my visit note below.    Neurosurgery follow-up    Ms. Herbert is a 75-year-old female who fell a month ago while running down a hill pulling a wagon with her grandchildren on it.  She hit her head and was unconscious for a good duration of time after the fall.  She was seen in the hospital and had left subarachnoid hemorrhage this was stable on repeat imaging.  She is here for one-month follow-up.  Head CT scan today shows resolution of the hemorrhage.  She reports no headache, but does states she has been dizzy since the fall and has having fatigue.    Exam    B/P: 164/88, T: 97.7, P: 78, R: Data Unavailable     Alert and oriented no acute distress  Bilateral upper extremities appropriate strength  Gait is normal  Negative pronator drift  Finger-nose slow and accurate      Imaging    Head CT demonstrates resolution of the previously seen left subarachnoid hemorrhage    Assessment    Fall  Concussion  Loss of consciousness  Subarachnoid hemorrhage, resolved      Plan    The patient may resume her Plavix at the discretion of her medical team.  She should follow-up with her primary care provider about her dizziness as well.  No further follow-up is needed with neurosurgery clinic.      Total time of 30 minutes was spent with the patient today in face-to-face consultation and coordination regarding the above assessment and plan.      Again, thank you for allowing me to participate in the care of your patient.        Sincerely,        Nohemi Morataya PA-C

## 2020-07-20 NOTE — PROGRESS NOTES
Neurosurgery follow-up    Ms. Herbert is a 75-year-old female who fell a month ago while running down a hill pulling a wagon with her grandchildren on it.  She hit her head and was unconscious for a good duration of time after the fall.  She was seen in the hospital and had left subarachnoid hemorrhage this was stable on repeat imaging.  She is here for one-month follow-up.  Head CT scan today shows resolution of the hemorrhage.  She reports no headache, but does states she has been dizzy since the fall and has having fatigue.    Exam    B/P: 164/88, T: 97.7, P: 78, R: Data Unavailable     Alert and oriented no acute distress  Bilateral upper extremities appropriate strength  Gait is normal  Negative pronator drift  Finger-nose slow and accurate      Imaging    Head CT demonstrates resolution of the previously seen left subarachnoid hemorrhage    Assessment    Fall  Concussion  Loss of consciousness  Subarachnoid hemorrhage, resolved      Plan    The patient may resume her Plavix at the discretion of her medical team.  She should follow-up with her primary care provider about her dizziness as well.  No further follow-up is needed with neurosurgery clinic.      Total time of 30 minutes was spent with the patient today in face-to-face consultation and coordination regarding the above assessment and plan.

## 2020-07-20 NOTE — NURSING NOTE
"Anabelle Herbert is a 75 year old female who presents for:  Chief Complaint   Patient presents with     Neurologic Problem     Head / Intracranial hemorrage        Initial Vitals:  BP (!) 164/88   Pulse 78   Temp 97.7  F (36.5  C)   Ht 5' 2\" (1.575 m)   Wt 141 lb (64 kg)   SpO2 97%   BMI 25.79 kg/m   Estimated body mass index is 25.79 kg/m  as calculated from the following:    Height as of this encounter: 5' 2\" (1.575 m).    Weight as of this encounter: 141 lb (64 kg).. Body surface area is 1.67 meters squared. BP completed using cuff size: regular  No Pain (0)    Nursing Comments: Patient presents with 0 head pain and to review CT from today    Rj Uriostegui MA  "

## 2020-07-27 ENCOUNTER — PATIENT OUTREACH (OUTPATIENT)
Dept: NURSING | Facility: CLINIC | Age: 75
End: 2020-07-27
Payer: COMMERCIAL
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
## 2020-08-06 PROBLEM — M79.662 PAIN IN BOTH LOWER LEGS: Status: RESOLVED | Noted: 2020-02-24 | Resolved: 2020-08-06

## 2020-08-06 PROBLEM — M79.661 PAIN IN BOTH LOWER LEGS: Status: RESOLVED | Noted: 2020-02-24 | Resolved: 2020-08-06

## 2020-08-08 DIAGNOSIS — K21.9 GASTROESOPHAGEAL REFLUX DISEASE, ESOPHAGITIS PRESENCE NOT SPECIFIED: ICD-10-CM

## 2020-08-09 DIAGNOSIS — I10 HYPERTENSION GOAL BP (BLOOD PRESSURE) < 130/80: ICD-10-CM

## 2020-08-11 NOTE — TELEPHONE ENCOUNTER
Routing refill request to provider for review/approval because:  Fails protocol  Erika Brock RN  [facility]

## 2020-08-12 RX ORDER — LISINOPRIL 40 MG/1
20 TABLET ORAL DAILY
Qty: 45 TABLET | Refills: 1 | Status: SHIPPED | OUTPATIENT
Start: 2020-08-12 | End: 2021-02-25 | Stop reason: DRUGHIGH

## 2020-08-12 NOTE — TELEPHONE ENCOUNTER
Routing refill request to provider for review/approval because:  Failed BP reading    Vicki Shearer RN, [facility] Triage

## 2020-09-09 ENCOUNTER — VIRTUAL VISIT (OUTPATIENT)
Dept: FAMILY MEDICINE | Facility: CLINIC | Age: 75
End: 2020-09-09
Payer: COMMERCIAL

## 2020-09-09 DIAGNOSIS — I62.9 INTRACRANIAL HEMORRHAGE (H): ICD-10-CM

## 2020-09-09 DIAGNOSIS — F43.23 ADJUSTMENT DISORDER WITH MIXED ANXIETY AND DEPRESSED MOOD: ICD-10-CM

## 2020-09-09 DIAGNOSIS — I10 HYPERTENSION GOAL BP (BLOOD PRESSURE) < 130/80: ICD-10-CM

## 2020-09-09 DIAGNOSIS — K21.9 GASTROESOPHAGEAL REFLUX DISEASE, ESOPHAGITIS PRESENCE NOT SPECIFIED: ICD-10-CM

## 2020-09-09 DIAGNOSIS — K59.00 CONSTIPATION, UNSPECIFIED CONSTIPATION TYPE: ICD-10-CM

## 2020-09-09 DIAGNOSIS — N30.10 CHRONIC INTERSTITIAL CYSTITIS: ICD-10-CM

## 2020-09-09 DIAGNOSIS — F01.50 VASCULAR DEMENTIA WITHOUT BEHAVIORAL DISTURBANCE (H): Primary | ICD-10-CM

## 2020-09-09 PROCEDURE — 99214 OFFICE O/P EST MOD 30 MIN: CPT | Mod: 95 | Performed by: FAMILY MEDICINE

## 2020-09-09 NOTE — PROGRESS NOTES
"Anabelle Herbert is a 75 year old female who is being evaluated via a billable video visit.      The patient has been notified of following:     \"This video visit will be conducted via a call between you and your physician/provider. We have found that certain health care needs can be provided without the need for an in-person physical exam.  This service lets us provide the care you need with a video conversation.  If a prescription is necessary we can send it directly to your pharmacy.  If lab work is needed we can place an order for that and you can then stop by our lab to have the test done at a later time.    Video visits are billed at different rates depending on your insurance coverage.  Please reach out to your insurance provider with any questions.    If during the course of the call the physician/provider feels a video visit is not appropriate, you will not be charged for this service.\"    Patient has given verbal consent for Video visit? Yes  How would you like to obtain your AVS? MyChart  Will anyone else be joining your video visit? Yes: daughter. How would they like to receive their invitation? Other e-mail: already on when contact    Subjective     Anabelle Herbert is a 75 year old female who presents today via video visit for the following health issues:    LISA Ahn and her daughter Pily are on this video visit together today.  We are notes that she isTired all the time and memory continues to be an issue.     However, Anabelle's daughter notes she is so much better than last time she was seen.  In fact she has been able to move back home    No longer staying with her daughter serenity now. Back in her own apt for the last month. Is able to cook somewhat but they are looking into options for PCA.  Daughter is looking into resources for this. Was told to request home care.   Daughter comes over to Anabelle's house M-W-F to check in on her.     Weight is stable- clothes fitting. Appetite is good.   No gerd or " "constipation.   gu- back to normal.   No illness, cough, breathing ok  Sleeping okay  Mood- feels \"lonely\", happy when her daughter is there. Has dog.   Still unsteady on her feet.  Has both a cane and walker at home but does not use them  Had f/u with neurosurg this summer-subarachnoid hemorrhage was completely resolved.  Was ok'd to restart plavix    Few bps recently have been elevated. Is taking 1/2 tablet of the lisinopril as she was orthostatic on the 40 mg dose.        Video Start Time: 3:47      Review of Systems   Constitutional, HEENT, cardiovascular, pulmonary, gi and gu systems are negative, except as otherwise noted.      Objective           Vitals:  No vitals were obtained today due to virtual visit.    Physical Exam     GENERAL: Healthy, alert and no distress  EYES: Eyes grossly normal to inspection.  No discharge or erythema, or obvious scleral/conjunctival abnormalities.  RESP: No audible wheeze, cough, or visible cyanosis.  No visible retractions or increased work of breathing.    SKIN: Visible skin clear. No significant rash, abnormal pigmentation or lesions.  NEURO: Cranial nerves grossly intact.  Mentation and speech appropriate for age.  PSYCH: Patient is able to answer most questions on her own but has little recall for past visits and hospitalizations., affect normal/bright, judgement and insight fair, normal speech and appearance well-groomed.          Assessment & Plan     Vascular dementia without behavioral disturbance (H)  Is overall quite stable right now and is moved back in to her own home.  Would definitely benefit from home cares and ideally with the PCA. Reviewed with vera the importance of using her cane/walker to prevent falls  - HOME CARE NURSING REFERRAL    Intracranial hemorrhage (H)  Resolved and finished with her neurosurgery follow-up.  Is back on Plavix.  - HOME CARE NURSING REFERRAL    Hypertension goal BP (blood pressure) < 130/80  Uncertain control.  Patient's daughter " "notes that blood pressure was rechecked with neurosurgeon and it had come down so not sure if the number listed is the second reading or first.  We will have home care start monitoring blood pressure and may need to increase lisinopril up to 40 mg.  Am somewhat hesitant to push her blood pressure too low due to her dizziness and risk of falls.  - HOME CARE NURSING REFERRAL    Gastroesophageal reflux disease, esophagitis presence not specified  Well-controlled continue PPI    Chronic interstitial cystitis  Controlled right now with no current symptoms    Adjustment disorder with mixed anxiety and depressed mood  Doing better from a mood standpoint although does voice she is quite lonely.  We discussed looking for new hobbies or things to occupy her time.  - HOME CARE NURSING REFERRAL    Constipation, unspecified constipation type  Overall controlled.  - HOME CARE NURSING REFERRAL     BMI:   Estimated body mass index is 25.79 kg/m  as calculated from the following:    Height as of 7/20/20: 1.575 m (5' 2\").    Weight as of 7/20/20: 64 kg (141 lb).     Return in about 3 months (around 12/9/2020) for Medication check.    Jyoti Moreno MD  [facility]      Video-Visit Details    Type of service:  Video Visit    Video End Time:4:09 PM    Originating Location (pt. Location): Home    Distant Location (provider location):  [facility]     Platform used for Video Visit: Hitesh        "

## 2020-09-22 ENCOUNTER — PATIENT OUTREACH (OUTPATIENT)
Dept: NURSING | Facility: CLINIC | Age: 75
End: 2020-09-22
Payer: COMMERCIAL

## 2020-09-22 NOTE — PROGRESS NOTES
"Clinic Care Coordination Contact    Follow Up Progress Note      Assessment: Spoke to patient's daughter, Ratna.    Ratna states they are doing really good. She states her Mom is very happy being back in her own place. Ratna is going at least three times a week and her brother goes as well. They call patient daily.     Ratna sets up medications and calls to remind patient to take them. She has been doing well with this plan.     Patient did not want home care coming. She wants Ratna to be her PCA. At this time patient has too much money to qualify for medical assistance and a PCA. Ratna states that her brother, Sang, is in charge of the finances. Apparently patient cashed in all of her stocks earlier this year and put them in savings. They also discovered that patient was donating $600 to $800 every month. She would make a donation and then forget and do it again the next day.     Ratna states that Sang \"has a plan\" for when their mother may need to have more care. He is going to fix up her home now and if needed they can either rent it or sell to pay for her care when needed.     They have not heard any more from \"Kamila\" woman who was living with patient. She was also taking money from patient in the form \"gifts, cash, etc. \" They did report this to the police and they have blocked patient's phone she can not receive calls. Ratna states they have signs all over the house reminding patient not to have contact with Kamila.     Patient is not driving, family has taken her car keys.            Goals addressed this encounter: Doing well currently  Goals Addressed                 This Visit's Progress      Medication 1 (pt-stated)   60%     Goal Statement: I will take my medications as prescribed    Date Goal set: 6/25/2020    Barriers: Memory loss    Strengths: Family support    Date to Achieve By: October 2020    Patient expressed understanding of goal: yes    Action steps to achieve this goal:  1. I will " allow my daughter to set up my medications for me  2. I will write down if I took my medications          Intervention/Education provided during outreach: As above     Outreach Frequency: monthly    Plan:   Patient will continue to take medications as ordered.     Care Coordinator will follow up in one month.    Arcelia Sullivan RN, [facility] - Primary Care Clinic RN Coordinator  [facility]   9/22/2020      [phone]

## 2020-10-06 NOTE — TELEPHONE ENCOUNTER
"Requested Prescriptions   Pending Prescriptions Disp Refills     clopidogrel (PLAVIX) 75 MG tablet [Pharmacy Med Name: CLOPIDOGREL 75 MG TABLET] 90 tablet 3     Sig: TAKE 1 TABLET BY MOUTH EVERY DAY       Plavix Passed - 8/7/2019  1:32 AM        Passed - No active PPI on record unless is Protonix        Passed - Normal HGB on file in past 12 months     Recent Labs   Lab Test 03/26/19  1309   HGB 12.0               Passed - Normal Platelets on file in past 12 months     Recent Labs   Lab Test 03/26/19  1309                  Passed - Recent (12 mo) or future (30 days) visit within the authorizing provider's specialty     Patient had office visit in the last 12 months or has a visit in the next 30 days with authorizing provider or within the authorizing provider's specialty.  See \"Patient Info\" tab in inbasket, or \"Choose Columns\" in Meds & Orders section of the refill encounter.              Passed - Medication is active on med list        Passed - Patient is age 18 or older        Passed - No active pregnancy on record        Passed - No positive pregnancy test in past 12 months        clopidogrel (PLAVIX) 75 MG tablet  Last Written Prescription Date:  7/5/18  Last Fill Quantity: 90,  # refills: 3   Last office visit: 3/26/2019 with prescribing provider:  Dr. Moreno   Future Office Visit:      "          Right TL PICC Usage Necessity Functionality Comments   Insertion Date:  9/22/2020  CVL Days:  14 days    Lab Draws         no  Frequ:   IV Abx yes  Frequ: every 8 hours  Inotropes yes  TPN/IL no  Chemotherapy no  Other Vesicants:     Prn electrolytes Long-term tx yes  Short-term tx no  Difficult access yes     Date of last PIV attempt:  (09/30/2020) Leaking? no  Blood return?yes  TPA administered?   (list all dates & ports requiring TPA below)  White lumen 9/30/2020    Sluggish flush? no  Frequent dressing changes? no     CVL Site Assessment:     Clean, dry, intact          PLAN FOR TODAY: line to remain in place while patient is on milrinone                   Daily Discussion Tool     Right IJ DL dialysis cath Usage Necessity Functionality Comments   Insertion Date:  10/3/2020  CVL Days:  3     Lab Draws         yes  Frequ: every 6 hours  IV Abx no  Frequ: n/a  Inotropes no  TPN/IL no  Chemotherapy no  Other Vesicants: CaCl and Citrate       Long-term tx no  Short-term tx no  Difficult access no     Date of last PIV attempt:  (09/30/2020) Leaking? no  Blood return? yes  TPA administered?   no  (list all dates & ports requiring TPA below)     Sluggish flush? no  Frequent dressing changes? no     CVL Site Assessment:     Clean, dry, and intact          PLAN FOR TODAY: Line to remain in place while patient requires CVVHD.

## 2020-10-20 DIAGNOSIS — F43.23 ADJUSTMENT REACTION WITH ANXIETY AND DEPRESSION: ICD-10-CM

## 2020-10-22 ENCOUNTER — PATIENT OUTREACH (OUTPATIENT)
Dept: CARE COORDINATION | Facility: CLINIC | Age: 75
End: 2020-10-22

## 2020-10-22 RX ORDER — MIRTAZAPINE 15 MG/1
15 TABLET, FILM COATED ORAL AT BEDTIME
Qty: 90 TABLET | Refills: 0 | Status: SHIPPED | OUTPATIENT
Start: 2020-10-22 | End: 2021-05-17

## 2020-10-22 NOTE — PROGRESS NOTES
Clinic Care Coordination Contact    Follow Up Progress Note      Assessment: Outreach to patient's daughter, Ratna.    Left message on voicemail and requested return call at her convenience.     Goals addressed this encounter: Stable  Goals Addressed                 This Visit's Progress      Medication 1 (pt-stated)   60%     Goal Statement: I will take my medications as prescribed    Date Goal set: 6/25/2020    Barriers: Memory loss    Strengths: Family support    Date to Achieve By: October 2020    Patient expressed understanding of goal: yes    Action steps to achieve this goal:  1. I will allow my daughter to set up my medications for me  2. I will write down if I took my medications          Intervention/Education provided during outreach: Requested return call     Outreach Frequency: monthly    Plan:   Ratna will return call to RN care coordinator.    Care Coordinator will follow up in one month if no response.     Arcelia Sullivan RN, [facility] - Primary Care Clinic RN Coordinator  [facility]   10/22/2020    12:36 PM  [phone]

## 2020-10-22 NOTE — TELEPHONE ENCOUNTER
Routing refill request to provider for review/approval because:  PHQ-9 fails protocol.    Vicki Shearer RN, [facility] Triage

## 2020-10-22 NOTE — LETTER
[facility]  Complex Care Plan  About Me:    Patient Name:  Anabelle Greene    YOB: 1945  Age:         75 year old   [facility] MRN:    [id number] Telephone Information:  Home Phone [phone]   Mobile [phone]       Address:  [address] [place] [id number] Email address:  [email or web address]      Emergency Contact(s)    Name Relationship Lgl Grd Work Phone Home Phone Mobile Phone   1. TAY CHEN* Daughter No  [phone]    2. EFREN GREENE* Son No   [phone]   3. PARISA RUTHERFORD Sister No   [phone]           Primary language:  English     needed? No   [facility] [employer]:  [phone] op. 1  Other communication barriers: Glasses  Preferred Method of Communication:  Mail  Current living arrangement:  I live in my own home  Mobility Status/ Medical Equipment:  Independent    Health Maintenance  Health Maintenance Reviewed:    Health Maintenance Due   Topic Date Due     ZOSTER IMMUNIZATION (1 of 2) 04/21/1997     MAMMO SCREENING  05/08/2015     ASTHMA ACTION PLAN  01/07/2017     MEDICARE ANNUAL WELLNESS VISIT  11/12/2019     ASTHMA CONTROL TEST  07/22/2020     LIPID  08/21/2020     INFLUENZA VACCINE (1) 09/01/2020       My Access Plan  Medical Emergency 911   Primary Clinic Line [facility] [phone]   24 Hour Appointment Line [phone] or  [phone]) (toll-free)   24 Hour Nurse Line [phone] (toll-free)   Preferred Urgent Care     Preferred Hospital     Preferred Pharmacy [facility]/pharmacy #[id number] [address] Behavioral Health Crisis Line The National Suicide Prevention Lifeline at 1-129.885.8495 or 911             My Care Team Members  Patient Care Team       Relationship Specialty Notifications Start End    Jyoti Moreno MD PCP - General   4/27/07     Phone: [phone] Fax: [phone] [address] [facility] [address]     Jyoti Moreno MD Assigned PCP   10/4/12     Phone: [phone] Fax: [phone] [address] [facility] [address] [facility] [address]    Arcelia Sullivan, RN Lead Care Coordinator Primary Care - CC Admissions 6/25/20     Phone: [phone]                 My Care Plans  Self Management and Treatment Plan  Goals and (Comments)  Goals        General    Medication 1 (pt-stated)     Notes - Note created  6/25/2020 11:12 AM by Arcelia Sullivan, RN    Goal Statement: I will take my medications as prescribed    Date Goal set: 6/25/2020    Barriers: Memory loss    Strengths: Family support    Date to Achieve By: October 2020    Patient expressed understanding of goal: yes    Action steps to achieve this goal:  1. I will allow my daughter to set up my medications for me  2. I will write down if I took my medications               Action Plans on File:            Depression          Advance Care Plans/Directives Type:        My Medical and Care Information  Problem List   Patient Active Problem List   Diagnosis     Chronic interstitial cystitis     Adjustment reaction with anxiety and depression     Abdominal pain     Benign essential hypertension     CKD (chronic kidney disease) stage 2, GFR 60-89 ml/min     Hyperlipidemia LDL goal <100     Mild persistent asthma     Vitamin D deficiency     GERD (gastroesophageal reflux disease)     B12 deficiency     Transient cerebral ischemia     Carotid stenosis     Diverticulosis of colon     Colon polyp     Atrophic vaginitis     Interstitial cystitis     Chronic constipation     Cognitive complaints     Prediabetes     Osteoporosis     Cerebrovascular accident (CVA), unspecified mechanism (H)     SHANEKA (obstructive sleep apnea)     Intracranial hemorrhage (H)     Vascular dementia without behavioral disturbance (H)      Current Medications and Allergies:  See printed Medication Report.    Care Coordination Start Date: 6/25/2020   Frequency of Care Coordination: monthly   Form Last  Updated: 10/22/2020

## 2020-11-30 ENCOUNTER — PATIENT OUTREACH (OUTPATIENT)
Dept: CARE COORDINATION | Facility: CLINIC | Age: 75
End: 2020-11-30

## 2020-11-30 NOTE — PROGRESS NOTES
Clinic Care Coordination Contact    Follow Up Progress Note      Assessment: Outreach to patient's daughterPily.  Left message on voicemail and requested return call.     Patient has had no episodes of care during this review period.      Goals addressed this encounter: Stable  Goals Addressed                 This Visit's Progress      Medication 1 (pt-stated)   60%     Goal Statement: I will take my medications as prescribed    Date Goal set: 6/25/2020    Barriers: Memory loss    Strengths: Family support    Date to Achieve By: October 2020    Patient expressed understanding of goal: yes    Action steps to achieve this goal:  1. I will allow my daughter to set up my medications for me  2. I will write down if I took my medications          Intervention/Education provided during outreach: Requested return call     Outreach Frequency: 2 months    Plan:   Daughter will return call to RN clinic care coordinator.    Care Coordinator will follow up in 2 months if no response anticipate closure.     Arcelia Sullivan RN, [facility] - Primary Care Clinic RN Coordinator  [facility]   11/30/2020    12:39 PM  [phone]

## 2020-12-06 ENCOUNTER — HEALTH MAINTENANCE LETTER (OUTPATIENT)
Age: 75
End: 2020-12-06

## 2021-01-07 ENCOUNTER — OFFICE VISIT (OUTPATIENT)
Dept: FAMILY MEDICINE | Facility: CLINIC | Age: 76
End: 2021-01-07
Payer: COMMERCIAL

## 2021-01-07 VITALS
BODY MASS INDEX: 28.89 KG/M2 | WEIGHT: 157 LBS | SYSTOLIC BLOOD PRESSURE: 134 MMHG | HEIGHT: 62 IN | DIASTOLIC BLOOD PRESSURE: 80 MMHG | OXYGEN SATURATION: 95 % | HEART RATE: 70 BPM

## 2021-01-07 DIAGNOSIS — R82.90 NONSPECIFIC FINDING ON EXAMINATION OF URINE: ICD-10-CM

## 2021-01-07 DIAGNOSIS — Z00.00 ENCOUNTER FOR MEDICARE ANNUAL WELLNESS EXAM: Primary | ICD-10-CM

## 2021-01-07 DIAGNOSIS — F01.50 VASCULAR DEMENTIA WITHOUT BEHAVIORAL DISTURBANCE (H): ICD-10-CM

## 2021-01-07 DIAGNOSIS — E53.8 B12 DEFICIENCY: ICD-10-CM

## 2021-01-07 DIAGNOSIS — E55.9 VITAMIN D DEFICIENCY: ICD-10-CM

## 2021-01-07 DIAGNOSIS — R53.83 FATIGUE, UNSPECIFIED TYPE: ICD-10-CM

## 2021-01-07 DIAGNOSIS — N30.10 CHRONIC INTERSTITIAL CYSTITIS: ICD-10-CM

## 2021-01-07 DIAGNOSIS — R79.89 ABNORMAL TSH: ICD-10-CM

## 2021-01-07 DIAGNOSIS — N18.2 CKD (CHRONIC KIDNEY DISEASE) STAGE 2, GFR 60-89 ML/MIN: ICD-10-CM

## 2021-01-07 DIAGNOSIS — F43.23 ADJUSTMENT REACTION WITH ANXIETY AND DEPRESSION: ICD-10-CM

## 2021-01-07 LAB
ALBUMIN SERPL-MCNC: 3.9 G/DL (ref 3.4–5)
ALBUMIN UR-MCNC: NEGATIVE MG/DL
ALP SERPL-CCNC: 70 U/L (ref 40–150)
ALT SERPL W P-5'-P-CCNC: 22 U/L (ref 0–50)
ANION GAP SERPL CALCULATED.3IONS-SCNC: 3 MMOL/L (ref 3–14)
APPEARANCE UR: CLEAR
AST SERPL W P-5'-P-CCNC: 18 U/L (ref 0–45)
BACTERIA #/AREA URNS HPF: ABNORMAL /HPF
BASOPHILS # BLD AUTO: 0 10E9/L (ref 0–0.2)
BASOPHILS NFR BLD AUTO: 0.6 %
BILIRUB SERPL-MCNC: 0.4 MG/DL (ref 0.2–1.3)
BILIRUB UR QL STRIP: NEGATIVE
BUN SERPL-MCNC: 21 MG/DL (ref 7–30)
CALCIUM SERPL-MCNC: 9.6 MG/DL (ref 8.5–10.1)
CHLORIDE SERPL-SCNC: 109 MMOL/L (ref 94–109)
CO2 SERPL-SCNC: 29 MMOL/L (ref 20–32)
COLOR UR AUTO: YELLOW
CREAT SERPL-MCNC: 1.11 MG/DL (ref 0.52–1.04)
DEPRECATED CALCIDIOL+CALCIFEROL SERPL-MC: 78 UG/L (ref 20–75)
DIFFERENTIAL METHOD BLD: NORMAL
EOSINOPHIL # BLD AUTO: 0.2 10E9/L (ref 0–0.7)
EOSINOPHIL NFR BLD AUTO: 2.7 %
ERYTHROCYTE [DISTWIDTH] IN BLOOD BY AUTOMATED COUNT: 11.8 % (ref 10–15)
GFR SERPL CREATININE-BSD FRML MDRD: 49 ML/MIN/{1.73_M2}
GLUCOSE SERPL-MCNC: 96 MG/DL (ref 70–99)
GLUCOSE UR STRIP-MCNC: NEGATIVE MG/DL
HCT VFR BLD AUTO: 40.4 % (ref 35–47)
HGB BLD-MCNC: 13.4 G/DL (ref 11.7–15.7)
HGB UR QL STRIP: NEGATIVE
KETONES UR STRIP-MCNC: NEGATIVE MG/DL
LEUKOCYTE ESTERASE UR QL STRIP: ABNORMAL
LYMPHOCYTES # BLD AUTO: 1.7 10E9/L (ref 0.8–5.3)
LYMPHOCYTES NFR BLD AUTO: 24.5 %
MCH RBC QN AUTO: 31.4 PG (ref 26.5–33)
MCHC RBC AUTO-ENTMCNC: 33.2 G/DL (ref 31.5–36.5)
MCV RBC AUTO: 95 FL (ref 78–100)
MONOCYTES # BLD AUTO: 0.5 10E9/L (ref 0–1.3)
MONOCYTES NFR BLD AUTO: 7.6 %
MUCOUS THREADS #/AREA URNS LPF: PRESENT /LPF
NEUTROPHILS # BLD AUTO: 4.5 10E9/L (ref 1.6–8.3)
NEUTROPHILS NFR BLD AUTO: 64.6 %
NITRATE UR QL: NEGATIVE
NON-SQ EPI CELLS #/AREA URNS LPF: ABNORMAL /LPF
PH UR STRIP: 6 PH (ref 5–7)
PLATELET # BLD AUTO: 259 10E9/L (ref 150–450)
POTASSIUM SERPL-SCNC: 4.3 MMOL/L (ref 3.4–5.3)
PROT SERPL-MCNC: 7.5 G/DL (ref 6.8–8.8)
RBC # BLD AUTO: 4.27 10E12/L (ref 3.8–5.2)
RBC #/AREA URNS AUTO: ABNORMAL /HPF
SODIUM SERPL-SCNC: 141 MMOL/L (ref 133–144)
SOURCE: ABNORMAL
SP GR UR STRIP: >1.03 (ref 1–1.03)
T4 FREE SERPL-MCNC: 0.87 NG/DL (ref 0.76–1.46)
TSH SERPL DL<=0.005 MIU/L-ACNC: 4.22 MU/L (ref 0.4–4)
UROBILINOGEN UR STRIP-ACNC: 0.2 EU/DL (ref 0.2–1)
VIT B12 SERPL-MCNC: 878 PG/ML (ref 193–986)
WBC # BLD AUTO: 7 10E9/L (ref 4–11)
WBC #/AREA URNS AUTO: >100 /HPF

## 2021-01-07 PROCEDURE — 86800 THYROGLOBULIN ANTIBODY: CPT | Performed by: FAMILY MEDICINE

## 2021-01-07 PROCEDURE — 80053 COMPREHEN METABOLIC PANEL: CPT | Performed by: FAMILY MEDICINE

## 2021-01-07 PROCEDURE — 85025 COMPLETE CBC W/AUTO DIFF WBC: CPT | Performed by: FAMILY MEDICINE

## 2021-01-07 PROCEDURE — 86376 MICROSOMAL ANTIBODY EACH: CPT | Performed by: FAMILY MEDICINE

## 2021-01-07 PROCEDURE — 82306 VITAMIN D 25 HYDROXY: CPT | Performed by: FAMILY MEDICINE

## 2021-01-07 PROCEDURE — 87086 URINE CULTURE/COLONY COUNT: CPT | Performed by: FAMILY MEDICINE

## 2021-01-07 PROCEDURE — 87186 SC STD MICRODIL/AGAR DIL: CPT | Performed by: FAMILY MEDICINE

## 2021-01-07 PROCEDURE — 84443 ASSAY THYROID STIM HORMONE: CPT | Performed by: FAMILY MEDICINE

## 2021-01-07 PROCEDURE — G0439 PPPS, SUBSEQ VISIT: HCPCS | Performed by: FAMILY MEDICINE

## 2021-01-07 PROCEDURE — 81001 URINALYSIS AUTO W/SCOPE: CPT | Performed by: FAMILY MEDICINE

## 2021-01-07 PROCEDURE — 87088 URINE BACTERIA CULTURE: CPT | Performed by: FAMILY MEDICINE

## 2021-01-07 PROCEDURE — 99214 OFFICE O/P EST MOD 30 MIN: CPT | Mod: 25 | Performed by: FAMILY MEDICINE

## 2021-01-07 PROCEDURE — 36415 COLL VENOUS BLD VENIPUNCTURE: CPT | Performed by: FAMILY MEDICINE

## 2021-01-07 PROCEDURE — 84439 ASSAY OF FREE THYROXINE: CPT | Performed by: FAMILY MEDICINE

## 2021-01-07 PROCEDURE — 82607 VITAMIN B-12: CPT | Performed by: FAMILY MEDICINE

## 2021-01-07 ASSESSMENT — MIFFLIN-ST. JEOR: SCORE: 1170.4

## 2021-01-07 ASSESSMENT — PAIN SCALES - GENERAL: PAINLEVEL: NO PAIN (0)

## 2021-01-07 NOTE — PROGRESS NOTES
"  SUBJECTIVE:   Anabelle Herbert is a 75 year old female who presents for Preventive Visit.      Patient has been advised of split billing requirements and indicates understanding: Yes  Are you in the first 12 months of your Medicare Part B coverage?  No    Physical Health:    In general, how would you rate your overall physical health? fair    Outside of work, how many days during the week do you exercise? none    Outside of work, approximately how many minutes a day do you exercise?not applicable    If you drink alcohol do you typically have >3 drinks per day or >7 drinks per week? No    Do you usually eat at least 4 servings of fruit and vegetables a day, include whole grains & fiber and avoid regularly eating high fat or \"junk\" foods? NO    Do you have any problems taking medications regularly?  No    Do you have any side effects from medications? none    Needs assistance for the following daily activities: telephone use, transportation, shopping, preparing meals, housework, bathing, laundry, money management and taking medicine    Which of the following safety concerns are present in your home?  none identified     Hearing impairment: No    In the past 6 months, have you been bothered by leaking of urine? no    Mental Health:    In general, how would you rate your overall mental or emotional health? fair  PHQ-2 Score:      Do you feel safe in your environment? Sometimes    Have you ever done Advance Care Planning? (For example, a Health Directive, POLST, or a discussion with a medical provider or your loved ones about your wishes): Yes, advance care planning is on file.    Additional concerns to address?  YES- sleeping a lot    Fall risk:  Fallen 2 or more times in the past year?: No  Any fall with injury in the past year?: Yes    Cognitive Screenin) Repeat 3 items (Leader, Season, Table)  YES  2) Clock draw: NORMAL  3) 3 item recall: Recalls 1 object   Results: NORMAL clock, 1 items recalled: COGNITIVE " IMPAIRMENT LESS LIKELY    Mini-CogTM Copyright AV Cohen. Licensed by the author for use in [facility]; reprinted with permission (cuate@.[facility]). All rights reserved.      Do you have sleep apnea, excessive snoring or daytime drowsiness?: yes       HEARING FREQUENCY    Right Ear:      1000 Hz RESPONSE- on Level:   20 db  (Conditioning sound)   1000 Hz: RESPONSE- on Level:   20 db    2000 Hz: RESPONSE- on Level:   20 db    4000 Hz: RESPONSE- on Level:   20 db     Left Ear:      4000 Hz: RESPONSE- on Level:   20 db    2000 Hz: RESPONSE- on Level:   20 db    1000 Hz: RESPONSE- on Level:   20 db     500 Hz: RESPONSE- on Level:   20 db     Right Ear:    500 Hz: RESPONSE- on Level:   20 db     Hearing Acuity: Pass    Hearing Assessment: normal      Daughter is with her for today's appointment.   Her daughter goes to see her M-W-F and has been noting that she seems very tired all the time and has been sleeping a lot again lately.  She had similar symptoms last year and her Aricept dose was decreased and antidepressants were tweaked and she improved considerably.    Patient reports she is drinking water each day    Daughter notes memory worse again.   Not remembering day prior and needing constant reminders of what she is doing.   Meals on wheels on weekdays and daughter brings her food.   No dyspnea or cp.   No recent falls   bowels are working fine.     Recent eye exam with possible glaucoma? Will be seeing specialist.     Left arm firm papule that keeps picking at and peeling off and then it comes back    Usually mood is low.   Has a dog and has been caring for it and her daughter notes this is been very therapeutic for her    Reviewed and updated as needed this visit by clinical staff  Tobacco  Allergies  Meds  Problems  Med Hx  Surg Hx  Fam Hx  Soc Hx          Reviewed and updated as needed this visit by Provider                Social History     Tobacco Use     Smoking status: Never Smoker      "Smokeless tobacco: Never Used   Substance Use Topics     Alcohol use: No                           Current providers sharing in care for this patient include:   Patient Care Team:  Jyoti Moreno MD as PCP - General  Jyoti Moreno MD as Assigned PCP    The following health maintenance items are reviewed in Epic and correct as of today:  Health Maintenance   Topic Date Due     ZOSTER IMMUNIZATION (1 of 2) 04/21/1997     MAMMO SCREENING  05/08/2015     ASTHMA ACTION PLAN  01/07/2017     ASTHMA CONTROL TEST  07/22/2020     LIPID  08/21/2020     PHQ-9  11/29/2020     Pneumococcal Vaccine: 65+ Years (2 of 2 - PPSV23) 03/16/2021     FALL RISK ASSESSMENT  06/25/2021     MEDICARE ANNUAL WELLNESS VISIT  01/07/2022     COLORECTAL CANCER SCREENING  01/11/2022     DTAP/TDAP/TD IMMUNIZATION (2 - Td) 03/07/2023     ADVANCE CARE PLANNING  05/01/2025     DEXA  07/18/2031     HEPATITIS C SCREENING  Completed     INFLUENZA VACCINE  Completed     Pneumococcal Vaccine: Pediatrics (0 to 5 Years) and At-Risk Patients (6 to 64 Years)  Aged Out     IPV IMMUNIZATION  Aged Out     MENINGITIS IMMUNIZATION  Aged Out     HEPATITIS B IMMUNIZATION  Aged Out     }    ROS:   ROS: 10 point ROS neg other than the symptoms noted above in the HPI.      OBJECTIVE:   /80   Pulse 70   Ht 1.575 m (5' 2\")   Wt 71.2 kg (157 lb)   SpO2 95%   BMI 28.72 kg/m   Estimated body mass index is 28.72 kg/m  as calculated from the following:    Height as of this encounter: 1.575 m (5' 2\").    Weight as of this encounter: 71.2 kg (157 lb).  EXAM:   GENERAL: healthy, alert and no distress  EYES: Eyes grossly normal to inspection, PERRL and conjunctivae and sclerae normal  HENT: ear canals and TM's normal, nose and mouth without ulcers or lesions  NECK: no adenopathy, no asymmetry, masses, or scars and thyroid normal to palpation  RESP: lungs clear to auscultation - no rales, rhonchi or wheezes  CV: regular rate and rhythm, normal S1 " S2, no S3 or S4, no murmur, click or rub, no peripheral edema and peripheral pulses strong  ABDOMEN: soft, nontender, no hepatosplenomegaly, no masses and bowel sounds normal  MS: no gross musculoskeletal defects noted, no edema  SKIN: no suspicious lesions or rashes  NEURO: cranial nerves 2-12 intact and is able to give much of her history but does look to her daughter for some details  PSYCH: mentation appears normal, affect normal/bright    Diagnostic Test Results:  Labs reviewed in Epic    ASSESSMENT / PLAN:   1. Encounter for Medicare annual wellness exam  We briefly discussed mammograms but decided to defer for now given her dementia and other health issues  - Urine Microscopic  - T4 free    2. Fatigue, unspecified type  Unclear etiology of recent fatigue trigger.  Will get labs as below and trial stopping the Aricept to see if this makes a difference.  We could also consider increasing mirtazapine in the future although this also could be a medication which is causing fatigue (doubt this is the issue as she had previously been taught tolerating it)  - CBC with platelets differential  - TSH with free T4 reflex  - Comprehensive metabolic panel (BMP + Alb, Alk Phos, ALT, AST, Total. Bili, TP)    3. Vascular dementia without behavioral disturbance (H)  See above.  Question if current symptoms are just a flare of her baseline dementia.  We discussed getting home care of involved again but her daughter notes she did not always like having folks come into her home and they have been managing her medications and food quite well.  Patient and her daughter will discuss home care a little bit more and let me know if they change their minds on this  - CBC with platelets differential  - TSH with free T4 reflex  - Comprehensive metabolic panel (BMP + Alb, Alk Phos, ALT, AST, Total. Bili, TP)    4. Adjustment reaction with anxiety and depression  Fair control.  Fatigue is the most driving force and not necessarily severe  "depression.  See above  - TSH with free T4 reflex  - Comprehensive metabolic panel (BMP + Alb, Alk Phos, ALT, AST, Total. Bili, TP)    5. CKD (chronic kidney disease) stage 2, GFR 60-89 ml/min  Assess stability.  On ACE inhibitor  - CBC with platelets differential    6. Chronic interstitial cystitis  No recent urinary symptoms  - UA reflex to Microscopic and Culture    7. Vitamin D deficiency  Is supplementing. assess status  - Vitamin D Deficiency    8. B12 deficiency  Is supplementing. assess status  - Vitamin B12      COUNSELING:  Reviewed preventive health counseling, as reflected in patient instructions       Regular exercise       Healthy diet/nutrition       Vision screening       Bladder control       Fall risk prevention    Estimated body mass index is 28.72 kg/m  as calculated from the following:    Height as of this encounter: 1.575 m (5' 2\").    Weight as of this encounter: 71.2 kg (157 lb).        She reports that she has never smoked. She has never used smokeless tobacco.    Appropriate preventive services were discussed with this patient, including applicable screening as appropriate for cardiovascular disease, diabetes, osteopenia/osteoporosis, and glaucoma.  As appropriate for age/gender, discussed screening for colorectal cancer, prostate cancer, breast cancer, and cervical cancer. Checklist reviewing preventive services available has been given to the patient.    Reviewed patients plan of care and provided an AVS. The Basic Care Plan (routine screening as documented in Health Maintenance) for Anabelle meets the Care Plan requirement. This Care Plan has been established and reviewed with the Patient and daughter.    Counseling Resources:  ATP IV Guidelines  Pooled Cohorts Equation Calculator  Breast Cancer Risk Calculator  BRCA-Related Cancer Risk Assessment: FHS-7 Tool  FRAX Risk Assessment  ICSI Preventive Guidelines  Dietary Guidelines for Americans, 2010  USDA's MyPlate  ASA Prophylaxis  Lung CA " Screening    Jyoti Moreno MD  [facility]    She is at risk for falling and has been provided with information to reduce the risk of falling at home.

## 2021-01-07 NOTE — PATIENT INSTRUCTIONS
Adjust miralax dose to allow daily bowel movements    Ok to trial off the aricept for 3-4 weeks.   Can restart the medication if no change     Schedule skin biopsy of arm skin lesion      At [facility], we strive to deliver an exceptional experience to you, every time we see you. If you receive a survey, please complete it as we do value your feedback.  If you have MyChart, you can expect to receive results automatically within 24 hours of their completion.  Your provider will send a note interpreting your results as well.   If you do not have MyChart, you should receive your results in about a week by mail.    Your care team:                            Family Medicine Internal Medicine   MD Velasquez Asencio, MD Indiana Yin, MD Shannon Boo PAJeniferC  Margaret Kwan, APRN CNP    Torres Ervin, MD Pediatrics   Devaughn Donaldson, PA-C  Allison River, CNP Princess Covarrubias, MD Alexa Meadows APRN CNP   MD Jessika Zendejas MD Deborah Mielke, MD Kim Thein, APRN CNP  Alethea Gillis, PA-C  Sheba Henriquez, CNP  Robinson Jack, MD Jyoti Cotton MD      Clinic hours: Monday - Thursday 7 am-7 pm; Fridays 7 am-5 pm.   Urgent care: Monday - Friday 11 am-9 pm; Saturday and Sunday 9 am-5 pm.    Clinic: [phone]       [facility]: Monday - Thursday 8 am - 7 pm; Friday 8 am - 6 pm  [facility]: [phone]     Use [email or web address] for 24/7 diagnosis and treatment of dozens of conditions.    Patient Education   Personalized Prevention Plan  You are due for the preventive services outlined below.  Your care team is available to assist you in scheduling these services.  If you have already completed any of these items, please share that information with your care team to update in your medical record.  Health Maintenance Due   Topic Date Due     Zoster  (Shingles) Vaccine (1 of 2) 04/21/1997     Mammogram  05/08/2015     Asthma Action Plan - yearly  01/07/2017     Asthma Control Test  07/22/2020     Cholesterol Lab  08/21/2020     Depression Assessment  11/29/2020        Patient Education   Personalized Prevention Plan  You are due for the preventive services outlined below.  Your care team is available to assist you in scheduling these services.  If you have already completed any of these items, please share that information with your care team to update in your medical record.  Health Maintenance Due   Topic Date Due     Zoster (Shingles) Vaccine (1 of 2) 04/21/1997     Mammogram  05/08/2015     Asthma Action Plan - yearly  01/07/2017     Asthma Control Test  07/22/2020     Cholesterol Lab  08/21/2020     Depression Assessment  11/29/2020       Preventing Falls in the Home  An adult or child can fall for many reasons. If you are an older adult, you may fall because your reaction time slows down. Your muscles and joints may get stiff, weak, or less flexible because of illness, medicines, or a physical condition. These things can also make a child more likely to fall or be injured in a fall.  Other health problems that make falls more likely include:    Arthritis    Dizziness or lightheadedness when you get out of bed (orthostatic hypotension)    History of a stroke    Dizziness    Anemia    Certain medicines taken for mental illness or to control blood pressure.    Problems with balance or gait    Bladder or urinary problems    History of falls with or without an injury    Changes in vision (vision impairment)    Changes in thinking skills and memory (cognitive impairment)  Injuries from a fall can include broken bones, dislocated joints, internal bleeding and cuts. When these injuries are serious enough, they can make it impossible for you or a child who is injured in a fall to live on his or her ownhome.  Prevention tips  To help prevent falls and fall-related  injuries, follow the tips below.   Floors  Make floors safer by doing the following:     Put nonskid pads under area rugs.    Remove throw rugs.    Replace worn floor coverings.    Tack carpets firmly to each step on carpeted stairs. Put nonskid strips on the edges of uncarpeted stairs.    Keep floors and stairs free of clutter and cords.    Arrange furniture so there are clear pathways.    Clean up any spills right away.    Wear shoes that fit.  Bathrooms    Make bathrooms safer by doing the following:     Install grab bars in the tub or shower.    Apply nonskid strips or put a nonskid rubber mat in the tub or shower.    Sit on a bath chair to bathe.    Use bathmats with nonskid backing.  Lighting and the environment  Improve lighting in your home by doing the following:     Keep a flashlight in each room. Or put a lamp next to the bed within easy reach.    Put nightlights in the bedrooms, hallways, kitchen, and bathrooms.    Make sure all stairways have good lighting.    Take your time when going up and down stairs.    Put handrails on both sides of stairs and in walkways for more support. To prevent injury to your wrist or arm, don t use handrails to pull yourself up.    Install grab bars to pull yourself up.    Move or rearrange items that you use often. This will make them easier to find or reach.    Look at your home to find any safety hazards. Especially look at doorways, walkways, and the driveway. Remove or repair any safety problems that you find.  [employer] last reviewed this educational content on 8/1/2016 2000-2020 [employer]. [address] [place] [address], [place] [address]. All rights reserved. This information is not intended as a substitute for professional medical care. Always follow your healthcare professional's instructions.

## 2021-01-08 DIAGNOSIS — R53.83 FATIGUE, UNSPECIFIED TYPE: ICD-10-CM

## 2021-01-08 DIAGNOSIS — R31.9 URINARY TRACT INFECTION WITH HEMATURIA, SITE UNSPECIFIED: Primary | ICD-10-CM

## 2021-01-08 DIAGNOSIS — R79.89 ABNORMAL TSH: ICD-10-CM

## 2021-01-08 DIAGNOSIS — N39.0 URINARY TRACT INFECTION WITH HEMATURIA, SITE UNSPECIFIED: Primary | ICD-10-CM

## 2021-01-08 DIAGNOSIS — R94.4 DECREASED GFR: ICD-10-CM

## 2021-01-08 LAB
BACTERIA SPEC CULT: ABNORMAL
Lab: ABNORMAL
SPECIMEN SOURCE: ABNORMAL
THYROGLOB AB SERPL IA-ACNC: <20 IU/ML (ref 0–40)
THYROPEROXIDASE AB SERPL-ACNC: 11 IU/ML

## 2021-01-08 RX ORDER — NITROFURANTOIN 25; 75 MG/1; MG/1
100 CAPSULE ORAL 2 TIMES DAILY
Qty: 10 CAPSULE | Refills: 0 | Status: SHIPPED | OUTPATIENT
Start: 2021-01-08 | End: 2021-02-09

## 2021-01-29 DIAGNOSIS — N39.0 URINARY TRACT INFECTION WITH HEMATURIA, SITE UNSPECIFIED: ICD-10-CM

## 2021-01-29 DIAGNOSIS — R31.9 URINARY TRACT INFECTION WITH HEMATURIA, SITE UNSPECIFIED: ICD-10-CM

## 2021-01-29 DIAGNOSIS — R94.4 DECREASED GFR: ICD-10-CM

## 2021-01-29 DIAGNOSIS — R53.83 FATIGUE, UNSPECIFIED TYPE: ICD-10-CM

## 2021-01-29 DIAGNOSIS — R79.89 ABNORMAL TSH: ICD-10-CM

## 2021-01-29 LAB
ALBUMIN UR-MCNC: NEGATIVE MG/DL
ANION GAP SERPL CALCULATED.3IONS-SCNC: 4 MMOL/L (ref 3–14)
APPEARANCE UR: CLEAR
BACTERIA #/AREA URNS HPF: ABNORMAL /HPF
BILIRUB UR QL STRIP: NEGATIVE
BUN SERPL-MCNC: 28 MG/DL (ref 7–30)
CALCIUM SERPL-MCNC: 8.7 MG/DL (ref 8.5–10.1)
CHLORIDE SERPL-SCNC: 106 MMOL/L (ref 94–109)
CO2 SERPL-SCNC: 29 MMOL/L (ref 20–32)
COLOR UR AUTO: YELLOW
CREAT SERPL-MCNC: 0.94 MG/DL (ref 0.52–1.04)
GFR SERPL CREATININE-BSD FRML MDRD: 60 ML/MIN/{1.73_M2}
GLUCOSE SERPL-MCNC: 106 MG/DL (ref 70–99)
GLUCOSE UR STRIP-MCNC: NEGATIVE MG/DL
HGB UR QL STRIP: NEGATIVE
KETONES UR STRIP-MCNC: NEGATIVE MG/DL
LEUKOCYTE ESTERASE UR QL STRIP: ABNORMAL
NITRATE UR QL: NEGATIVE
NON-SQ EPI CELLS #/AREA URNS LPF: ABNORMAL /LPF
PH UR STRIP: 5.5 PH (ref 5–7)
POTASSIUM SERPL-SCNC: 4.5 MMOL/L (ref 3.4–5.3)
RBC #/AREA URNS AUTO: ABNORMAL /HPF
RENAL EPI CELLS #/AREA URNS HPF: ABNORMAL /HPF
SODIUM SERPL-SCNC: 139 MMOL/L (ref 133–144)
SOURCE: ABNORMAL
SP GR UR STRIP: 1.02 (ref 1–1.03)
T4 FREE SERPL-MCNC: 0.8 NG/DL (ref 0.76–1.46)
TSH SERPL DL<=0.005 MIU/L-ACNC: 4.5 MU/L (ref 0.4–4)
UROBILINOGEN UR STRIP-ACNC: 0.2 EU/DL (ref 0.2–1)
WBC #/AREA URNS AUTO: ABNORMAL /HPF

## 2021-01-29 PROCEDURE — 81001 URINALYSIS AUTO W/SCOPE: CPT | Performed by: FAMILY MEDICINE

## 2021-01-29 PROCEDURE — 36415 COLL VENOUS BLD VENIPUNCTURE: CPT | Performed by: FAMILY MEDICINE

## 2021-01-29 PROCEDURE — 84443 ASSAY THYROID STIM HORMONE: CPT | Performed by: FAMILY MEDICINE

## 2021-01-29 PROCEDURE — 80048 BASIC METABOLIC PNL TOTAL CA: CPT | Performed by: FAMILY MEDICINE

## 2021-01-29 PROCEDURE — 84439 ASSAY OF FREE THYROXINE: CPT | Performed by: FAMILY MEDICINE

## 2021-02-03 ENCOUNTER — MYC MEDICAL ADVICE (OUTPATIENT)
Dept: FAMILY MEDICINE | Facility: CLINIC | Age: 76
End: 2021-02-03

## 2021-02-09 ENCOUNTER — VIRTUAL VISIT (OUTPATIENT)
Dept: FAMILY MEDICINE | Facility: CLINIC | Age: 76
End: 2021-02-09
Payer: COMMERCIAL

## 2021-02-09 DIAGNOSIS — R79.89 ABNORMAL TSH: ICD-10-CM

## 2021-02-09 DIAGNOSIS — I10 HYPERTENSION GOAL BP (BLOOD PRESSURE) < 130/80: ICD-10-CM

## 2021-02-09 DIAGNOSIS — R31.9 URINARY TRACT INFECTION WITH HEMATURIA, SITE UNSPECIFIED: ICD-10-CM

## 2021-02-09 DIAGNOSIS — R53.83 FATIGUE, UNSPECIFIED TYPE: Primary | ICD-10-CM

## 2021-02-09 DIAGNOSIS — F01.50 VASCULAR DEMENTIA WITHOUT BEHAVIORAL DISTURBANCE (H): ICD-10-CM

## 2021-02-09 DIAGNOSIS — N18.2 CKD (CHRONIC KIDNEY DISEASE) STAGE 2, GFR 60-89 ML/MIN: ICD-10-CM

## 2021-02-09 DIAGNOSIS — N39.0 URINARY TRACT INFECTION WITH HEMATURIA, SITE UNSPECIFIED: ICD-10-CM

## 2021-02-09 PROCEDURE — 99215 OFFICE O/P EST HI 40 MIN: CPT | Mod: 95 | Performed by: FAMILY MEDICINE

## 2021-02-09 NOTE — PROGRESS NOTES
Anabelle is a 75 year old who is being evaluated via a billable telephone visit.      What phone number would you like to be contacted at? See epic  How would you like to obtain your AVS? [email or web address]    Assessment & Plan     Fatigue, unspecified type  Still profound fatigue not improved with treatment of URINARY TRACT INFECTION.   See instructions below. May want to trial off aricept or trial lower dose of mirtazapine    Hypertension goal BP (blood pressure) < 130/80  Unknown control. ? If hypotension ocurring with poor fluid intake causing some of the fatigue. Will start home monitoring (daughter will continue to check in on her regularly for this.   - Blood Pressure Monitoring (BLOOD PRESSURE MONITOR/M CUFF) MISC; 1 each daily as needed    Abnormal TSH  Neg thyroid abys but given profound fatigue will trial low dose levothyroxine trial of 25 mcg daily.     CKD (chronic kidney disease) stage 2, GFR 60-89 ml/min  Push fluids encouraged.     Vascular dementia without behavioral disturbance (H)  ? Worsening causing her sx's. Encouraged f/u with neuro. Consider head ct if persistent dizziness with nl bp    Urinary tract infection with hematuria, site unspecified  Improved on repeat ua. No current sx's.       41 minutes spent on the date of the encounter doing chart review, review of test results, patient visit, documentation and discussion with family         Patient Instructions   1. Start monitoring home blood pressure  Normal range: 100-140/60-90.   Update me via Interana with your home pressures in the next week (update me sooner if pressures are significant abnormal)    2. Push fluids    3. Schedule with your neurologist.     4. We can trial a low dose of thyroid medication to see if this helps your energy. Levothyroxine 25 mcg once daily in the morning.     5. If above things are not helping, we can start trying lower doses of some of your medications.     6. Start home exercise program. Come see me in the office if your  "back pain continues.           Return in about 3 weeks (around 3/2/2021) for Follow up.    Jyoti Moreno MD  [facility] TIKA Ahn is a 75 year old who presents for the following health issues  accompanied by her daughter:    HPI   Seen on 1/7/21- worsening dementia and fatigue sx's again  Treated for UTi    Anabelle reports she is always tired. Feel like a \"zombie all the time\"  Fatigue present since right prior to Oral.   Sleeping all night and many hours a day.     Treatment of the URINARY TRACT INFECTION did not help fatigue.   Memory still continues to be quite poor  Having some low back pain, right upper buttock area- pain does not radiate   (side pain for a while but that has resolve)  Eating - \"no problem\" and thinks weight is same. No fever or chills.   Drinking fluids- daughter sets up water bottles for her each day of 33 oz. (daughter notes its very rare she finishes it).     Taking meds every day.  No missed doses  Voiding and stooling normally.   Not checking blood pressure.   Does note light-headedness          Objective           Vitals:  No vitals were obtained today due to virtual visit.    Physical Exam   healthy, alert and no distress  PSYCH: Alert and oriented times 3; coherent speech, normal   rate and volume, able to articulate logical thoughts, able   to abstract reason, no tangential thoughts, no hallucinations   or delusions  Her affect is normal and pleasant  RESP: No cough, no audible wheezing, able to talk in full sentences  Remainder of exam unable to be completed due to telephone visits  Neuro: repeats questions several times and forgets our earlier conversation. Daughter corrects her on some of her responses, Otherwise answers questions appropriately.      Component      Latest Ref Rng & Units 1/7/2021 1/29/2021   WBC      4.0 - 11.0 10e9/L 7.0    RBC Count      3.8 - 5.2 10e12/L 4.27    Hemoglobin      11.7 - 15.7 g/dL 13.4  "   Hematocrit      35.0 - 47.0 % 40.4    MCV      78 - 100 fl 95    MCH      26.5 - 33.0 pg 31.4    MCHC      31.5 - 36.5 g/dL 33.2    RDW      10.0 - 15.0 % 11.8    Platelet Count      150 - 450 10e9/L 259    % Neutrophils      % 64.6    % Lymphocytes      % 24.5    % Monocytes      % 7.6    % Eosinophils      % 2.7    % Basophils      % 0.6    Absolute Neutrophil      1.6 - 8.3 10e9/L 4.5    Absolute Lymphocytes      0.8 - 5.3 10e9/L 1.7    Absolute Monocytes      0.0 - 1.3 10e9/L 0.5    Absolute Eosinophils      0.0 - 0.7 10e9/L 0.2    Absolute Basophils      0.0 - 0.2 10e9/L 0.0    Diff Method       Automated Method    Sodium      133 - 144 mmol/L 141 139   Potassium      3.4 - 5.3 mmol/L 4.3 4.5   Chloride      94 - 109 mmol/L 109 106   Carbon Dioxide      20 - 32 mmol/L 29 29   Anion Gap      3 - 14 mmol/L 3 4   Glucose      70 - 99 mg/dL 96 106 (H)   Urea Nitrogen      7 - 30 mg/dL 21 28   Creatinine      0.52 - 1.04 mg/dL 1.11 (H) 0.94   GFR Estimate      >60 mL/min/1.73:m2 49 (L) 60 (L)   GFR Estimate If Black      >60 mL/min/1.73:m2 57 (L) 69   Calcium      8.5 - 10.1 mg/dL 9.6 8.7   Bilirubin Total      0.2 - 1.3 mg/dL 0.4    Albumin      3.4 - 5.0 g/dL 3.9    Protein Total      6.8 - 8.8 g/dL 7.5    Alkaline Phosphatase      40 - 150 U/L 70    ALT      0 - 50 U/L 22    AST      0 - 45 U/L 18    Color Urine       Yellow Yellow   Appearance Urine       Clear Clear   Glucose Urine      NEG:Negative mg/dL Negative Negative   Bilirubin Urine      NEG:Negative Negative Negative   Ketones Urine      NEG:Negative mg/dL Negative Negative   Specific Gravity Urine      1.003 - 1.035 >1.030 1.025   Blood Urine      NEG:Negative Negative Negative   pH Urine      5.0 - 7.0 pH 6.0 5.5   Protein Albumin Urine      NEG:Negative mg/dL Negative Negative   Urobilinogen Urine      0.2 - 1.0 EU/dL 0.2 0.2   Nitrite Urine      NEG:Negative Negative Negative   Leukocyte Esterase Urine      NEG:Negative Small (A) Small (A)    Source       Midstream Urine Midstream Urine   WBC Urine      OTO5:0 - 5 /HPF >100 (A) 5-10 (A)   RBC Urine      OTO2:O - 2 /HPF 2-5 (A) O - 2   Squamous Epithelial /LPF Urine      FEW:Few /LPF Few Few   Bacteria Urine      NEG:Negative /HPF Moderate (A) Few (A)   Mucous Urine      NEG:Negative /LPF Present (A)    Renal Tub Epi      NEG:Negative /HPF  Few (A)   Specimen Description       Midstream Urine    Special Requests       Specimen received in preservative    Culture Micro       >100,000 colonies/mL (A) . . .    TSH      0.40 - 4.00 mU/L 4.22 (H) 4.50 (H)   Vitamin B12      193 - 986 pg/mL 878    Vitamin D Deficiency screening      20 - 75 ug/L 78 (H)    T4 Free      0.76 - 1.46 ng/dL 0.87 0.80   Thyroid Peroxidase Antibody      <35 IU/mL 11    Thyroglobulin Antibody      <40 IU/mL <20            Phone call duration: 34 minutes

## 2021-02-09 NOTE — PATIENT INSTRUCTIONS
1. Start monitoring home blood pressure  Normal range: 100-140/60-90.   Update me via Inxerohart with your home pressures in the next week (update me sooner if pressures are significant abnormal)    2. Push fluids    3. Schedule with your neurologist.     4. We can trial a low dose of thyroid medication to see if this helps your energy. Levothyroxine 25 mcg once daily in the morning.     5. If above things are not helping, we can start trying lower doses of some of your medications.     6. Start home exercise program. Come see me in the office if your back pain continues.

## 2021-02-15 DIAGNOSIS — K21.9 GASTROESOPHAGEAL REFLUX DISEASE, UNSPECIFIED WHETHER ESOPHAGITIS PRESENT: ICD-10-CM

## 2021-02-16 NOTE — TELEPHONE ENCOUNTER
Routing refill request to provider for review/approval because:  History of osteoporosis     Cynthia Ciera Mcclelland RN, BSN, CMSRN  [facility]

## 2021-02-25 ENCOUNTER — MYC MEDICAL ADVICE (OUTPATIENT)
Dept: FAMILY MEDICINE | Facility: CLINIC | Age: 76
End: 2021-02-25

## 2021-02-25 DIAGNOSIS — I10 HYPERTENSION GOAL BP (BLOOD PRESSURE) < 130/80: Primary | ICD-10-CM

## 2021-02-25 RX ORDER — LISINOPRIL 30 MG/1
30 TABLET ORAL DAILY
Qty: 90 TABLET | Refills: 0 | Status: SHIPPED | OUTPATIENT
Start: 2021-02-25 | End: 2021-06-09

## 2021-03-01 ENCOUNTER — MYC MEDICAL ADVICE (OUTPATIENT)
Dept: FAMILY MEDICINE | Facility: CLINIC | Age: 76
End: 2021-03-01

## 2021-03-01 DIAGNOSIS — R79.89 ABNORMAL TSH: Primary | ICD-10-CM

## 2021-03-02 RX ORDER — LEVOTHYROXINE SODIUM 25 UG/1
25 TABLET ORAL DAILY
Qty: 90 TABLET | Refills: 0 | Status: CANCELLED | OUTPATIENT
Start: 2021-03-02

## 2021-03-02 RX ORDER — LEVOTHYROXINE SODIUM 25 UG/1
25 TABLET ORAL DAILY
Qty: 30 TABLET | Refills: 2 | Status: SHIPPED | OUTPATIENT
Start: 2021-03-02 | End: 2021-06-04

## 2021-03-02 NOTE — TELEPHONE ENCOUNTER
Per visit notes on 2/9/2021    We can trial a low dose of thyroid medication to see if this helps your energy. Levothyroxine 25 mcg once daily in the morning.     Spoke to Ratna, patient's daughter.  Clarified the pharmacy.    Routing to Dr. Moreno to review Pended Rx for approval    Thao Whitfield RN, [facility]

## 2021-03-07 DIAGNOSIS — I63.9 CEREBROVASCULAR ACCIDENT (CVA), UNSPECIFIED MECHANISM (H): ICD-10-CM

## 2021-03-09 ENCOUNTER — IMMUNIZATION (OUTPATIENT)
Dept: PEDIATRICS | Facility: CLINIC | Age: 76
End: 2021-03-09
Payer: COMMERCIAL

## 2021-03-09 PROCEDURE — 91300 PR COVID VAC PFIZER DIL RECON 30 MCG/0.3 ML IM: CPT

## 2021-03-09 PROCEDURE — 0001A PR COVID VAC PFIZER DIL RECON 30 MCG/0.3 ML IM: CPT

## 2021-03-09 RX ORDER — CLOPIDOGREL BISULFATE 75 MG/1
TABLET ORAL
Qty: 90 TABLET | Refills: 3 | Status: SHIPPED | OUTPATIENT
Start: 2021-03-09 | End: 2021-11-08

## 2021-03-09 NOTE — TELEPHONE ENCOUNTER
Routing refill request to provider for review/approval because:  Drug not active on patient's medication list    Alivia LOYDN, RN

## 2021-03-25 DIAGNOSIS — R79.89 ABNORMAL TSH: ICD-10-CM

## 2021-03-25 RX ORDER — LEVOTHYROXINE SODIUM 25 UG/1
TABLET ORAL
Qty: 30 TABLET | Refills: 2 | OUTPATIENT
Start: 2021-03-25

## 2021-03-25 NOTE — TELEPHONE ENCOUNTER
Routing refill request to provider for review/approval because:  Labs out of range:  TSH    Alivia Engel BSN, RN

## 2021-03-29 ENCOUNTER — VIRTUAL VISIT (OUTPATIENT)
Dept: FAMILY MEDICINE | Facility: CLINIC | Age: 76
End: 2021-03-29
Payer: COMMERCIAL

## 2021-03-29 VITALS — HEIGHT: 60 IN | BODY MASS INDEX: 30.66 KG/M2

## 2021-03-29 DIAGNOSIS — F43.23 ADJUSTMENT REACTION WITH ANXIETY AND DEPRESSION: ICD-10-CM

## 2021-03-29 DIAGNOSIS — R79.89 ABNORMAL TSH: ICD-10-CM

## 2021-03-29 DIAGNOSIS — I63.9 CEREBROVASCULAR ACCIDENT (CVA), UNSPECIFIED MECHANISM (H): ICD-10-CM

## 2021-03-29 DIAGNOSIS — R53.83 FATIGUE, UNSPECIFIED TYPE: ICD-10-CM

## 2021-03-29 DIAGNOSIS — N18.2 CKD (CHRONIC KIDNEY DISEASE) STAGE 2, GFR 60-89 ML/MIN: ICD-10-CM

## 2021-03-29 DIAGNOSIS — F01.50 VASCULAR DEMENTIA WITHOUT BEHAVIORAL DISTURBANCE (H): Primary | ICD-10-CM

## 2021-03-29 PROCEDURE — 99214 OFFICE O/P EST MOD 30 MIN: CPT | Mod: 95 | Performed by: FAMILY MEDICINE

## 2021-03-29 RX ORDER — DONEPEZIL HYDROCHLORIDE 5 MG/1
5 TABLET, FILM COATED ORAL AT BEDTIME
Qty: 30 TABLET | Refills: 1 | Status: SHIPPED | OUTPATIENT
Start: 2021-03-29 | End: 2021-10-27

## 2021-03-29 RX ORDER — MIRTAZAPINE 30 MG/1
30 TABLET, FILM COATED ORAL AT BEDTIME
Qty: 30 TABLET | Refills: 1 | Status: SHIPPED | OUTPATIENT
Start: 2021-03-29 | End: 2021-06-03

## 2021-03-29 NOTE — PROGRESS NOTES
Anabelle is a 75 year old who is being evaluated via a billable video visit.      How would you like to obtain your AVS? MyChart  If the video visit is dropped, the invitation should be resent by: Send to e-mail at: [email or web address]  Will anyone else be joining your video visit? No    Video Start Time: 2:15 PM    Assessment & Plan     Vascular dementia without behavioral disturbance (H)  Unfortunately Anabelle is noting progression of her dementia symptoms.  This coincides with stopping of her Aricept this winter due to concern it was possibly worsening her fatigue.  We will resume the Aricept at this point given her cognitive decline without it and encouraged her to follow-up with neurology.  I discussed with Anabelle and her daughter that I think she needs more care, ideally assisted living but at minimum home cares.  There is quite concerned about cost of this and feels this is not an option for her.  She has also declined home care many times in the past and is uncertain if she wants that still.  She reports she will think about it and let me know.  I will have care coordination follow-up with them also  - NEUROLOGY ADULT REFERRAL  - donepezil (ARICEPT) 5 MG tablet; Take 1 tablet (5 mg) by mouth At Bedtime    Cerebrovascular accident (CVA), unspecified mechanism (H)  - NEUROLOGY ADULT REFERRAL    CKD (chronic kidney disease) stage 2, GFR 60-89 ml/min  Due for labs  - Basic metabolic panel  (Ca, Cl, CO2, Creat, Gluc, K, Na, BUN); Future    Adjustment reaction with anxiety and depression  Question if depression could be contributing to some of her worsening memory symptoms.  Has also been fatigued and sleeping a lot so we will try the higher dose of mirtazapine as the sedating effects do tend to improve on the higher doses.  This may also help stimulate her appetite  - mirtazapine (REMERON) 30 MG tablet; Take 1 tablet (30 mg) by mouth At Bedtime    Abnormal TSH  Subclinical hypothyroidism.  Had started levothyroxine  "low-dose on trial basis last visit and daughter does feel that it had mild improvement in her energy.  We will continue for now and have her recheck labs  - TSH with free T4 reflex; Future    Fatigue, unspecified type  As above.  - TSH with free T4 reflex; Future       Patient Instructions   1. Increase mirtazapine dose to 30 mg at bedtime  2. Continue thyroid medication. Schedule a non-fasting lab visit to check the thyroid levels and kidney function.   3. Restart Aricept (donepezil) 5 mg daily  4. Please call [facility] in [place] at [phone] to schedule with neurology.  5. Talk with our care coordinators about possibly getting increase in care/assisted living vs home care.   6. Next visit with me in 3-4 weeks.         Return in about 4 weeks (around 4/26/2021) for using a video visit, med check.    Jyoti Moreno MD  [facility] TIKA Ahn is a 75 year old who presents for the following health issues  accompanied by her daughter Ratna:    HPI     Hypothyroidism Follow-up      Since last visit, patient describes the following symptoms: Weight stable, no hair loss, no skin changes, no constipation, no loose stools and anxiety      How many servings of fruits and vegetables do you eat daily?  2-3    On average, how many sweetened beverages do you drink each day (Examples: soda, juice, sweet tea, etc.  Do NOT count diet or artificially sweetened beverages)?   1    How many days per week do you exercise enough to make your heart beat faster? 3 or less    How many minutes a day do you exercise enough to make your heart beat faster? 20 - 29  How many days per week do you miss taking your medication? 2    What makes it hard for you to take your medications?  remembering to take    Is doing \"crappie\"  Frustrated by her memory.   Worsening over the last 3 months.   Mood has been \"up and down\". daugher is noticing she is more depressed.   Sleeping is \"no problem\"  Called " daughter this weekend with panic about not paying her bills. Had forgotten that her daughter is paying the bills. Once daughter came over she was able calm down after a hour  Daughter notes she is not eating and drinking well. Gets meals on wheels. But doesn't eat due to not being hungry at times. No cooking/no stove use. Dressing on own.   No falls.   No n/v. stooling okay.     Wondering if I have new neurology referral. Have not been satisfied with last provider.     Has loud alarm for pills so daughter thinks she is taking morning meds well. However, miss night-time meds 1-2 x's a week.    Since thyroid med started daughter thinks she is more energetic. Able to take walks again.             Objective           Vitals:  No vitals were obtained today due to virtual visit.    Physical Exam   GENERAL: Healthy, alert and no distress  EYES: Eyes grossly normal to inspection.  No discharge or erythema, or obvious scleral/conjunctival abnormalities.  RESP: No audible wheeze, cough, or visible cyanosis.  No visible retractions or increased work of breathing.    SKIN: Visible skin clear. No significant rash, abnormal pigmentation or lesions.  NEURO: Cranial nerves grossly intact.  speech appropriate for age.  PSYCH: tangential, has limited knowledge of her medications and daily routines and relies on her daughter to get this history.  Affect is full and mood is sad, judgement and insight intact and appearance well groomed          Video-Visit Details    Type of service:  Video Visit    Video End Time:2:43 PM    Originating Location (pt. Location): Home    Distant Location (provider location):  [facility]     Platform used for Video Visit: [employer]

## 2021-03-29 NOTE — PATIENT INSTRUCTIONS
1. Increase mirtazapine dose to 30 mg at bedtime  2. Continue thyroid medication. Schedule a non-fasting lab visit to check the thyroid levels and kidney function.   3. Restart Aricept (donepezil) 5 mg daily  4. Please call  [facility] in [place] at [phone] to schedule with neurology.  5. Talk with our care coordinators about possibly getting increase in care/assisted living vs home care.   6. Next visit with me in 3-4 weeks.

## 2021-03-30 ENCOUNTER — IMMUNIZATION (OUTPATIENT)
Dept: PEDIATRICS | Facility: CLINIC | Age: 76
End: 2021-03-30
Attending: INTERNAL MEDICINE
Payer: COMMERCIAL

## 2021-03-30 PROCEDURE — 91300 PR COVID VAC PFIZER DIL RECON 30 MCG/0.3 ML IM: CPT

## 2021-03-30 PROCEDURE — 0002A PR COVID VAC PFIZER DIL RECON 30 MCG/0.3 ML IM: CPT

## 2021-04-02 ENCOUNTER — PATIENT OUTREACH (OUTPATIENT)
Dept: CARE COORDINATION | Facility: CLINIC | Age: 76
End: 2021-04-02

## 2021-04-02 DIAGNOSIS — R41.9 COGNITIVE COMPLAINTS: Primary | ICD-10-CM

## 2021-04-02 NOTE — PROGRESS NOTES
"Clinic Care Coordination Contact  [facility]/Voicemail    Notification received by MD. \"Anabelle's dementia is progressing further. I think she could benefit from more care. She is still living alone although her daughter is highly involved with med set up and check ins. She has meals on wheels. She has been resistant to home care (gets confused when new people come,etc)   Was told in past she didn't make enough money for assisted living but too much for subsidies. I think assisted living/memory care would be ideal at this point if possible\". [facility] reaching out to patient's daughter to discuss further.    Clinical Data: Care Coordinator Outreach  Outreach attempted x 1.  Left message on DaughterRatna's voicemail with call back information and requested return call.  Plan: Care Coordinator will try to reach patient again in 1-2 business days.    Ravi Wolff, [facility] [place]  Primary Care Clinic- Social Work Care Coordinator  [facility] and [facility]  Ph: [phone]  4/2/2021 10:51 AM      "

## 2021-04-07 ENCOUNTER — PATIENT OUTREACH (OUTPATIENT)
Dept: NURSING | Facility: CLINIC | Age: 76
End: 2021-04-07
Payer: COMMERCIAL

## 2021-04-07 NOTE — PROGRESS NOTES
Clinic Care Coordination Contact    Clinic MICHAEL requested SW call Patient's dtr for update.  SW called Patient's dtr Ratna, she cannot talk right now.  She is driving on her way to Patient's home. She prefers to call when she gets there.  She states she has clinic MICHAEL's # and will leave message when she is available. MICHAEL will attempt to call later this week if no return call     Lucia Hammonds, AKIRA, MSW   [facility]  Care Coordination  [facility]  [phone]  4/7/2021 1:06 PM

## 2021-04-09 ENCOUNTER — PATIENT OUTREACH (OUTPATIENT)
Dept: NURSING | Facility: CLINIC | Age: 76
End: 2021-04-09
Payer: COMMERCIAL

## 2021-04-09 NOTE — PROGRESS NOTES
Clinic Care Coordination Contact    Clinic MICHAEL had requested MICHAEL call and inquire on Patient, to call dtr Ratna.MICHAEL called and spoke briefly with Ratna, Ratna had to call back as driving.  Return call to Ratna.  Ratna reported interest in PT/OT.  MICHAEL stated she will send this request to care team.  She reports that provider increased Patient's Mirtazipine, they are noticing a positive change stating Patient back to herself.  Brief call today, will have clinic MICHAEL call for further assessment next week    Lucia Hammonds, AKIRA, MSW   [facility]  Care Coordination  [facility]  [phone]  4/9/2021 3:28 PM

## 2021-04-10 DIAGNOSIS — I63.9 CEREBROVASCULAR ACCIDENT (CVA), UNSPECIFIED MECHANISM (H): ICD-10-CM

## 2021-04-10 DIAGNOSIS — I10 HYPERTENSION GOAL BP (BLOOD PRESSURE) < 130/80: ICD-10-CM

## 2021-04-10 DIAGNOSIS — F01.50 VASCULAR DEMENTIA WITHOUT BEHAVIORAL DISTURBANCE (H): Primary | ICD-10-CM

## 2021-04-11 ENCOUNTER — NURSE TRIAGE (OUTPATIENT)
Dept: NURSING | Facility: CLINIC | Age: 76
End: 2021-04-11

## 2021-04-11 NOTE — TELEPHONE ENCOUNTER
[facility] RN calling.  Referral order for home care sent for pt to be evaluated for skilled nursing care in the next 24-48 hrs.      [facility] calling that pt will not be seen until either 4/15/21 or 4/16/21.    [facility] needs to know if this is ok/  If this is ok to wait, [facility] needs an update to the order that it is ok to delay and also the Clinic will need to call pt and inform them the visit will be delayed.      Please call [facility] with any questions.    Thank you  Alyson Garland RN, [facility] Nurse Advisor        Additional Information    Negative: Nursing judgment or information in reference    Protocols used: NO GUIDELINE [id number]

## 2021-04-14 ENCOUNTER — PATIENT OUTREACH (OUTPATIENT)
Dept: CARE COORDINATION | Facility: CLINIC | Age: 76
End: 2021-04-14

## 2021-04-14 NOTE — PROGRESS NOTES
Clinic Care Coordination Contact  [facility]/Voicemail       Clinical Data: Care Coordinator Outreach  Outreach attempted x 3.  Left message on daughter, Ratna's voicemail with call back information and requested return call.  Plan: [facility] has been able to reach daughter in the past, but only briefly, as she was busy. [facility] has not been able to speak with daughter long enough to discuss consult reason/potentially enroll patient. Care Coordinator will do one final outreach two weeks from now. If no answer from daughter, [facility] will complete no further outreaches    AKIRA Phillips  Primary Care Clinic- Social Work Care Coordinator  [facility] and [place]  Ph: [phone]  4/14/2021 2:35 PM

## 2021-04-20 ENCOUNTER — DOCUMENTATION ONLY (OUTPATIENT)
Dept: CARE COORDINATION | Facility: CLINIC | Age: 76
End: 2021-04-20

## 2021-04-21 ENCOUNTER — DOCUMENTATION ONLY (OUTPATIENT)
Dept: CARE COORDINATION | Facility: CLINIC | Age: 76
End: 2021-04-21

## 2021-04-21 NOTE — PROGRESS NOTES
[facility] now requests orders and shares plan of care/discharge summaries for some patients through [employer].  Please REPLY TO THIS MESSAGE OR ROUTE BACK TO THE AUTHOR in order to give authorization for orders when needed.  This is considered a verbal order, you will still receive a faxed copy of orders for signature.  Thank you for your assistance in improving collaboration for our patients.    ORDER    PT SOC week of 4/26 per patient request. Our  called yesterday to schedule SN SOC, patient has requested to not have nursing and she would like to be seen next week.     Jyoti, Director of Patient Care Services

## 2021-04-28 ENCOUNTER — PATIENT OUTREACH (OUTPATIENT)
Dept: NURSING | Facility: CLINIC | Age: 76
End: 2021-04-28
Payer: COMMERCIAL

## 2021-04-28 ENCOUNTER — PATIENT OUTREACH (OUTPATIENT)
Dept: CARE COORDINATION | Facility: CLINIC | Age: 76
End: 2021-04-28

## 2021-04-28 ENCOUNTER — MEDICAL CORRESPONDENCE (OUTPATIENT)
Dept: HEALTH INFORMATION MANAGEMENT | Facility: CLINIC | Age: 76
End: 2021-04-28

## 2021-04-28 ASSESSMENT — ACTIVITIES OF DAILY LIVING (ADL): DEPENDENT_IADLS:: COOKING;LAUNDRY;SHOPPING;MEDICATION MANAGEMENT;TRANSPORTATION

## 2021-04-28 NOTE — PROGRESS NOTES
"Clinic Care Coordination Contact    Clinic Care Coordination Contact  OUTREACH    Referral Information:  Referral Source: Care Team    Reason for Referral: Patient/Caregiver Support: Navigation of Long Term Care/[facility] Services      Additional pertinent details:  Per MD, \"Dariuss dementia is progressing further. I think she could benefit from more care. She is still living alone although her daughter is highly involved with med set up and check ins. She has meals on wheels. She has been resistant to home care (gets confused when new people come,etc). Was told in past she didn't make enough money for assisted living but too much for subsidies. I think assisted living/memory care would be ideal at this point if possible\".       Primary Diagnosis: Psychosocial    Chief Complaint   Patient presents with     Clinic Care Coordination - Initial     Ravi Wolff- MICHAEL        [place] Utilization: [employer] [facility]; [facility]; [facility] Utilization  Difficulty keeping appointments:: No  Compliance Concerns: No  No-Show Concerns: No  No PCP office visit in Past Year: No  Utilization    Last refreshed: 4/28/2021 12:37 PM: Hospital Admissions 1           Last refreshed: 4/28/2021 12:37 PM: ED Visits 1           Last refreshed: 4/28/2021 12:37 PM: No Show Count (past year) 1              Current as of: 4/28/2021 12:37 PM              Clinical Concerns:  Current Medical Concerns:    Patient Active Problem List   Diagnosis     Chronic interstitial cystitis     Adjustment reaction with anxiety and depression     Abdominal pain     Benign essential hypertension     CKD (chronic kidney disease) stage 2, GFR 60-89 ml/min     Hyperlipidemia LDL goal <100     Mild persistent asthma     Vitamin D deficiency     GERD (gastroesophageal reflux disease)     B12 deficiency     Transient cerebral ischemia     Carotid stenosis     Diverticulosis of colon     Colon polyp     Atrophic vaginitis     Interstitial cystitis     Chronic " constipation     Cognitive complaints     Prediabetes     Osteoporosis     Cerebrovascular accident (CVA), unspecified mechanism (H)     SHANEKA (obstructive sleep apnea)     Intracranial hemorrhage (H)     Vascular dementia without behavioral disturbance (H)         Current Behavioral Concerns: Patient has dx of Adjustment reaction with anxiety and depression. Patient has also has dx of Vascular dementia without behavioral disturbance.     Education Provided to patient: Introduced self and role to daughterRatna.      Health Maintenance Reviewed: Due/Overdue   Health Maintenance Due   Topic Date Due     ZOSTER IMMUNIZATION (1 of 2) Never done     MAMMO SCREENING  05/08/2015     ASTHMA ACTION PLAN  01/07/2017     Pneumococcal Vaccine: Pediatrics (0 to 5 Years) and At-Risk Patients (6 to 64 Years) (1 of 2 - PPSV23) 05/11/2020     Pneumococcal Vaccine: 65+ Years (1 of 2 - PPSV23) 05/11/2020     ASTHMA CONTROL TEST  07/22/2020     LIPID  08/21/2020     PHQ-9  11/29/2020       Clinical Pathway: None    Medication Management:  Patient's daughter manages her medications     Functional Status:  Dependent ADLs:: Independent  Dependent IADLs:: Cooking, Laundry, Shopping, Medication Management, Transportation  Mobility Status: Independent    Living Situation:  Current living arrangement:: I live alone  Type of residence:: Private home - stairs    Lifestyle & Psychosocial Needs: [facility] received a call back from patient's daughter, Ratna. Introduced self and role. Discussed consult reason from back in the beginning of April. Daughter stated that patient lives alone in a home with stairs. Her daughter visits every other day and her son visits on the days daughter is not there. Daughter helps patient with laundry, shopping, transportation, medication management, and makes sure she is safe throughout the day. Daughter reports that she is independent with all other activities besides cooking. She gets Meals on Wheels delivered.  Patient goes grocery shopping with daughter and they purchase frozen meals, and family provides microwaveable meals for her. Patient is able to use the microwave independently. Daughter stated that patient has been a little off balance lately, so they got PT and OT to come in and visit patient, which has been going well. Overall, daughter stated that she would like for patient to stay in her home as long as possible. She reports that patient is very much against going in to an Assisted Living facility, so she would like to do what she can to keep patient in the home as long as possible by utilizing home care services, or other services that could be of assistance. Daughter reports understanding that with her dementia, there will likely come a time when it won't be an option to stay in her home, but she is hopeful that won't be for awhile. [facility] discussed long term, home care services. Provided an estimated out of pocket cost/hour. [facility] offered to provide [facility] number so that when they are ready for home care, or other services, they can utilize that resource during their search. Daughter stated that she would like information on any kind of groups or programs patient could be part of to get some socialization as well. [facility] mentioned Adult Day Programs. Explained that this would not be covered by insurance. Daughter would like resources for this to figure out cost. [facility] will send these resources to patient's [facility], which daughter has access to. No further needs from Care Coordination.           Transportation means:: Family     Informal Support system:: Children   Socioeconomic History     Marital status: Single     Spouse name: Not on file     Number of children: Not on file     Years of education: Not on file     Highest education level: Not on file     Tobacco Use     Smoking status: Never Smoker     Smokeless tobacco: Never Used   Substance and Sexual Activity     Alcohol use: No     Drug use: No      Sexual activity: Not Currently     Partners: Male          Resources and Interventions:  Current Resources:   Skilled Home Care Services: Physicial Therapy, Occupational Therapy  Community Resources: Meals on Wheels        Employment Status: retired)   )               Goals: No Goals were created      Patient/Caregiver understanding: Yes- daughter    Outreach Frequency: 2 months  Future Appointments              In 1 month Elmo Loza MD [facility] [place]          Plan: Patient was not enrolled in Care Coordination. [facility] sent a [employer] message with adult day program options, as well as the number to [facility] [employer] [facility], as discussed. [facility] encouraged Ratna to reach back out to care team, or CC directly, if further needs arise.     AKIRA Phillips  Primary Care Clinic- Social Work Care Coordinator  [facility] [place] and [facility]  Ph: [phone]  4/28/2021 12:53 PM

## 2021-04-28 NOTE — PROGRESS NOTES
Clinic Care Coordination Contact  [facility]/Voicemail       Clinical Data: Care Coordinator Outreach  Outreach attempted x 4.  Left message on daughter, Ratna's voicemail with call back information and requested return call.  Plan: Care Coordinator will do no further outreaches at this time.    AKIRA Phillips  Primary Care Clinic- Social Work Care Coordinator  [facility] and [place]  Ph: [phone]  4/28/2021 10:08 AM

## 2021-04-29 ENCOUNTER — TELEPHONE (OUTPATIENT)
Dept: FAMILY MEDICINE | Facility: CLINIC | Age: 76
End: 2021-04-29

## 2021-04-29 DIAGNOSIS — K21.9 GASTROESOPHAGEAL REFLUX DISEASE, UNSPECIFIED WHETHER ESOPHAGITIS PRESENT: Primary | ICD-10-CM

## 2021-04-29 NOTE — TELEPHONE ENCOUNTER
PA for pantoprazole (PROTONIX) 20 MG EC tablet    Alternative requested. Insurance says Plan limits exceeded.  No other info given to pharmacy.      Please call [phone]    ID# [id number]     PA or alternative    Jeniffer Fabian

## 2021-04-29 NOTE — TELEPHONE ENCOUNTER
Provider: Are you willing to give the order to delay OT eval until next week.  Thank you. Rocío Braxton R.N.    This refill/encounter is being handled by a team outside your facility.  If action needs to be taken, please route the encounter back to your team that would normally handle it. Please do not send directly back to the sender. Thank you. Rocío Georges - She declined OT evaluation visit this week and would like to do the evaluation next week - per patient request - OT Eval and treat for cognition, ADLs - Vacular dementia     Ok to leave a message with the provider's response - Destini's phone number is confidential.

## 2021-04-30 RX ORDER — ESOMEPRAZOLE MAGNESIUM 40 MG/1
40 CAPSULE, DELAYED RELEASE ORAL
Qty: 90 CAPSULE | Refills: 3 | Status: SHIPPED | OUTPATIENT
Start: 2021-04-30 | End: 2021-11-17

## 2021-05-03 ENCOUNTER — TELEPHONE (OUTPATIENT)
Dept: FAMILY MEDICINE | Facility: CLINIC | Age: 76
End: 2021-05-03

## 2021-05-03 NOTE — TELEPHONE ENCOUNTER
[facility] home care physical therapy calling. They did physical therapy assessment and calling back.     Wanting orders for visits: 1 this week, none next week (since patient has dementia and the therapist is off next week), 2 the week after, 1 the week after that for cognitive assessment and safety concerns.     This RN gave approval for the above order.     Alivia LOYDN, RN

## 2021-05-04 ENCOUNTER — TELEPHONE (OUTPATIENT)
Dept: FAMILY MEDICINE | Facility: CLINIC | Age: 76
End: 2021-05-04

## 2021-05-04 NOTE — TELEPHONE ENCOUNTER
[facility] form placed in Dr. Moreno's bin for completion.      Avani LUNA (R))

## 2021-05-05 ENCOUNTER — MEDICAL CORRESPONDENCE (OUTPATIENT)
Dept: HEALTH INFORMATION MANAGEMENT | Facility: CLINIC | Age: 76
End: 2021-05-05

## 2021-05-10 ENCOUNTER — VIRTUAL VISIT (OUTPATIENT)
Dept: FAMILY MEDICINE | Facility: CLINIC | Age: 76
End: 2021-05-10
Payer: COMMERCIAL

## 2021-05-10 DIAGNOSIS — F01.50 VASCULAR DEMENTIA WITHOUT BEHAVIORAL DISTURBANCE (H): Primary | ICD-10-CM

## 2021-05-10 DIAGNOSIS — R53.83 FATIGUE, UNSPECIFIED TYPE: ICD-10-CM

## 2021-05-10 DIAGNOSIS — K21.9 GASTROESOPHAGEAL REFLUX DISEASE, UNSPECIFIED WHETHER ESOPHAGITIS PRESENT: ICD-10-CM

## 2021-05-10 DIAGNOSIS — N18.2 CKD (CHRONIC KIDNEY DISEASE) STAGE 2, GFR 60-89 ML/MIN: ICD-10-CM

## 2021-05-10 DIAGNOSIS — R79.89 ABNORMAL TSH: ICD-10-CM

## 2021-05-10 DIAGNOSIS — F43.23 ADJUSTMENT REACTION WITH ANXIETY AND DEPRESSION: ICD-10-CM

## 2021-05-10 DIAGNOSIS — I63.9 CEREBROVASCULAR ACCIDENT (CVA), UNSPECIFIED MECHANISM (H): ICD-10-CM

## 2021-05-10 PROCEDURE — 99214 OFFICE O/P EST MOD 30 MIN: CPT | Mod: 95 | Performed by: FAMILY MEDICINE

## 2021-05-10 NOTE — PATIENT INSTRUCTIONS
We are changing the omeprazole (prilosec) to a different medication because of an interaction. I had recently sent in a Rx for Nexium (esomeprazole). Let me know if there is an issue with making this change.     Schedule lab only visit with the clinic OR see if home care can draw the labs needed

## 2021-05-10 NOTE — PROGRESS NOTES
Anabelle is a 76 year old who is being evaluated via a billable video visit.      How would you like to obtain your AVS? MyChart  If the video visit is dropped, the invitation should be resent by: Text to cell phone: see epic  Will anyone else be joining your video visit? No    Video Start Time: 2:26 PM    Assessment & Plan     Vascular dementia without behavioral disturbance (H)  Home care now assisting and OT evaluating cognitive status/home safety. Overall doing better with taking meds and memory back to previous baseline    Adjustment reaction with anxiety and depression  MUCH improved with increased mirtazapine. Tolerating higher dose without adverse effects. Continue current dosing    Abnormal TSH  Is on low dose levothyroxine. Due for tsh. Reminded to schedule labs or have home care draw.    Cerebrovascular accident (CVA), unspecified mechanism (H)  Dizziness is actually improved, no recent falls. continuies on plavix and home bp's reported as controlled.     Fatigue, unspecified type  Improved with tx of mood.     CKD (chronic kidney disease) stage 2, GFR 60-89 ml/min  Due for f/u labs. As above    Gastroesophageal reflux disease, unspecified whether esophagitis present  Needing to change prilosec to alternate PPI due to interaction with the omeprazole. Last rx sent to pharmacy was nexium. They have not yet made the change but will f/u on this       Patient Instructions   We are changing the omeprazole (prilosec) to a different medication because of an interaction. I had recently sent in a Rx for Nexium (esomeprazole). Let me know if there is an issue with making this change.     Schedule lab only visit with the clinic OR see if home care can draw the labs needed          Return in about 3 months (around 8/10/2021) for med check, Follow up.    Jyoti Moreno MD  [facility]   Anabelle is a 76 year old who presents for the following health issues  accompanied by her  "loydayesymiguel Segundo:    HPI     Reports things are going Just fine.   - daughter notes \"a lot has changed\". Back to herself.  - much better mood. Night and day difference. Gets up in the morning. Going for walks again. Wanting to do things. Having friends over   - no AE from the med changes.     Appetite is good and eating regular meals.   No abd pain/heartburn/nausea.   No voiding/stooling issues.   No recent falls.   Balance is maybe better.   Taking meds regularly. Moved up bedtime meds up a hour and thinks maybe that helped   Home care services since last visit started.   Daughter has been communicating with . Resources given. Might start balance program for seniors at [facility]  - home bp's monitored by home care and #'s have been good  - testing through home care for memory/cares needed.     Omeprezole.         Review of Systems   Constitutional, HEENT, cardiovascular, pulmonary, gi and gu systems are negative, except as otherwise noted.      Objective           Vitals:  No vitals were obtained today due to virtual visit.    Physical Exam   GENERAL: Healthy, alert and no distress  EYES: Eyes grossly normal to inspection.  No discharge or erythema, or obvious scleral/conjunctival abnormalities.  RESP: No audible wheeze, cough, or visible cyanosis.  No visible retractions or increased work of breathing.    SKIN: Visible skin clear. No significant rash, abnormal pigmentation or lesions.  NEURO: Cranial nerves grossly intact.  Mentation and speech appropriate for age.  PSYCH: Mentation appears normal, affect normal/bright, judgement and insight intact, normal speech and appearance well-groomed.            Video-Visit Details    Type of service:  Video Visit    Video End Time:2:38 PM    Originating Location (pt. Location): Home    Distant Location (provider location):  [facility]     Platform used for Video Visit: Hitesh"

## 2021-05-12 ENCOUNTER — TELEPHONE (OUTPATIENT)
Dept: FAMILY MEDICINE | Facility: CLINIC | Age: 76
End: 2021-05-12

## 2021-05-12 DIAGNOSIS — Z53.9 DIAGNOSIS NOT YET DEFINED: Primary | ICD-10-CM

## 2021-05-12 PROCEDURE — G0180 MD CERTIFICATION HHA PATIENT: HCPCS | Performed by: FAMILY MEDICINE

## 2021-05-14 ENCOUNTER — TELEPHONE (OUTPATIENT)
Dept: NURSING | Facility: CLINIC | Age: 76
End: 2021-05-14

## 2021-05-14 NOTE — TELEPHONE ENCOUNTER
Isabella PT, from [facility] called to report that patient canceled the rest of her physical therapy. Isabella stated that patient is doing much better since medication changes. Isabella stated that safety issues are being worked on with family and occupational therapy. Will route FYI message to team.    Leatha Rogers RN  [facility] Nurse Advisor  12:42 PM 5/14/2021

## 2021-05-17 DIAGNOSIS — F43.23 ADJUSTMENT REACTION WITH ANXIETY AND DEPRESSION: ICD-10-CM

## 2021-05-17 RX ORDER — MIRTAZAPINE 15 MG/1
15 TABLET, FILM COATED ORAL AT BEDTIME
Qty: 90 TABLET | Refills: 0 | OUTPATIENT
Start: 2021-05-17

## 2021-05-17 NOTE — TELEPHONE ENCOUNTER
Refill request received within 30 days of last office visit with pcp.  Prescription is routed to the provider to please address refill.   Abbi Headley RN

## 2021-05-18 ENCOUNTER — TELEPHONE (OUTPATIENT)
Dept: FAMILY MEDICINE | Facility: CLINIC | Age: 76
End: 2021-05-18

## 2021-05-18 ENCOUNTER — MEDICAL CORRESPONDENCE (OUTPATIENT)
Dept: HEALTH INFORMATION MANAGEMENT | Facility: CLINIC | Age: 76
End: 2021-05-18

## 2021-05-18 NOTE — TELEPHONE ENCOUNTER
[facility] form placed in Dr. Moreno's bin for completion.      Avani LUNA (R))

## 2021-06-01 NOTE — PROGRESS NOTES
[facility] Neurology Consultation    Anabelle Herbert MRN# [id number]   Age: 76 year old YOB: 1945     Requesting physician: Jyoti Dc*  Jyoti Moreno     Reason for Consultation: memory changes      History of Presenting Symptoms:   Anabelle Herbert is a 76 year old female who presents today for evaluation of memory changes.     Daughter Pily is here with patient today. Patient had a stroke around 2010 and then again in 2016. Problems with memory first were noted around 2016. Daughter notes that around then she would start forgetting conversations. Memory has steadily worsened since then. She know usually doesn't know what day it is. Long term memory is ok. Patient has not driven since summer 2020. Patient lives by herself. Patient has meals on wheels Monday through Friday. Daughter and son check in on her on a daily basis. Daughter does a lot of the the cleaning. Patient has no issues with getting dressed or bathing.    Patient is on Aricept 5 mg and unclear if there has been any benefit. She had side effects with higher doses.    Patient reports ok mood, but daughter states patient is often sad and has tressa spells. She'll occasionally call daughter in a panic over changes in memory. No seeing people that aren't there or hearing voices. Per chart review patient has been on multiple medications for mood in the past including Lexapro, Celaxa, Prosac. Unclear why patient stopped these medications in the past.    Sleep is ok except when she has back issues.     Patient has a black lab, which she adores.     Patient previously worked for [email or web address]. She worked there for 25+ years. She fully retired in 2016. She didn't retire because of memory issues. Highest level of education was high school.     Patient had fall in 7/2020 where she hit her head. CT head showed trace amount of right sided SAH, which was stable on subsequent imaging.       Past Medical History:     Patient  Active Problem List   Diagnosis     Chronic interstitial cystitis     Adjustment reaction with anxiety and depression     Abdominal pain     Benign essential hypertension     CKD (chronic kidney disease) stage 2, GFR 60-89 ml/min     Hyperlipidemia LDL goal <100     Mild persistent asthma     Vitamin D deficiency     GERD (gastroesophageal reflux disease)     B12 deficiency     Transient cerebral ischemia     Carotid stenosis     Diverticulosis of colon     Colon polyp     Atrophic vaginitis     Interstitial cystitis     Chronic constipation     Cognitive complaints     Prediabetes     Osteoporosis     Cerebrovascular accident (CVA), unspecified mechanism (H)     SHANEKA (obstructive sleep apnea)     Intracranial hemorrhage (H)     Vascular dementia without behavioral disturbance (H)     Past Medical History:   Diagnosis Date     Acute sinusitis, unspecified      Allergic rhinitis, cause unspecified      Candidiasis of unspecified site      Candidiasis of vulva and vagina      Diplopia      Dyspnea     following inhalation of cleaning solution. Treated with inhaler/neb.      Essential hypertension, benign      Hypercalcemia      Memory loss      Osteoporosis, unspecified     defined by chiropractor who did xrays during tx for back pain     Other B-complex deficiencies      Other, multiple and ill-defined closed fractures of lower limb 1990    prolonged healing     Unspecified cerebral artery occlusion with cerebral infarction      Unspecified disorder of skin and subcutaneous tissue      Urinary hesitancy      Urinary tract infection, site not specified         Past Surgical History:     Past Surgical History:   Procedure Laterality Date     COLONOSCOPY       CYSTOSCOPY, BIOPSY BLADDER, COMBINED  1/6/2014    Procedure: COMBINED CYSTOSCOPY, BIOPSY BLADDER;;  Surgeon: Vandana Tang MD;  Location: [facility] OR     CYSTOSCOPY, INJECT COLLAGEN, COMBINED  1/6/2014    Procedure: COMBINED CYSTOSCOPY, INJECT BULKING AGENT;  IC  cocktail, bladder biopsy, fulguration, heparin, gentamyacin, lidocaine & kenalog;  Surgeon: Vandana Tang MD;  Location: [facility] OR     GENITOURINARY SURGERY       NO HISTORY OF SURGERY          Social History:     Social History     Tobacco Use     Smoking status: Never Smoker     Smokeless tobacco: Never Used   Substance Use Topics     Alcohol use: No     Drug use: No        Family History:     Family History   Problem Relation Age of Onset     Cancer Father         colon?     Heart Disease Father      Asthma Mother      Alzheimer Disease Mother 86     Unknown/Adopted Maternal Grandmother      Unknown/Adopted Maternal Grandfather      Unknown/Adopted Paternal Grandmother      Unknown/Adopted Paternal Grandfather      Asthma Sister      Allergies Sister      Unknown/Adopted Son      Diabetes No family hx of         Medications:     Current Outpatient Medications   Medication Sig     acetaminophen (TYLENOL) 500 MG tablet Take 2 tablets (1,000 mg) by mouth every 8 hours as needed for mild pain     Blood Pressure Monitoring (BLOOD PRESSURE CUFF) MISC 1 each daily as needed     Blood Pressure Monitoring (BLOOD PRESSURE CUFF) MISC 1 Device daily as needed     Blood Pressure Monitoring (BLOOD PRESSURE MONITOR/M CUFF) MISC 1 each daily as needed     cholecalciferol (VITAMIN D3) 5000 units (125 mcg) CAPS capsule Take 5,000 Units by mouth daily     clopidogrel (PLAVIX) 75 MG tablet TAKE 1 TABLET BY MOUTH EVERY DAY     donepezil (ARICEPT) 5 MG tablet Take 1 tablet (5 mg) by mouth At Bedtime     esomeprazole (NEXIUM) 40 MG DR capsule Take 1 capsule (40 mg) by mouth every morning (before breakfast) Take 30-60 minutes before eating.     levothyroxine (SYNTHROID/LEVOTHROID) 25 MCG tablet Take 1 tablet (25 mcg) by mouth daily     lisinopril (ZESTRIL) 30 MG tablet Take 1 tablet (30 mg) by mouth daily     meclizine (ANTIVERT) 25 MG tablet Take 1 tablet (25 mg) by mouth 2 times daily as needed for dizziness     mirtazapine (REMERON)  30 MG tablet Take 1 tablet (30 mg) by mouth At Bedtime     Multiple Vitamins-Minerals (ANTIOXIDANT PO) Take 1 tablet by mouth daily CONSULT [facility]     Nutritional Supplements (ENSURE HIGH PROTEIN) Take 1 Can by mouth 3 times daily (with meals)     pantoprazole (PROTONIX) 20 MG EC tablet Take 2 tablets (40 mg) by mouth daily     polyethylene glycol (MIRALAX) 17 GM/SCOOP powder Take 17 g by mouth daily To twice daily as needed for constipation.     senna-docusate (SENOKOT-S/PERICOLACE) 8.6-50 MG tablet Take 1 tablet by mouth 2 times daily as needed for constipation     No current facility-administered medications for this visit.         Allergies:     Allergies   Allergen Reactions     Contrast Dye      Burning, hematuria     Diatrizoate Other (See Comments)     Feels burning inside body     Dogs Unknown     Throat started to close up.      Sulfa Drugs Unknown     Bupropion Anxiety     Patient reports increased anxiety, panic, difficulty concentrating, and various aches and pains        Review of Systems:   As above     Physical Exam:   Vitals: BP (!) 160/91   Pulse 67   Resp 16   Wt 73.9 kg (163 lb)   BMI 31.83 kg/m     General: Seated comfortably in no acute distress.  Lungs: breathing comfortably  Extremities: no edema  Skin: No rashes  Neurologic:     Mental Status: MoCA 15/30 (-1 clock hands, -2 naming animals, -1 serial 7s, -1 repeating sentence, -1 abstraction, -5 delayed recall, -4 orientation)     Cranial Nerves: Visual fields grossly intact. PERRL. EOMI with normal smooth pursuit. Facial sensation intact/symmetric. Facial movements symmetric. Hearing not formally tested but intact to conversation. Palate elevation symmetric, uvula midline. No dysarthria. Shoulder shrug strong bilaterally. Tongue protrusion midline.     Motor: No tremors or other abnormal movements observed. Muscle tone normal throughout. Normal/symmetric rapid finger tapping. Strength 5/5 throughout upper and lower  extremities.     Deep Tendon Reflexes: 1+/symmetric throughout upper and lower extremities. Toes downgoing bilaterally.     Sensory: Mild decrease in temperature sensation in feet compared to hands. 4 seconds of vibration in bilateral great toes, 6 seconds in ankles, normal in hands. Intact/symmetric to light touch throughout upper and lower extremities. Negative Romberg.      Coordination: Finger-nose-finger and heel-shin intact without dysmetria. Rapid alternating movements intact/symmetric with normal speed and rhythm.     Gait: Mildly wide based ataxic gait. Mild difficulties with heel and toe gait, moderate difficulties with tandem gait.         Data: Pertinent prior to visit   [email or web address]  MRI brain     MRI brain 2016  Impression:  Subacute infarction in the left cuneus.  Chronic infarction in the right parietal lobe.   Unchanged mild chronic microvascular ischemic changes involving the  supratentorial periventricular white matter and central carol.   Head MRA demonstrates no definite aneurysm or stenosis of the major  intracranial arteries.  Neck MRA demonstrates tortuosity of the vertebral arteries and  atherosclerotic irregularities of the carotid bulbs bilaterally  without significant stenosis..    Procedures:  None    Laboratory:  B12 ~800, TSH high with normal free T4, CBC normal, CMP with CKD otherwise unremarkable (2021)         Assessment and Plan:   Assessment:  Anabelle Herbert is a 76 year old female who presents today for evaluation of memory changes since 2016. Symptoms have steadily worsened since then. She has history of right parietal stroke in 2010 and left cuneus stroke in 2016. MoCA today with score 15/30. MRI brain from  compared to 2016 scan and is stable. There is mild chronic microvascular changes in addition to prior strokes. We discussed today that vascular changes may account for some of patient's memory changes, but are unable to explain all of symptoms. Given progressive  worsening over the last 5 years there is concern for neurodegenerative process, possibly Alzheimer's. It is unclear how much patient's concurrent mood symptoms are contributing to symptoms. We discussed trial of Lexapro for mood. We could consider switching from Aricept to Memantine in the future for memory symptoms.      Plan:  - Start Lexapro 5 mg daily; if tolerated after 2 weeks increase to 10 mg daily  - Consider weaning off of Aricept to see if any change in cognition; contact us if interested in trial of Memantine    Follow up in Neurology clinic in 3 months or earlier as needed should new concerns arise.    Elmo Loza MD   of Neurology  [facility]      The total time of this encounter today amounted to 75 minutes. This time included time spent with the patient, prep work, ordering tests, and performing post visit documentation.

## 2021-06-02 ENCOUNTER — OFFICE VISIT (OUTPATIENT)
Dept: NEUROLOGY | Facility: CLINIC | Age: 76
End: 2021-06-02
Attending: FAMILY MEDICINE
Payer: COMMERCIAL

## 2021-06-02 VITALS
RESPIRATION RATE: 16 BRPM | SYSTOLIC BLOOD PRESSURE: 160 MMHG | BODY MASS INDEX: 31.83 KG/M2 | HEART RATE: 67 BPM | WEIGHT: 163 LBS | DIASTOLIC BLOOD PRESSURE: 91 MMHG

## 2021-06-02 DIAGNOSIS — F41.9 ANXIETY: ICD-10-CM

## 2021-06-02 DIAGNOSIS — R41.3 MEMORY CHANGE: Primary | ICD-10-CM

## 2021-06-02 PROCEDURE — 99205 OFFICE O/P NEW HI 60 MIN: CPT | Performed by: INTERNAL MEDICINE

## 2021-06-02 RX ORDER — ESCITALOPRAM OXALATE 10 MG/1
TABLET ORAL
Qty: 60 TABLET | Refills: 2 | Status: SHIPPED | OUTPATIENT
Start: 2021-06-02 | End: 2021-08-20

## 2021-06-02 NOTE — LETTER
6/2/2021         RE: Anabelle Herbert  [address]  [facility] [address]        Dear Colleague,    Thank you for referring your patient, Anabelle Herbert, to the [facility] [place]. Please see a copy of my visit note below.    [facility] Neurology Consultation    Anabelle Herbert MRN# [id number]   Age: 76 year old YOB: 1945     Requesting physician: Jyoti Dc*  Jyoti Moreno     Reason for Consultation: memory changes      History of Presenting Symptoms:   Anabelle Herbert is a 76 year old female who presents today for evaluation of memory changes.     Daughter Pily is here with patient today. Patient had a stroke around 2010 and then again in 2016. Problems with memory first were noted around 2016. Daughter notes that around then she would start forgetting conversations. Memory has steadily worsened since then. She know usually doesn't know what day it is. Long term memory is ok. Patient has not driven since summer 2020. Patient lives by herself. Patient has meals on wheels Monday through Friday. Daughter and son check in on her on a daily basis. Daughter does a lot of the the cleaning. Patient has no issues with getting dressed or bathing.    Patient is on Aricept 5 mg and unclear if there has been any benefit. She had side effects with higher doses.    Patient reports ok mood, but daughter states patient is often sad and has tressa spells. She'll occasionally call daughter in a panic over changes in memory. No seeing people that aren't there or hearing voices. Per chart review patient has been on multiple medications for mood in the past including Lexapro, Celaxa, Prosac. Unclear why patient stopped these medications in the past.    Sleep is ok except when she has back issues.     Patient has a black lab, which she adores.     Patient previously worked for [employer]. She worked there for 25+ years. She fully retired in 2016. She didn't  retire because of memory issues. Highest level of education was high school.     Patient had fall in 7/2020 where she hit her head. CT head showed trace amount of right sided SAH, which was stable on subsequent imaging.       Past Medical History:     Patient Active Problem List   Diagnosis     Chronic interstitial cystitis     Adjustment reaction with anxiety and depression     Abdominal pain     Benign essential hypertension     CKD (chronic kidney disease) stage 2, GFR 60-89 ml/min     Hyperlipidemia LDL goal <100     Mild persistent asthma     Vitamin D deficiency     GERD (gastroesophageal reflux disease)     B12 deficiency     Transient cerebral ischemia     Carotid stenosis     Diverticulosis of colon     Colon polyp     Atrophic vaginitis     Interstitial cystitis     Chronic constipation     Cognitive complaints     Prediabetes     Osteoporosis     Cerebrovascular accident (CVA), unspecified mechanism (H)     SHANEKA (obstructive sleep apnea)     Intracranial hemorrhage (H)     Vascular dementia without behavioral disturbance (H)     Past Medical History:   Diagnosis Date     Acute sinusitis, unspecified      Allergic rhinitis, cause unspecified      Candidiasis of unspecified site      Candidiasis of vulva and vagina      Diplopia      Dyspnea     following inhalation of cleaning solution. Treated with inhaler/neb.      Essential hypertension, benign      Hypercalcemia      Memory loss      Osteoporosis, unspecified     defined by chiropractor who did xrays during tx for back pain     Other B-complex deficiencies      Other, multiple and ill-defined closed fractures of lower limb 1990    prolonged healing     Unspecified cerebral artery occlusion with cerebral infarction      Unspecified disorder of skin and subcutaneous tissue      Urinary hesitancy      Urinary tract infection, site not specified         Past Surgical History:     Past Surgical History:   Procedure Laterality Date     COLONOSCOPY        CYSTOSCOPY, BIOPSY BLADDER, COMBINED  1/6/2014    Procedure: COMBINED CYSTOSCOPY, BIOPSY BLADDER;;  Surgeon: Vanadna Tang MD;  Location: [facility] OR     CYSTOSCOPY, INJECT COLLAGEN, COMBINED  1/6/2014    Procedure: COMBINED CYSTOSCOPY, INJECT BULKING AGENT;  IC cocktail, bladder biopsy, fulguration, heparin, gentamyacin, lidocaine & kenalog;  Surgeon: Vandana Tang MD;  Location: [facility] OR     GENITOURINARY SURGERY       NO HISTORY OF SURGERY          Social History:     Social History     Tobacco Use     Smoking status: Never Smoker     Smokeless tobacco: Never Used   Substance Use Topics     Alcohol use: No     Drug use: No        Family History:     Family History   Problem Relation Age of Onset     Cancer Father         colon?     Heart Disease Father      Asthma Mother      Alzheimer Disease Mother 86     Unknown/Adopted Maternal Grandmother      Unknown/Adopted Maternal Grandfather      Unknown/Adopted Paternal Grandmother      Unknown/Adopted Paternal Grandfather      Asthma Sister      Allergies Sister      Unknown/Adopted Son      Diabetes No family hx of         Medications:     Current Outpatient Medications   Medication Sig     acetaminophen (TYLENOL) 500 MG tablet Take 2 tablets (1,000 mg) by mouth every 8 hours as needed for mild pain     Blood Pressure Monitoring (BLOOD PRESSURE CUFF) MISC 1 each daily as needed     Blood Pressure Monitoring (BLOOD PRESSURE CUFF) MISC 1 Device daily as needed     Blood Pressure Monitoring (BLOOD PRESSURE MONITOR/M CUFF) MISC 1 each daily as needed     cholecalciferol (VITAMIN D3) 5000 units (125 mcg) CAPS capsule Take 5,000 Units by mouth daily     clopidogrel (PLAVIX) 75 MG tablet TAKE 1 TABLET BY MOUTH EVERY DAY     donepezil (ARICEPT) 5 MG tablet Take 1 tablet (5 mg) by mouth At Bedtime     esomeprazole (NEXIUM) 40 MG DR capsule Take 1 capsule (40 mg) by mouth every morning (before breakfast) Take 30-60 minutes before eating.     levothyroxine  (SYNTHROID/LEVOTHROID) 25 MCG tablet Take 1 tablet (25 mcg) by mouth daily     lisinopril (ZESTRIL) 30 MG tablet Take 1 tablet (30 mg) by mouth daily     meclizine (ANTIVERT) 25 MG tablet Take 1 tablet (25 mg) by mouth 2 times daily as needed for dizziness     mirtazapine (REMERON) 30 MG tablet Take 1 tablet (30 mg) by mouth At Bedtime     Multiple Vitamins-Minerals (ANTIOXIDANT PO) Take 1 tablet by mouth daily CONSULT [facility]     Nutritional Supplements (ENSURE HIGH PROTEIN) Take 1 Can by mouth 3 times daily (with meals)     pantoprazole (PROTONIX) 20 MG EC tablet Take 2 tablets (40 mg) by mouth daily     polyethylene glycol (MIRALAX) 17 GM/SCOOP powder Take 17 g by mouth daily To twice daily as needed for constipation.     senna-docusate (SENOKOT-S/PERICOLACE) 8.6-50 MG tablet Take 1 tablet by mouth 2 times daily as needed for constipation     No current facility-administered medications for this visit.         Allergies:     Allergies   Allergen Reactions     Contrast Dye      Burning, hematuria     Diatrizoate Other (See Comments)     Feels burning inside body     Dogs Unknown     Throat started to close up.      Sulfa Drugs Unknown     Bupropion Anxiety     Patient reports increased anxiety, panic, difficulty concentrating, and various aches and pains        Review of Systems:   As above     Physical Exam:   Vitals: BP (!) 160/91   Pulse 67   Resp 16   Wt 73.9 kg (163 lb)   BMI 31.83 kg/m     General: Seated comfortably in no acute distress.  Lungs: breathing comfortably  Extremities: no edema  Skin: No rashes  Neurologic:     Mental Status: MoCA 15/30 (-1 clock hands, -2 naming animals, -1 serial 7s, -1 repeating sentence, -1 abstraction, -5 delayed recall, -4 orientation)     Cranial Nerves: Visual fields grossly intact. PERRL. EOMI with normal smooth pursuit. Facial sensation intact/symmetric. Facial movements symmetric. Hearing not formally tested but intact to conversation. Palate  elevation symmetric, uvula midline. No dysarthria. Shoulder shrug strong bilaterally. Tongue protrusion midline.     Motor: No tremors or other abnormal movements observed. Muscle tone normal throughout. Normal/symmetric rapid finger tapping. Strength 5/5 throughout upper and lower extremities.     Deep Tendon Reflexes: 1+/symmetric throughout upper and lower extremities. Toes downgoing bilaterally.     Sensory: Mild decrease in temperature sensation in feet compared to hands. 4 seconds of vibration in bilateral great toes, 6 seconds in ankles, normal in hands. Intact/symmetric to light touch throughout upper and lower extremities. Negative Romberg.      Coordination: Finger-nose-finger and heel-shin intact without dysmetria. Rapid alternating movements intact/symmetric with normal speed and rhythm.     Gait: Mildly wide based ataxic gait. Mild difficulties with heel and toe gait, moderate difficulties with tandem gait.         Data: Pertinent prior to visit   Imagin2020  MRI brain     MRI brain 2016  Impression:  Subacute infarction in the left cuneus.  Chronic infarction in the right parietal lobe.   Unchanged mild chronic microvascular ischemic changes involving the  supratentorial periventricular white matter and central carol.   Head MRA demonstrates no definite aneurysm or stenosis of the major  intracranial arteries.  Neck MRA demonstrates tortuosity of the vertebral arteries and  atherosclerotic irregularities of the carotid bulbs bilaterally  without significant stenosis..    Procedures:  None    Laboratory:  B12 ~800, TSH high with normal free T4, CBC normal, CMP with CKD otherwise unremarkable (2021)         Assessment and Plan:   Assessment:  Anabelle Herbert is a 76 year old female who presents today for evaluation of memory changes since 2016. Symptoms have steadily worsened since then. She has history of right parietal stroke in 2010 and left cuneus stroke in 2016. MoCA today with score 15/30.  MRI brain from 2020 compared to 2016 scan and is stable. There is mild chronic microvascular changes in addition to prior strokes. We discussed today that vascular changes may account for some of patient's memory changes, but are unable to explain all of symptoms. Given progressive worsening over the last 5 years there is concern for neurodegenerative process, possibly Alzheimer's. It is unclear how much patient's concurrent mood symptoms are contributing to symptoms. We discussed trial of Lexapro for mood. We could consider switching from Aricept to Memantine in the future for memory symptoms.      Plan:  - Start Lexapro 5 mg daily; if tolerated after 2 weeks increase to 10 mg daily  - Consider weaning off of Aricept to see if any change in cognition; contact us if interested in trial of Memantine    Follow up in Neurology clinic in 3 months or earlier as needed should new concerns arise.    Elmo Loza MD   of Neurology  [facility]      The total time of this encounter today amounted to 75 minutes. This time included time spent with the patient, prep work, ordering tests, and performing post visit documentation.        Again, thank you for allowing me to participate in the care of your patient.        Sincerely,        Elmo Loza MD

## 2021-06-03 DIAGNOSIS — N18.2 CKD (CHRONIC KIDNEY DISEASE) STAGE 2, GFR 60-89 ML/MIN: ICD-10-CM

## 2021-06-03 DIAGNOSIS — R53.83 FATIGUE, UNSPECIFIED TYPE: ICD-10-CM

## 2021-06-03 DIAGNOSIS — R79.89 ABNORMAL TSH: ICD-10-CM

## 2021-06-03 LAB
ANION GAP SERPL CALCULATED.3IONS-SCNC: 6 MMOL/L (ref 3–14)
BUN SERPL-MCNC: 20 MG/DL (ref 7–30)
CALCIUM SERPL-MCNC: 8.9 MG/DL (ref 8.5–10.1)
CHLORIDE SERPL-SCNC: 110 MMOL/L (ref 94–109)
CO2 SERPL-SCNC: 27 MMOL/L (ref 20–32)
CREAT SERPL-MCNC: 1.07 MG/DL (ref 0.52–1.04)
GFR SERPL CREATININE-BSD FRML MDRD: 51 ML/MIN/{1.73_M2}
GLUCOSE SERPL-MCNC: 118 MG/DL (ref 70–99)
POTASSIUM SERPL-SCNC: 4.4 MMOL/L (ref 3.4–5.3)
SODIUM SERPL-SCNC: 143 MMOL/L (ref 133–144)
TSH SERPL DL<=0.005 MIU/L-ACNC: 1.03 MU/L (ref 0.4–4)

## 2021-06-03 PROCEDURE — 36415 COLL VENOUS BLD VENIPUNCTURE: CPT | Performed by: FAMILY MEDICINE

## 2021-06-03 PROCEDURE — 84443 ASSAY THYROID STIM HORMONE: CPT | Performed by: FAMILY MEDICINE

## 2021-06-03 PROCEDURE — 80048 BASIC METABOLIC PNL TOTAL CA: CPT | Performed by: FAMILY MEDICINE

## 2021-06-04 PROBLEM — E03.8 SUBCLINICAL HYPOTHYROIDISM: Status: ACTIVE | Noted: 2021-06-04

## 2021-06-09 DIAGNOSIS — I10 HYPERTENSION GOAL BP (BLOOD PRESSURE) < 130/80: ICD-10-CM

## 2021-06-09 RX ORDER — LISINOPRIL 30 MG/1
TABLET ORAL
Qty: 90 TABLET | Refills: 3 | Status: ON HOLD | OUTPATIENT
Start: 2021-06-09 | End: 2021-11-05

## 2021-06-09 NOTE — TELEPHONE ENCOUNTER
"Requested Prescriptions   Pending Prescriptions Disp Refills    lisinopril (ZESTRIL) 30 MG tablet [Pharmacy Med Name: LISINOPRIL 30 MG TABLET] 90 tablet 0     Sig: TAKE 1 TABLET BY MOUTH EVERY DAY       ACE Inhibitors (Including Combos) Protocol Failed - 6/9/2021  2:20 PM        Failed - Blood pressure under 140/90 in past 12 months     BP Readings from Last 3 Encounters:   06/02/21 (!) 160/91   01/07/21 134/80   07/20/20 (!) 151/80                 Failed - Normal serum creatinine on file in past 12 months     Recent Labs   Lab Test 06/03/21  1359   CR 1.07*       Ok to refill medication if creatinine is low          Passed - Recent (12 mo) or future (30 days) visit within the authorizing provider's specialty     Patient has had an office visit with the authorizing provider or a provider within the authorizing providers department within the previous 12 mos or has a future within next 30 days. See \"Patient Info\" tab in inbasket, or \"Choose Columns\" in Meds & Orders section of the refill encounter.              Passed - Medication is active on med list        Passed - Patient is age 18 or older        Passed - No active pregnancy on record        Passed - Normal serum potassium on file in past 12 months     Recent Labs   Lab Test 06/03/21  1359   POTASSIUM 4.4             Passed - No positive pregnancy test within past 12 months             "

## 2021-07-22 ENCOUNTER — OFFICE VISIT (OUTPATIENT)
Dept: FAMILY MEDICINE | Facility: CLINIC | Age: 76
End: 2021-07-22
Payer: COMMERCIAL

## 2021-07-22 VITALS — DIASTOLIC BLOOD PRESSURE: 64 MMHG | SYSTOLIC BLOOD PRESSURE: 122 MMHG

## 2021-07-22 DIAGNOSIS — L81.8 IDIOPATHIC GUTTATE HYPOMELANOSIS: ICD-10-CM

## 2021-07-22 DIAGNOSIS — L81.4 LENTIGINES: ICD-10-CM

## 2021-07-22 DIAGNOSIS — L82.1 SEBORRHEIC KERATOSES: ICD-10-CM

## 2021-07-22 DIAGNOSIS — D48.5 NEOPLASM OF UNCERTAIN BEHAVIOR OF SKIN: Primary | ICD-10-CM

## 2021-07-22 PROCEDURE — 99213 OFFICE O/P EST LOW 20 MIN: CPT | Mod: 25 | Performed by: PHYSICIAN ASSISTANT

## 2021-07-22 PROCEDURE — 11102 TANGNTL BX SKIN SINGLE LES: CPT | Performed by: PHYSICIAN ASSISTANT

## 2021-07-22 PROCEDURE — 88305 TISSUE EXAM BY PATHOLOGIST: CPT | Performed by: DERMATOLOGY

## 2021-07-22 NOTE — LETTER
7/22/2021         RE: Anabelle Herbert  [address]  [facility] [address]        Dear Colleague,    Thank you for referring your patient, Anabelle Herbert, to the [facility] [place]. Please see a copy of my visit note below.    HPI:  Anabelle Herbert is a 76 year old female patient here today for growth on left forearm .  Patient states this has been present for over a year.  Patient reports the following symptoms: growing, itchy .  Patient reports the following previous treatments: none.  Patient reports the following modifying factors: none.  Associated symptoms: none.  Patient has no other skin complaints today.  Remainder of the HPI, Meds, PMH, Allergies, FH, and SH was reviewed in chart.    Pertinent Hx:   No personal or family history of skin cancer    Past Medical History:   Diagnosis Date     Acute sinusitis, unspecified      Allergic rhinitis, cause unspecified      Candidiasis of unspecified site      Candidiasis of vulva and vagina      Diplopia      Dyspnea     following inhalation of cleaning solution. Treated with inhaler/neb.      Essential hypertension, benign      Hypercalcemia      Memory loss      Osteoporosis, unspecified     defined by chiropractor who did xrays during tx for back pain     Other B-complex deficiencies      Other, multiple and ill-defined closed fractures of lower limb 1990    prolonged healing     Unspecified cerebral artery occlusion with cerebral infarction      Unspecified disorder of skin and subcutaneous tissue      Urinary hesitancy      Urinary tract infection, site not specified        Past Surgical History:   Procedure Laterality Date     COLONOSCOPY       CYSTOSCOPY, BIOPSY BLADDER, COMBINED  1/6/2014    Procedure: COMBINED CYSTOSCOPY, BIOPSY BLADDER;;  Surgeon: Vandana Tang MD;  Location: [facility] OR     CYSTOSCOPY, INJECT COLLAGEN, COMBINED  1/6/2014    Procedure: COMBINED CYSTOSCOPY, INJECT BULKING AGENT;  IC cocktail, bladder biopsy,  fulguration, heparin, gentamyacin, lidocaine & kenalog;  Surgeon: Vandana Tang MD;  Location: [facility] OR     GENITOURINARY SURGERY       NO HISTORY OF SURGERY          Family History   Problem Relation Age of Onset     Cancer Father         colon?     Heart Disease Father      Asthma Mother      Alzheimer Disease Mother 86     Unknown/Adopted Maternal Grandmother      Unknown/Adopted Maternal Grandfather      Unknown/Adopted Paternal Grandmother      Unknown/Adopted Paternal Grandfather      Asthma Sister      Allergies Sister      Unknown/Adopted Son      Diabetes No family hx of        Social History     Socioeconomic History     Marital status:      Spouse name: Not on file     Number of children: Not on file     Years of education: Not on file     Highest education level: Not on file   Occupational History     Not on file   Tobacco Use     Smoking status: Never Smoker     Smokeless tobacco: Never Used   Substance and Sexual Activity     Alcohol use: No     Drug use: No     Sexual activity: Not Currently     Partners: Male   Other Topics Concern     Parent/sibling w/ CABG, MI or angioplasty before 65F 55M? No   Social History Narrative     Not on file     Social Determinants of Health     Financial Resource Strain:      Difficulty of Paying Living Expenses:    Food Insecurity:      Worried About Running Out of Food in the Last Year:      Ran Out of Food in the Last Year:    Transportation Needs:      Lack of Transportation (Medical):      Lack of Transportation (Non-Medical):    Physical Activity:      Days of Exercise per Week:      Minutes of Exercise per Session:    Stress:      Feeling of Stress :    Social Connections:      Frequency of Communication with Friends and Family:      Frequency of Social Gatherings with Friends and Family:      Attends Congregational Services:      Active Member of Clubs or Organizations:      Attends Club or Organization Meetings:      Marital Status:    Intimate Partner  Violence:      Fear of Current or Ex-Partner:      Emotionally Abused:      Physically Abused:      Sexually Abused:        Outpatient Encounter Medications as of 7/22/2021   Medication Sig Dispense Refill     acetaminophen (TYLENOL) 500 MG tablet Take 2 tablets (1,000 mg) by mouth every 8 hours as needed for mild pain       Blood Pressure Monitoring (BLOOD PRESSURE CUFF) MISC 1 each daily as needed 1 each 0     Blood Pressure Monitoring (BLOOD PRESSURE CUFF) MISC 1 Device daily as needed 1 each 0     Blood Pressure Monitoring (BLOOD PRESSURE MONITOR/M CUFF) MISC 1 each daily as needed 1 each 0     cholecalciferol (VITAMIN D3) 5000 units (125 mcg) CAPS capsule Take 5,000 Units by mouth daily       clopidogrel (PLAVIX) 75 MG tablet TAKE 1 TABLET BY MOUTH EVERY DAY 90 tablet 3     donepezil (ARICEPT) 5 MG tablet Take 1 tablet (5 mg) by mouth At Bedtime 30 tablet 1     escitalopram (LEXAPRO) 10 MG tablet Take 1/2 tablet daily. If tolerated after 2 weeks increase to 1 tablet daily 60 tablet 2     esomeprazole (NEXIUM) 40 MG DR capsule Take 1 capsule (40 mg) by mouth every morning (before breakfast) Take 30-60 minutes before eating. 90 capsule 3     levothyroxine (SYNTHROID/LEVOTHROID) 25 MCG tablet TAKE 1 TABLET BY MOUTH EVERY DAY 90 tablet 1     lisinopril (ZESTRIL) 30 MG tablet TAKE 1 TABLET BY MOUTH EVERY DAY 90 tablet 3     meclizine (ANTIVERT) 25 MG tablet Take 1 tablet (25 mg) by mouth 2 times daily as needed for dizziness 30 tablet 2     mirtazapine (REMERON) 30 MG tablet TAKE 1 TABLET (30 MG) BY MOUTH AT BEDTIME 90 tablet 1     Multiple Vitamins-Minerals (ANTIOXIDANT PO) Take 1 tablet by mouth daily CONSULT [facility]       Nutritional Supplements (ENSURE HIGH PROTEIN) Take 1 Can by mouth 3 times daily (with meals) 90 Can 3     pantoprazole (PROTONIX) 20 MG EC tablet Take 2 tablets (40 mg) by mouth daily 90 tablet 3     polyethylene glycol (MIRALAX) 17 GM/SCOOP powder Take 17 g by mouth daily To  twice daily as needed for constipation. 850 g 11     senna-docusate (SENOKOT-S/PERICOLACE) 8.6-50 MG tablet Take 1 tablet by mouth 2 times daily as needed for constipation 30 tablet 1     No facility-administered encounter medications on file as of 7/22/2021.       Review Of Systems:  Skin: growth  Eyes: negative  Ears/Nose/Throat: negative  Respiratory: No shortness of breath, dyspnea on exertion, cough, or hemoptysis  Cardiovascular: negative  Gastrointestinal: negative  Genitourinary: negative  Musculoskeletal: negative  Neurologic: negative  Psychiatric: negative  Hematologic/Lymphatic/Immunologic: negative  Endocrine: negative      Objective:     /64   Eyes: Conjunctivae/lids: Normal   ENT: Lips:  Normal  MSK: Normal  Cardiovascular: Peripheral edema none  Pulm: Breathing Normal  Neuro/Psych: Orientation: A/O x 3. Normal; Mood/Affect: Normal, NAD, WDWN  Pt accompanied by: daughter Pily  Following areas examined: face, wearing mask, left forearm, hands, right forearm  Recio skin type:i   Findings:    Brown, stuck-on scaly appearing papules on forearms  Hypopigmented smooth macules on dorsal forearms   Yellow firm nodule on left extensor forearm 1.0cm  Geronimo WD smooth macules on forearms    Assessment and Plan:     1) IGH, Seborrheic keratoses,  Lentigines     I discussed the specifics of tumor, prognosis, and genetics of benign lesions.  I explained that treatment of these lesions would be purely cosmetic and not medically neccessary.  I discussed with patient different removal options including excision, cryotherapy, cautery and /or laser.  Lesion may recur and/or may not completely resolve. May need additional treatment.     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  No complications and routine wound care.  May grow back and will get a scar. Based on  lesion type may need to completely remove lesion. Patient will be notified in 7-10 days of results. Wound care directions given.      Signs and Symptoms of non-melanoma skin cancer and ABCDEs of melanoma reviewed with patient. Patient encouraged to perform monthly self skin exams and educated on how to perform them. UV precautions reviewed with patient. Patient was asked about new or changing moles/lesions on body.   Wear a sunscreen with at least SPF 30 on your face, ears, neck and V of the chest daily. Wear sunscreen on other areas of the body if those areas are exposed to the sun throughout the day. Sunscreens can contain physical and/or chemical blockers. Physical blockers are less likely to clog pores, these include zinc oxide and titanium dioxide. Reapply every two hour and after swimming. Sunscreen examples include Neutrogena, CeraVe, Blue Lizard, Elta MD and many others.    Proper skin care from [facility]:    -Eliminate harsh soaps as they strip the natural oils from the skin, often resulting in dry itchy skin ( i.e. Dial, Zest, Elizabeth Spring)  -Use mild soaps such as Cetaphil or Dove Sensitive Skin in the shower. You do not need to use soap on arms, legs, and trunk every time you shower unless visibly soiled.   -Avoid hot or cold showers.  -After showering, lightly dry off and apply moisturizing within 2-3 minutes. This will help trap moisture in the skin.   -Aggressive use of a moisturizer at least 1-2 times a day to the entire body (including -Vanicream, Cetaphil, Aquaphor or Cerave) and moisturize hands after every washing.  -We recommend using moisturizers that come in a tub that needs to be scooped out, not a pump. This has more of an oil base. It will hold moisture in your skin much better than a water base moisturizer. The above recommended are non-pore clogging.         It was a pleasure speaking with Anabelle Herbert today.       Follow up in based on pathology        Again, thank you for  allowing me to participate in the care of your patient.        Sincerely,        Thao Forde PA-C

## 2021-07-22 NOTE — PATIENT INSTRUCTIONS
Proper skin care from [facility]:    -Eliminate harsh soaps as they strip the natural oils from the skin, often resulting in dry itchy skin ( i.e. Dial, Zest, Elizabeth Spring)  -Use mild soaps such as Cetaphil or Dove Sensitive Skin in the shower. You do not need to use soap on arms, legs, and trunk every time you shower unless visibly soiled.   -Avoid hot or cold showers.  -After showering, lightly dry off and apply moisturizing within 2-3 minutes. This will help trap moisture in the skin.   -Aggressive use of a moisturizer at least 1-2 times a day to the entire body (including -Vanicream, Cetaphil, Aquaphor or Cerave) and moisturize hands after every washing.  -We recommend using moisturizers that come in a tub that needs to be scooped out, not a pump. This has more of an oil base. It will hold moisture in your skin much better than a water base moisturizer. The above recommended are non-pore clogging.      Wear a sunscreen with at least SPF 30 on your face, ears, neck and V of the chest daily. Wear sunscreen on other areas of the body if those areas are exposed to the sun throughout the day. Sunscreens can contain physical and/or chemical blockers. Physical blockers are less likely to clog pores, these include zinc oxide and titanium dioxide. Reapply every two hour and after swimming. Sunscreen examples include Neutrogena, CeraVe, Blue Lizard, Elta MD and many others.    UV radiation  UVA radiation remains constant throughout the day and throughout the year. It is a longer wavelength than UVB and therefore penetrates deeper into the skin leading to immediate and delayed tanning, photoaging, and skin cancer. 70-80% of UVA and UVB radiation occurs between the hours of 10am-2pm.  UVB radiation  UVB radiation causes the most harmful effects and is more significant during the summer months. However, snow and ice can reflect UVB radiation leading to skin damage during the winter months as well. UVB radiation is  responsible for tanning, burning, inflammation, delayed erythema (pinkness), pigmentation (brown spots), and skin cancer.     I recommend self monthly full body exams and yearly full body exams with a dermatology provider. If you develop a new or changing lesion please follow up for examination. Most skin cancers are pink and scaly or pink and pearly. However, we do see blue/brown/black skin cancers.  Consider the ABCDEs of melanoma when giving yourself your monthly full body exam ( don't forget the groin, buttocks, feet, toes, etc). A-asymmetry, B-borders, C-color, D-diameter, E-elevation or evolving. If you see any of these changes please follow up in clinic. If you cannot see your back I recommend purchasing a hand held mirror to use with a larger wall mirror.                       Wound Care Instructions     FOR SUPERFICIAL WOUNDS     [facility] or     [facility] [phone]          AFTER 24 HOURS YOU SHOULD REMOVE THE BANDAGE AND BEGIN DAILY DRESSING CHANGES AS FOLLOWS:     1) Remove Dressing.     2) Clean and dry the area with tap water using a Q-tip or sterile gauze pad.     3) Apply Vaseline, Aquaphor, Polysporin ointment or Bacitracin ointment over entire wound.  Do NOT use Neosporin ointment.     4) Cover the wound with a band-aid, or a sterile non-stick gauze pad and micropore paper tape      REPEAT THESE INSTRUCTIONS AT LEAST ONCE A DAY UNTIL THE WOUND HAS COMPLETELY HEALED.    It is an old wives tale that a wound heals better when it is exposed to air and allowed to dry out. The wound will heal faster with a better cosmetic result if it is kept moist with ointment and covered with a bandage.    **Do not let the wound dry out.**      Supplies Needed:      *Cotton tipped applicators (Q-tips)    *Polysporin Ointment or Bacitracin Ointment (NOT NEOSPORIN)    *Band-aids or non-stick gauze pads and micropore paper tape.      PATIENT INFORMATION:    During the healing process you  will notice a number of changes. All wounds develop a small halo of redness surrounding the wound.  This means healing is occurring. Severe itching with extensive redness usually indicates sensitivity to the ointment or bandage tape used to dress the wound.  You should call our office if this develops.      Swelling  and/or discoloration around your surgical site is common, particularly when performed around the eye.    All wounds normally drain.  The larger the wound the more drainage there will be.  After 7-10 days, you will notice the wound beginning to shrink and new skin will begin to grow.  The wound is healed when you can see skin has formed over the entire area.  A healed wound has a healthy, shiny look to the surface and is red to dark pink in color to normalize.  Wounds may take approximately 4-6 weeks to heal.  Larger wounds may take 6-8 weeks.  After the wound is healed you may discontinue dressing changes.    You may experience a sensation of tightness as your wound heals. This is normal and will gradually subside.    Your healed wound may be sensitive to temperature changes. This sensitivity improves with time, but if you re having a lot of discomfort, try to avoid temperature extremes.    Patients frequently experience itching after their wound appears to have healed because of the continue healing under the skin.  Plain Vaseline will help relieve the itching.        POSSIBLE COMPLICATIONS    BLEEDIN. Leave the bandage in place.  2. Use tightly rolled up gauze or a cloth to apply direct pressure over the bandage for 30  minutes.  3. Reapply pressure for an additional 30 minutes if necessary  4. Use additional gauze and tape to maintain pressure once the bleeding has stopped.

## 2021-07-22 NOTE — PROGRESS NOTES
HPI:  Anabelle Herbert is a 76 year old female patient here today for growth on left forearm .  Patient states this has been present for over a year.  Patient reports the following symptoms: growing, itchy .  Patient reports the following previous treatments: none.  Patient reports the following modifying factors: none.  Associated symptoms: none.  Patient has no other skin complaints today.  Remainder of the HPI, Meds, PMH, Allergies, FH, and SH was reviewed in chart.    Pertinent Hx:   No personal or family history of skin cancer    Past Medical History:   Diagnosis Date     Acute sinusitis, unspecified      Allergic rhinitis, cause unspecified      Candidiasis of unspecified site      Candidiasis of vulva and vagina      Diplopia      Dyspnea     following inhalation of cleaning solution. Treated with inhaler/neb.      Essential hypertension, benign      Hypercalcemia      Memory loss      Osteoporosis, unspecified     defined by chiropractor who did xrays during tx for back pain     Other B-complex deficiencies      Other, multiple and ill-defined closed fractures of lower limb 1990    prolonged healing     Unspecified cerebral artery occlusion with cerebral infarction      Unspecified disorder of skin and subcutaneous tissue      Urinary hesitancy      Urinary tract infection, site not specified        Past Surgical History:   Procedure Laterality Date     COLONOSCOPY       CYSTOSCOPY, BIOPSY BLADDER, COMBINED  1/6/2014    Procedure: COMBINED CYSTOSCOPY, BIOPSY BLADDER;;  Surgeon: Vandana Tang MD;  Location: [facility] OR     CYSTOSCOPY, INJECT COLLAGEN, COMBINED  1/6/2014    Procedure: COMBINED CYSTOSCOPY, INJECT BULKING AGENT;  IC cocktail, bladder biopsy, fulguration, heparin, gentamyacin, lidocaine & kenalog;  Surgeon: Vandana Tang MD;  Location: [facility] OR     GENITOURINARY SURGERY       NO HISTORY OF SURGERY          Family History   Problem Relation Age of Onset     Cancer Father         colon?     Heart  Disease Father      Asthma Mother      Alzheimer Disease Mother 86     Unknown/Adopted Maternal Grandmother      Unknown/Adopted Maternal Grandfather      Unknown/Adopted Paternal Grandmother      Unknown/Adopted Paternal Grandfather      Asthma Sister      Allergies Sister      Unknown/Adopted Son      Diabetes No family hx of        Social History     Socioeconomic History     Marital status:      Spouse name: Not on file     Number of children: Not on file     Years of education: Not on file     Highest education level: Not on file   Occupational History     Not on file   Tobacco Use     Smoking status: Never Smoker     Smokeless tobacco: Never Used   Substance and Sexual Activity     Alcohol use: No     Drug use: No     Sexual activity: Not Currently     Partners: Male   Other Topics Concern     Parent/sibling w/ CABG, MI or angioplasty before 65F 55M? No   Social History Narrative     Not on file     Social Determinants of Health     Financial Resource Strain:      Difficulty of Paying Living Expenses:    Food Insecurity:      Worried About Running Out of Food in the Last Year:      Ran Out of Food in the Last Year:    Transportation Needs:      Lack of Transportation (Medical):      Lack of Transportation (Non-Medical):    Physical Activity:      Days of Exercise per Week:      Minutes of Exercise per Session:    Stress:      Feeling of Stress :    Social Connections:      Frequency of Communication with Friends and Family:      Frequency of Social Gatherings with Friends and Family:      Attends Christian Services:      Active Member of Clubs or Organizations:      Attends Club or Organization Meetings:      Marital Status:    Intimate Partner Violence:      Fear of Current or Ex-Partner:      Emotionally Abused:      Physically Abused:      Sexually Abused:        Outpatient Encounter Medications as of 7/22/2021   Medication Sig Dispense Refill     acetaminophen (TYLENOL) 500 MG tablet Take 2 tablets  (1,000 mg) by mouth every 8 hours as needed for mild pain       Blood Pressure Monitoring (BLOOD PRESSURE CUFF) MISC 1 each daily as needed 1 each 0     Blood Pressure Monitoring (BLOOD PRESSURE CUFF) MISC 1 Device daily as needed 1 each 0     Blood Pressure Monitoring (BLOOD PRESSURE MONITOR/M CUFF) MISC 1 each daily as needed 1 each 0     cholecalciferol (VITAMIN D3) 5000 units (125 mcg) CAPS capsule Take 5,000 Units by mouth daily       clopidogrel (PLAVIX) 75 MG tablet TAKE 1 TABLET BY MOUTH EVERY DAY 90 tablet 3     donepezil (ARICEPT) 5 MG tablet Take 1 tablet (5 mg) by mouth At Bedtime 30 tablet 1     escitalopram (LEXAPRO) 10 MG tablet Take 1/2 tablet daily. If tolerated after 2 weeks increase to 1 tablet daily 60 tablet 2     esomeprazole (NEXIUM) 40 MG DR capsule Take 1 capsule (40 mg) by mouth every morning (before breakfast) Take 30-60 minutes before eating. 90 capsule 3     levothyroxine (SYNTHROID/LEVOTHROID) 25 MCG tablet TAKE 1 TABLET BY MOUTH EVERY DAY 90 tablet 1     lisinopril (ZESTRIL) 30 MG tablet TAKE 1 TABLET BY MOUTH EVERY DAY 90 tablet 3     meclizine (ANTIVERT) 25 MG tablet Take 1 tablet (25 mg) by mouth 2 times daily as needed for dizziness 30 tablet 2     mirtazapine (REMERON) 30 MG tablet TAKE 1 TABLET (30 MG) BY MOUTH AT BEDTIME 90 tablet 1     Multiple Vitamins-Minerals (ANTIOXIDANT PO) Take 1 tablet by mouth daily CONSULT [facility]       Nutritional Supplements (ENSURE HIGH PROTEIN) Take 1 Can by mouth 3 times daily (with meals) 90 Can 3     pantoprazole (PROTONIX) 20 MG EC tablet Take 2 tablets (40 mg) by mouth daily 90 tablet 3     polyethylene glycol (MIRALAX) 17 GM/SCOOP powder Take 17 g by mouth daily To twice daily as needed for constipation. 850 g 11     senna-docusate (SENOKOT-S/PERICOLACE) 8.6-50 MG tablet Take 1 tablet by mouth 2 times daily as needed for constipation 30 tablet 1     No facility-administered encounter medications on file as of 7/22/2021.        Review Of Systems:  Skin: growth  Eyes: negative  Ears/Nose/Throat: negative  Respiratory: No shortness of breath, dyspnea on exertion, cough, or hemoptysis  Cardiovascular: negative  Gastrointestinal: negative  Genitourinary: negative  Musculoskeletal: negative  Neurologic: negative  Psychiatric: negative  Hematologic/Lymphatic/Immunologic: negative  Endocrine: negative      Objective:     /64   Eyes: Conjunctivae/lids: Normal   ENT: Lips:  Normal  MSK: Normal  Cardiovascular: Peripheral edema none  Pulm: Breathing Normal  Neuro/Psych: Orientation: A/O x 3. Normal; Mood/Affect: Normal, NAD, WDWN  Pt accompanied by: daughter Pily  Following areas examined: face, wearing mask, left forearm, hands, right forearm  Recio skin type:i   Findings:    Brown, stuck-on scaly appearing papules on forearms  Hypopigmented smooth macules on dorsal forearms   Yellow firm nodule on left extensor forearm 1.0cm  Geronimo WD smooth macules on forearms    Assessment and Plan:     1) IGH, Seborrheic keratoses,  Lentigines     I discussed the specifics of tumor, prognosis, and genetics of benign lesions.  I explained that treatment of these lesions would be purely cosmetic and not medically neccessary.  I discussed with patient different removal options including excision, cryotherapy, cautery and /or laser.  Lesion may recur and/or may not completely resolve. May need additional treatment.     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  No complications and routine wound care.  May grow back and will get a scar. Based on lesion type may need to completely remove lesion. Patient will be notified in 7-10 days of results. Wound care directions given.      Signs and Symptoms of non-melanoma skin cancer and ABCDEs of melanoma reviewed with patient. Patient encouraged to perform monthly  self skin exams and educated on how to perform them. UV precautions reviewed with patient. Patient was asked about new or changing moles/lesions on body.   Wear a sunscreen with at least SPF 30 on your face, ears, neck and V of the chest daily. Wear sunscreen on other areas of the body if those areas are exposed to the sun throughout the day. Sunscreens can contain physical and/or chemical blockers. Physical blockers are less likely to clog pores, these include zinc oxide and titanium dioxide. Reapply every two hour and after swimming. Sunscreen examples include Neutrogena, CeraVe, Blue Lizard, Elta MD and many others.    Proper skin care from [facility]:    -Eliminate harsh soaps as they strip the natural oils from the skin, often resulting in dry itchy skin ( i.e. Dial, Zest, Elizabeth Spring)  -Use mild soaps such as Cetaphil or Dove Sensitive Skin in the shower. You do not need to use soap on arms, legs, and trunk every time you shower unless visibly soiled.   -Avoid hot or cold showers.  -After showering, lightly dry off and apply moisturizing within 2-3 minutes. This will help trap moisture in the skin.   -Aggressive use of a moisturizer at least 1-2 times a day to the entire body (including -Vanicream, Cetaphil, Aquaphor or Cerave) and moisturize hands after every washing.  -We recommend using moisturizers that come in a tub that needs to be scooped out, not a pump. This has more of an oil base. It will hold moisture in your skin much better than a water base moisturizer. The above recommended are non-pore clogging.         It was a pleasure speaking with Anabelle Herbert today.       Follow up in based on pathology

## 2021-07-23 LAB
PATH REPORT.COMMENTS IMP SPEC: NORMAL
PATH REPORT.COMMENTS IMP SPEC: NORMAL
PATH REPORT.FINAL DX SPEC: NORMAL
PATH REPORT.GROSS SPEC: NORMAL
PATH REPORT.MICROSCOPIC SPEC OTHER STN: NORMAL
PATH REPORT.RELEVANT HX SPEC: NORMAL

## 2021-07-28 ENCOUNTER — TELEPHONE (OUTPATIENT)
Dept: FAMILY MEDICINE | Facility: CLINIC | Age: 76
End: 2021-07-28

## 2021-07-28 NOTE — TELEPHONE ENCOUNTER
Patient daughter notified of test results and providers message, patient has no further questions.    Vero ROSSRN BSN  [facility]  [phone]

## 2021-07-28 NOTE — TELEPHONE ENCOUNTER
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 If still present or bothersome can try a topical or perform an excision with me.     Please discuss :The extent of scar, wound care, activity restriction, complications,  and healing time were discussed with patient.

## 2021-08-20 ENCOUNTER — VIRTUAL VISIT (OUTPATIENT)
Dept: NEUROLOGY | Facility: CLINIC | Age: 76
End: 2021-08-20
Payer: COMMERCIAL

## 2021-08-20 DIAGNOSIS — R41.3 MEMORY CHANGE: ICD-10-CM

## 2021-08-20 DIAGNOSIS — F03.90 DEMENTIA WITHOUT BEHAVIORAL DISTURBANCE, UNSPECIFIED DEMENTIA TYPE: Primary | ICD-10-CM

## 2021-08-20 DIAGNOSIS — F41.9 ANXIETY: ICD-10-CM

## 2021-08-20 PROCEDURE — 99213 OFFICE O/P EST LOW 20 MIN: CPT | Mod: 95 | Performed by: INTERNAL MEDICINE

## 2021-08-20 RX ORDER — ESCITALOPRAM OXALATE 10 MG/1
TABLET ORAL
Qty: 135 TABLET | Refills: 2 | Status: SHIPPED | OUTPATIENT
Start: 2021-08-20 | End: 2021-10-27

## 2021-08-20 NOTE — PROGRESS NOTES
[facility] Neurology Follow Up Visit    Anabelle Herbert MRN# [id number]   Age: 76 year old YOB: 1945     Brief history of symptoms: The patient was initially seen in neurologic consultation on 6/2/2021 for evaluation of memory changes. Please see the comprehensive neurologic consultation note from that date in the Epic records for details.     Interval history:   Anxiety has been a little better since last visit. Lexapro was started last visit. No clear side effects. This has been helpful for anxiety and her mood. Patient thinks her anxiety could still be a little better.    She stopped the donepezil. She reports that her memory is lousy, but daughter thinks memory has been better with bettering of anxiety. Nothing clearly is worse compared to last visit.     Balance has been ok.       Past Medical History:     Patient Active Problem List   Diagnosis     Chronic interstitial cystitis     Adjustment reaction with anxiety and depression     Abdominal pain     Benign essential hypertension     CKD (chronic kidney disease) stage 2, GFR 60-89 ml/min     Hyperlipidemia LDL goal <100     Mild persistent asthma     Vitamin D deficiency     GERD (gastroesophageal reflux disease)     B12 deficiency     Transient cerebral ischemia     Carotid stenosis     Diverticulosis of colon     Colon polyp     Atrophic vaginitis     Interstitial cystitis     Chronic constipation     Cognitive complaints     Prediabetes     Osteoporosis     Cerebrovascular accident (CVA), unspecified mechanism (H)     SHANEKA (obstructive sleep apnea)     Intracranial hemorrhage (H)     Vascular dementia without behavioral disturbance (H)     Subclinical hypothyroidism     Past Medical History:   Diagnosis Date     Acute sinusitis, unspecified      Allergic rhinitis, cause unspecified      Candidiasis of unspecified site      Candidiasis of vulva and vagina      Diplopia      Dyspnea     following inhalation of cleaning solution. Treated with  inhaler/neb.      Essential hypertension, benign      Hypercalcemia      Memory loss      Osteoporosis, unspecified     defined by chiropractor who did xrays during tx for back pain     Other B-complex deficiencies      Other, multiple and ill-defined closed fractures of lower limb 1990    prolonged healing     Unspecified cerebral artery occlusion with cerebral infarction      Unspecified disorder of skin and subcutaneous tissue      Urinary hesitancy      Urinary tract infection, site not specified         Past Surgical History:     Past Surgical History:   Procedure Laterality Date     COLONOSCOPY       CYSTOSCOPY, BIOPSY BLADDER, COMBINED  1/6/2014    Procedure: COMBINED CYSTOSCOPY, BIOPSY BLADDER;;  Surgeon: Vandana Tang MD;  Location: [facility]     CYSTOSCOPY, INJECT COLLAGEN, COMBINED  1/6/2014    Procedure: COMBINED CYSTOSCOPY, INJECT BULKING AGENT;  IC cocktail, bladder biopsy, fulguration, heparin, gentamyacin, lidocaine & kenalog;  Surgeon: Vandana Tang MD;  Location: [facility] OR     GENITOURINARY SURGERY       NO HISTORY OF SURGERY          Social History:     Social History     Tobacco Use     Smoking status: Never Smoker     Smokeless tobacco: Never Used   Substance Use Topics     Alcohol use: No     Drug use: No        Family History:     Family History   Problem Relation Age of Onset     Cancer Father         colon?     Heart Disease Father      Asthma Mother      Alzheimer Disease Mother 86     Unknown/Adopted Maternal Grandmother      Unknown/Adopted Maternal Grandfather      Unknown/Adopted Paternal Grandmother      Unknown/Adopted Paternal Grandfather      Asthma Sister      Allergies Sister      Unknown/Adopted Son      Diabetes No family hx of         Medications:     Current Outpatient Medications   Medication Sig     acetaminophen (TYLENOL) 500 MG tablet Take 2 tablets (1,000 mg) by mouth every 8 hours as needed for mild pain     Blood Pressure Monitoring (BLOOD PRESSURE CUFF) MISC 1 each  daily as needed     Blood Pressure Monitoring (BLOOD PRESSURE CUFF) MISC 1 Device daily as needed     Blood Pressure Monitoring (BLOOD PRESSURE MONITOR/M CUFF) MISC 1 each daily as needed     cholecalciferol (VITAMIN D3) 5000 units (125 mcg) CAPS capsule Take 5,000 Units by mouth daily     clopidogrel (PLAVIX) 75 MG tablet TAKE 1 TABLET BY MOUTH EVERY DAY     donepezil (ARICEPT) 5 MG tablet Take 1 tablet (5 mg) by mouth At Bedtime     escitalopram (LEXAPRO) 10 MG tablet Take 1/2 tablet daily. If tolerated after 2 weeks increase to 1 tablet daily     esomeprazole (NEXIUM) 40 MG DR capsule Take 1 capsule (40 mg) by mouth every morning (before breakfast) Take 30-60 minutes before eating.     levothyroxine (SYNTHROID/LEVOTHROID) 25 MCG tablet TAKE 1 TABLET BY MOUTH EVERY DAY     lisinopril (ZESTRIL) 30 MG tablet TAKE 1 TABLET BY MOUTH EVERY DAY     meclizine (ANTIVERT) 25 MG tablet Take 1 tablet (25 mg) by mouth 2 times daily as needed for dizziness     mirtazapine (REMERON) 30 MG tablet TAKE 1 TABLET (30 MG) BY MOUTH AT BEDTIME     Multiple Vitamins-Minerals (ANTIOXIDANT PO) Take 1 tablet by mouth daily CONSULT [facility]     Nutritional Supplements (ENSURE HIGH PROTEIN) Take 1 Can by mouth 3 times daily (with meals)     pantoprazole (PROTONIX) 20 MG EC tablet Take 2 tablets (40 mg) by mouth daily     polyethylene glycol (MIRALAX) 17 GM/SCOOP powder Take 17 g by mouth daily To twice daily as needed for constipation.     senna-docusate (SENOKOT-S/PERICOLACE) 8.6-50 MG tablet Take 1 tablet by mouth 2 times daily as needed for constipation     No current facility-administered medications for this visit.        Allergies:     Allergies   Allergen Reactions     Contrast Dye      Burning, hematuria     Diatrizoate Other (See Comments)     Feels burning inside body     Dogs Unknown     Throat started to close up.      Sulfa Drugs Unknown     Bupropion Anxiety     Patient reports increased anxiety, panic,  difficulty concentrating, and various aches and pains        Review of Systems:   As above     Physical Exam:   Vitals: There were no vitals taken for this visit.   Neurologic:     Mental Status: TICS-m  (-1 year, -1 specific date, -1 season, -1 day of week, -7 immediate recall, -4 serial 7s, -1 president, -1 , -10 delayed recall).      Data reviewed on previous visits    Imagin2020  MRI brain     MRI brain 2016  Impression:  Subacute infarction in the left cuneus.  Chronic infarction in the right parietal lobe.   Unchanged mild chronic microvascular ischemic changes involving the  supratentorial periventricular white matter and central carol.   Head MRA demonstrates no definite aneurysm or stenosis of the major  intracranial arteries.  Neck MRA demonstrates tortuosity of the vertebral arteries and  atherosclerotic irregularities of the carotid bulbs bilaterally  without significant stenosis..     Laboratory:  B12 ~800, TSH high with normal free T4, CBC normal, CMP with CKD otherwise unremarkable (2021)    Pertinent Investigations since last visit:   None         Assessment and Plan:   Assessment:  Anabelle Herbert is a 76 year old female who presents today for follow-up of dementia. Symptoms seem to start around stroke in 2016. She has history of right parietal stroke in 2010 and left cuneus stroke in 2016. Symptoms have steadily worsened since then. At initial visit MoCA with score 15/30. TICS-m score today . MRI brain from  compared to 2016 scan is stable. There is mild chronic microvascular changes in addition to prior strokes. Vascular dementia is a possible etiology of memory changes. Given reported progression, neurodegenerative process, possibly Alzheimer's, is a consideration as well. Anxiety has been improved since starting Lexapro and this has also reported in some improvement in cognition. She had no benefit from donepezil in the past. We discussed further increasing  Lexapro to help with anxiety.     Plan:  - Increase Lexapro to 15 mg daily    Follow up in Neurology clinic in 3 months or earlier as needed should new concerns arise.    Elmo Loza MD   of Neurology  [facility]

## 2021-08-20 NOTE — LETTER
8/20/2021         RE: Anabelle Herbert  [address]  [place] [address]        Dear Colleague,    Thank you for referring your patient, Anabelle Herbret, to the [facility] NEUROLOGY CLINIC [place]. Please see a copy of my visit note below.    [facility] Neurology Follow Up Visit    Anabelle Herbert MRN# [id number]   Age: 76 year old YOB: 1945     Brief history of symptoms: The patient was initially seen in neurologic consultation on 6/2/2021 for evaluation of memory changes. Please see the comprehensive neurologic consultation note from that date in the Epic records for details.     Interval history:   Anxiety has been a little better since last visit. Lexapro was started last visit. No clear side effects. This has been helpful for anxiety and her mood. Patient thinks her anxiety could still be a little better.    She stopped the donepezil. She reports that her memory is lousy, but daughter thinks memory has been better with bettering of anxiety. Nothing clearly is worse compared to last visit.     Balance has been ok.       Past Medical History:     Patient Active Problem List   Diagnosis     Chronic interstitial cystitis     Adjustment reaction with anxiety and depression     Abdominal pain     Benign essential hypertension     CKD (chronic kidney disease) stage 2, GFR 60-89 ml/min     Hyperlipidemia LDL goal <100     Mild persistent asthma     Vitamin D deficiency     GERD (gastroesophageal reflux disease)     B12 deficiency     Transient cerebral ischemia     Carotid stenosis     Diverticulosis of colon     Colon polyp     Atrophic vaginitis     Interstitial cystitis     Chronic constipation     Cognitive complaints     Prediabetes     Osteoporosis     Cerebrovascular accident (CVA), unspecified mechanism (H)     SHANEKA (obstructive sleep apnea)     Intracranial hemorrhage (H)     Vascular dementia without behavioral disturbance (H)     Subclinical hypothyroidism     Past Medical  History:   Diagnosis Date     Acute sinusitis, unspecified      Allergic rhinitis, cause unspecified      Candidiasis of unspecified site      Candidiasis of vulva and vagina      Diplopia      Dyspnea     following inhalation of cleaning solution. Treated with inhaler/neb.      Essential hypertension, benign      Hypercalcemia      Memory loss      Osteoporosis, unspecified     defined by chiropractor who did xrays during tx for back pain     Other B-complex deficiencies      Other, multiple and ill-defined closed fractures of lower limb 1990    prolonged healing     Unspecified cerebral artery occlusion with cerebral infarction      Unspecified disorder of skin and subcutaneous tissue      Urinary hesitancy      Urinary tract infection, site not specified         Past Surgical History:     Past Surgical History:   Procedure Laterality Date     COLONOSCOPY       CYSTOSCOPY, BIOPSY BLADDER, COMBINED  1/6/2014    Procedure: COMBINED CYSTOSCOPY, BIOPSY BLADDER;;  Surgeon: Vandana Tang MD;  Location: [facility] OR     CYSTOSCOPY, INJECT COLLAGEN, COMBINED  1/6/2014    Procedure: COMBINED CYSTOSCOPY, INJECT BULKING AGENT;  IC cocktail, bladder biopsy, fulguration, heparin, gentamyacin, lidocaine & kenalog;  Surgeon: Vandana Tang MD;  Location: [facility] OR     GENITOURINARY SURGERY       NO HISTORY OF SURGERY          Social History:     Social History     Tobacco Use     Smoking status: Never Smoker     Smokeless tobacco: Never Used   Substance Use Topics     Alcohol use: No     Drug use: No        Family History:     Family History   Problem Relation Age of Onset     Cancer Father         colon?     Heart Disease Father      Asthma Mother      Alzheimer Disease Mother 86     Unknown/Adopted Maternal Grandmother      Unknown/Adopted Maternal Grandfather      Unknown/Adopted Paternal Grandmother      Unknown/Adopted Paternal Grandfather      Asthma Sister      Allergies Sister      Unknown/Adopted Son      Diabetes No  family hx of         Medications:     Current Outpatient Medications   Medication Sig     acetaminophen (TYLENOL) 500 MG tablet Take 2 tablets (1,000 mg) by mouth every 8 hours as needed for mild pain     Blood Pressure Monitoring (BLOOD PRESSURE CUFF) MISC 1 each daily as needed     Blood Pressure Monitoring (BLOOD PRESSURE CUFF) MISC 1 Device daily as needed     Blood Pressure Monitoring (BLOOD PRESSURE MONITOR/M CUFF) MISC 1 each daily as needed     cholecalciferol (VITAMIN D3) 5000 units (125 mcg) CAPS capsule Take 5,000 Units by mouth daily     clopidogrel (PLAVIX) 75 MG tablet TAKE 1 TABLET BY MOUTH EVERY DAY     donepezil (ARICEPT) 5 MG tablet Take 1 tablet (5 mg) by mouth At Bedtime     escitalopram (LEXAPRO) 10 MG tablet Take 1/2 tablet daily. If tolerated after 2 weeks increase to 1 tablet daily     esomeprazole (NEXIUM) 40 MG DR capsule Take 1 capsule (40 mg) by mouth every morning (before breakfast) Take 30-60 minutes before eating.     levothyroxine (SYNTHROID/LEVOTHROID) 25 MCG tablet TAKE 1 TABLET BY MOUTH EVERY DAY     lisinopril (ZESTRIL) 30 MG tablet TAKE 1 TABLET BY MOUTH EVERY DAY     meclizine (ANTIVERT) 25 MG tablet Take 1 tablet (25 mg) by mouth 2 times daily as needed for dizziness     mirtazapine (REMERON) 30 MG tablet TAKE 1 TABLET (30 MG) BY MOUTH AT BEDTIME     Multiple Vitamins-Minerals (ANTIOXIDANT PO) Take 1 tablet by mouth daily [facility]     Nutritional Supplements (ENSURE HIGH PROTEIN) Take 1 Can by mouth 3 times daily (with meals)     pantoprazole (PROTONIX) 20 MG EC tablet Take 2 tablets (40 mg) by mouth daily     polyethylene glycol (MIRALAX) 17 GM/SCOOP powder Take 17 g by mouth daily To twice daily as needed for constipation.     senna-docusate (SENOKOT-S/PERICOLACE) 8.6-50 MG tablet Take 1 tablet by mouth 2 times daily as needed for constipation     No current facility-administered medications for this visit.        Allergies:     Allergies   Allergen  Reactions     Contrast Dye      Burning, hematuria     Diatrizoate Other (See Comments)     Feels burning inside body     Dogs Unknown     Throat started to close up.      Sulfa Drugs Unknown     Bupropion Anxiety     Patient reports increased anxiety, panic, difficulty concentrating, and various aches and pains        Review of Systems:   As above     Physical Exam:   Vitals: There were no vitals taken for this visit.   Neurologic:     Mental Status: TICS-m  (-1 year, -1 specific date, -1 season, -1 day of week, -7 immediate recall, -4 serial 7s, -1 president, -1 , -10 delayed recall).      Data reviewed on previous visits    [email or web address]  MRI brain     MRI brain 2016  Impression:  Subacute infarction in the left cuneus.  Chronic infarction in the right parietal lobe.   Unchanged mild chronic microvascular ischemic changes involving the  supratentorial periventricular white matter and central carol.   Head MRA demonstrates no definite aneurysm or stenosis of the major  intracranial arteries.  Neck MRA demonstrates tortuosity of the vertebral arteries and  atherosclerotic irregularities of the carotid bulbs bilaterally  without significant stenosis..     Laboratory:  B12 ~800, TSH high with normal free T4, CBC normal, CMP with CKD otherwise unremarkable (2021)    Pertinent Investigations since last visit:   None         Assessment and Plan:   Assessment:  Anabelle Herbert is a 76 year old female who presents today for follow-up of dementia. Symptoms seem to start around stroke in 2016. She has history of right parietal stroke in 2010 and left cuneus stroke in 2016. Symptoms have steadily worsened since then. At initial visit MoCA with score 15/30. TICS-m score today 51. MRI brain from 2020 compared to 2016 scan is stable. There is mild chronic microvascular changes in addition to prior strokes. Vascular dementia is a possible etiology of memory changes. Given reported progression,  neurodegenerative process, possibly Alzheimer's, is a consideration as well. Anxiety has been improved since starting Lexapro and this has also reported in some improvement in cognition. She had no benefit from donepezil in the past. We discussed further increasing Lexapro to help with anxiety.     Plan:  - Increase Lexapro to 15 mg daily    Follow up in Neurology clinic in 3 months or earlier as needed should new concerns arise.    Elmo Loza MD   of Neurology  [facility]      Anabelle is a 76 year old who is being evaluated via a billable telephone visit.      What phone number would you like to be contacted at?  [phone]    How would you like to obtain your AVS? MyChart    Total telephone time: 24 minutes.      Again, thank you for allowing me to participate in the care of your patient.        Sincerely,        Elmo Loza MD

## 2021-08-20 NOTE — PROGRESS NOTES
Anabelle is a 76 year old who is being evaluated via a billable telephone visit.      What phone number would you like to be contacted at?  [phone]    How would you like to obtain your AVS? Lilia    Total telephone time: 24 minutes.

## 2021-09-25 ENCOUNTER — HEALTH MAINTENANCE LETTER (OUTPATIENT)
Age: 76
End: 2021-09-25

## 2021-10-27 ENCOUNTER — HOSPITAL ENCOUNTER (INPATIENT)
Facility: CLINIC | Age: 76
LOS: 7 days | Discharge: HOME | DRG: 066 | End: 2021-11-05
Attending: EMERGENCY MEDICINE | Admitting: INTERNAL MEDICINE
Payer: COMMERCIAL

## 2021-10-27 ENCOUNTER — OFFICE VISIT (OUTPATIENT)
Dept: NEUROLOGY | Facility: CLINIC | Age: 76
End: 2021-10-27
Payer: COMMERCIAL

## 2021-10-27 ENCOUNTER — APPOINTMENT (OUTPATIENT)
Dept: CT IMAGING | Facility: CLINIC | Age: 76
DRG: 066 | End: 2021-10-27
Attending: EMERGENCY MEDICINE
Payer: COMMERCIAL

## 2021-10-27 VITALS
SYSTOLIC BLOOD PRESSURE: 152 MMHG | WEIGHT: 172.3 LBS | BODY MASS INDEX: 33.65 KG/M2 | DIASTOLIC BLOOD PRESSURE: 98 MMHG | HEART RATE: 88 BPM

## 2021-10-27 DIAGNOSIS — R29.818 ACUTE FOCAL NEUROLOGICAL DEFICIT: ICD-10-CM

## 2021-10-27 DIAGNOSIS — R41.0 CONFUSION: ICD-10-CM

## 2021-10-27 DIAGNOSIS — F01.50 VASCULAR DEMENTIA WITHOUT BEHAVIORAL DISTURBANCE (H): ICD-10-CM

## 2021-10-27 DIAGNOSIS — R44.3 HALLUCINATIONS: Primary | ICD-10-CM

## 2021-10-27 DIAGNOSIS — I10 HYPERTENSION GOAL BP (BLOOD PRESSURE) < 130/80: ICD-10-CM

## 2021-10-27 DIAGNOSIS — R41.3 MEMORY CHANGE: ICD-10-CM

## 2021-10-27 DIAGNOSIS — F41.9 ANXIETY: ICD-10-CM

## 2021-10-27 DIAGNOSIS — E03.8 SUBCLINICAL HYPOTHYROIDISM: ICD-10-CM

## 2021-10-27 LAB
ALBUMIN SERPL-MCNC: 3.5 G/DL (ref 3.4–5)
ALBUMIN UR-MCNC: NEGATIVE MG/DL
ALP SERPL-CCNC: 73 U/L (ref 40–150)
ALT SERPL W P-5'-P-CCNC: 18 U/L (ref 0–50)
AMMONIA PLAS-SCNC: 30 UMOL/L (ref 10–50)
AMPHETAMINES UR QL SCN: ABNORMAL
ANION GAP SERPL CALCULATED.3IONS-SCNC: 4 MMOL/L (ref 3–14)
APPEARANCE UR: CLEAR
AST SERPL W P-5'-P-CCNC: 15 U/L (ref 0–45)
ATRIAL RATE - MUSE: 76 BPM
BARBITURATES UR QL: ABNORMAL
BASOPHILS # BLD AUTO: 0.1 10E3/UL (ref 0–0.2)
BASOPHILS NFR BLD AUTO: 1 %
BENZODIAZ UR QL: ABNORMAL
BILIRUB SERPL-MCNC: 0.1 MG/DL (ref 0.2–1.3)
BILIRUB UR QL STRIP: NEGATIVE
BUN SERPL-MCNC: 25 MG/DL (ref 7–30)
CALCIUM SERPL-MCNC: 8.9 MG/DL (ref 8.5–10.1)
CANNABINOIDS UR QL SCN: ABNORMAL
CHLORIDE BLD-SCNC: 111 MMOL/L (ref 94–109)
CO2 SERPL-SCNC: 24 MMOL/L (ref 20–32)
COCAINE UR QL: ABNORMAL
COLOR UR AUTO: YELLOW
CREAT SERPL-MCNC: 1.11 MG/DL (ref 0.52–1.04)
DIASTOLIC BLOOD PRESSURE - MUSE: NORMAL MMHG
EOSINOPHIL # BLD AUTO: 0.2 10E3/UL (ref 0–0.7)
EOSINOPHIL NFR BLD AUTO: 2 %
ERYTHROCYTE [DISTWIDTH] IN BLOOD BY AUTOMATED COUNT: 12.4 % (ref 10–15)
GFR SERPL CREATININE-BSD FRML MDRD: 49 ML/MIN/1.73M2
GLUCOSE BLD-MCNC: 86 MG/DL (ref 70–99)
GLUCOSE UR STRIP-MCNC: NEGATIVE MG/DL
HCT VFR BLD AUTO: 37.7 % (ref 35–47)
HGB BLD-MCNC: 12.3 G/DL (ref 11.7–15.7)
HGB UR QL STRIP: NEGATIVE
HYALINE CASTS: 3 /LPF
IMM GRANULOCYTES # BLD: 0 10E3/UL
IMM GRANULOCYTES NFR BLD: 0 %
INTERPRETATION ECG - MUSE: NORMAL
KETONES UR STRIP-MCNC: NEGATIVE MG/DL
LEUKOCYTE ESTERASE UR QL STRIP: ABNORMAL
LYMPHOCYTES # BLD AUTO: 2.1 10E3/UL (ref 0.8–5.3)
LYMPHOCYTES NFR BLD AUTO: 22 %
MCH RBC QN AUTO: 30.4 PG (ref 26.5–33)
MCHC RBC AUTO-ENTMCNC: 32.6 G/DL (ref 31.5–36.5)
MCV RBC AUTO: 93 FL (ref 78–100)
MONOCYTES # BLD AUTO: 0.8 10E3/UL (ref 0–1.3)
MONOCYTES NFR BLD AUTO: 9 %
MUCOUS THREADS #/AREA URNS LPF: PRESENT /LPF
NEUTROPHILS # BLD AUTO: 6.4 10E3/UL (ref 1.6–8.3)
NEUTROPHILS NFR BLD AUTO: 66 %
NITRATE UR QL: NEGATIVE
NRBC # BLD AUTO: 0 10E3/UL
NRBC BLD AUTO-RTO: 0 /100
OPIATES UR QL SCN: ABNORMAL
P AXIS - MUSE: 52 DEGREES
PCP UR QL SCN: ABNORMAL
PH UR STRIP: 5 [PH] (ref 5–7)
PLATELET # BLD AUTO: 259 10E3/UL (ref 150–450)
POTASSIUM BLD-SCNC: 4.5 MMOL/L (ref 3.4–5.3)
PR INTERVAL - MUSE: 166 MS
PROT SERPL-MCNC: 7.6 G/DL (ref 6.8–8.8)
QRS DURATION - MUSE: 72 MS
QT - MUSE: 402 MS
QTC - MUSE: 452 MS
R AXIS - MUSE: -2 DEGREES
RBC # BLD AUTO: 4.05 10E6/UL (ref 3.8–5.2)
RBC URINE: 2 /HPF
SARS-COV-2 RNA RESP QL NAA+PROBE: NEGATIVE
SODIUM SERPL-SCNC: 139 MMOL/L (ref 133–144)
SP GR UR STRIP: 1.03 (ref 1–1.03)
SQUAMOUS EPITHELIAL: 4 /HPF
SYSTOLIC BLOOD PRESSURE - MUSE: NORMAL MMHG
T AXIS - MUSE: 52 DEGREES
TROPONIN I SERPL-MCNC: <0.015 UG/L (ref 0–0.04)
TSH SERPL DL<=0.005 MIU/L-ACNC: 2.49 MU/L (ref 0.4–4)
UROBILINOGEN UR STRIP-MCNC: NORMAL MG/DL
VENTRICULAR RATE- MUSE: 76 BPM
WBC # BLD AUTO: 9.6 10E3/UL (ref 4–11)
WBC URINE: 7 /HPF

## 2021-10-27 PROCEDURE — 99214 OFFICE O/P EST MOD 30 MIN: CPT | Performed by: INTERNAL MEDICINE

## 2021-10-27 PROCEDURE — 258N000003 HC RX IP 258 OP 636: Performed by: EMERGENCY MEDICINE

## 2021-10-27 PROCEDURE — G0378 HOSPITAL OBSERVATION PER HR: HCPCS

## 2021-10-27 PROCEDURE — 99285 EMERGENCY DEPT VISIT HI MDM: CPT | Mod: 25

## 2021-10-27 PROCEDURE — 80307 DRUG TEST PRSMV CHEM ANLYZR: CPT | Performed by: EMERGENCY MEDICINE

## 2021-10-27 PROCEDURE — 84484 ASSAY OF TROPONIN QUANT: CPT | Performed by: EMERGENCY MEDICINE

## 2021-10-27 PROCEDURE — 84443 ASSAY THYROID STIM HORMONE: CPT | Performed by: EMERGENCY MEDICINE

## 2021-10-27 PROCEDURE — 99207 PR CDG-CODE CATEGORY CHANGED: CPT | Performed by: INTERNAL MEDICINE

## 2021-10-27 PROCEDURE — 70450 CT HEAD/BRAIN W/O DYE: CPT

## 2021-10-27 PROCEDURE — 96374 THER/PROPH/DIAG INJ IV PUSH: CPT

## 2021-10-27 PROCEDURE — 87635 SARS-COV-2 COVID-19 AMP PRB: CPT | Performed by: EMERGENCY MEDICINE

## 2021-10-27 PROCEDURE — 96361 HYDRATE IV INFUSION ADD-ON: CPT

## 2021-10-27 PROCEDURE — 80053 COMPREHEN METABOLIC PANEL: CPT | Performed by: EMERGENCY MEDICINE

## 2021-10-27 PROCEDURE — 87086 URINE CULTURE/COLONY COUNT: CPT | Performed by: INTERNAL MEDICINE

## 2021-10-27 PROCEDURE — 36415 COLL VENOUS BLD VENIPUNCTURE: CPT | Performed by: EMERGENCY MEDICINE

## 2021-10-27 PROCEDURE — C9803 HOPD COVID-19 SPEC COLLECT: HCPCS

## 2021-10-27 PROCEDURE — 81001 URINALYSIS AUTO W/SCOPE: CPT | Performed by: EMERGENCY MEDICINE

## 2021-10-27 PROCEDURE — 82140 ASSAY OF AMMONIA: CPT | Performed by: EMERGENCY MEDICINE

## 2021-10-27 PROCEDURE — 99220 PR INITIAL OBSERVATION CARE,LEVEL III: CPT | Performed by: INTERNAL MEDICINE

## 2021-10-27 PROCEDURE — 85025 COMPLETE CBC W/AUTO DIFF WBC: CPT | Performed by: EMERGENCY MEDICINE

## 2021-10-27 PROCEDURE — 93005 ELECTROCARDIOGRAM TRACING: CPT

## 2021-10-27 RX ORDER — ESCITALOPRAM OXALATE 20 MG/1
20 TABLET ORAL DAILY
Qty: 90 TABLET | Refills: 3 | Status: SHIPPED | OUTPATIENT
Start: 2021-10-27 | End: 2021-11-17

## 2021-10-27 RX ADMIN — SODIUM CHLORIDE 1000 ML: 9 INJECTION, SOLUTION INTRAVENOUS at 23:06

## 2021-10-27 ASSESSMENT — ENCOUNTER SYMPTOMS: HALLUCINATIONS: 1

## 2021-10-27 ASSESSMENT — PAIN SCALES - GENERAL: PAINLEVEL: NO PAIN (0)

## 2021-10-27 NOTE — LETTER
10/27/2021         RE: Anabelle Herbert  [address]  [facility] [address]        Dear Colleague,    Thank you for referring your patient, Anabelle Herbert, to the [facility] NEUROLOGY CL[facility] [place]. Please see a copy of my visit note below.    [facility] Neurology Follow Up Visit    Anabelle Herbert MRN# [id number]   Age: 76 year old YOB: 1945     Brief history of symptoms: The patient was initially seen in neurologic consultation on 6/2/2021 for evaluation of memory changes. Please see the comprehensive neurologic consultation note from that date in the Epic records for details.     Interval history:   There has been improvement in anxiety since increase in Lexapro. Patient denies any side effects. She has not had any additional panic attacks. Memory symptoms are relatively stable. She denies any hallucinations. She has mild tremor in the bilateral hands, but it is not bothersome.       Past Medical History:     Patient Active Problem List   Diagnosis     Chronic interstitial cystitis     Adjustment reaction with anxiety and depression     Abdominal pain     Benign essential hypertension     CKD (chronic kidney disease) stage 2, GFR 60-89 ml/min     Hyperlipidemia LDL goal <100     Mild persistent asthma     Vitamin D deficiency     GERD (gastroesophageal reflux disease)     B12 deficiency     Transient cerebral ischemia     Carotid stenosis     Diverticulosis of colon     Colon polyp     Atrophic vaginitis     Interstitial cystitis     Chronic constipation     Cognitive complaints     Prediabetes     Osteoporosis     Cerebrovascular accident (CVA), unspecified mechanism (H)     SHANEKA (obstructive sleep apnea)     Intracranial hemorrhage (H)     Vascular dementia without behavioral disturbance (H)     Subclinical hypothyroidism     Past Medical History:   Diagnosis Date     Acute sinusitis, unspecified      Allergic rhinitis, cause unspecified      Candidiasis of unspecified site       Candidiasis of vulva and vagina      Diplopia      Dyspnea     following inhalation of cleaning solution. Treated with inhaler/neb.      Essential hypertension, benign      Hypercalcemia      Memory loss      Osteoporosis, unspecified     defined by chiropractor who did xrays during tx for back pain     Other B-complex deficiencies      Other, multiple and ill-defined closed fractures of lower limb 1990    prolonged healing     Unspecified cerebral artery occlusion with cerebral infarction      Unspecified disorder of skin and subcutaneous tissue      Urinary hesitancy      Urinary tract infection, site not specified         Past Surgical History:     Past Surgical History:   Procedure Laterality Date     COLONOSCOPY       CYSTOSCOPY, BIOPSY BLADDER, COMBINED  1/6/2014    Procedure: COMBINED CYSTOSCOPY, BIOPSY BLADDER;;  Surgeon: Vandana Tang MD;  Location: [facility] OR     CYSTOSCOPY, INJECT COLLAGEN, COMBINED  1/6/2014    Procedure: COMBINED CYSTOSCOPY, INJECT BULKING AGENT;  IC cocktail, bladder biopsy, fulguration, heparin, gentamyacin, lidocaine & kenalog;  Surgeon: Vandana Tang MD;  Location: [facility] OR     GENITOURINARY SURGERY       NO HISTORY OF SURGERY          Social History:     Social History     Tobacco Use     Smoking status: Never Smoker     Smokeless tobacco: Never Used   Substance Use Topics     Alcohol use: No     Drug use: No        Family History:     Family History   Problem Relation Age of Onset     Cancer Father         colon?     Heart Disease Father      Asthma Mother      Alzheimer Disease Mother 86     Unknown/Adopted Maternal Grandmother      Unknown/Adopted Maternal Grandfather      Unknown/Adopted Paternal Grandmother      Unknown/Adopted Paternal Grandfather      Asthma Sister      Allergies Sister      Unknown/Adopted Son      Diabetes No family hx of         Medications:     Current Outpatient Medications   Medication Sig     acetaminophen (TYLENOL) 500 MG tablet Take 2 tablets  (1,000 mg) by mouth every 8 hours as needed for mild pain     Blood Pressure Monitoring (BLOOD PRESSURE CUFF) MISC 1 each daily as needed     Blood Pressure Monitoring (BLOOD PRESSURE CUFF) MISC 1 Device daily as needed     Blood Pressure Monitoring (BLOOD PRESSURE MONITOR/M CUFF) MISC 1 each daily as needed     cholecalciferol (VITAMIN D3) 5000 units (125 mcg) CAPS capsule Take 5,000 Units by mouth daily     clopidogrel (PLAVIX) 75 MG tablet TAKE 1 TABLET BY MOUTH EVERY DAY     donepezil (ARICEPT) 5 MG tablet Take 1 tablet (5 mg) by mouth At Bedtime     escitalopram (LEXAPRO) 10 MG tablet Take 1.5 tablets daily     esomeprazole (NEXIUM) 40 MG DR capsule Take 1 capsule (40 mg) by mouth every morning (before breakfast) Take 30-60 minutes before eating.     levothyroxine (SYNTHROID/LEVOTHROID) 25 MCG tablet TAKE 1 TABLET BY MOUTH EVERY DAY     lisinopril (ZESTRIL) 30 MG tablet TAKE 1 TABLET BY MOUTH EVERY DAY     meclizine (ANTIVERT) 25 MG tablet Take 1 tablet (25 mg) by mouth 2 times daily as needed for dizziness     mirtazapine (REMERON) 30 MG tablet TAKE 1 TABLET (30 MG) BY MOUTH AT BEDTIME     Multiple Vitamins-Minerals (ANTIOXIDANT PO) Take 1 tablet by mouth daily CONSULT [facility]     Nutritional Supplements (ENSURE HIGH PROTEIN) Take 1 Can by mouth 3 times daily (with meals)     pantoprazole (PROTONIX) 20 MG EC tablet Take 2 tablets (40 mg) by mouth daily     polyethylene glycol (MIRALAX) 17 GM/SCOOP powder Take 17 g by mouth daily To twice daily as needed for constipation.     senna-docusate (SENOKOT-S/PERICOLACE) 8.6-50 MG tablet Take 1 tablet by mouth 2 times daily as needed for constipation     No current facility-administered medications for this visit.        Allergies:     Allergies   Allergen Reactions     Contrast Dye      Burning, hematuria     Diatrizoate Other (See Comments)     Feels burning inside body     Dogs Unknown     Throat started to close up.      Sulfa Drugs Unknown      Bupropion Anxiety     Patient reports increased anxiety, panic, difficulty concentrating, and various aches and pains        Review of Systems:   As above     Physical Exam:   Vitals: BP (!) 152/98 (BP Location: Right arm, Patient Position: Sitting, Cuff Size: Adult Regular)   Pulse 88   Wt 78.2 kg (172 lb 4.8 oz)   BMI 33.65 kg/m     Neurologic:     Mental Status: MoCA 16/30 (-1 naming, -2 serial 7s, -2 repeating sentence, -5 delayed recall, -4 orientation).  Cranial nerves: no dysarthria. Hearing grossly intact.  Motor: intermittent mild resting tremor in bilateral upper extremities. Mild postural/intention/action tremor in bilateral upper extremities. Normal muscle tone. No significant bradykinesia in bilateral finger taps.   Sensory: intact to light touch in all extremities  Gait: Mildly wide based gait.     Data reviewed on previous visits    [email or web address]  MRI brain     MRI brain 2016  Impression:  Subacute infarction in the left cuneus.  Chronic infarction in the right parietal lobe.   Unchanged mild chronic microvascular ischemic changes involving the  supratentorial periventricular white matter and central carol.   Head MRA demonstrates no definite aneurysm or stenosis of the major  intracranial arteries.  Neck MRA demonstrates tortuosity of the vertebral arteries and  atherosclerotic irregularities of the carotid bulbs bilaterally  without significant stenosis..     Laboratory:  B12 ~800, TSH high with normal free T4, CBC normal, CMP with CKD otherwise unremarkable (2021)    Pertinent Investigations since last visit:   None         Assessment and Plan:   Assessment:  Anabelle Herbert is a 76 year old female who presents today for follow-up of dementia. Symptoms seem to start around stroke in 2016. She has history of right parietal stroke in 2010 and left cuneus stroke in 2016. Symptoms have steadily worsened since then. At initial visit (2021) MoCA with score 15/30. MoCA is stable today at 16/30.  MRI brain from 2020 compared to 2016 scan is stable. There is mild chronic microvascular changes in addition to prior strokes. Vascular dementia is a possible etiology of memory changes. Given reported progression, neurodegenerative process, possibly Alzheimer's, is a consideration as well. Anxiety has been improved since starting Lexapro and this has also reported in some improvement in cognition. She had no benefit from donepezil in the past. We discussed further increasing Lexapro to help with anxiety.     Plan:  - Increase Lexapro to 20 mg daily    Follow up in Neurology clinic in 6 months or earlier as needed should new concerns arise.    Elmo Loza MD   of Neurology  [facility]    The total time of this encounter today amounted to 31 minutes. This time included time spent with the patient, prep work, ordering tests, and performing post visit documentation.          Again, thank you for allowing me to participate in the care of your patient.        Sincerely,        Elmo Loza MD

## 2021-10-27 NOTE — PROGRESS NOTES
[facility] Neurology Follow Up Visit    Anabelle Herbert MRN# [id number]   Age: 76 year old YOB: 1945     Brief history of symptoms: The patient was initially seen in neurologic consultation on 6/2/2021 for evaluation of memory changes. Please see the comprehensive neurologic consultation note from that date in the Epic records for details.     Interval history:   There has been improvement in anxiety since increase in Lexapro. Patient denies any side effects. She has not had any additional panic attacks. Memory symptoms are relatively stable. She denies any hallucinations. She has mild tremor in the bilateral hands, but it is not bothersome.       Past Medical History:     Patient Active Problem List   Diagnosis     Chronic interstitial cystitis     Adjustment reaction with anxiety and depression     Abdominal pain     Benign essential hypertension     CKD (chronic kidney disease) stage 2, GFR 60-89 ml/min     Hyperlipidemia LDL goal <100     Mild persistent asthma     Vitamin D deficiency     GERD (gastroesophageal reflux disease)     B12 deficiency     Transient cerebral ischemia     Carotid stenosis     Diverticulosis of colon     Colon polyp     Atrophic vaginitis     Interstitial cystitis     Chronic constipation     Cognitive complaints     Prediabetes     Osteoporosis     Cerebrovascular accident (CVA), unspecified mechanism (H)     SHANEKA (obstructive sleep apnea)     Intracranial hemorrhage (H)     Vascular dementia without behavioral disturbance (H)     Subclinical hypothyroidism     Past Medical History:   Diagnosis Date     Acute sinusitis, unspecified      Allergic rhinitis, cause unspecified      Candidiasis of unspecified site      Candidiasis of vulva and vagina      Diplopia      Dyspnea     following inhalation of cleaning solution. Treated with inhaler/neb.      Essential hypertension, benign      Hypercalcemia      Memory loss      Osteoporosis, unspecified     defined by chiropractor who  did xrays during tx for back pain     Other B-complex deficiencies      Other, multiple and ill-defined closed fractures of lower limb 1990    prolonged healing     Unspecified cerebral artery occlusion with cerebral infarction      Unspecified disorder of skin and subcutaneous tissue      Urinary hesitancy      Urinary tract infection, site not specified         Past Surgical History:     Past Surgical History:   Procedure Laterality Date     COLONOSCOPY       CYSTOSCOPY, BIOPSY BLADDER, COMBINED  1/6/2014    Procedure: COMBINED CYSTOSCOPY, BIOPSY BLADDER;;  Surgeon: Vandana Tang MD;  Location: [facility] OR     CYSTOSCOPY, INJECT COLLAGEN, COMBINED  1/6/2014    Procedure: COMBINED CYSTOSCOPY, INJECT BULKING AGENT;  IC cocktail, bladder biopsy, fulguration, heparin, gentamyacin, lidocaine & kenalog;  Surgeon: Vandana Tang MD;  Location: [facility] OR     GENITOURINARY SURGERY       NO HISTORY OF SURGERY          Social History:     Social History     Tobacco Use     Smoking status: Never Smoker     Smokeless tobacco: Never Used   Substance Use Topics     Alcohol use: No     Drug use: No        Family History:     Family History   Problem Relation Age of Onset     Cancer Father         colon?     Heart Disease Father      Asthma Mother      Alzheimer Disease Mother 86     Unknown/Adopted Maternal Grandmother      Unknown/Adopted Maternal Grandfather      Unknown/Adopted Paternal Grandmother      Unknown/Adopted Paternal Grandfather      Asthma Sister      Allergies Sister      Unknown/Adopted Son      Diabetes No family hx of         Medications:     Current Outpatient Medications   Medication Sig     acetaminophen (TYLENOL) 500 MG tablet Take 2 tablets (1,000 mg) by mouth every 8 hours as needed for mild pain     Blood Pressure Monitoring (BLOOD PRESSURE CUFF) MISC 1 each daily as needed     Blood Pressure Monitoring (BLOOD PRESSURE CUFF) MISC 1 Device daily as needed     Blood Pressure Monitoring (BLOOD PRESSURE  MONITOR/M CUFF) MISC 1 each daily as needed     cholecalciferol (VITAMIN D3) 5000 units (125 mcg) CAPS capsule Take 5,000 Units by mouth daily     clopidogrel (PLAVIX) 75 MG tablet TAKE 1 TABLET BY MOUTH EVERY DAY     donepezil (ARICEPT) 5 MG tablet Take 1 tablet (5 mg) by mouth At Bedtime     escitalopram (LEXAPRO) 10 MG tablet Take 1.5 tablets daily     esomeprazole (NEXIUM) 40 MG DR capsule Take 1 capsule (40 mg) by mouth every morning (before breakfast) Take 30-60 minutes before eating.     levothyroxine (SYNTHROID/LEVOTHROID) 25 MCG tablet TAKE 1 TABLET BY MOUTH EVERY DAY     lisinopril (ZESTRIL) 30 MG tablet TAKE 1 TABLET BY MOUTH EVERY DAY     meclizine (ANTIVERT) 25 MG tablet Take 1 tablet (25 mg) by mouth 2 times daily as needed for dizziness     mirtazapine (REMERON) 30 MG tablet TAKE 1 TABLET (30 MG) BY MOUTH AT BEDTIME     Multiple Vitamins-Minerals (ANTIOXIDANT PO) Take 1 tablet by mouth daily CONSULT [facility] WOMENS WELLNESS     Nutritional Supplements (ENSURE HIGH PROTEIN) Take 1 Can by mouth 3 times daily (with meals)     pantoprazole (PROTONIX) 20 MG EC tablet Take 2 tablets (40 mg) by mouth daily     polyethylene glycol (MIRALAX) 17 GM/SCOOP powder Take 17 g by mouth daily To twice daily as needed for constipation.     senna-docusate (SENOKOT-S/PERICOLACE) 8.6-50 MG tablet Take 1 tablet by mouth 2 times daily as needed for constipation     No current facility-administered medications for this visit.        Allergies:     Allergies   Allergen Reactions     Contrast Dye      Burning, hematuria     Diatrizoate Other (See Comments)     Feels burning inside body     Dogs Unknown     Throat started to close up.      Sulfa Drugs Unknown     Bupropion Anxiety     Patient reports increased anxiety, panic, difficulty concentrating, and various aches and pains        Review of Systems:   As above     Physical Exam:   Vitals: BP (!) 152/98 (BP Location: Right arm, Patient Position: Sitting, Cuff Size: Adult  Regular)   Pulse 88   Wt 78.2 kg (172 lb 4.8 oz)   BMI 33.65 kg/m     Neurologic:     Mental Status: MoCA 16/30 (-1 naming, -2 serial 7s, -2 repeating sentence, -5 delayed recall, -4 orientation).  Cranial nerves: no dysarthria. Hearing grossly intact.  Motor: intermittent mild resting tremor in bilateral upper extremities. Mild postural/intention/action tremor in bilateral upper extremities. Normal muscle tone. No significant bradykinesia in bilateral finger taps.   Sensory: intact to light touch in all extremities  Gait: Mildly wide based gait.     Data reviewed on previous visits    Imagin2020  MRI brain     MRI brain 2016  Impression:  Subacute infarction in the left cuneus.  Chronic infarction in the right parietal lobe.   Unchanged mild chronic microvascular ischemic changes involving the  supratentorial periventricular white matter and central carol.   Head MRA demonstrates no definite aneurysm or stenosis of the major  intracranial arteries.  Neck MRA demonstrates tortuosity of the vertebral arteries and  atherosclerotic irregularities of the carotid bulbs bilaterally  without significant stenosis..     Laboratory:  B12 ~800, TSH high with normal free T4, CBC normal, CMP with CKD otherwise unremarkable (2021)    Pertinent Investigations since last visit:   None         Assessment and Plan:   Assessment:  Anabelle Herbert is a 76 year old female who presents today for follow-up of dementia. Symptoms seem to start around stroke in 2016. She has history of right parietal stroke in 2010 and left cuneus stroke in 2016. Symptoms have steadily worsened since then. At initial visit (2021) MoCA with score 15/30. MoCA is stable today at 16/30. MRI brain from 2020 compared to 2016 scan is stable. There is mild chronic microvascular changes in addition to prior strokes. Vascular dementia is a possible etiology of memory changes. Given reported progression, neurodegenerative process, possibly Alzheimer's, is a  consideration as well. Anxiety has been improved since starting Lexapro and this has also reported in some improvement in cognition. She had no benefit from donepezil in the past. We discussed further increasing Lexapro to help with anxiety.     Plan:  - Increase Lexapro to 20 mg daily    Follow up in Neurology clinic in 6 months or earlier as needed should new concerns arise.    Elmo Loza MD   of Neurology  [facility]    The total time of this encounter today amounted to 31 minutes. This time included time spent with the patient, prep work, ordering tests, and performing post visit documentation.

## 2021-10-28 ENCOUNTER — APPOINTMENT (OUTPATIENT)
Dept: OCCUPATIONAL THERAPY | Facility: CLINIC | Age: 76
DRG: 066 | End: 2021-10-28
Attending: INTERNAL MEDICINE
Payer: COMMERCIAL

## 2021-10-28 ENCOUNTER — APPOINTMENT (OUTPATIENT)
Dept: CT IMAGING | Facility: CLINIC | Age: 76
DRG: 066 | End: 2021-10-28
Attending: INTERNAL MEDICINE
Payer: COMMERCIAL

## 2021-10-28 LAB
ANION GAP SERPL CALCULATED.3IONS-SCNC: 3 MMOL/L (ref 3–14)
BUN SERPL-MCNC: 19 MG/DL (ref 7–30)
CALCIUM SERPL-MCNC: 8.5 MG/DL (ref 8.5–10.1)
CHLORIDE BLD-SCNC: 110 MMOL/L (ref 94–109)
CO2 SERPL-SCNC: 26 MMOL/L (ref 20–32)
CREAT SERPL-MCNC: 0.86 MG/DL (ref 0.52–1.04)
ERYTHROCYTE [DISTWIDTH] IN BLOOD BY AUTOMATED COUNT: 12.4 % (ref 10–15)
GFR SERPL CREATININE-BSD FRML MDRD: 67 ML/MIN/1.73M2
GLUCOSE BLD-MCNC: 87 MG/DL (ref 70–99)
GLUCOSE BLDC GLUCOMTR-MCNC: 88 MG/DL (ref 70–99)
GLUCOSE BLDC GLUCOMTR-MCNC: 92 MG/DL (ref 70–99)
HCT VFR BLD AUTO: 35 % (ref 35–47)
HGB BLD-MCNC: 11.4 G/DL (ref 11.7–15.7)
MCH RBC QN AUTO: 30.2 PG (ref 26.5–33)
MCHC RBC AUTO-ENTMCNC: 32.6 G/DL (ref 31.5–36.5)
MCV RBC AUTO: 93 FL (ref 78–100)
PLATELET # BLD AUTO: 226 10E3/UL (ref 150–450)
POTASSIUM BLD-SCNC: 3.9 MMOL/L (ref 3.4–5.3)
RBC # BLD AUTO: 3.78 10E6/UL (ref 3.8–5.2)
SODIUM SERPL-SCNC: 139 MMOL/L (ref 133–144)
WBC # BLD AUTO: 7.4 10E3/UL (ref 4–11)

## 2021-10-28 PROCEDURE — 70496 CT ANGIOGRAPHY HEAD: CPT

## 2021-10-28 PROCEDURE — 250N000013 HC RX MED GY IP 250 OP 250 PS 637: Performed by: INTERNAL MEDICINE

## 2021-10-28 PROCEDURE — 70450 CT HEAD/BRAIN W/O DYE: CPT

## 2021-10-28 PROCEDURE — 99207 PR CDG-CODE CATEGORY CHANGED: CPT | Performed by: HOSPITALIST

## 2021-10-28 PROCEDURE — 97530 THERAPEUTIC ACTIVITIES: CPT | Mod: GO | Performed by: OCCUPATIONAL THERAPIST

## 2021-10-28 PROCEDURE — 250N000013 HC RX MED GY IP 250 OP 250 PS 637: Performed by: HOSPITALIST

## 2021-10-28 PROCEDURE — 82962 GLUCOSE BLOOD TEST: CPT

## 2021-10-28 PROCEDURE — 85027 COMPLETE CBC AUTOMATED: CPT | Performed by: INTERNAL MEDICINE

## 2021-10-28 PROCEDURE — 250N000009 HC RX 250: Performed by: INTERNAL MEDICINE

## 2021-10-28 PROCEDURE — 96376 TX/PRO/DX INJ SAME DRUG ADON: CPT

## 2021-10-28 PROCEDURE — 97535 SELF CARE MNGMENT TRAINING: CPT | Mod: GO | Performed by: OCCUPATIONAL THERAPIST

## 2021-10-28 PROCEDURE — G0378 HOSPITAL OBSERVATION PER HR: HCPCS

## 2021-10-28 PROCEDURE — 36415 COLL VENOUS BLD VENIPUNCTURE: CPT | Performed by: INTERNAL MEDICINE

## 2021-10-28 PROCEDURE — 0042T CT HEAD PERFUSION WITH CONTRAST: CPT

## 2021-10-28 PROCEDURE — 97166 OT EVAL MOD COMPLEX 45 MIN: CPT | Mod: GO | Performed by: OCCUPATIONAL THERAPIST

## 2021-10-28 PROCEDURE — 250N000011 HC RX IP 250 OP 636: Performed by: INTERNAL MEDICINE

## 2021-10-28 PROCEDURE — 80048 BASIC METABOLIC PNL TOTAL CA: CPT | Performed by: INTERNAL MEDICINE

## 2021-10-28 PROCEDURE — 250N000013 HC RX MED GY IP 250 OP 250 PS 637: Performed by: EMERGENCY MEDICINE

## 2021-10-28 PROCEDURE — 99291 CRITICAL CARE FIRST HOUR: CPT | Performed by: NURSE PRACTITIONER

## 2021-10-28 PROCEDURE — 99226 PR SUBSEQUENT OBSERVATION CARE,LEVEL III: CPT | Performed by: HOSPITALIST

## 2021-10-28 RX ORDER — LEVOTHYROXINE SODIUM 25 UG/1
25 TABLET ORAL DAILY
Status: DISCONTINUED | OUTPATIENT
Start: 2021-10-28 | End: 2021-11-05 | Stop reason: HOSPADM

## 2021-10-28 RX ORDER — GADOBUTROL 604.72 MG/ML
8 INJECTION INTRAVENOUS ONCE
Status: DISCONTINUED | OUTPATIENT
Start: 2021-10-28 | End: 2021-10-28 | Stop reason: CLARIF

## 2021-10-28 RX ORDER — ACETAMINOPHEN 500 MG
1000 TABLET ORAL EVERY 8 HOURS PRN
Status: DISCONTINUED | OUTPATIENT
Start: 2021-10-28 | End: 2021-11-05 | Stop reason: HOSPADM

## 2021-10-28 RX ORDER — ESCITALOPRAM OXALATE 20 MG/1
20 TABLET ORAL DAILY
Status: DISCONTINUED | OUTPATIENT
Start: 2021-10-28 | End: 2021-11-05 | Stop reason: HOSPADM

## 2021-10-28 RX ORDER — POLYETHYLENE GLYCOL 3350 17 G/17G
17 POWDER, FOR SOLUTION ORAL DAILY
Status: DISCONTINUED | OUTPATIENT
Start: 2021-10-28 | End: 2021-11-05 | Stop reason: HOSPADM

## 2021-10-28 RX ORDER — LORAZEPAM 0.5 MG/1
0.5 TABLET ORAL ONCE
Status: COMPLETED | OUTPATIENT
Start: 2021-10-28 | End: 2021-10-28

## 2021-10-28 RX ORDER — LISINOPRIL 40 MG/1
40 TABLET ORAL DAILY
Status: DISCONTINUED | OUTPATIENT
Start: 2021-10-28 | End: 2021-11-05 | Stop reason: HOSPADM

## 2021-10-28 RX ORDER — ONDANSETRON 4 MG/1
4 TABLET, ORALLY DISINTEGRATING ORAL EVERY 6 HOURS PRN
Status: DISCONTINUED | OUTPATIENT
Start: 2021-10-28 | End: 2021-11-05 | Stop reason: HOSPADM

## 2021-10-28 RX ORDER — MIRTAZAPINE 15 MG/1
30 TABLET, FILM COATED ORAL AT BEDTIME
Status: DISCONTINUED | OUTPATIENT
Start: 2021-10-28 | End: 2021-11-05 | Stop reason: HOSPADM

## 2021-10-28 RX ORDER — AMOXICILLIN 250 MG
1 CAPSULE ORAL 2 TIMES DAILY PRN
Status: DISCONTINUED | OUTPATIENT
Start: 2021-10-28 | End: 2021-11-05 | Stop reason: HOSPADM

## 2021-10-28 RX ORDER — CEFTRIAXONE 1 G/1
1 INJECTION, POWDER, FOR SOLUTION INTRAMUSCULAR; INTRAVENOUS EVERY 24 HOURS
Status: DISCONTINUED | OUTPATIENT
Start: 2021-10-28 | End: 2021-10-30

## 2021-10-28 RX ORDER — IOPAMIDOL 755 MG/ML
120 INJECTION, SOLUTION INTRAVASCULAR ONCE
Status: COMPLETED | OUTPATIENT
Start: 2021-10-28 | End: 2021-10-28

## 2021-10-28 RX ORDER — ONDANSETRON 2 MG/ML
4 INJECTION INTRAMUSCULAR; INTRAVENOUS EVERY 6 HOURS PRN
Status: DISCONTINUED | OUTPATIENT
Start: 2021-10-28 | End: 2021-11-05 | Stop reason: HOSPADM

## 2021-10-28 RX ORDER — ALPRAZOLAM 0.25 MG
0.25 TABLET ORAL
Status: DISCONTINUED | OUTPATIENT
Start: 2021-10-28 | End: 2021-11-02

## 2021-10-28 RX ORDER — LABETALOL HYDROCHLORIDE 5 MG/ML
10 INJECTION, SOLUTION INTRAVENOUS
Status: DISCONTINUED | OUTPATIENT
Start: 2021-10-28 | End: 2021-11-05 | Stop reason: HOSPADM

## 2021-10-28 RX ORDER — CLOPIDOGREL BISULFATE 75 MG/1
75 TABLET ORAL DAILY
Status: DISCONTINUED | OUTPATIENT
Start: 2021-10-28 | End: 2021-11-05 | Stop reason: HOSPADM

## 2021-10-28 RX ORDER — PANTOPRAZOLE SODIUM 40 MG/1
40 TABLET, DELAYED RELEASE ORAL
Status: DISCONTINUED | OUTPATIENT
Start: 2021-10-28 | End: 2021-11-05 | Stop reason: HOSPADM

## 2021-10-28 RX ADMIN — PANTOPRAZOLE SODIUM 40 MG: 40 TABLET, DELAYED RELEASE ORAL at 08:58

## 2021-10-28 RX ADMIN — CEFTRIAXONE SODIUM 1 G: 1 INJECTION, POWDER, FOR SOLUTION INTRAMUSCULAR; INTRAVENOUS at 23:57

## 2021-10-28 RX ADMIN — CLOPIDOGREL BISULFATE 75 MG: 75 TABLET ORAL at 08:58

## 2021-10-28 RX ADMIN — LORAZEPAM 0.5 MG: 0.5 TABLET ORAL at 00:39

## 2021-10-28 RX ADMIN — LEVOTHYROXINE SODIUM 25 MCG: 25 TABLET ORAL at 08:58

## 2021-10-28 RX ADMIN — IOPAMIDOL 120 ML: 755 INJECTION, SOLUTION INTRAVENOUS at 18:26

## 2021-10-28 RX ADMIN — LISINOPRIL 40 MG: 40 TABLET ORAL at 08:59

## 2021-10-28 RX ADMIN — ESCITALOPRAM OXALATE 20 MG: 20 TABLET ORAL at 08:59

## 2021-10-28 RX ADMIN — MIRTAZAPINE 30 MG: 15 TABLET, FILM COATED ORAL at 20:22

## 2021-10-28 RX ADMIN — CEFTRIAXONE SODIUM 1 G: 1 INJECTION, POWDER, FOR SOLUTION INTRAMUSCULAR; INTRAVENOUS at 00:44

## 2021-10-28 RX ADMIN — MIRTAZAPINE 30 MG: 15 TABLET, FILM COATED ORAL at 01:15

## 2021-10-28 RX ADMIN — SODIUM CHLORIDE 100 ML: 900 INJECTION INTRAVENOUS at 18:26

## 2021-10-28 ASSESSMENT — MIFFLIN-ST. JEOR: SCORE: 1232.53

## 2021-10-28 NOTE — ED PROVIDER NOTES
"  History   Chief Complaint:  Altered Mental Status     The history is provided by a relative.      Anabelle Herbert is a 76 year old female on Plavix with history of vascular dementia, hypertension and CVA who presents with altered mental status. Patient's daughter reports that the patient had a visit with her neurologist today and did well on her exams: so well that her neurologist stated that the patient does not have to have a recheck for 6 months. Daughter states that after the visit, the patient started to act abnormal, talking about little dogs and having hallucinations. Endorses the patient was saying that \"her brain does not work\". Patient has never had these hallucinations before. Daughter adds that at 1700, the patient called her and thought that she was babysitting a little girl and that she lost the little girl. No pain, falls or injuries. Of note, patient fell last year and had a brain bleed.     Review of Systems   Psychiatric/Behavioral: Positive for hallucinations.   All other systems reviewed and are negative.    Allergies:  Cats  Contrast Dye  Diatrizoate  Sulfa Drugs  Bupropion    Medications:  Plavix   Lexapro  Nexium  Synthroid   Lisinopril   Remeron  Antivert   Protonix  Senokot    Past Medical History:     Hypertension  Memory loss  Osteoporosis  UTI  CVA  CKD  GERD  Asthma  Intracranial hemorrhage  Hypothyroidism  Vascular dementia without behavioral disturbance    Past Surgical History:     surgery     Family History:    Father - colon cancer, heart disease  Mother - asthma, Alzheimer's     Social History:  Presents with her daughter    Physical Exam     Patient Vitals for the past 24 hrs:   BP Temp Temp src Pulse Resp SpO2   10/28/21 0044 (!) 152/89 -- -- 68 -- --   10/28/21 0040 -- -- -- -- -- 95 %   10/27/21 2035 (!) 195/94 98.3  F (36.8  C) Oral 80 16 96 %     Physical Exam  General: Demented, appears well-developed and well-nourished. Cooperative.     In no acute " distress  HEENT:  Head:  Atraumatic  Ears:  External ears are normal  Mouth/Throat:  Oropharynx is without erythema or exudate and mucous membranes are moist.   Eyes:   Conjunctivae normal and EOM are normal. No scleral icterus.  CV:  Normal rate, regular rhythm, normal heart sounds and radial pulses are 2+ and symmetric.  No murmur.  Resp:  Breath sounds are clear bilaterally    Non-labored, no retractions or accessory muscle use  GI:  Abdomen is soft, no distension, no tenderness. No rebound or guarding.  No CVA tenderness bilaterally  MS:  Normal range of motion. No edema.    Normal strength in all 4 extremities.     Back atraumatic.    No midline cervical, thoracic, or lumbar tenderness  Skin:  Warm and dry.  No rash or lesions noted.  Neuro: Demented. Normal strength.  Sensation intact in all 4 extremities. GCS: 14    Cranial nerves 2-12 intact.  Psych:  Normal mood and affect.    Emergency Department Course   ECG  ECG obtained at 2046, ECG read at 1046  NSR   No significant change as compared to prior, dated 03/18/13.  Rate 76 bpm. OR interval 166 ms. QRS duration 72 ms. QT/QTc 402/452 ms. P-R-T axes 52 -2 52.     Imaging:  CT Head w/o Contrast   Final Result   IMPRESSION:   1.  No acute intracranial hemorrhage, mass effect, or CT evidence for acute infarction.   2.  Unchanged chronic cortical infarctions in the superior left occipital lobe and superior right parietal lobe.   3.  Stable background of mild chronic age-related change.        Report per radiology    Laboratory:  Labs Ordered and Resulted from Time of ED Arrival to Time of ED Departure   COMPREHENSIVE METABOLIC PANEL - Abnormal       Result Value    Sodium 139      Potassium 4.5      Chloride 111 (*)     Carbon Dioxide (CO2) 24      Anion Gap 4      Urea Nitrogen 25      Creatinine 1.11 (*)     Calcium 8.9      Glucose 86      Alkaline Phosphatase 73      AST 15      ALT 18      Protein Total 7.6      Albumin 3.5      Bilirubin Total 0.1 (*)      GFR Estimate 49 (*)    ROUTINE UA WITH MICROSCOPIC REFLEX TO CULTURE - Abnormal    Color Urine Yellow      Appearance Urine Clear      Glucose Urine Negative      Bilirubin Urine Negative      Ketones Urine Negative      Specific Gravity Urine 1.027      Blood Urine Negative      pH Urine 5.0      Protein Albumin Urine Negative      Urobilinogen Urine Normal      Nitrite Urine Negative      Leukocyte Esterase Urine Small (*)     Mucus Urine Present (*)     RBC Urine 2      WBC Urine 7 (*)     Squamous Epithelials Urine 4 (*)     Hyaline Casts Urine 3 (*)    DRUG ABUSE SCREEN 77 URINE (FL, RH, SH) - Abnormal    Amphetamines Urine Screen Negative      Barbiturates Urine Screen Negative      Benzodiazepines Urine Screen Negative      Cannabinoids Urine Screen Positive (*)     Cocaine Urine Screen Negative      Opiates Urine Screen Negative      PCP Urine Screen Negative     TROPONIN I - Normal    Troponin I <0.015     TSH WITH FREE T4 REFLEX - Normal    TSH 2.49     AMMONIA - Normal    Ammonia 30     COVID-19 VIRUS (CORONAVIRUS) BY PCR - Normal    SARS CoV2 PCR Negative     CBC WITH PLATELETS AND DIFFERENTIAL    WBC Count 9.6      RBC Count 4.05      Hemoglobin 12.3      Hematocrit 37.7      MCV 93      MCH 30.4      MCHC 32.6      RDW 12.4      Platelet Count 259      % Neutrophils 66      % Lymphocytes 22      % Monocytes 9      % Eosinophils 2      % Basophils 1      % Immature Granulocytes 0      NRBCs per 100 WBC 0      Absolute Neutrophils 6.4      Absolute Lymphocytes 2.1      Absolute Monocytes 0.8      Absolute Eosinophils 0.2      Absolute Basophils 0.1      Absolute Immature Granulocytes 0.0      Absolute NRBCs 0.0     URINE CULTURE        Procedures  None    Emergency Department Course:  Reviewed:  I reviewed nursing notes, vitals, past medical history and Care Everywhere    Assessments:  2043 I obtained history and examined the patient as noted above.   2300 I rechecked the patient and explained findings.      Consults:  2237 I spoke with Dr. De La O of the Hospitalist service from [facility] regarding patient's  presentation, findings, and plan of care.      Interventions:  Medications   cefTRIAXone (ROCEPHIN) 1 g vial to attach to  mL bag for ADULTS or NS 50 mL bag for PEDS (1 g Intravenous New Bag 10/28/21 0044)   0.9% sodium chloride BOLUS (1,000 mLs Intravenous New Bag 10/27/21 2306)   LORazepam (ATIVAN) tablet 0.5 mg (0.5 mg Oral Given 10/28/21 0039)     Disposition:  The patient was admitted to the hospital under the care of Dr. De La O.     Impression & Plan     CMS Diagnoses: None    Medical Decision Making:  Patient is a 76-year-old female with a history of vascular dementia who presents with confusion/hallucinations.  Daughter states that since leaving the neurology office this afternoon she is had multiple events where she has had what are suspected to be visual hallucinations.  She has had a prior intracranial hemorrhage and so CT imaging was performed tonight.  Thankfully CT scan of the head negative for acute intracranial hemorrhage.  Patient does live independently at this time and daughter is quite understandably concerned for her safety at home particularly with confusion/hallucinations.  No evidence of electrolyte abnormalities.  Urinalysis equivocal and patient with no urinary complaints at this time.  Will defer any antibiotic treatment to hospitalist service.  Plan for observation admission at this time.  Consider MRI and/or neurology consultation in the morning if confusion/hallucinations continue.  Spoke with Dr. De La O who agrees to observation.    Covid-19  Anabelle Herbert was evaluated during a global COVID-19 pandemic, which necessitated consideration that the patient might be at risk for infection with the SARS-CoV-2 virus that causes COVID-19.   Applicable protocols for evaluation were followed during the patient's care.   COVID-19 was considered as part of the patient's  evaluation. The plan for testing is:  a test was obtained during this visit.    Diagnosis:    ICD-10-CM    1. Hallucinations  R44.3    2. Confusion  R41.0        Discharge Medications:  New Prescriptions    No medications on file       Scribe Disclosure:  ARVIND, Andrade Lewis, and Meera Montano are serving as a scribe at 8:30 PM on 10/27/2021 to document services personally performed by Dada Alcantara MD based on my observations and the provider's statements to me.            Dada Alcantara MD  10/28/21 0049       Dada Alcantara MD  10/28/21 0049

## 2021-10-28 NOTE — H&P
[facility]    History and Physical  Hospitalist       Date of Admission:  10/27/2021    Assessment & Plan   Anabelle Herbert is a 76 year old female who presents with occasional confusion for 1 day.  Active Problems:  Altered mental status    Vascular dementia without behavioral disturbance (H)  --- In the emergency department her symptoms were considered to be hallucinations but on  further evaluation her symptoms mostly involves occasional confusion, patient was not seeing things which was not present but that she was talking about things that did not make sense.  ---Patient follows up outpatient with neurology and had visited with them  on 10/27, at that time they had concerns about patient having Alzheimer's dementia along with vascular dementia.  ---So far work-up here is negative except for mild abnormality in the UA, considering her confusion will start on antibiotics and order for urine culture. Patient does have history of thickening of the bladder/chronic cystitis and patient had prior follow-up with urology. Patient had mentioned increased frequency of micturition.  ---CT head done here did not indicate any new intracranial hemorrhage, patient has history of intracranial hemorrhage following a fall in June 2020.  --- Of note the urine drug screen is positive for cannabinoids, (was not clarified with the patient whether she took it or not) if patient did use cannabinoid products this confusion could be part of it.  ---Admit to observation, OT evaluation ordered, will obtain MRI brain to evaluate for any new lacunar infarcts.  ---Patient does not seem to have hallucinations rather have occasional episodes of confusion, she is otherwise alert oriented x3.  ---Requested neurology consult here, if MRI brain is negative most of her symptoms could be related to lack of sleep on the day of admission (daughter mentioned that patient usually sleeps till 11AM but on the day of admission due  to her neurology follow-up she had to wake up at 6 AM.)  ---Care coordination consult requested as daughter was concerned about letting patient leave alone (she lives alone at home at this point)    Subclinical hypothyroidism  --currently treated with levothyroxine, continue that     Adjustment reaction with anxiety and depression  --on lexapro, during today's visit with neurologist  plan was to increase dosage to 20 mg     Benign essential hypertension  ---uncontrolled, will restart home meds and add prn, patient is very anxious causing worsening control.    CKD (chronic kidney disease) stage 2, GFR 60-89 ml/min  --baseline creatinine 1.1, stable here     Hyperlipidemia LDL goal <100  ---continue statin     Intracranial hemorrhage (H)- 6/2020 following  Fall   ---no new concerns of bleeding as per CT here   Cerebrovascular accident with history of  TIA   ---continue Plavix once medications reconciled.          DVT Prophylaxis: sequential compression device   Code Status: Full Code    Disposition: Expected discharge in 24 hours     Sheila De La O MD    Primary Care Physician   Jyoti Moreno    Chief Complaint   Confusion 1 day     History is obtained from the patient  And family     History of Present Illness   Anabelle Herbert is a 76 year old female with history of vascular dementia, concern of Alzheimer's dementia, history of chronic cystitis, who lives alone at home presents to the emergency department brought in by daughter with concern of confusion. Patient usually sleeps till 11 AM but she had an appointment with her neurologist so she woke up at 6 AM and went for the appointment, this was 10/27 AM, during the appointment she did not have any abnormal behaviors or confusion, following the appointment on their way home patient had talked about not bringing home the small dog, daughter was concerned about this and on further questioning patient said to forget about it and later she again talked  about's babysitting a child and the child having a dog. Patient did not see any dog of her child but was talking about things which did not make sense as per daughter. Patient herself was quite agitated about these concerns, daughter had asked patient to sleep and went to her own home, patient called her and acted confused about future events, daughter went to visit with her and noted that patient was talking about things which are not true, she was also confusing patient daughter for her aunt, she had forgotten about her divorce and assumed that she is still living with her ex-. Patient mentions increased frequency of micturition, no dysuria, no fever, no chills, no nausea, no vomiting or diarrhea. Denies any recent fall or any focal neuro deficit. Patient lives alone and daughter was concerned about her change in behavior. In the emergency department there was a concern whether patient had hallucinations, on further inquiry patient never so anything that was not present in the room, it was mostly her conversations about things that might have happened in the past that was concerning. PTA patient is on Lexapro and the dose was increased during this admission. Patient did not take the increased dose yet.  In the emergency department her labs were within normal limit, UA was slightly abnormal, CT head did not indicate any new ICH, her urine drug screen indicated presence of cannabinoids, was unable to clarify this with patient.  Past Medical History    I have reviewed this patient's medical history and updated it with pertinent information if needed.   Past Medical History:   Diagnosis Date     Acute sinusitis, unspecified      Allergic rhinitis, cause unspecified      Candidiasis of unspecified site      Candidiasis of vulva and vagina      Diplopia      Dyspnea     following inhalation of cleaning solution. Treated with inhaler/neb.      Essential hypertension, benign      Hypercalcemia      Memory loss       Osteoporosis, unspecified     defined by chiropractor who did xrays during tx for back pain     Other B-complex deficiencies      Other, multiple and ill-defined closed fractures of lower limb     prolonged healing     Unspecified cerebral artery occlusion with cerebral infarction      Unspecified disorder of skin and subcutaneous tissue      Urinary hesitancy      Urinary tract infection, site not specified        Past Surgical History   I have reviewed this patient's surgical history and updated it with pertinent information if needed.  Past Surgical History:   Procedure Laterality Date     COLONOSCOPY       CYSTOSCOPY, BIOPSY BLADDER, COMBINED  2014    Procedure: COMBINED CYSTOSCOPY, BIOPSY BLADDER;;  Surgeon: Vandana Tang MD;  Location: [facility] OR     CYSTOSCOPY, INJECT COLLAGEN, COMBINED  2014    Procedure: COMBINED CYSTOSCOPY, INJECT BULKING AGENT;  IC cocktail, bladder biopsy, fulguration, heparin, gentamyacin, lidocaine & kenalog;  Surgeon: Vandana Tang MD;  Location: [facility] OR     GENITOURINARY SURGERY       NO HISTORY OF SURGERY         Prior to Admission Medications   Prior to Admission Medications   Prescriptions Last Dose Informant Patient Reported? Taking?   Blood Pressure Monitoring (BLOOD PRESSURE CUFF) MISC  Daughter No No   Si Device daily as needed   Blood Pressure Monitoring (BLOOD PRESSURE CUFF) MISC   No No   Si each daily as needed   Blood Pressure Monitoring (BLOOD PRESSURE MONITOR/M CUFF) MISC   No No   Si each daily as needed   Multiple Vitamins-Minerals (ANTIOXIDANT PO)  Daughter Yes No   Sig: Take 1 tablet by mouth daily CONSULT [facility]   Nutritional Supplements (ENSURE HIGH PROTEIN)  Daughter No No   Sig: Take 1 Can by mouth 3 times daily (with meals)   acetaminophen (TYLENOL) 500 MG tablet   Yes No   Sig: Take 2 tablets (1,000 mg) by mouth every 8 hours as needed for mild pain   cholecalciferol (VITAMIN D3) 5000 units (125 mcg) CAPS capsule   Daughter Yes No   Sig: Take 5,000 Units by mouth daily   clopidogrel (PLAVIX) 75 MG tablet   No No   Sig: TAKE 1 TABLET BY MOUTH EVERY DAY   escitalopram (LEXAPRO) 20 MG tablet   No No   Sig: Take 1 tablet (20 mg) by mouth daily   esomeprazole (NEXIUM) 40 MG DR capsule   No No   Sig: Take 1 capsule (40 mg) by mouth every morning (before breakfast) Take 30-60 minutes before eating.   levothyroxine (SYNTHROID/LEVOTHROID) 25 MCG tablet   No No   Sig: TAKE 1 TABLET BY MOUTH EVERY DAY   lisinopril (ZESTRIL) 30 MG tablet   No No   Sig: TAKE 1 TABLET BY MOUTH EVERY DAY   meclizine (ANTIVERT) 25 MG tablet  Daughter No No   Sig: Take 1 tablet (25 mg) by mouth 2 times daily as needed for dizziness   mirtazapine (REMERON) 30 MG tablet   No No   Sig: TAKE 1 TABLET (30 MG) BY MOUTH AT BEDTIME   pantoprazole (PROTONIX) 20 MG EC tablet   No No   Sig: Take 2 tablets (40 mg) by mouth daily   polyethylene glycol (MIRALAX) 17 GM/SCOOP powder  Daughter No No   Sig: Take 17 g by mouth daily To twice daily as needed for constipation.   Patient not taking: Reported on 10/27/2021   senna-docusate (SENOKOT-S/PERICOLACE) 8.6-50 MG tablet   No No   Sig: Take 1 tablet by mouth 2 times daily as needed for constipation   Patient not taking: Reported on 10/27/2021      Facility-Administered Medications: None     Allergies   Allergies   Allergen Reactions     Cats      Contrast Dye      Burning, hematuria     Diatrizoate Other (See Comments)     Feels burning inside body     Sulfa Drugs Unknown     Bupropion Anxiety     Patient reports increased anxiety, panic, difficulty concentrating, and various aches and pains       Social History   I have reviewed this patient's social history and updated it with pertinent information if needed. Anabelle BARBY Herbert  reports that she has never smoked. She has never used smokeless tobacco. She reports that she does not drink alcohol and does not use drugs.    Family History   I have reviewed this patient's family  history and updated it with pertinent information if needed.   Family History   Problem Relation Age of Onset     Cancer Father         colon?     Heart Disease Father      Asthma Mother      Alzheimer Disease Mother 86     Unknown/Adopted Maternal Grandmother      Unknown/Adopted Maternal Grandfather      Unknown/Adopted Paternal Grandmother      Unknown/Adopted Paternal Grandfather      Asthma Sister      Allergies Sister      Unknown/Adopted Son      Diabetes No family hx of        Review of Systems   The 10 point Review of Systems is negative other than noted in the HPI or here.     Physical Exam   Temp: 98.3  F (36.8  C) Temp src: Oral BP: (!) 195/94 Pulse: 80   Resp: 16 SpO2: 96 % O2 Device: None (Room air)    Vital Signs with Ranges  Temp:  [98.3  F (36.8  C)] 98.3  F (36.8  C)  Pulse:  [80-88] 80  Resp:  [16] 16  BP: (152-195)/(94-98) 195/94  SpO2:  [96 %] 96 %  0 lbs 0 oz    Constitutional: Awake, alert, cooperative, no apparent distress.  Eyes: Conjunctiva and pupils examined and normal.  HEENT: Moist mucous membranes, normal dentition.  Respiratory: Clear to auscultation bilaterally, no crackles or wheezing.  Cardiovascular: Regular rate and rhythm, normal S1 and S2, and no murmur noted.  GI: Soft, non-distended, non-tender, normal bowel sounds.  Lymph/Hematologic: No anterior cervical or supraclavicular adenopathy.  Skin: No rashes, no cyanosis, no edema.  Musculoskeletal: No joint swelling, erythema or tenderness.  Neurologic: Cranial nerves 2-12 intact, normal strength and sensation.  Psychiatric: Alert, oriented to person, place and time, no obvious anxiety or depression.    Data   Data reviewed today:  I personally reviewed labs and imaging below.  Recent Labs   Lab 10/27/21  2042   WBC 9.6   HGB 12.3   MCV 93         POTASSIUM 4.5   CHLORIDE 111*   CO2 24   BUN 25   CR 1.11*   ANIONGAP 4   ESTEE 8.9   GLC 86   ALBUMIN 3.5   PROTTOTAL 7.6   BILITOTAL 0.1*   ALKPHOS 73   ALT 18   AST 15    TROPONIN <0.015       Imaging:  Recent Results (from the past 24 hour(s))   CT Head w/o Contrast    Narrative    EXAM: CT HEAD W/O CONTRAST  LOCATION: [facility]  DATE/TIME: 10/27/2021 9:15 PM    INDICATION: Mental status change, unknown cause  COMPARISON: 07/20/2020  TECHNIQUE: Routine CT Head without IV contrast. Multiplanar reformats. Dose reduction techniques were used.    FINDINGS:  INTRACRANIAL CONTENTS: No intracranial hemorrhage, extraaxial collection, or mass effect.  No CT evidence of acute infarct. Unchanged chronic cortical infarctions in the superior left occipital lobe and in the superior right parietal lobe. Mild presumed   chronic small vessel ischemic changes. Mild generalized volume loss. No hydrocephalus.     VISUALIZED ORBITS/SINUSES/MASTOIDS: No intraorbital abnormality. No paranasal sinus mucosal disease. No middle ear or mastoid effusion.    BONES/SOFT TISSUES: No acute abnormality.        Impression    IMPRESSION:  1.  No acute intracranial hemorrhage, mass effect, or CT evidence for acute infarction.  2.  Unchanged chronic cortical infarctions in the superior left occipital lobe and superior right parietal lobe.  3.  Stable background of mild chronic age-related change.

## 2021-10-28 NOTE — PROGRESS NOTES
MD Notification    Notified Person: MD    Notified Person Name:  Jose Ladenshelly    Notification Date/Time: 10/28/2021 1015    Notification Interaction: web page    Purpose of Notification:Per Pt's daughter- claustophobic. Can we get PRN in case we need to give for MRI? Needed one last time she got MRI.     Orders Received: PRN xanax ordered.     Comments:

## 2021-10-28 NOTE — PHARMACY-ADMISSION MEDICATION HISTORY
Pharmacy Medication History  Admission medication history interview status for the 10/27/2021  admission is complete. See EPIC admission navigator for prior to admission medications     Location of Interview: Patient room  Medication history sources: Patient's family/friend (daughter), Surescripts, Patient's home med list and Care Everywhere    Significant changes made to the medication list:  Changed: Miralax from daily to PRN  Removed: pantoprazole    In the past week, patient estimated taking medication this percent of the time: greater than 90%    Additional medication history information:   None    Medication reconciliation completed by provider prior to medication history? No    Time spent in this activity: 15 mins    Prior to Admission medications    Medication Sig Last Dose Taking? Auth Provider   acetaminophen (TYLENOL) 500 MG tablet Take 2 tablets (1,000 mg) by mouth every 8 hours as needed for mild pain  Yes Sheila De La O MD   cholecalciferol (VITAMIN D3) 5000 units (125 mcg) CAPS capsule Take 5,000 Units by mouth daily 10/27/2021 at am Yes Reported, Patient   clopidogrel (PLAVIX) 75 MG tablet TAKE 1 TABLET BY MOUTH EVERY DAY 10/27/2021 at am Yes Jyoti Moreno MD   escitalopram (LEXAPRO) 20 MG tablet Take 1 tablet (20 mg) by mouth daily 10/26/2021 at pm Yes Elmo Loza MD   esomeprazole (NEXIUM) 40 MG DR capsule Take 1 capsule (40 mg) by mouth every morning (before breakfast) Take 30-60 minutes before eating. 10/27/2021 at am Yes Jyoti Moreno MD   levothyroxine (SYNTHROID/LEVOTHROID) 25 MCG tablet TAKE 1 TABLET BY MOUTH EVERY DAY 10/27/2021 at am Yes Jyoti Moreno MD   lisinopril (ZESTRIL) 30 MG tablet TAKE 1 TABLET BY MOUTH EVERY DAY 10/26/2021 at pm Yes Jyoti Moreno MD   meclizine (ANTIVERT) 25 MG tablet Take 1 tablet (25 mg) by mouth 2 times daily as needed for dizziness  Yes Jyoti Moreno MD   mirtazapine (REMERON) 30 MG  tablet TAKE 1 TABLET (30 MG) BY MOUTH AT BEDTIME 10/26/2021 at pm Yes Jyoti Moreno MD   Multiple Vitamins-Minerals (ANTIOXIDANT PO) Take 1 tablet by mouth daily CONSULT [facility]LL[facility] 10/27/2021 at am Yes Reported, Patient   polyethylene glycol (MIRALAX) 17 GM/SCOOP powder Take 17 g by mouth daily To twice daily as needed for constipation.  Patient taking differently: Take 17 g by mouth daily as needed   Yes Jyoti Moreno MD   senna-docusate (SENOKOT-S/PERICOLACE) 8.6-50 MG tablet Take 1 tablet by mouth 2 times daily as needed for constipation  Yes Sheila De La O MD   Blood Pressure Monitoring (BLOOD PRESSURE CUFF) MISC 1 each daily as needed   Jyoti Moreno MD   Blood Pressure Monitoring (BLOOD PRESSURE CUFF) MISC 1 Device daily as needed   Jyoti Moreno MD   Blood Pressure Monitoring (BLOOD PRESSURE MONITOR/M CUFF) MISC 1 each daily as needed   Jyoti Moreno MD   Nutritional Supplements (ENSURE HIGH PROTEIN) Take 1 Can by mouth 3 times daily (with meals)   Sheba Henriquez NP       The information provided in this note is only as accurate as the sources available at the time of update(s)

## 2021-10-28 NOTE — PROGRESS NOTES
10/28/21 1500   Quick Adds   Type of Visit Initial Occupational Therapy Evaluation   Living Environment   People in home alone   Current Living Arrangements house   Home Accessibility stairs to enter home;stairs within home   Number of Stairs, Main Entrance 1   Number of Stairs, Within Home, Primary greater than 10 stairs   Transportation Anticipated family or friend will provide   Living Environment Comments Pt's daughter providing PLOF via phone as pt unreliable historian at this time. Pt lives alone in rambler-style home, pt does not need to access basement, pt's daughter does her laundry. Pt has tub-shower   Self-Care   Usual Activity Tolerance good   Current Activity Tolerance moderate   Equipment Currently Used at Home none  (has FWW and cane)   Activity/Exercise/Self-Care Comment Pt's daughter reporting pt independent with ADLs and gait, has been told before to use FWW or cane but pt does not use. Daughter helps with IADLs including laundry, transportation, cooking, cleaning. Pt's daughter is with pt on Mon, Wed, Fri all day and stops in other times of the week as well   Disability/Function   Hearing Difficulty or Deaf no   Wear Glasses or Blind yes   Vision Management glasses   Concentrating, Remembering or Making Decisions Difficulty yes   Concentration Management   (STM difficulty per daughter)   Difficulty Communicating no   Difficulty Eating/Swallowing no   Walking or Climbing Stairs Difficulty no   Dressing/Bathing Difficulty no   Toileting issues no   Doing Errands Independently Difficulty (such as shopping) yes   Errands Management   (pt does not drive)   Fall history within last six months no   General Information   Onset of Illness/Injury or Date of Surgery 10/27/21   Referring Physician Sheila De La O MD   Additional Occupational Profile Info/Pertinent History of Current Problem Pt is 77 yo female admitted 10/27 for AMS, possible UTI. PMH significant for vascular dementia, HTN, CKD, hx of fall  resulting in ICH in June 2020   Existing Precautions/Restrictions fall   General Observations and Info Vital signs monitored throughout session. BP pre-activity 155/69, post-activity 140/88. HR 72, O2 saturation 96% on RA   Cognitive Status Examination   Orientation Status person;place  (did not know year but knew month)   Affect/Mental Status (Cognitive) confused   Follows Commands follows one-step commands;75-90% accuracy;repetition of directions required   Safety Deficit safety precautions follow-through/compliance;minimal deficit;insight into deficits/self-awareness   Memory Deficit short-term memory;severe deficit   Cognitive Status Comments Patient pleasant and cooperative throughout session. Confused at times, unable to recall details from PLOF. Administered to administer Short Blessed Test, pt scoring 18, current score consistent with severe cognitive deficit   Visual Perception   Visual Impairment/Limitations corrective lenses full-time   Sensory   Sensory Quick Adds No deficits were identified   Pain Assessment   Patient Currently in Pain No   Posture   Posture protracted shoulders   Range of Motion Comprehensive   General Range of Motion bilateral upper extremity ROM WFL   Strength Comprehensive (MMT)   Comment, General Manual Muscle Testing (MMT) Assessment Generalized weakness, B UE 4/5   Muscle Tone Assessment   Muscle Tone Quick Adds No deficits were identified   Coordination   Upper Extremity Coordination No deficits were identified   Bed Mobility   Bed Mobility supine-sit;sit-supine   Supine-Sit [place] (Bed Mobility) supervision   Sit-Supine [place] (Bed Mobility) supervision   Transfers   Transfers sit-stand transfer   Sit-Stand Transfer   Sit-Stand [place] (Transfers) contact guard   Balance   Balance Comments Pt unsteady with mobility, requiring CGA. Pt walking with short shuffling steps and uneven gait pattern.   Activities of Daily Living   BADL Assessment lower body  dressing;toileting   Lower Body Dressing Assessment   [place] Level (Lower Body Dressing) supervision   Toileting   [place] Level (Toileting) contact guard assist   Assistive Devices (Toileting) grab bar, toilet   Clinical Impression   Criteria for Skilled Therapeutic Interventions Met (OT) yes;meets criteria;skilled treatment is necessary   OT Diagnosis Impaired independence with I/ADLS   OT Problem List-Impairments impacting ADL activity tolerance impaired;balance;cognition   Assessment of Occupational Performance 3-5 Performance Deficits   Identified Performance Deficits functional mobility, higher level IADLs including cooking, med mgmt, etc   Planned Therapy Interventions (OT) cognition;IADL retraining;ADL retraining   Clinical Decision Making Complexity (OT) moderate complexity   Therapy Frequency (OT) Daily   Predicted Duration of Therapy 4 days   Anticipated Equipment Needs Upon Discharge (OT)   (TBD)   Risk & Benefits of therapy have been explained evaluation/treatment results reviewed;care plan/treatment goals reviewed;risks/benefits reviewed;current/potential barriers reviewed;participants voiced agreement with care plan;participants included;patient;daughter   OT Discharge Planning    OT Discharge Recommendation (DC Rec) Transitional Care Facility;Home with assist;home with home care occupational therapy   Therapy Certification   Start of Care Date 10/28/21   Certification date from 10/28/21   Certification date to 11/03/21   Medical Diagnosis AMS   Total Evaluation Time (Minutes)   Total Evaluation Time (Minutes) 15

## 2021-10-28 NOTE — PLAN OF CARE
[facility]      OUTPATIENT OCCUPATIONAL THERAPY  EVALUATION  PLAN OF TREATMENT FOR OUTPATIENT REHABILITATION  (COMPLETE FOR INITIAL CLAIMS ONLY)  Patient's Last Name, First Name, M.I.  YOB: 1945  Anabelle Herbert                          Provider's Name  [facility] Medical Record No.  [id number]                               Onset Date:  10/27/21   Start of Care Date:  10/28/21     Type:     ___PT   _X_OT   ___SLP Medical Diagnosis:  AMS                        OT Diagnosis:  Impaired independence with I/ADLS   Visits from SOC:  1   _________________________________________________________________________________  Plan of Treatment/Functional Goals    Planned Interventions: cognition, IADL retraining, ADL retraining   Goals: See Occupational Therapy Goals on Care Plan in [employer] electronic health record.    Therapy Frequency: 3x/week  Predicted Duration of Therapy Intervention: 4 days  _________________________________________________________________________________    I CERTIFY THE NEED FOR THESE SERVICES FURNISHED UNDER        THIS PLAN OF TREATMENT AND WHILE UNDER MY CARE     (Physician co-signature of this document indicates review and certification of the therapy plan).                Certification date from: 10/28/21, Certification date to: 11/03/21    Referring Physician: Sheila De La O MD            Initial Assessment        See Occupational Therapy evaluation dated 10/28/21 in Epic electronic health record.

## 2021-10-28 NOTE — PLAN OF CARE
Reason for Admission: Altered mental status, hallucinations  Family reports A+O x4 at [facility] with short term memory issues     Cognitive/Mentation: orientated to person, inconsistent with place and time, disorientated to situation   Neuros/CMS: Intact   VS: hypertension within parameters.  GI: BS active, patient reports flatus, last BM unknown, no BM since admission. Continent.  : WNL. Continent.  Pulmonary: LS Clear.  Pain: denies.     Skin: WNL  Activity: Assist x 1 with gait belt.  Diet: Regular, patient reports no appetite with thin liquids. Takes pills whole.     Therapies recs: TBD  Discharge: Obs patient, discharge pending MRI and neuro consult    End of shift summary: Patient has been sleeping for most of the day, family reports that she normal sleeps until 11 pm, OT eval before discharge   New R visual field cut noticed at 1726, RRT called (see note) RRT called code stroke

## 2021-10-28 NOTE — ED NOTES
[facility]  ED Nurse Handoff Report    ED Chief complaint: Altered Mental Status      ED Diagnosis:   Final diagnoses:   Hallucinations       Code Status: Full Code    Allergies:   Allergies   Allergen Reactions     Cats      Contrast Dye      Burning, hematuria     Diatrizoate Other (See Comments)     Feels burning inside body     Sulfa Drugs Unknown     Bupropion Anxiety     Patient reports increased anxiety, panic, difficulty concentrating, and various aches and pains       Patient Story: Pt comes in via EMS for increased confusion. Pt w/ hx of vascular dementia. Daughter noticed pt to have increased confusion since this morning that has been off and on all day. Resp: WNL Cardiac: HTN. Neuro: oriented to self. Severe short term memory loss. Unsteady gait. Pleasant.       Treatments and/or interventions provided: none  Patient's response to treatments and/or interventions:     To be done/followed up on inpatient unit:      Does this patient have any cognitive concerns?: Short term memory loss, Baseline dementia, Forgetful, Disoriented to time and Disoriented to place    Activity level - Baseline/Home:  Stand with Assist  Activity Level - Current:   Stand with Assist    Patient's Preferred language: English   Needed?: No    Isolation: None  Infection: Not Applicable  Patient tested for COVID 19 prior to admission: YES  Bariatric?: No    Vital Signs:   Vitals:    10/27/21 2035   BP: (!) 195/94   Pulse: 80   Resp: 16   Temp: 98.3  F (36.8  C)   TempSrc: Oral   SpO2: 96%       Cardiac Rhythm:     Was the PSS-3 completed:   Yes  What interventions are required if any?               Family Comments: Daughter at bedside  OBS brochure/video discussed/provided to patient/family: Yes              Name of person given brochure if not patient:               Relationship to patient:     For the majority of the shift this patient's behavior was Green.   Behavioral interventions performed were .    ED  NURSE PHONE NUMBER:

## 2021-10-28 NOTE — CODE/RAPID RESPONSE
"[facility]    CODE STROKE NOTE  10/28/2021   Time Called: 1803    RRT called for: Concern for CVA    Neuro:  Right visual field cut (new deficit).  Time:  1400 (last known neuro normal).  tPA: contraindications - -> Pharm notified do not mix tPA.    Stroke Mimics were considered (including migraine headache, seizure disorder, hypoglycemia (or hyperglycemia), head or spinal trauma, CNS infection, Toxin ingestion and shock state (e.g. sepsis) .     Acute Right Field Cut   Mental Status:  fully alert, follows commands, speech clear and fluent  Cranial Nerves:  PERRL, facial sensation intact and symmetric, facial movements symmetric, tongue protrusion midline, no dysarthria, no difficulty hearing during conversation, shoulder shrug intact  Motor: no abnormal movements, Muscle 5/5  strength bilaterally, bilateral elbow flexion and extension 5,  Sensory:  Intact and symmetric touch sensation for face, hands bilaterally. Slight sensation dullness/difference on RLE (less so) compared to right.   Coordination:  FNF intact with no dymetria, no ataxia noted on exam. In order to complete exam on right, pt needed to fully turn her head to touch my fingers   Station/Gait: intact    Medical history significant for: CVA x 2, frequent UTIs    INTERVENTIONS:  - CTA, head w/o, perfusion  -MRI already planned     Physical Exam   Vital Signs with Ranges:  Temp:  [97.6  F (36.4  C)-98.3  F (36.8  C)] 97.7  F (36.5  C)  Pulse:  [63-86] 73  Resp:  [16-18] 18  BP: (109-195)/() 142/82  SpO2:  [94 %-97 %] 94 %  I/O last 3 completed shifts:  In: 240 [P.O.:240]  Out: -     Recommendations:  --continue with MRI to evaluate confusion  --already on plavix, neuro will follow MRI results to determine if additional antiplatelet is needed if new stroke present     Of note, the patient had a listed allergy to contrast dye with response as \"hematuria and burning\" in 2012. Her daughter had completed the MRI checklist " and indicated no allergy to contrast dye. The patient stated she had no knowledge of such an allergy.   --contrast was utilized during her neuroimaging without allergy  --the patient did note an increase intensity of her urinary frequency and burning post CT dye. She was admitted with UTI symptoms, unclear if this is an exacerbation of her symptoms or truly a result of the dye.    --at 2030, symptoms were improving, will monitor for now, but may be experiencing bladder spasms ? Will not initiate treatment as of now with improving symptoms.    Discussed with and defer further cares to Dr. Osuna  / Neurology      Code Status: Full Code      CLAYTON Perez [facility]  Hospitalist Service  House Officer  Pager: [phone] (7a - 6p)      Allergies   Allergies   Allergen Reactions     Cats      Contrast Dye      Burning, hematuria     Diatrizoate Other (See Comments)     Feels burning inside body     Sulfa Drugs Unknown     Bupropion Anxiety     Patient reports increased anxiety, panic, difficulty concentrating, and various aches and pains       Data     Troponin:    Recent Labs   Lab Test 10/27/21  2042   TROPONIN <0.015       IMAGING: (X-ray/CT/MRI)   Recent Results (from the past 24 hour(s))   CT Head w/o Contrast    Narrative    EXAM: CT HEAD W/O CONTRAST  LOCATION: [facility]  DATE/TIME: 10/27/2021 9:15 PM    INDICATION: Mental status change, unknown cause  COMPARISON: 07/20/2020  TECHNIQUE: Routine CT Head without IV contrast. Multiplanar reformats. Dose reduction techniques were used.    FINDINGS:  INTRACRANIAL CONTENTS: No intracranial hemorrhage, extraaxial collection, or mass effect.  No CT evidence of acute infarct. Unchanged chronic cortical infarctions in the superior left occipital lobe and in the superior right parietal lobe. Mild presumed   chronic small vessel ischemic changes. Mild generalized volume loss. No hydrocephalus.     VISUALIZED ORBITS/SINUSES/MASTOIDS: No  intraorbital abnormality. No paranasal sinus mucosal disease. No middle ear or mastoid effusion.    BONES/SOFT TISSUES: No acute abnormality.        Impression    IMPRESSION:  1.  No acute intracranial hemorrhage, mass effect, or CT evidence for acute infarction.  2.  Unchanged chronic cortical infarctions in the superior left occipital lobe and superior right parietal lobe.  3.  Stable background of mild chronic age-related change.   CT Head w/o Contrast    Narrative    CT SCAN OF THE HEAD WITHOUT CONTRAST   10/28/2021 6:30 PM     HISTORY: Transient ischemic attack (TIA); Code Stroke    TECHNIQUE:  Axial images of the head and coronal reformations without  IV contrast material. Radiation dose for this scan was reduced using  automated exposure control, adjustment of the mA and/or kV according  to patient size, or iterative reconstruction technique.    COMPARISON: CT head 10/27/2021.    FINDINGS: The ventricles are normal in size and configuration. Chronic  cortical/subcortical infarct involving the cuneus gyrus of the left  occipital lobe, as before. Chronic small cortical/subcortical infarct  involving the right parietal lobe, as before. Mild patchy nonspecific  hypoattenuation is again noted in the cerebral white matter,  presumably representing chronic small vessel ischemic changes. There  is mild generalized brain parenchymal volume loss. No definite acute  extended infarct signs. No new/acute intracranial hemorrhage,  extra-axial fluid collection, or mass effect/herniation. Visualized  orbits, paranasal sinuses, and mastoids are normal.      Impression    IMPRESSION:  1. No CT findings of acute intracranial process.  2. Unchanged chronic left occipital and right parietal infarcts.  3. Brain atrophy and presumed chronic small vessel ischemic changes,  as described.    The findings were discussed with Dr. Osuna by myself at approximately  6:50 PM on 10/28/2021.   CTA Head Neck with Contrast    Narrative    CT  ANGIOGRAM OF THE HEAD AND NECK WITH CONTRAST  10/28/2021 6:32 PM     HISTORY: Transient ischemic attack (TIA); Code Stroke     TECHNIQUE:  CT angiography with an injection of 70 mL Isovue-370 IV  with scans through the head and neck. Images were transferred to a  separate 3-D workstation where multiplanar reformations and 3-D images  were created. Estimates of carotid stenoses are made relative to the  distal internal carotid artery diameters except as noted. Radiation  dose for this scan was reduced using automated exposure control,  adjustment of the mA and/or kV according to patient size, or iterative  reconstruction technique.    COMPARISON: CT scan of same day. MRA of the head 11/2/2016 MRA of the  neck 11/2/2016.     CT HEAD FINDINGS: No contrast enhancing lesions. Cerebral blood flow  is grossly normal.     CT ANGIOGRAM HEAD FINDINGS:  There is severe focal stenosis of the  distal P2 segment of the left posterior cerebral artery (series 9  image 376, series 12 image 96). There is moderate stenosis of the mid  P2 segment of the left posterior cerebral artery (series 9 image 368).  No definite large vessel occlusion identified. There is a somewhat  diminutive appearance of the distal left ZOË branches in the region of  the known chronic left cuneus gyrus infarct, likely chronic.    Mild nonflow calcified atherosclerosis involving right greater than  left carotid siphons. No high-grade stenosis or large vessel occlusion  involving the major proximal visualized branches of the anterior  cerebral or middle cerebral arteries is identified. No aneurysm or  high flow vascular malformation is seen.    CT ANGIOGRAM NECK FINDINGS:   Mild atherosclerosis of the aortic arch without significant stenosis  of the origins of the great vessels. Conventional three-vessel aortic  arch branching pattern.     Right carotid artery: There is mixed plaque in the distal common  carotid artery extending into the carotid  bifurcation/proximal  internal carotid artery resulting in up to approximately 50-55%  stenosis of the proximal right internal carotid artery by NASCET  criteria.    Left carotid artery: The left common and internal carotid arteries are  patent. Mild calcified left carotid bifurcation/proximal internal  carotid artery atherosclerotic disease without significant stenosis by  NASCET criteria.     Vertebral arteries: Vertebral arteries are patent without evidence of  dissection. No significant stenosis.     Other findings: None.       Impression    IMPRESSION:  1. Multifocal stenosis of the left posterior cerebral artery,  including a severe focal stenosis of the distal P2 segment.  2. No definite large vessel occlusion. No other high-grade central  intracranial arterial stenosis.  3. Approximately 50-55% stenosis of the proximal right internal  carotid artery by NASCET criteria due to mixed atherosclerotic plaque.  4. No significant stenosis of the left cervical carotid artery or  bilateral cervical vertebral arteries.    The findings were discussed with Dr. Osuna by myself at approximately  6:50 PM on 10/28/2021.   CT Head Perfusion w Contrast    Narrative    CT BRAIN PERFUSION 10/28/2021 6:47 PM    HISTORY: Transient ischemic attack (TIA); Evaluate mismatch between  penumbra and core infarct    TECHNIQUE: Time sequential axial CT images of the head were acquired  during the administration of 120mL Isovue 370. IV. Color perfusion  maps of the brain were created from this time sequential axial source  data.     Radiation dose for this scan was reduced using automated exposure  control, adjustment of the mA and/or kV according to patient size, or  iterative reconstruction technique.    COMPARISON: CTA head and neck of same day.      Impression    Impression: There appears to be a subtle asymmetric prolongation of  the contrast transit time to the left posterior cerebral artery  territory, which may be related to the  severe left distal P2 stenosis  (series 206 image 24). There appear to be small matched perfusion  defects corresponding to the patient's known right parietal and left  occipital infarcts (series 204 image 26, series 204 image 31). No  definite acute infarct by perfusion criteria.       CBC with Diff:  Recent Labs   Lab Test 10/28/21  0730 10/31/16  1001 06/30/15  1539   WBC 7.4   < >  --    HGB 11.4*   < >  --    MCV 93   < >  --       < >  --    INR  --   --  0.95    < > = values in this interval not displayed.      Absolute Retic (10e9/L)   Date Value   09/23/2019 40.8     % Retic (%)   Date Value   09/23/2019 1.1       Lactic Acid:    No results found for: LACT        Comprehensive Metabolic Panel:  Recent Labs   Lab 10/28/21  1750 10/28/21  1013 10/28/21  0730 10/27/21  2042 10/27/21  2042   NA  --   --  139   < > 139   POTASSIUM  --   --  3.9   < > 4.5   CHLORIDE  --   --  110*   < > 111*   CO2  --   --  26   < > 24   ANIONGAP  --   --  3   < > 4   GLC 92   < > 87   < > 86   BUN  --   --  19   < > 25   CR  --   --  0.86   < > 1.11*   GFRESTIMATED  --   --  67   < > 49*   ESTEE  --   --  8.5   < > 8.9   PROTTOTAL  --   --   --   --  7.6   ALBUMIN  --   --   --   --  3.5   BILITOTAL  --   --   --   --  0.1*   ALKPHOS  --   --   --   --  73   AST  --   --   --   --  15   ALT  --   --   --   --  18    < > = values in this interval not displayed.       INR:    Recent Labs   Lab Test 06/30/15  1539   INR 0.95       D-DIMER:  No results found for: DDIMER    BNP:  No results found for: BNP    UA:  Recent Labs   Lab 10/27/21  2043   COLOR Yellow   APPEARANCE Clear   URINEGLC Negative   URINEBILI Negative   URINEKETONE Negative   SG 1.027   UBLD Negative   URINEPH 5.0   PROTEIN Negative   NITRITE Negative   LEUKEST Small*   RBCU 2   WBCU 7*         Time Spent on this Encounter     I spent 60 minutes (1803 - 1903) of critical care time on the unit/floor managing the care of  Anabelle Herbert. Upon evaluation, this  patient had a high probability of imminent or life-threatening deterioration due to acute neurological deficit which required my direct attention, intervention, and personal management. 100% of my time was spent at the bedside counseling the patient and/or coordinating care regarding services listed in this note.

## 2021-10-28 NOTE — PROGRESS NOTES
10/28/21 1500   Quick Adds   Type of Visit Initial Occupational Therapy Evaluation   Living Environment   People in home alone   Current Living Arrangements house   Home Accessibility stairs to enter home;stairs within home   Number of Stairs, Main Entrance 1   Number of Stairs, Within Home, Primary greater than 10 stairs   Transportation Anticipated family or friend will provide   Living Environment Comments Pt's daughter providing PLOF via phone as pt unreliable historian at this time. Pt lives alone in rambler-style home, pt does not need to access basement, pt's daughter does her laundry. Pt has tub-shower   Self-Care   Usual Activity Tolerance good   Current Activity Tolerance moderate   Equipment Currently Used at Home none  (has FWW and cane)   Activity/Exercise/Self-Care Comment Pt's daughter reporting pt independent with ADLs and gait, has been told before to use FWW or cane but pt does not use. Daughter helps with IADLs including laundry, transportation, cooking, cleaning. Pt's daughter is with pt on Mon, Wed, Fri all day and stops in other times of the week as well   Disability/Function   Hearing Difficulty or Deaf no   Wear Glasses or Blind yes   Vision Management glasses   Concentrating, Remembering or Making Decisions Difficulty yes   Concentration Management   (STM difficulty per daughter)   Difficulty Communicating no   Difficulty Eating/Swallowing no   Walking or Climbing Stairs Difficulty no   Dressing/Bathing Difficulty no   Toileting issues no   Doing Errands Independently Difficulty (such as shopping) yes   Errands Management   (pt does not drive)   Fall history within last six months no   General Information   Onset of Illness/Injury or Date of Surgery 10/27/21   Referring Physician Sheila De La O MD   Additional Occupational Profile Info/Pertinent History of Current Problem Pt is 75 yo female admitted 10/27 for AMS, possible UTI. PMH significant for vascular dementia, HTN, CKD, hx of fall  resulting in ICH in June 2020   Existing Precautions/Restrictions fall   General Observations and Info Vital signs monitored throughout session. BP pre-activity 155/69, post-activity 140/88. HR 72, O2 saturation 96% on RA   Cognitive Status Examination   Orientation Status person;place  (did not know year but knew month)   Affect/Mental Status (Cognitive) confused   Follows Commands follows one-step commands;75-90% accuracy;repetition of directions required   Safety Deficit safety precautions follow-through/compliance;minimal deficit;insight into deficits/self-awareness   Memory Deficit short-term memory;severe deficit   Cognitive Status Comments Patient pleasant and cooperative throughout session. Confused at times, unable to recall details from PLOF. Administered to administer Short Blessed Test, pt scoring 18, current score consistent with severe cognitive deficit   Visual Perception   Visual Impairment/Limitations corrective lenses full-time   Sensory   Sensory Quick Adds No deficits were identified   Pain Assessment   Patient Currently in Pain No   Posture   Posture protracted shoulders   Range of Motion Comprehensive   General Range of Motion bilateral upper extremity ROM WFL   Strength Comprehensive (MMT)   Comment, General Manual Muscle Testing (MMT) Assessment Generalized weakness, B UE 4/5   Muscle Tone Assessment   Muscle Tone Quick Adds No deficits were identified   Coordination   Upper Extremity Coordination No deficits were identified   Bed Mobility   Bed Mobility supine-sit;sit-supine   Supine-Sit [place] (Bed Mobility) supervision   Sit-Supine [place] (Bed Mobility) supervision   Transfers   Transfers sit-stand transfer   Sit-Stand Transfer   Sit-Stand [place] (Transfers) contact guard   Balance   Balance Comments Pt unsteady with mobility, requiring CGA. Pt walking with short shuffling steps and uneven gait pattern.   Activities of Daily Living   BADL Assessment lower body  dressing;toileting   Lower Body Dressing Assessment   [place] (Lower Body Dressing) supervision   Toileting   [place] (Toileting) contact guard assist   Assistive Devices (Toileting) grab bar, toilet   Clinical Impression   Criteria for Skilled Therapeutic Interventions Met (OT) yes;meets criteria;skilled treatment is necessary   OT Diagnosis Impaired independence with I/ADLS   OT Problem List-Impairments impacting ADL activity tolerance impaired;balance;cognition   Assessment of Occupational Performance 3-5 Performance Deficits   Identified Performance Deficits functional mobility, higher level IADLs including cooking, med mgmt, etc   Planned Therapy Interventions (OT) cognition;IADL retraining;ADL retraining   Clinical Decision Making Complexity (OT) moderate complexity   Therapy Frequency (OT) 5x/wk   Predicted Duration of Therapy 4 days   Anticipated Equipment Needs Upon Discharge (OT)   (TBD)   Risk & Benefits of therapy have been explained evaluation/treatment results reviewed;care plan/treatment goals reviewed;risks/benefits reviewed;current/potential barriers reviewed;participants voiced agreement with care plan;participants included;patient;daughter   OT Discharge Planning    OT Discharge Recommendation (DC Rec) Home with assist;home with home care occupational therapy   OT Rationale for DC Rec Patient presents below baseline for cognition and functional mobility with ADLs. Pt with vascular dementia diagnosis at baseline but per daughter, is normally oriented with good awareness. Pt currently presenting with balance impairments, per daughter pt has some balance deficits but it appeared much worse on day of admission. Recommend 24 hr assist at home with I/ADLs and functional mobility- pt's daughter stating pt can come stay with her and receive this level of care. Recommend home OT for further cognitive assessment and home safety eval   Therapy Certification   Start of Care Date 10/28/21    Certification date from 10/28/21   Certification date to 11/03/21   Medical Diagnosis AMS   Total Evaluation Time (Minutes)   Total Evaluation Time (Minutes) 15

## 2021-10-28 NOTE — PROVIDER NOTIFICATION
MD Notification    Notified Person: MD    Notified Person Name:    Notification Date/Time: 10/28 0125    Notification Interaction: Paged    Purpose of Notification: BP continues to be elevated.  Last was 165/94.  She does not have any PRNs or Perimeters.  Would you like me to treat this?    Orders Received: IV Labetol PRN ordered    Comments:

## 2021-10-28 NOTE — ED NOTES
Bed: ED25  Expected date: 10/27/21  Expected time: 8:20 PM  Means of arrival: Ambulance  Comments:  [address] 27F confusion

## 2021-10-28 NOTE — PLAN OF CARE
[facility]      OUTPATIENT OCCUPATIONAL THERAPY  EVALUATION  PLAN OF TREATMENT FOR OUTPATIENT REHABILITATION  (COMPLETE FOR INITIAL CLAIMS ONLY)  Patient's Last Name, First Name, M.I.  YOB: 1945  Anabelle Herbert                          Provider's Name  [facility] Medical Record No.  [id number]                               Onset Date:  10/27/21   Start of Care Date:  10/28/21     Type:     ___PT   _X_OT   ___SLP Medical Diagnosis:  AMS                        OT Diagnosis:  Impaired independence with I/ADLS   Visits from SOC:  1   _________________________________________________________________________________  Plan of Treatment/Functional Goals    Planned Interventions: cognition, IADL retraining, ADL retraining   Goals: See Occupational Therapy Goals on Care Plan in [employer] electronic health record.    Therapy Frequency: 5x/week  Predicted Duration of Therapy Intervention: 4 days  _________________________________________________________________________________    I CERTIFY THE NEED FOR THESE SERVICES FURNISHED UNDER        THIS PLAN OF TREATMENT AND WHILE UNDER MY CARE     (Physician co-signature of this document indicates review and certification of the therapy plan).                Certification date from: 10/28/21, Certification date to: 11/03/21    Referring Physician: Sheila De La O MD            Initial Assessment        See Occupational Therapy evaluation dated 10/28/21 in Epic electronic health record.

## 2021-10-28 NOTE — CONSULTS
"            [facility]    Neurology Consultation    Anabelle Herbert MRN# [id number]   YOB: 1945 Age: 76 year old    Code Status:Full Code   Date of Admission: 10/27/2021  Date of Consult:10/28/2021                                                                                       Assessment and Plan:                                         #. Increased confusion-multifactorial.  -Known history of dementia, given history of stroke in 2016 this is most consistent with vascular dementia  -Records suggest possible UTI  -Polypharmacy/cannabis use likely contributory    Natural progression of dementia can sometimes involve periods of decompensation.  Differential diagnosis would include stroke  --Agree with plan for MRI, treat underlying contributory issues to confusion such as UTI if present.  Overall prognosis and expectations would be that the patient likely to have some progression of dementia in the months like this in the future.  Therefore or supervised living situation will need to be considered by patient/family.  Recommend avoiding cannabis in any psychoactive medications  We will follow up after MRI completed    ----------------------------------------------------------------------------------  ----------------------------------------------------------------------------------  Reason for consult: as above       Chief Complaint:   \"Do not know\"  History is obtained from the patient / chart       History of Present Illness:   This patient is a 76 year old female who presents with confusion, brought in yesterday.  Confused behavior was noted by daughter after neurology appointment yesterday.  She became agitated about seeing things-see yesterday's admission note.  She was becoming somewhat delusional.  She had not yet taken the increased dose of Lexapro that had been prescribed.  Urine screen is also positive for cannabis       Past Medical History:     Past Medical History:   Diagnosis " Date     Acute sinusitis, unspecified      Allergic rhinitis, cause unspecified      Candidiasis of unspecified site      Candidiasis of vulva and vagina      Diplopia      Dyspnea     following inhalation of cleaning solution. Treated with inhaler/neb.      Essential hypertension, benign      Hypercalcemia      Memory loss      Osteoporosis, unspecified     defined by chiropractor who did xrays during tx for back pain     Other B-complex deficiencies      Other, multiple and ill-defined closed fractures of lower limb 1990    prolonged healing     Unspecified cerebral artery occlusion with cerebral infarction      Unspecified disorder of skin and subcutaneous tissue      Urinary hesitancy      Urinary tract infection, site not specified          Past Surgical History:     Past Surgical History:   Procedure Laterality Date     COLONOSCOPY       CYSTOSCOPY, BIOPSY BLADDER, COMBINED  1/6/2014    Procedure: COMBINED CYSTOSCOPY, BIOPSY BLADDER;;  Surgeon: Vandana Tang MD;  Location: [facility] OR     CYSTOSCOPY, INJECT COLLAGEN, COMBINED  1/6/2014    Procedure: COMBINED CYSTOSCOPY, INJECT BULKING AGENT;  IC cocktail, bladder biopsy, fulguration, heparin, gentamyacin, lidocaine & kenalog;  Surgeon: Vandana Tang MD;  Location: [facility] OR     GENITOURINARY SURGERY       NO HISTORY OF SURGERY            Social History:     Social History     Socioeconomic History     Marital status:      Spouse name: Not on file     Number of children: Not on file     Years of education: Not on file     Highest education level: Not on file   Occupational History     Not on file   Tobacco Use     Smoking status: Never Smoker     Smokeless tobacco: Never Used   Substance and Sexual Activity     Alcohol use: No     Drug use: No     Sexual activity: Not Currently     Partners: Male   Other Topics Concern     Parent/sibling w/ CABG, MI or angioplasty before 65F 55M? No   Social History Narrative     Not on file     Social Determinants of  Health     Financial Resource Strain:      Difficulty of Paying Living Expenses:    Food Insecurity:      Worried About Running Out of Food in the Last Year:      Ran Out of Food in the Last Year:    Transportation Needs:      Lack of Transportation (Medical):      Lack of Transportation (Non-Medical):    Physical Activity:      Days of Exercise per Week:      Minutes of Exercise per Session:    Stress:      Feeling of Stress :    Social Connections:      Frequency of Communication with Friends and Family:      Frequency of Social Gatherings with Friends and Family:      Attends [employer] Services:      Active Member of Clubs or Organizations:      Attends Club or Organization Meetings:      Marital Status:    Intimate Partner Violence:      Fear of Current or Ex-Partner:      Emotionally Abused:      Physically Abused:      Sexually Abused:      Patient denies smoking, no significant alcohol intake, denies illicit drugs use, uses cannabis      Family History:     Family History   Problem Relation Age of Onset     Cancer Father         colon?     Heart Disease Father      Asthma Mother      Alzheimer Disease Mother 86     Unknown/Adopted Maternal Grandmother      Unknown/Adopted Maternal Grandfather      Unknown/Adopted Paternal Grandmother      Unknown/Adopted Paternal Grandfather      Asthma Sister      Allergies Sister      Unknown/Adopted Son      Diabetes No family hx of      Reviewed and not felt to be contributory.        Home Medications:     Prior to Admission Medications   Prescriptions Last Dose Informant Patient Reported? Taking?   Blood Pressure Monitoring (BLOOD PRESSURE CUFF) MISC  Daughter No No   Si Device daily as needed   Blood Pressure Monitoring (BLOOD PRESSURE CUFF) MISC  Daughter No No   Si each daily as needed   Blood Pressure Monitoring (BLOOD PRESSURE MONITOR/M CUFF) MISC  Daughter No No   Si each daily as needed   Multiple Vitamins-Minerals (ANTIOXIDANT PO) 10/27/2021 at am  Daughter Yes Yes   Sig: Take 1 tablet by mouth daily CONSULT [facility]   Nutritional Supplements (ENSURE HIGH PROTEIN)  Daughter No No   Sig: Take 1 Can by mouth 3 times daily (with meals)   acetaminophen (TYLENOL) 500 MG tablet  Daughter Yes Yes   Sig: Take 2 tablets (1,000 mg) by mouth every 8 hours as needed for mild pain   cholecalciferol (VITAMIN D3) 5000 units (125 mcg) CAPS capsule 10/27/2021 at am Daughter Yes Yes   Sig: Take 5,000 Units by mouth daily   clopidogrel (PLAVIX) 75 MG tablet 10/27/2021 at am Daughter No Yes   Sig: TAKE 1 TABLET BY MOUTH EVERY DAY   escitalopram (LEXAPRO) 20 MG tablet 10/26/2021 at pm Daughter No Yes   Sig: Take 1 tablet (20 mg) by mouth daily   esomeprazole (NEXIUM) 40 MG DR capsule 10/27/2021 at am Daughter No Yes   Sig: Take 1 capsule (40 mg) by mouth every morning (before breakfast) Take 30-60 minutes before eating.   levothyroxine (SYNTHROID/LEVOTHROID) 25 MCG tablet 10/27/2021 at am Daughter No Yes   Sig: TAKE 1 TABLET BY MOUTH EVERY DAY   lisinopril (ZESTRIL) 30 MG tablet 10/26/2021 at pm Daughter No Yes   Sig: TAKE 1 TABLET BY MOUTH EVERY DAY   meclizine (ANTIVERT) 25 MG tablet  Daughter No Yes   Sig: Take 1 tablet (25 mg) by mouth 2 times daily as needed for dizziness   mirtazapine (REMERON) 30 MG tablet 10/26/2021 at pm Daughter No Yes   Sig: TAKE 1 TABLET (30 MG) BY MOUTH AT BEDTIME   polyethylene glycol (MIRALAX) 17 GM/SCOOP powder  Daughter No Yes   Sig: Take 17 g by mouth daily To twice daily as needed for constipation.   Patient taking differently: Take 17 g by mouth daily as needed    senna-docusate (SENOKOT-S/PERICOLACE) 8.6-50 MG tablet  Daughter No Yes   Sig: Take 1 tablet by mouth 2 times daily as needed for constipation      Facility-Administered Medications: None          Allergy:     Allergies   Allergen Reactions     Cats      Contrast Dye      Burning, hematuria     Diatrizoate Other (See Comments)     Feels burning inside body     Sulfa  "Drugs Unknown     Bupropion Anxiety     Patient reports increased anxiety, panic, difficulty concentrating, and various aches and pains          Inpatient Medications:   Scheduled Meds:    cefTRIAXone  1 g Intravenous Q24H     clopidogrel  75 mg Oral Daily     escitalopram  20 mg Oral Daily     levothyroxine  25 mcg Oral Daily     lisinopril  40 mg Oral Daily     mirtazapine  30 mg Oral At Bedtime     pantoprazole  40 mg Oral QAM AC     polyethylene glycol  17 g Oral Daily     PRN Meds: acetaminophen, ALPRAZolam, labetalol, melatonin, ondansetron **OR** ondansetron, senna-docusate        Review of Systems    The Review of Systems is negative other than noted in the HPI  Unable to fully participate  NEURO: see HPI       Physical Exam:   Physical Exam   Vitals:  Height:5' 2\"  Weight:171 lbs 12.8 oz   Temp: 98  F (36.7  C) Temp src: Oral BP: (!) 148/90 Pulse: 69   Resp: 16 SpO2: 96 % O2 Device: None (Room air)    General Appearance:  No acute distress  Neuro:       Mental Status Exam:   Alert and oriented to self, hospital, not events of why she is here.  She does know the month but not the year nor the name of the president.  Confused but articulate speech       Cranial Nerves: 2 through 12 intact.  Martin grossly intact.  Extraocular movements intact.  Face symmetrical       Motor:   Tone bulk and strength intact x4          Reflexes: Symmetrically reduced, plantar signs neutral       Sensory: Double simultaneous stimulation and light touch intact x4                 Coordination: Intact x4       Gait: Not tested due to concerns about fall risk  Neck: no nuchal rigidity, normal thyroid. No carotid bruits.    Cardiovascular: Regular rate and rhythm, no m/r/g    Extremities: No clubbing, no cyanosis, no edema       Data:   ROUTINE IP LABS   CBC RESULTS:     Recent Labs   Lab 10/28/21  0730 10/27/21  2042   WBC 7.4 9.6   RBC 3.78* 4.05   HGB 11.4* 12.3   HCT 35.0 37.7    259     Basic Metabolic Panel:   Recent " Labs   Lab Test 10/28/21  1013 10/28/21  0730 10/27/21  2042 06/03/21  1359   NA  --  139 139 143   POTASSIUM  --  3.9 4.5 4.4   CHLORIDE  --  110* 111* 110*   CO2  --  26 24 27   BUN  --  19 25 20   CR  --  0.86 1.11* 1.07*   GLC 88 87 86 118*   ESTEE  --  8.5 8.9 8.9     Liver panel:  Recent Labs   Lab Test 10/27/21  2042 01/07/21  0834 06/22/20  0756 06/21/20  0727 06/03/20  1206   PROTTOTAL 7.6 7.5 6.3* 6.3* 7.0   ALBUMIN 3.5 3.9 3.1* 3.1* 3.8   BILITOTAL 0.1* 0.4 0.5 0.5 0.7   ALKPHOS 73 70 47 47 54   AST 15 18 30 37 23   ALT 18 22 30 33 18     Lipid Profile:  Recent Labs   Lab Test 08/21/19  1047 11/09/18  1322 05/30/18  1425 10/02/17  1441 11/28/16  1132 07/05/16  0825 06/30/15  1442 11/13/14  1541 11/13/14  1541   CHOL 215* 215* 243* 227* 172   < > 146   < > 200*   HDL 69 62 73 71 69   < > 53   < > 74   * 136* 156* 147* 87   < > 76   < > 114   TRIG 75 87 71 47 82   < > 87   < > 59   CHOLHDLRATIO  --   --   --   --   --   --  2.8  --  2.7    < > = values in this interval not displayed.     Thyroid Panel:  Recent Labs   Lab Test 10/27/21  2042 06/03/21  1359 01/29/21  1511 01/07/21  0834 06/03/20  1206 01/22/20  1745 09/23/19  1304 09/04/19  1512 09/04/19  1512 12/03/14  1503 11/13/14  1541   TSH 2.49 1.03 4.50* 4.22* 2.69   < > 5.39*   < > 4.24*   < > 4.02*   T4  --   --  0.80 0.87  --   --  0.82  --  0.79  --  0.81    < > = values in this interval not displayed.      Vitamin B12:   Recent Labs   Lab Test 01/07/21  0834 01/22/20  1745 09/04/19  1512   B12 [phone]           IMAGING:   All imaging studies were reviewed personally  CT head:   EXAM: CT HEAD W/O CONTRAST  LOCATION: [facility]  DATE/TIME: 10/27/2021 9:15 PM                                                                        IMPRESSION:  1.  No acute intracranial hemorrhage, mass effect, or CT evidence for acute infarction.  2.  Unchanged chronic cortical infarctions in the superior left occipital lobe and  superior right parietal lobe.  3.  Stable background of mild chronic age-related change    Time:  50minutes evaluation and management.     Angelica Plunkett M.D.  [facility] Neurology, [facility].  Office [phone]

## 2021-10-28 NOTE — ED TRIAGE NOTES
Pt lives with daughter. This morning on way home from neurology clinic pt became more confused. Has been confused off and on all day. Hx of vascular dementia. Thinks its valentines day and called daughter by different name. This confusion is different than her baseline.

## 2021-10-28 NOTE — PLAN OF CARE
Problem: Sleep Disturbance (Depressive Signs/Symptoms)  Goal: Improved Sleep (Depressive Signs/Symptoms)  Outcome: No Change  Flowsheets (Taken 10/28/2021 7825)  Mutually Determined Action Steps (Improved Sleep):   sleeps 4-6 hours at night   remains out of bed during day   identifies disturbance factors   uses relaxation techniques     Problem: Fall Injury Risk  Goal: Absence of Fall and Fall-Related Injury  Outcome: No Change  Intervention: Promote Injury-Free Environment  Recent Flowsheet Documentation  Taken 10/28/2021 0500 by Mariely Boyer  Safety Promotion/Fall Prevention:   bed alarm on   activity supervised   assistive device/personal items within reach  Taken 10/28/2021 0100 by Mariely Boyer  Safety Promotion/Fall Prevention:   bed alarm on   activity supervised   assistive device/personal items within reach    Care plan note: Anabelle is here for altered mental status.        Recent Vitals:  Temp: 98.3  F (36.8  C) Temp src: Oral BP: (!) 151/102 Pulse: 78   Resp: 16 SpO2: 96 % O2 Device: None (Room air)      Orientation/Neuro: Disoriented to, Time, Situation  Pain: The patient is not having any pain.   Tele: Sinus rhythm    IV medications: IV antibiotics   Mobility: St. by assist and Gait belt   Skin: With in normal limits   GI: WDL  : frequency     Diet: Tolerating diet:   Well  Orders Placed This Encounter      Regular Diet Adult      Safety/Concerns:  Fall Risk  Aggression Color: Green    Plan: Plans for MRI today and Neurology consult   Continue to monitor.      Mariely Boyer

## 2021-10-28 NOTE — PROGRESS NOTES
RECEIVING UNIT ED HANDOFF REVIEW    ED Nurse Handoff Report was reviewed by: Mariely Boyer on October 28, 2021 at 12:34 AM

## 2021-10-28 NOTE — PROGRESS NOTES
[facility]    Hospitalist Progress Note      Assessment & Plan   Anabelle Herbert is a 76 year old female who presents with occasional confusion for 1 day.    Altered mental status    Vascular dementia without behavioral disturbance (H)  --- In the emergency department her symptoms were considered to be hallucinations but on  further evaluation her symptoms mostly involves occasional confusion, patient was not seeing things which was not present but that she was talking about things that did not make sense.  ---Patient follows up outpatient with neurology and had visited with them  on 10/27, at that time they had concerns about patient having Alzheimer's dementia along with vascular dementia. Dr Loza had recommended increased dose of Lexapro for anxiety.  ---So far work-up here is negative except for mild abnormality in the UA, considering her confusion will start on antibiotics and order for urine culture. Patient does have history of thickening of the bladder/chronic cystitis and patient had prior follow-up with urology. Patient had mentioned increased frequency of micturition.  ---CT head done here did not indicate any new intracranial hemorrhage, patient has history of intracranial hemorrhage following a fall in June 2020.  --- Of note the urine drug screen is positive for cannabinoids, clarification with patient's daughter this morning states Patient  takes a sleep aid with cannabinoids for insomnia and anxiety and she may have taken extra dose last evening resulting in admission confusion.    - MRI brain to evaluate for any new lacunar infarcts; pending.  ---Patient does not seem to have hallucinations rather have occasional episodes of confusion, she is otherwise alert oriented x3.  ---Neurology consult pending;  if MRI brain is negative most of her symptoms could be related to lack of sleep on the day of admission (daughter mentioned that patient usually sleeps till 11AM but on the  day of admission due to her neurology follow-up she had to wake up at 6 AM.), the cannabinoid sleep aid as above; UTI and age related/post stroke related cognitive decline.   --- PT/OT consult to assist with disposition as currently patient lives independently and unclear with current cognitive state if this is safe for patient  .    Subclinical hypothyroidism  --currently treated with levothyroxine, TSH wnl.      Adjustment reaction with anxiety and depression  --on lexapro, during 10/27/2021 visit with neurologist  plan was to increase dosage to 20 mg; see #1.  This may be appropriate as it appears patient with anxiety that leads her to use the cannabinoid sleep aid.      Benign essential hypertension  ---uncontrolled, will restart home meds and add prn, patient is very anxious causing worsening control.      CKD (chronic kidney disease) stage 2, GFR 60-89 ml/min  --baseline creatinine 1.1, stable here       Hyperlipidemia LDL goal <100  ---continue statin       Intracranial hemorrhage (H)- 6/2020 following  Fall   ---no new concerns of bleeding as per CT here   Cerebrovascular accident with history of  TIA   ---continue Plavix once medications reconciled.  -brain MRI; see #1.        DVT Prophylaxis: Pneumatic Compression Devices  Code Status: Full Code  Expected discharge: 1-2 days pending clinical improvement mental status and results of brain MRI, neurology clearance and plan established; safe disposition.    Total unit/floor time 35 minutes:  time consisted of the following assuming Patient care in complex Patient with acute confusion, history of stroke,, examination of patient, review of records including labs, imaging results, medications, interdisciplinary notes and completing documentation; > 50% Coordination of Care time with Nursing  and Specialists, Neurology regarding evaluation of cognitive change, care plan, management and surveillance.     Tl Moffett MD  Text Page (7am - 6pm,  "M-F)    Interval History   Initially patient appeared intact oriented x3 however on specific questioning regarding her living situation she states she\" blanked out,\" this also was the situation when she was asked about her family/children.  No demonstrated anxiety, somewhat sleepy on morning encounters.  Nursing notes no acute neurologic focality, no acute behavioral changes.    SH: No tobacco.  Lives independently.     ROS: Complete ROS negative except as above.     -Data reviewed today: I reviewed all new labs and imaging results over the last 24 hours.    Physical Exam   Temp: 98  F (36.7  C) Temp src: Oral BP: (!) 148/90 Pulse: 69   Resp: 16 SpO2: 96 % O2 Device: None (Room air)    Vitals:    10/28/21 0100   Weight: 77.9 kg (171 lb 12.8 oz)     Vital Signs with Ranges  Temp:  [97.6  F (36.4  C)-98.3  F (36.8  C)] 98  F (36.7  C)  Pulse:  [63-80] 69  Resp:  [16] 16  BP: (148-195)/() 148/90  SpO2:  [95 %-97 %] 96 %  I/O last 3 completed shifts:  In: 240 [P.O.:240]  Out: -     General/Constitutional:   NAD, alert, calm, cooperative; was blank on specific questioning.  HEENT/Head Exam:  atraumatic  Eyes:  PERRL, no conjunctivits  Mouth/Oral Pharynx:  Buccal mucosa WNL  Chest/Respiratory:  Air exchange bilateral lung fields; no rales or wheeze. Respiration nonlabored.  Cardiovascular:  no murmur appreciated.  LE edema none  Gastrointestinal/Abdomen:  soft, nontender,   no rebound, guarding or other peritoneal signs.  Musculoskeletal:  extremities warm, dry, noncyanotic; no acitve synovitis.  Neuro.  Gross motor tested, nonfocal, sensory intact  Psych oriented x3 however on specific questioning she rita blank could not respond., affect calm   Skin:  No rash noted on gross exam    Medications       cefTRIAXone  1 g Intravenous Q24H     clopidogrel  75 mg Oral Daily     escitalopram  20 mg Oral Daily     levothyroxine  25 mcg Oral Daily     lisinopril  40 mg Oral Daily     mirtazapine  30 mg Oral At Bedtime     " pantoprazole  40 mg Oral QAM AC     polyethylene glycol  17 g Oral Daily       Data   Recent Labs   Lab 10/28/21  1013 10/28/21  0730 10/27/21  2042   WBC  --  7.4 9.6   HGB  --  11.4* 12.3   MCV  --  93 93   PLT  --  226 259   NA  --  139 139   POTASSIUM  --  3.9 4.5   CHLORIDE  --  110* 111*   CO2  --  26 24   BUN  --  19 25   CR  --  0.86 1.11*   ANIONGAP  --  3 4   ESTEE  --  8.5 8.9   GLC 88 87 86   ALBUMIN  --   --  3.5   PROTTOTAL  --   --  7.6   BILITOTAL  --   --  0.1*   ALKPHOS  --   --  73   ALT  --   --  18   AST  --   --  15   TROPONIN  --   --  <0.015       Recent Results (from the past 24 hour(s))   CT Head w/o Contrast    Narrative    EXAM: CT HEAD W/O CONTRAST  LOCATION: [facility]  DATE/TIME: 10/27/2021 9:15 PM    INDICATION: Mental status change, unknown cause  COMPARISON: 07/20/2020  TECHNIQUE: Routine CT Head without IV contrast. Multiplanar reformats. Dose reduction techniques were used.    FINDINGS:  INTRACRANIAL CONTENTS: No intracranial hemorrhage, extraaxial collection, or mass effect.  No CT evidence of acute infarct. Unchanged chronic cortical infarctions in the superior left occipital lobe and in the superior right parietal lobe. Mild presumed   chronic small vessel ischemic changes. Mild generalized volume loss. No hydrocephalus.     VISUALIZED ORBITS/SINUSES/MASTOIDS: No intraorbital abnormality. No paranasal sinus mucosal disease. No middle ear or mastoid effusion.    BONES/SOFT TISSUES: No acute abnormality.        Impression    IMPRESSION:  1.  No acute intracranial hemorrhage, mass effect, or CT evidence for acute infarction.  2.  Unchanged chronic cortical infarctions in the superior left occipital lobe and superior right parietal lobe.  3.  Stable background of mild chronic age-related change.

## 2021-10-29 ENCOUNTER — APPOINTMENT (OUTPATIENT)
Dept: OCCUPATIONAL THERAPY | Facility: CLINIC | Age: 76
DRG: 066 | End: 2021-10-29
Payer: COMMERCIAL

## 2021-10-29 PROBLEM — R29.818 ACUTE FOCAL NEUROLOGICAL DEFICIT: Status: ACTIVE | Noted: 2021-10-29

## 2021-10-29 LAB — BACTERIA UR CULT: NORMAL

## 2021-10-29 PROCEDURE — 120N000001 HC R&B MED SURG/OB

## 2021-10-29 PROCEDURE — 97535 SELF CARE MNGMENT TRAINING: CPT | Mod: GO

## 2021-10-29 PROCEDURE — 250N000013 HC RX MED GY IP 250 OP 250 PS 637: Performed by: HOSPITALIST

## 2021-10-29 PROCEDURE — 250N000013 HC RX MED GY IP 250 OP 250 PS 637: Performed by: INTERNAL MEDICINE

## 2021-10-29 PROCEDURE — G0378 HOSPITAL OBSERVATION PER HR: HCPCS

## 2021-10-29 PROCEDURE — 99233 SBSQ HOSP IP/OBS HIGH 50: CPT | Performed by: HOSPITALIST

## 2021-10-29 RX ORDER — ACETAMINOPHEN 325 MG/1
650 TABLET ORAL 3 TIMES DAILY
Status: DISCONTINUED | OUTPATIENT
Start: 2021-10-29 | End: 2021-11-05 | Stop reason: HOSPADM

## 2021-10-29 RX ADMIN — ACETAMINOPHEN 650 MG: 325 TABLET, FILM COATED ORAL at 14:21

## 2021-10-29 RX ADMIN — PANTOPRAZOLE SODIUM 40 MG: 40 TABLET, DELAYED RELEASE ORAL at 09:04

## 2021-10-29 RX ADMIN — LISINOPRIL 40 MG: 40 TABLET ORAL at 09:03

## 2021-10-29 RX ADMIN — ESCITALOPRAM OXALATE 20 MG: 20 TABLET ORAL at 09:04

## 2021-10-29 RX ADMIN — CLOPIDOGREL BISULFATE 75 MG: 75 TABLET ORAL at 09:03

## 2021-10-29 RX ADMIN — MIRTAZAPINE 30 MG: 15 TABLET, FILM COATED ORAL at 21:31

## 2021-10-29 RX ADMIN — ACETAMINOPHEN 650 MG: 325 TABLET, FILM COATED ORAL at 21:31

## 2021-10-29 RX ADMIN — POLYETHYLENE GLYCOL 3350 17 G: 17 POWDER, FOR SOLUTION ORAL at 09:03

## 2021-10-29 RX ADMIN — LEVOTHYROXINE SODIUM 25 MCG: 25 TABLET ORAL at 09:03

## 2021-10-29 ASSESSMENT — ACTIVITIES OF DAILY LIVING (ADL)
ADLS_ACUITY_SCORE: 10
ADLS_ACUITY_SCORE: 8
ADLS_ACUITY_SCORE: 9
ADLS_ACUITY_SCORE: 10
ADLS_ACUITY_SCORE: 9
ADLS_ACUITY_SCORE: 8
ADLS_ACUITY_SCORE: 9

## 2021-10-29 NOTE — PLAN OF CARE
Pt here with UTI, confusion. A&Ox3 disoriented to situation. Neuros forgetfulness, unsteady gait, minimal L field cut, more prominent R field cut. VSS. Tele NSR. Regular diet, thin liquids. Takes pills whole with water. Up with SBA/GB. Denies pain. Slept well overnight. Pt scoring green on the Aggression Stop Light Tool. Discharge plan pending.

## 2021-10-29 NOTE — PLAN OF CARE
Patient admitted with confusion/AMS, does have known vascular dementia. Today is awake, oriented to self, knows she is in a hospital and Oct/2021, but is frequently disoriented to surroundings and situation, very pleasantly confused. Follows commands, neuros with slight left field cut and more obvious right field cut (per daughter she lost both peripheral visual fields from a stroke 6 years ago). Denies pain. Up with SBA/GB in the room. Tolerating regular diet and takes pills whole. Had some pain and difficulty urinating during RRT when she was down at her CT scan, but voided without difficulty when back to her room. Tele NSR. Discharge plan pending MRI results, unable to be completed tonight due to urinary discomfort.

## 2021-10-29 NOTE — PLAN OF CARE
Patient here with AMS. Alert, oriented to time self and place. VSS. Tele NSR. Neuro's with R field cut. Pleasant and cooperative. Up with 1 with GB to bathroom. Continent of bowel and bladder. Denied having pain.

## 2021-10-29 NOTE — PROGRESS NOTES
[facility]  Neurology Daily Note      Admission Date:10/27/2021   Date of service: 10/29/2021   Hospital Day: 3                                                   Assessment and Plan:   #. Increased confusion-multifactorial.  -Known history of dementia, given history of stroke in 2016 this is most consistent with vascular dementia  -Records suggest possible UTI  -Polypharmacy/cannabis use likely contributory     Natural progression of dementia can sometimes involve periods of decompensation.  Differential diagnosis would include stroke-yesterday, patient noted to have dense right visual field cut, not sure if old from previous stroke, or new.    --Agree with plan for MRI, treat underlying contributory issues to confusion such as UTI if present.  Overall prognosis and expectations would be that the patient likely to have some progression of dementia in the months like this in the future.  Therefore or supervised living situation will need to be considered by patient/family.  Recommend avoiding cannabis in any psychoactive medications  On clopidogrel  We will follow up after MRI completed      Interval History:   Contacted yesterday about right visual field cut.  Otherwise stable overnight.        Review of Systems:   noncontributory       Medications:   Scheduled Meds:    acetaminophen  650 mg Oral TID     cefTRIAXone  1 g Intravenous Q24H     clopidogrel  75 mg Oral Daily     escitalopram  20 mg Oral Daily     levothyroxine  25 mcg Oral Daily     lisinopril  40 mg Oral Daily     mirtazapine  30 mg Oral At Bedtime     pantoprazole  40 mg Oral QAM AC     polyethylene glycol  17 g Oral Daily     PRN Meds: acetaminophen, ALPRAZolam, labetalol, melatonin, ondansetron **OR** ondansetron, senna-docusate        Physical Exam:   Vitals: Temp: 98.4  F (36.9  C) Temp src: Oral BP: (!) 147/78 Pulse: 71   Resp: 18 SpO2: 93 % O2 Device: None (Room air)    Vital Signs with Ranges: Temp:  [97.4  F (36.3  C)-98.4  F (36.9   C)] 98.4  F (36.9  C)  Pulse:  [71-86] 71  Resp:  [16-18] 18  BP: (109-154)/(64-88) 147/78  SpO2:  [92 %-96 %] 93 %    General Appearance:  No acute distress  Neuro:       Mental Status Exam:    Alert oriented to Month, not date, hospital (not name)  Not president even w choice.       Cranial Nerves:  Right dense visual field cut.     FS       Motor:   Ix4             Sensory: DSS intact                   Coordination:   I x4           Data:   ROUTINE IP LABS (Last 3results)  CBC RESULTS:     Recent Labs   Lab Test 10/28/21  0730 10/27/21  2042 01/07/21  0834   WBC 7.4 9.6 7.0   RBC 3.78* 4.05 4.27   HGB 11.4* 12.3 13.4   HCT 35.0 37.7 40.4    259 259     Basic Metabolic Panel:  Recent Labs   Lab Test 10/28/21  1750 10/28/21  1013 10/28/21  0730 10/27/21  2042 10/27/21  2042 06/03/21  1359   NA  --   --  139  --  139 143   POTASSIUM  --   --  3.9  --  4.5 4.4   CHLORIDE  --   --  110*  --  111* 110*   CO2  --   --  26  --  24 27   BUN  --   --  19  --  25 20   CR  --   --  0.86  --  1.11* 1.07*   GLC 92 88 87   < > 86 118*   ESTEE  --   --  8.5  --  8.9 8.9    < > = values in this interval not displayed.     Liver panel:  Recent Labs   Lab Test 10/27/21  2042 01/07/21  0834 06/22/20  0756 06/21/20  0727 06/03/20  1206   PROTTOTAL 7.6 7.5 6.3* 6.3* 7.0   ALBUMIN 3.5 3.9 3.1* 3.1* 3.8   BILITOTAL 0.1* 0.4 0.5 0.5 0.7   ALKPHOS 73 70 47 47 54   AST 15 18 30 37 23   ALT 18 22 30 33 18     Thyroid Panel:  Recent Labs   Lab Test 10/27/21  2042 06/03/21  1359 01/29/21  1511 01/07/21  0834 01/07/21  0834 01/22/20  1745 09/23/19  1304 06/30/15  1442 12/03/14  1503   TSH 2.49 1.03 4.50*   < > 4.22*   < > 5.39*   < > 3.12   T4  --   --  0.80  --  0.87  --  0.82   < >  --    TPO  --   --   --   --  11  --  16  --  <10   ATG  --   --   --   --  <20  --  <20  --  <20    < > = values in this interval not displayed.      Vitamin B12:   Recent Labs   Lab Test 01/07/21  0834 01/22/20  1745 09/04/19  1512   B12 376 655 136       Ammonia:   Recent Labs   Lab Test 10/27/21  2042   ALEXA 30        IMAGING:   All imaging studies were reviewed personally  CT head:   CT BRAIN PERFUSION 10/28/2021 6:47 PM     HISTORY: Transient ischemic attack (TIA); Evaluate mismatch between  penumbra and core infarct     TECHNIQUE: Time sequential axial CT images of the head were acquired  during the administration of 120mL Isovue 370. IV. Color perfusion  maps of the brain were created from this time sequential axial source  data.      Radiation dose for this scan was reduced using automated exposure  control, adjustment of the mA and/or kV according to patient size, or  iterative reconstruction technique.     COMPARISON: CTA head and neck of same day.                                                                      Impression: There appears to be a subtle asymmetric prolongation of  the contrast transit time to the left posterior cerebral artery        TIME     25minutes Evaluation/management time     Angelica Plunkett M.D.  Neurologist  [facility] Neurology  Office [phone]

## 2021-10-29 NOTE — UTILIZATION REVIEW
Admission Status; Secondary Review Determination       Under the authority of the Utilization Management Committee, the utilization review process indicated a secondary review on the above patient. The review outcome is based on review of the medical records, discussions with staff, and applying clinical experience noted on the date of the review.     (x) Inpatient Status Appropriate - This patient's medical care is consistent with medical management for inpatient care and reasonable inpatient medical practice.     RATIONALE FOR DETERMINATION     Anabelle Herbert is a 76 year old female with history of vascular dementia, concern of Alzheimer's dementia, and chronic cystitis.  She presented to the [facility] ED on 10/27/21 for evaluation of confusion. Patient reported increased frequency of micturition, but no dysuria, fever, chills, nausea, vomiting, or diarrhea. She denied recent fall or focal neuro deficit. ED work up showed stable vital signs. Labs were within normal limit except UA was slightly abnormal.  Urine drug screen indicated presence of cannabinoids. CT head did not indicate any new ICH or acute abnormality.  She was admitted with altered mental status and confusion.  She was treated with Rocephin for possible urinary tract infection.  Neurology was consulted.  No clear diagnosis was made.  MRI of brain to evaluate for new stroke was recommended and ordered.  This was not able to be done immediately, due to urinary symptoms.  At 6 PM on 10/28 patient developed acute right field vision cut.  Code stroke was called.  Recurrent stroke was suspected.  Inpatient admission is appropriate for further work-up of confusion, altered mental status, and new acute right field vision cut with suspicion of acute stroke.    At the time of admission with the information available to the attending physician more than 2 nights Hospital complex care was anticipated, based on patient risk of adverse outcome  if treated as outpatient and complex care required. Inpatient admission is appropriate based on the Medicare guidelines.     This document was produced using voice recognition software       The information on this document is developed by the utilization review team in order for the business office to ensure compliance. This only denotes the appropriateness of proper admission status and does not reflect the quality of care rendered.   The definitions of Inpatient Status and Observation Status used in making the determination above are those provided in the CMS Coverage Manual, Chapter 1 and Chapter 6, section 70.4.   Sincerely,     Beto Andrew MD    Utilization Review  Physician Advisor  [facility]

## 2021-10-29 NOTE — PROVIDER NOTIFICATION
"MD Notification    Notified Person: MD    Notified Person Name: Kiki     Notification Date/Time: 10/29/21 at 1252    Notification Interaction: web-based page    Purpose of Notification: : \"[facility] is reporting a new onset of shoulder pain and supraorbital pressure. Just a heads up, thank you. *[id number]\"    Orders Received: PRN Tylenol ordered for pain    Comments: Pressure appears to be musculoskeletal in nature, no further orders at this time.    "

## 2021-10-29 NOTE — PROGRESS NOTES
[facility]    Hospitalist Progress Note      Assessment & Plan   Anabelle Herbert is a 76 year old female who presents with occasional confusion for 1 day.    Altered mental status   Vascular dementia without behavioral disturbance (H)  Acute neurologic focality with right visual field cut.  --- In the emergency department her symptoms were considered to be hallucinations but on  further evaluation her symptoms mostly involves occasional confusion, patient was not seeing things which was not present but that she was talking about things that did not make sense.  ---Patient follows up outpatient with neurology and had visited with them  on 10/27, at that time they had concerns about patient having Alzheimer's dementia along with vascular dementia. Dr Loza had recommended increased dose of Lexapro for anxiety.  ---So far work-up here is negative except for mild abnormality in the UA, considering her confusion will start on antibiotics and order for urine culture. Patient does have history of thickening of the bladder/chronic cystitis and patient had prior follow-up with urology. Patient had mentioned increased frequency of micturition.  ---CT head done here did not indicate any new intracranial hemorrhage, patient has history of intracranial hemorrhage following a fall in June 2020.  --- Of note the urine drug screen is positive for cannabinoids, clarification with patient's daughter this morning states Patient  takes a sleep aid with cannabinoids for insomnia and anxiety and she may have taken extra dose last evening resulting in admission confusion.    - MRI brain to evaluate for any new lacunar infarcts; pending.  ---Patient does not seem to have hallucinations rather have occasional episodes of confusion, she is otherwise alert oriented x3.  ---Neurology consult pending;  if MRI brain is negative most of her symptoms could be related to lack of sleep on the day of admission (daughter  mentioned that patient usually sleeps till 11AM but on the day of admission due to her neurology follow-up she had to wake up at 6 AM.), the cannabinoid sleep aid as above; UTI and age related/post stroke related cognitive decline.   --- PT/OT consult to assist with disposition as currently patient lives independently and unclear with current cognitive state if this is safe for patient  -Acute neurologic focality with right visual field cut 10/28/2021.  Code stroke called.  Head CT without acute findings, CTA with stenotic left P2.  Seen by stroke neurology felt right inferior quadrantanopsia may be related to prior strokes including left occipital lobe.  Plan for brain MRI to rule out new stroke which would indicate possible start on dual antiplatelet therapy to patient's current PTA Plavix.  MRI pending.  Reports no neurologic focality.  .    Subclinical hypothyroidism  --currently treated with levothyroxine, TSH wnl.      Adjustment reaction with anxiety and depression  --on lexapro, during 10/27/2021 visit with neurologist  plan was to increase dosage to 20 mg; see #1.  This may be appropriate as it appears patient with anxiety that leads her to use the cannabinoid sleep aid.      Benign essential hypertension  ---uncontrolled, will restart home meds and add prn, patient is very anxious causing worsening control.      CKD (chronic kidney disease) stage 2, GFR 60-89 ml/min  --baseline creatinine 1.1, stable here       Hyperlipidemia LDL goal <100  ---continue statin       Intracranial hemorrhage (H)- 6/2020 following  Fall   ---no new concerns of bleeding as per CT here   Cerebrovascular accident with history of  TIA   ---continue Plavix once medications reconciled.  -brain MRI; see #1.     Shoulder pain.  Positional, felt muscle skeletal.  No motor weakness.  Trial schedule acetaminophen.  Monitor       DVT Prophylaxis: Pneumatic Compression Devices  Code Status: Full Code  Expected discharge: 1-2 days pending  clinical improvement mental status and results of brain MRI, neurology clearance and plan established; safe disposition.    Total unit/floor time 35 minutes:  time consisted of the following, examination of patient, review of records including labs, imaging results, medications, interdisciplinary notes and completing documentation; > 50% Coordination of Care time with Nursing re shoulder pain  and Specialists, Neurology regarding neurologic evaluation including MRI and care plan, management and surveillance.      Tl Moffett MD  Text Page (7am - 6pm, M-F)    Interval History   Sleepy on morning encounter yet arousable.  She knew she was at the hospital however today could not identify date.  No new neurologic focality.    SH: No tobacco.  Lives independently.     ROS: Complete ROS negative except as above.     -Data reviewed today: I reviewed all new labs and imaging results over the last 24 hours.    Physical Exam   Temp: 98.1  F (36.7  C) Temp src: Oral BP: 129/79 Pulse: 77   Resp: 18 SpO2: 92 % O2 Device: None (Room air)    Vitals:    10/28/21 0100   Weight: 77.9 kg (171 lb 12.8 oz)     Vital Signs with Ranges  Temp:  [97.4  F (36.3  C)-98.4  F (36.9  C)] 98.1  F (36.7  C)  Pulse:  [71-86] 77  Resp:  [16-18] 18  BP: (109-154)/(64-88) 129/79  SpO2:  [92 %-96 %] 92 %  No intake/output data recorded.    General/Constitutional:    NAD, alert, calm, cooperative    HEENT/Head Exam:  atraumatic  Eyes:  PERRL, no conjunctivits  Mouth/Oral Pharynx:  Buccal mucosa WNL  Chest/Respiratory:  Air exchange bilateral lung fields; no rales or wheeze. Respiration nonlabored.  Cardiovascular:  no murmur appreciated.  LE edema none  Gastrointestinal/Abdomen:  soft, nontender,   no rebound, guarding or other peritoneal signs.  Musculoskeletal:  extremities warm, dry, noncyanotic; no acitve synovitis.  Neuro.  Gross motor tested, nonfocal, sensory intact  Psych oriented x 2 however on specific questioning she riat blank could  not respond., affect calm   Skin:  No rash noted on gross exam    Medications       acetaminophen  650 mg Oral TID     cefTRIAXone  1 g Intravenous Q24H     clopidogrel  75 mg Oral Daily     escitalopram  20 mg Oral Daily     levothyroxine  25 mcg Oral Daily     lisinopril  40 mg Oral Daily     mirtazapine  30 mg Oral At Bedtime     pantoprazole  40 mg Oral QAM AC     polyethylene glycol  17 g Oral Daily       Data   Recent Labs   Lab 10/28/21  1750 10/28/21  1013 10/28/21  0730 10/27/21  2042 10/27/21  2042   WBC  --   --  7.4  --  9.6   HGB  --   --  11.4*  --  12.3   MCV  --   --  93  --  93   PLT  --   --  226  --  259   NA  --   --  139  --  139   POTASSIUM  --   --  3.9  --  4.5   CHLORIDE  --   --  110*  --  111*   CO2  --   --  26  --  24   BUN  --   --  19  --  25   CR  --   --  0.86  --  1.11*   ANIONGAP  --   --  3  --  4   ESTEE  --   --  8.5  --  8.9   GLC 92 88 87   < > 86   ALBUMIN  --   --   --   --  3.5   PROTTOTAL  --   --   --   --  7.6   BILITOTAL  --   --   --   --  0.1*   ALKPHOS  --   --   --   --  73   ALT  --   --   --   --  18   AST  --   --   --   --  15   TROPONIN  --   --   --   --  <0.015    < > = values in this interval not displayed.       Recent Results (from the past 24 hour(s))   CT Head w/o Contrast    Narrative    CT SCAN OF THE HEAD WITHOUT CONTRAST   10/28/2021 6:30 PM     HISTORY: Transient ischemic attack (TIA); Code Stroke    TECHNIQUE:  Axial images of the head and coronal reformations without  IV contrast material. Radiation dose for this scan was reduced using  automated exposure control, adjustment of the mA and/or kV according  to patient size, or iterative reconstruction technique.    COMPARISON: CT head 10/27/2021.    FINDINGS: The ventricles are normal in size and configuration. Chronic  cortical/subcortical infarct involving the cuneus gyrus of the left  occipital lobe, as before. Chronic small cortical/subcortical infarct  involving the right parietal lobe, as  before. Mild patchy nonspecific  hypoattenuation is again noted in the cerebral white matter,  presumably representing chronic small vessel ischemic changes. There  is mild generalized brain parenchymal volume loss. No definite acute  extended infarct signs. No new/acute intracranial hemorrhage,  extra-axial fluid collection, or mass effect/herniation. Visualized  orbits, paranasal sinuses, and mastoids are normal.      Impression    IMPRESSION:  1. No CT findings of acute intracranial process.  2. Unchanged chronic left occipital and right parietal infarcts.  3. Brain atrophy and presumed chronic small vessel ischemic changes,  as described.    The findings were discussed with Dr. Osuna by myself at approximately  6:50 PM on 10/28/2021.    RAFA CORNEJO MD         SYSTEM ID:  [id number]   CTA Head Neck with Contrast    Narrative    CT ANGIOGRAM OF THE HEAD AND NECK WITH CONTRAST  10/28/2021 6:32 PM     HISTORY: Transient ischemic attack (TIA); Code Stroke     TECHNIQUE:  CT angiography with an injection of 70 mL Isovue-370 IV  with scans through the head and neck. Images were transferred to a  separate 3-D workstation where multiplanar reformations and 3-D images  were created. Estimates of carotid stenoses are made relative to the  distal internal carotid artery diameters except as noted. Radiation  dose for this scan was reduced using automated exposure control,  adjustment of the mA and/or kV according to patient size, or iterative  reconstruction technique.    COMPARISON: CT scan of same day. MRA of the head 11/2/2016 MRA of the  neck 11/2/2016.     CT HEAD FINDINGS: No contrast enhancing lesions. Cerebral blood flow  is grossly normal.     CT ANGIOGRAM HEAD FINDINGS:  There is severe focal stenosis of the  distal P2 segment of the left posterior cerebral artery (series 9  image 376, series 12 image 96). There is moderate stenosis of the mid  P2 segment of the left posterior cerebral artery (series 9 image  368).  No definite large vessel occlusion identified. There is a somewhat  diminutive appearance of the distal left ZOË branches in the region of  the known chronic left cuneus gyrus infarct, likely chronic.    Mild nonflow calcified atherosclerosis involving right greater than  left carotid siphons. No high-grade stenosis or large vessel occlusion  involving the major proximal visualized branches of the anterior  cerebral or middle cerebral arteries is identified. No aneurysm or  high flow vascular malformation is seen.    CT ANGIOGRAM NECK FINDINGS:   Mild atherosclerosis of the aortic arch without significant stenosis  of the origins of the great vessels. Conventional three-vessel aortic  arch branching pattern.     Right carotid artery: There is mixed plaque in the distal common  carotid artery extending into the carotid bifurcation/proximal  internal carotid artery resulting in up to approximately 50-55%  stenosis of the proximal right internal carotid artery by NASCET  criteria.    Left carotid artery: The left common and internal carotid arteries are  patent. Mild calcified left carotid bifurcation/proximal internal  carotid artery atherosclerotic disease without significant stenosis by  NASCET criteria.     Vertebral arteries: Vertebral arteries are patent without evidence of  dissection. No significant stenosis.     Other findings: None.       Impression    IMPRESSION:  1. Multifocal stenosis of the left posterior cerebral artery,  including a severe focal stenosis of the distal P2 segment.  2. No definite large vessel occlusion. No other high-grade central  intracranial arterial stenosis.  3. Approximately 50-55% stenosis of the proximal right internal  carotid artery by NASCET criteria due to mixed atherosclerotic plaque.  4. No significant stenosis of the left cervical carotid artery or  bilateral cervical vertebral arteries.    The findings were discussed with Dr. Osuna by myself at approximately  6:50  PM on 10/28/2021.    RAFA CORNEJO MD         SYSTEM ID:  [id number]   CT Head Perfusion w Contrast    Narrative    CT BRAIN PERFUSION 10/28/2021 6:47 PM    HISTORY: Transient ischemic attack (TIA); Evaluate mismatch between  penumbra and core infarct    TECHNIQUE: Time sequential axial CT images of the head were acquired  during the administration of 120mL Isovue 370. IV. Color perfusion  maps of the brain were created from this time sequential axial source  data.     Radiation dose for this scan was reduced using automated exposure  control, adjustment of the mA and/or kV according to patient size, or  iterative reconstruction technique.    COMPARISON: CTA head and neck of same day.      Impression    Impression: There appears to be a subtle asymmetric prolongation of  the contrast transit time to the left posterior cerebral artery  territory, which may be related to the severe left distal P2 stenosis  (series 206 image 24). There appear to be small matched perfusion  defects corresponding to the patient's known right parietal and left  occipital infarcts (series 204 image 26, series 204 image 31). No  definite acute infarct by perfusion criteria.    RAFA CORNEJO MD         SYSTEM ID:  [id number]

## 2021-10-29 NOTE — PLAN OF CARE
"BP (!) 147/78   Pulse 71   Temp 98.4  F (36.9  C) (Oral)   Resp 18   Ht 1.575 m (5' 2\")   Wt 77.9 kg (171 lb 12.8 oz)   SpO2 93%   BMI 31.42 kg/m      Pt here with AMS/UTI, hx of vascular dementia. Disoriented to time and situation. Neuros intact aside from significant confusion, field cuts in both eyes that is baseline and greater in the left, right-sided slight tremor in the hand and foot as well as difficulty performing \"Nose-Finger\" with the left hand. Patient reporting R shoulder pain and supraorbital pressure, provider notified and PRN tylenol was administered. VSS on RA. Regular diet with thin liquids. Takes pills whole. Up with standby assist and gait belt, unsteady gait has been reported. Continent of bowel and bladder, had once loose stool. Pt scoring yellow on the Aggression Stop Light Tool r/t pleasant confusion and forgetfulness Plan for MRI this evening and continue to update the patient's daughter with any updates. Discharge pending results.   "

## 2021-10-29 NOTE — CONSULTS
[facility]    Stroke Consult Note    Reason for Consult: Stroke Code     Chief Complaint: Altered Mental Status      HPI  Anabelle Herbert is a 76 year old female  has a past medical history of Acute sinusitis, unspecified, Allergic rhinitis, cause unspecified, Candidiasis of unspecified site, Candidiasis of vulva and vagina, Diplopia, Dyspnea, Essential hypertension, benign, Hypercalcemia, Memory loss, Osteoporosis, unspecified, Other B-complex deficiencies, Other, multiple and ill-defined closed fractures of lower limb (1990), Unspecified cerebral artery occlusion with cerebral infarction, Unspecified disorder of skin and subcutaneous tissue, Urinary hesitancy, and Urinary tract infection, site not specified.    Patient has a history of 2 prior strokes including the left occipital lobe.    Patient was seen and examined by general neurology earlier 10/28 at approximately 2 PM and at that time noted to either have a small field cut or unclear if there was a field cut due to patient not fully following directional cues.  2 PM was last known well for patient.  Later this afternoon and into evening patient noted that she could not see the television which was to her right visual field and given this patient was stroke activated.    On examination patient with right inferior quadrantanopsia.  This seems consistent with previous known chronic stroke.    Imaging Findings  CT without acute findings  CTA with stenotic left P2  CTP without acute findings.    Thrombolytic Treatment   Not given due to unclear or unfavorable risk-benefit profile for extended window thrombolysis beyond the conventional 4.5 hour time window.    Endovascular Treatment  Not initiated due to absence of proximal vessel occlusion    Impression   Right inferior quadrantanopsia likely chronic given location of prior stroke; however, difficult examination due to a pleasantly confused patient.    Recommendations  MRI  "brain-this was previously ordered by general neurology team to evaluate for confusion  > If MRI revealing for new stroke would consider adding dual antiplatelet therapy to patient's PTA Plavix    Patient Follow-up    - final recommendation pending work-up    Thank you for this consult.  If stroke on MRI, we will continue to follow. If no stroke detected on MRI, would defer to general neurology for further workup if desired.         The Stroke Staff is Dr. HARTMAN .    Addison Osuna MD  Vascular Neurology Fellow  To page me or covering stroke neurology team member, click here: [facility]   Choose \"On Call\" tab at top, then search dropdown box for \"Neurology Adult\", select location, press Enter, then look for stroke/neuro ICU/telestroke.    ______________________________________________________    Clinically Significant Risk Factors Present on Admission             # Platelet Defect: home medication list includes an antiplatelet medication       Past Medical History   Past Medical History:   Diagnosis Date     Acute sinusitis, unspecified      Allergic rhinitis, cause unspecified      Candidiasis of unspecified site      Candidiasis of vulva and vagina      Diplopia      Dyspnea     following inhalation of cleaning solution. Treated with inhaler/neb.      Essential hypertension, benign      Hypercalcemia      Memory loss      Osteoporosis, unspecified     defined by chiropractor who did xrays during tx for back pain     Other B-complex deficiencies      Other, multiple and ill-defined closed fractures of lower limb 1990    prolonged healing     Unspecified cerebral artery occlusion with cerebral infarction      Unspecified disorder of skin and subcutaneous tissue      Urinary hesitancy      Urinary tract infection, site not specified      Past Surgical History   Past Surgical History:   Procedure Laterality Date     COLONOSCOPY       CYSTOSCOPY, BIOPSY BLADDER, COMBINED  1/6/2014    Procedure: COMBINED CYSTOSCOPY, " BIOPSY BLADDER;;  Surgeon: Vandana Tang MD;  Location: [facility] OR     CYSTOSCOPY, INJECT COLLAGEN, COMBINED  1/6/2014    Procedure: COMBINED CYSTOSCOPY, INJECT BULKING AGENT;  IC cocktail, bladder biopsy, fulguration, heparin, gentamyacin, lidocaine & kenalog;  Surgeon: Vandana Tang MD;  Location: [facility] OR     GENITOURINARY SURGERY       NO HISTORY OF SURGERY       Medications   Home Meds  Prior to Admission medications    Medication Sig Start Date End Date Taking? Authorizing Provider   acetaminophen (TYLENOL) 500 MG tablet Take 2 tablets (1,000 mg) by mouth every 8 hours as needed for mild pain 6/23/20  Yes Sheila De La O MD   cholecalciferol (VITAMIN D3) 5000 units (125 mcg) CAPS capsule Take 5,000 Units by mouth daily   Yes Reported, Patient   clopidogrel (PLAVIX) 75 MG tablet TAKE 1 TABLET BY MOUTH EVERY DAY 3/9/21  Yes Jyoti Moreno MD   escitalopram (LEXAPRO) 20 MG tablet Take 1 tablet (20 mg) by mouth daily 10/27/21  Yes Elmo Loza MD   esomeprazole (NEXIUM) 40 MG DR capsule Take 1 capsule (40 mg) by mouth every morning (before breakfast) Take 30-60 minutes before eating. 4/30/21  Yes Jyoti Moreno MD   levothyroxine (SYNTHROID/LEVOTHROID) 25 MCG tablet TAKE 1 TABLET BY MOUTH EVERY DAY 6/4/21  Yes Jyoti Moreno MD   lisinopril (ZESTRIL) 30 MG tablet TAKE 1 TABLET BY MOUTH EVERY DAY 6/9/21  Yes Jyoti Moreno MD   meclizine (ANTIVERT) 25 MG tablet Take 1 tablet (25 mg) by mouth 2 times daily as needed for dizziness 5/8/20  Yes Jyoti Moreno MD   mirtazapine (REMERON) 30 MG tablet TAKE 1 TABLET (30 MG) BY MOUTH AT BEDTIME 6/3/21  Yes Jyoti Moreno MD   Multiple Vitamins-Minerals (ANTIOXIDANT PO) Take 1 tablet by mouth daily CONSULT HEALTH WOMENS WELLNESS   Yes Reported, Patient   polyethylene glycol (MIRALAX) 17 GM/SCOOP powder Take 17 g by mouth daily To twice daily as needed for constipation.  Patient taking  differently: Take 17 g by mouth daily as needed  6/9/20  Yes Jyoti Moreno MD   senna-docusate (SENOKOT-S/PERICOLACE) 8.6-50 MG tablet Take 1 tablet by mouth 2 times daily as needed for constipation 6/23/20  Yes Sheila De La O MD   Blood Pressure Monitoring (BLOOD PRESSURE CUFF) MISC 1 each daily as needed 7/13/20   Jyoti Moreno MD   Blood Pressure Monitoring (BLOOD PRESSURE CUFF) MISC 1 Device daily as needed 3/20/17   Jyoti Moreno MD   Blood Pressure Monitoring (BLOOD PRESSURE MONITOR/M CUFF) MISC 1 each daily as needed 2/9/21   Jyoti Moreno MD   Nutritional Supplements (ENSURE HIGH PROTEIN) Take 1 Can by mouth 3 times daily (with meals) 6/3/20   Sheba Henriquez, CARLA       Scheduled Meds    cefTRIAXone  1 g Intravenous Q24H     clopidogrel  75 mg Oral Daily     escitalopram  20 mg Oral Daily     gadobutrol  8 mL Intravenous Once     levothyroxine  25 mcg Oral Daily     lisinopril  40 mg Oral Daily     mirtazapine  30 mg Oral At Bedtime     pantoprazole  40 mg Oral QAM AC     polyethylene glycol  17 g Oral Daily       Infusion Meds      PRN Meds  acetaminophen, ALPRAZolam, labetalol, melatonin, ondansetron **OR** ondansetron, senna-docusate    Allergies   Allergies   Allergen Reactions     Cats      Contrast Dye      Burning, hematuria     Diatrizoate Other (See Comments)     Feels burning inside body     Sulfa Drugs Unknown     Bupropion Anxiety     Patient reports increased anxiety, panic, difficulty concentrating, and various aches and pains     Family History   Family History   Problem Relation Age of Onset     Cancer Father         colon?     Heart Disease Father      Asthma Mother      Alzheimer Disease Mother 86     Unknown/Adopted Maternal Grandmother      Unknown/Adopted Maternal Grandfather      Unknown/Adopted Paternal Grandmother      Unknown/Adopted Paternal Grandfather      Asthma Sister      Allergies Sister      Unknown/Adopted Son       Diabetes No family hx of      Social History   Social History     Tobacco Use     Smoking status: Never Smoker     Smokeless tobacco: Never Used   Substance Use Topics     Alcohol use: No     Drug use: No       Review of Systems   Review of systems not obtained due to patient factors - mental status       PHYSICAL EXAMINATION  Temp:  [97.6  F (36.4  C)-98.3  F (36.8  C)] 97.7  F (36.5  C)  Pulse:  [63-86] 73  Resp:  [16-18] 18  BP: (109-195)/() 142/82  SpO2:  [94 %-97 %] 94 %     General Exam  General:  patient lying in bed without any acute distress    HEENT:  normocephalic/atraumatic  Cardio:  RRR  Pulmonary:  no respiratory distress  Abdomen:  non-distended  Extremities:  no edema  Skin:  intact     Neuro Exam  Mental Status:  follows commands, speech clear and fluent, naming and repetition normal  Cranial Nerves:  PERRL, EOMI with normal smooth pursuit, facial sensation intact and symmetric, facial movements symmetric, hearing not formally tested but intact to conversation, no dysarthria, R inferior quadrantanopsia  Motor:  normal muscle tone and bulk, no abnormal movements, able to move all limbs spontaneously, no pronator drift, mild weakness in LLE (4+/5) pt complaining of pain in RLE but 5/5    Sensory:  light touch sensation intact and symmetric throughout upper and lower extremities, no extinction on double simultaneous stimulation   Coordination:  normal finger-to-nose and heel-to-shin bilaterally without dysmetria  Station/Gait:  deferred    Dysphagia Screen  Per Nursing    Stroke Scales    NIHSS  Interval baseline (10/28/21 1816)   Interval Comments     1a. Level of Consciousness 0-->Alert, keenly responsive   1b. LOC Questions 1-->Answers one question correctly   1c. LOC Commands 0-->Performs both tasks correctly   2.   Best Gaze 0-->Normal   3.   Visual 1-->Partial hemianopia   4.   Facial Palsy 0-->Normal symmetrical movements (R inf quadrantanopsia)   5a. Motor Arm, Left 0-->No drift, limb  holds 90 (or 45) degrees for full 10 secs   5b. Motor Arm, Right 0-->No drift, limb holds 90 (or 45) degrees for full 10 secs   6a. Motor Leg, Left 0-->No drift, leg holds 30 degree position for full 5 secs   6b. Motor Leg, right 0-->No drift, leg holds 30 degree position for full 5 secs   7.   Limb Ataxia 0-->Absent   8.   Sensory 0-->Normal, no sensory loss   9.   Best Language 0-->No aphasia, normal   10. Dysarthria 0-->Normal   11. Extinction and Inattention  0-->No abnormality   Total 2 (10/28/21 1816)       Modified [place] Score (Pre-morbid)  3-Moderate disability; requiring some help, but able to walk without assistance   *Unclear home baseline at present. 3 is only estimate based on chart review    Imaging  I personally reviewed all imaging; relevant findings per HPI.     Lab Results Data   CBC  Recent Labs   Lab 10/28/21  0730 10/27/21  2042   WBC 7.4 9.6   RBC 3.78* 4.05   HGB 11.4* 12.3   HCT 35.0 37.7    259     Basic Metabolic Panel    Recent Labs   Lab 10/28/21  1750 10/28/21  1013 10/28/21  0730 10/27/21  2042 10/27/21  2042   NA  --   --  139  --  139   POTASSIUM  --   --  3.9  --  4.5   CHLORIDE  --   --  110*  --  111*   CO2  --   --  26  --  24   BUN  --   --  19  --  25   CR  --   --  0.86  --  1.11*   GLC 92 88 87   < > 86   ESTEE  --   --  8.5  --  8.9    < > = values in this interval not displayed.     Liver Panel  Recent Labs   Lab 10/27/21  2042   PROTTOTAL 7.6   ALBUMIN 3.5   BILITOTAL 0.1*   ALKPHOS 73   AST 15   ALT 18     INR    Recent Labs   Lab Test 06/30/15  1539   INR 0.95      Lipid Profile    Recent Labs   Lab Test 08/21/19  1047 11/09/18  1322 05/30/18  1425 07/05/16  0825 06/30/15  1442 11/13/14  1541 11/13/14  1541   CHOL 215* 215* 243*   < > 146   < > 200*   HDL 69 62 73   < > 53   < > 74   * 136* 156*   < > 76   < > 114   TRIG 75 87 71   < > 87   < > 59   CHOLHDLRATIO  --   --   --   --  2.8  --  2.7    < > = values in this interval not displayed.     A1C    Recent  Labs   Lab Test 06/03/20  1206 08/21/19  1047 10/31/16  1001   A1C 5.6 5.2 5.6     Troponin I    Recent Labs   Lab 10/27/21  2042   TROPONIN <0.015          Stroke Code Data Data   Stroke Code Data  (for stroke code without tele)  Stroke code activated 10/28/21   1804   First stroke provider response 10/28/21   1804   Last known normal 10/28/21   1400   Time of discovery   (or onset of symptoms) 10/28/21       Head CT read by Stroke Neuro Dr/Provider 10/28/21   1829   Was stroke code de-escalated? Yes 10/28/21 1849  symptoms are chronic and do not represent acute neurologic deficit

## 2021-10-30 ENCOUNTER — APPOINTMENT (OUTPATIENT)
Dept: PHYSICAL THERAPY | Facility: CLINIC | Age: 76
DRG: 066 | End: 2021-10-30
Attending: INTERNAL MEDICINE
Payer: COMMERCIAL

## 2021-10-30 ENCOUNTER — APPOINTMENT (OUTPATIENT)
Dept: MRI IMAGING | Facility: CLINIC | Age: 76
DRG: 066 | End: 2021-10-30
Attending: INTERNAL MEDICINE
Payer: COMMERCIAL

## 2021-10-30 LAB
HBA1C MFR BLD: 5.6 % (ref 0–5.6)
PA ADP BLD-ACNC: 161 PRU
RADIOLOGIST FLAGS: ABNORMAL

## 2021-10-30 PROCEDURE — 97116 GAIT TRAINING THERAPY: CPT | Mod: GP | Performed by: PHYSICAL THERAPIST

## 2021-10-30 PROCEDURE — 250N000011 HC RX IP 250 OP 636: Performed by: INTERNAL MEDICINE

## 2021-10-30 PROCEDURE — 250N000013 HC RX MED GY IP 250 OP 250 PS 637: Performed by: INTERNAL MEDICINE

## 2021-10-30 PROCEDURE — 85576 BLOOD PLATELET AGGREGATION: CPT | Mod: TC

## 2021-10-30 PROCEDURE — 36415 COLL VENOUS BLD VENIPUNCTURE: CPT | Performed by: STUDENT IN AN ORGANIZED HEALTH CARE EDUCATION/TRAINING PROGRAM

## 2021-10-30 PROCEDURE — 99233 SBSQ HOSP IP/OBS HIGH 50: CPT | Performed by: HOSPITALIST

## 2021-10-30 PROCEDURE — 97162 PT EVAL MOD COMPLEX 30 MIN: CPT | Mod: GP | Performed by: PHYSICAL THERAPIST

## 2021-10-30 PROCEDURE — 255N000002 HC RX 255 OP 636: Performed by: INTERNAL MEDICINE

## 2021-10-30 PROCEDURE — A9585 GADOBUTROL INJECTION: HCPCS | Performed by: INTERNAL MEDICINE

## 2021-10-30 PROCEDURE — 99223 1ST HOSP IP/OBS HIGH 75: CPT | Mod: GC | Performed by: PSYCHIATRY & NEUROLOGY

## 2021-10-30 PROCEDURE — 250N000013 HC RX MED GY IP 250 OP 250 PS 637: Performed by: HOSPITALIST

## 2021-10-30 PROCEDURE — 120N000001 HC R&B MED SURG/OB

## 2021-10-30 PROCEDURE — 70553 MRI BRAIN STEM W/O & W/DYE: CPT

## 2021-10-30 PROCEDURE — 83036 HEMOGLOBIN GLYCOSYLATED A1C: CPT | Performed by: STUDENT IN AN ORGANIZED HEALTH CARE EDUCATION/TRAINING PROGRAM

## 2021-10-30 RX ORDER — GADOBUTROL 604.72 MG/ML
8 INJECTION INTRAVENOUS ONCE
Status: COMPLETED | OUTPATIENT
Start: 2021-10-30 | End: 2021-10-30

## 2021-10-30 RX ORDER — ATORVASTATIN CALCIUM 40 MG/1
40 TABLET, FILM COATED ORAL EVERY EVENING
Status: DISCONTINUED | OUTPATIENT
Start: 2021-10-30 | End: 2021-11-05 | Stop reason: HOSPADM

## 2021-10-30 RX ADMIN — ACETAMINOPHEN 650 MG: 325 TABLET, FILM COATED ORAL at 09:24

## 2021-10-30 RX ADMIN — MIRTAZAPINE 30 MG: 15 TABLET, FILM COATED ORAL at 22:57

## 2021-10-30 RX ADMIN — ATORVASTATIN CALCIUM 40 MG: 40 TABLET, FILM COATED ORAL at 21:38

## 2021-10-30 RX ADMIN — ESCITALOPRAM OXALATE 20 MG: 20 TABLET ORAL at 09:25

## 2021-10-30 RX ADMIN — PANTOPRAZOLE SODIUM 40 MG: 40 TABLET, DELAYED RELEASE ORAL at 06:36

## 2021-10-30 RX ADMIN — LEVOTHYROXINE SODIUM 25 MCG: 25 TABLET ORAL at 09:25

## 2021-10-30 RX ADMIN — CLOPIDOGREL BISULFATE 75 MG: 75 TABLET ORAL at 09:25

## 2021-10-30 RX ADMIN — ACETAMINOPHEN 650 MG: 325 TABLET, FILM COATED ORAL at 16:47

## 2021-10-30 RX ADMIN — GADOBUTROL 8 ML: 604.72 INJECTION INTRAVENOUS at 03:56

## 2021-10-30 RX ADMIN — POLYETHYLENE GLYCOL 3350 17 G: 17 POWDER, FOR SOLUTION ORAL at 09:24

## 2021-10-30 RX ADMIN — ACETAMINOPHEN 650 MG: 325 TABLET, FILM COATED ORAL at 21:38

## 2021-10-30 RX ADMIN — LISINOPRIL 40 MG: 40 TABLET ORAL at 09:25

## 2021-10-30 RX ADMIN — CEFTRIAXONE SODIUM 1 G: 1 INJECTION, POWDER, FOR SOLUTION INTRAMUSCULAR; INTRAVENOUS at 00:12

## 2021-10-30 ASSESSMENT — ACTIVITIES OF DAILY LIVING (ADL)
ADLS_ACUITY_SCORE: 10
ADLS_ACUITY_SCORE: 9
ADLS_ACUITY_SCORE: 8
ADLS_ACUITY_SCORE: 10
ADLS_ACUITY_SCORE: 8
ADLS_ACUITY_SCORE: 8
ADLS_ACUITY_SCORE: 10
ADLS_ACUITY_SCORE: 8
ADLS_ACUITY_SCORE: 10
ADLS_ACUITY_SCORE: 8
ADLS_ACUITY_SCORE: 8
ADLS_ACUITY_SCORE: 10
ADLS_ACUITY_SCORE: 10
ADLS_ACUITY_SCORE: 9
ADLS_ACUITY_SCORE: 11
ADLS_ACUITY_SCORE: 9
ADLS_ACUITY_SCORE: 11
ADLS_ACUITY_SCORE: 10
ADLS_ACUITY_SCORE: 8
ADLS_ACUITY_SCORE: 8
ADLS_ACUITY_SCORE: 11
ADLS_ACUITY_SCORE: 9
ADLS_ACUITY_SCORE: 8
ADLS_ACUITY_SCORE: 8

## 2021-10-30 NOTE — PROGRESS NOTES
Pt here with AMS/UTI, hx vasc dementia. Recent MRI shows 6mm infarct to medial L thalamus. A&O to self & place. Neuros intact except R field cut & very confused. VSS on RA. Tele NSR. Regular diet, thin liquids. Takes pills whole with water. Up with SBA. Denies pain. Pt scoring green on the Aggression Stop Light Tool. Plan pending d/t recent MRI results.

## 2021-10-30 NOTE — PROGRESS NOTES
10/30/21 1541   Quick Adds   Type of Visit Initial PT Evaluation   Living Environment   People in home alone   Current Living Arrangements house   Home Accessibility stairs to enter home;stairs within home   Number of Stairs, Main Entrance 1   Number of Stairs, Within Home, Primary greater than 10 stairs   Transportation Anticipated family or friend will provide   Living Environment Comments information obtained from OT mary as she got information from patients daughter. Patient lives alone in ramb[place] style [place] and does not need asecc to basement. daughter does her laundry.    Self-Care   Usual Activity Tolerance good   Current Activity Tolerance moderate   Equipment Currently Used at Home none   Activity/Exercise/Self-Care Comment Patient tells me she doesn't have a walker but per OT note (daughter provided that information) she has a cane and a walker.   Disability/Function   Hearing Difficulty or Deaf no   Wear Glasses or Blind yes   Vision Management glasses   Concentrating, Remembering or Making Decisions Difficulty yes   Concentration Management   (Baseline STM deficit)   Difficulty Communicating no   Difficulty Eating/Swallowing no   Walking or Climbing Stairs Difficulty no   Dressing/Bathing Difficulty no   Toileting issues no   Doing Errands Independently Difficulty (such as shopping) yes   Errands Management   (Patient reports she drives but per OT note she doesn't)   Fall history within last six months no   General Information   Onset of Illness/Injury or Date of Surgery 10/27/21   Referring Physician Sheila De La O MD   Patient/Family Therapy Goals Statement (PT) Didn't state.    Pertinent History of Current Problem (include personal factors and/or comorbidities that impact the POC) Anabelle Herbert is a 76 year old female with history of vascular dementia, concern of Alzheimer's dementia, and chronic cystitis.  She presented to the [facility] ED on 10/27/21 for evaluation of  confusion. 6 mm early subacute infarct in the anteromedial left thalamus without mass effect or hemorrhage.Moderate chronic infarct in the parasagittal left parietal and occipital lobes.Small chronic cortical infarct in the right parietal lobe   Existing Precautions/Restrictions fall   General Observations Patient very forgetful and not an accurate historian. Per OT note, she obtained PLOF information from daughter. Information patient provided to therapist did not match this information.    Cognition   Orientation Status (Cognition) person;place  (Knows month and year. )   Memory Deficit (Cognition) short-term memory   Cognitive Status Comments Patient immediately forgetting her room number after therapist reminded her, however once reminded again, she could problem solve how to find her room based on room numbers we were passing.    Pain Assessment   Patient Currently in Pain No   Integumentary/Edema   Integumentary/Edema no deficits were identifed   Posture    Posture Not impaired   Range of Motion (ROM)   ROM Quick Adds ROM WNL   Strength Comprehensive (MMT)   Comment, General Manual Muscle Testing (MMT) Assessment Not formally tested but based on functional strength no deficits noted.    Bed Mobility   Comment (Bed Mobility) Sup to/from sit independently   Transfers   Transfer Safety Comments Supervision for safety but able to perform without physical assist from bed and chair.    Gait/Stairs (Locomotion)   Comment (Gait/Stairs) 20 feet without AD with varying degrees of steadiness. In bahoo when therapist arrived with aide in room but patient in bathroom with door locked. Amb out of bathroom without LOB however when amb from bathroom door to door of room, patient had instability times 3 to the right-recovered without physical assist.    Balance   Balance Comments Impaired balance during gait especially with distractions. Difficulty walking and talking at the same time.   Clinical Impression   Criteria for  Skilled Therapeutic Intervention yes, treatment indicated   PT Diagnosis (PT) Impaired functional independence.    Influenced by the following impairments Impaired cognition, impaired balance   Functional limitations due to impairments Needs assist with gait and supervision for all mobilty due to impaired cognition.    Clinical Presentation Stable/Uncomplicated   Clinical Presentation Rationale Complicated by prior CVA's and chronic impaired cognition.    Clinical Decision Making (Complexity) moderate complexity   Therapy Frequency (PT) Daily   Predicted Duration of Therapy Intervention (days/wks) 3 days   Planned Therapy Interventions (PT) gait training;neuromuscular re-education;stair training   Anticipated Equipment Needs at Discharge (PT)   (Per OT note (discussed with daughter) she has a ww already. )   Risk & Benefits of therapy have been explained evaluation/treatment results reviewed;participants included;patient   PT Discharge Planning    PT Discharge Recommendation (DC Rec) home with assist;home with home care physical therapy   PT Rationale for DC Rec Patient is below baseline with functional balance and with cognition. Currently she is able to amb without AD but subjectively feels very unsteady and does have frequent instability to the right which she self corrects. She is steadier with the walker so should use a walker currently. Per OT note, OT discussed with daughter and daughter can provide 24/7 supervision. Will need 24/7 supervision, use of ww and HHPT at discharge.    PT Brief overview of current status  Supervision for all mobiltiy. WW and transfer belt for amb.    Total Evaluation Time   Total Evaluation Time (Minutes) 13

## 2021-10-30 NOTE — PROGRESS NOTES
X-cover    Contacted re: brain MRI- found to have 6 mm subacute infarct in L thalamus. Defer treatment to neurology and day team.    Gus Moraes MD

## 2021-10-30 NOTE — PROGRESS NOTES
[facility]    Hospitalist Progress Note      Assessment & Plan   Anabelle Herbert is a 76 year old female who presents with occasional confusion for 1 day.    Altered mental status   Vascular dementia without behavioral disturbance (H)  Acute neurologic focality with right visual field cut.  --- In the emergency department her symptoms were considered to be hallucinations but on  further evaluation her symptoms mostly involves occasional confusion, patient was not seeing things which was not present but that she was talking about things that did not make sense.  ---Patient follows up outpatient with neurology and had visited with them  on 10/27, at that time they had concerns about patient having Alzheimer's dementia along with vascular dementia. Dr Loza had recommended increased dose of Lexapro for anxiety.  ---So far work-up here is negative except for mild abnormality in the UA, considering her confusion will start on antibiotics and order for urine culture. Patient does have history of thickening of the bladder/chronic cystitis and patient had prior follow-up with urology. Patient had mentioned increased frequency of micturition.  ---CT head done here did not indicate any new intracranial hemorrhage, patient has history of intracranial hemorrhage following a fall in June 2020.  --- Of note the urine drug screen is positive for cannabinoids, clarification with patient's daughter this morning states Patient  takes a sleep aid with cannabinoids for insomnia and anxiety and she may have taken extra dose last evening resulting in admission confusion.    ---Patient does not seem to have hallucinations rather have occasional episodes of confusion, she is otherwise alert oriented x3.  ---Neurology consult;  if MRI brain is negative most of her symptoms could be related to lack of sleep on the day of admission (daughter mentioned that patient usually sleeps till 11AM but on the day of admission  due to her neurology follow-up she had to wake up at 6 AM.), the cannabinoid sleep aid as above; UTI and age related/post stroke related cognitive decline.   --- PT/OT consult to assist with disposition as currently patient lives independently and unclear with current cognitive state if this is safe for patient  -Acute neurologic focality with right visual field cut 10/28/2021.  Code stroke called.  Head CT without acute findings, CTA with stenotic left P2.  Seen by stroke neurology felt right inferior quadrantanopsia may be related to prior strokes including left occipital lobe.   - brain MRI ; citation of 6 mm early subacute infarct in anteromedial left thalamus without mass or hemorrhage in addition to chronic changes.  Per stroke neurology continue Plavix 75 mg daily, start atorvastatin 40 mg, BP goal less than 130/80 outpatient, TTE with bubble, [facility] on discharge, follow-up [facility].  PT/OT to assist in discharge planning, previously patient lived independent, now progressive cognitive impairment..      Subclinical hypothyroidism  --currently treated with levothyroxine, TSH wnl.      Adjustment reaction with anxiety and depression  --on lexapro, during 10/27/2021 visit with neurologist  plan was to increase dosage to 20 mg; see #1.  This may be appropriate as it appears patient with anxiety that leads her to use the cannabinoid sleep aid.      Benign essential hypertension  ---uncontrolled, will restart home meds and add prn, patient is very anxious causing worsening control.      CKD (chronic kidney disease) stage 2, GFR 60-89 ml/min  --baseline creatinine 1.1, stable here       Hyperlipidemia LDL goal <100  ---continue statin       Intracranial hemorrhage (H)- 6/2020 following  Fall   ---no new concerns of bleeding as per CT here   Cerebrovascular accident with history of  TIA   ---continue Plavix once medications reconciled.  -brain MRI; see #1.     Shoulder pain.  Positional, felt muscle skeletal.  No motor  "weakness.  Trial schedule acetaminophen.  Monitor       DVT Prophylaxis: Pneumatic Compression Devices  Code Status: Full Code  Expected discharge: 1-2 days pending clinical improvement; CVA complete with ECHO. Therapy assessment to assist with discharge planning.    Total unit/floor time 35 minutes:  time consisted of the following, examination of patient, review of records including labs, imaging results, medications, interdisciplinary notes and completing documentation; > 50%  Coordination of Care time with Nursing  and Specialists, Stroke Neurology and Neurology regarding stroke and mental status changecare plan, management and surveillance.  .    Tl Moffett MD  Text Page (7am - 6pm, M-F)    Interval History   Patient more awake today however response to questioning reflects increasing confusion since admission.  She blanks out on many of the questions she previously answered.  Oriented to \"hospital and month' not to year.  No new neurologic focality.      SH: No tobacco.  Lives independently.     ROS: Complete ROS negative except as above.     -Data reviewed today: I reviewed all new labs and imaging results over the last 24 hours.    Physical Exam   Temp: 97.4  F (36.3  C) Temp src: Oral BP: 127/69 Pulse: 72   Resp: 18 SpO2: 95 % O2 Device: None (Room air)    Vitals:    10/28/21 0100   Weight: 77.9 kg (171 lb 12.8 oz)     Vital Signs with Ranges  Temp:  [97.4  F (36.3  C)-98.1  F (36.7  C)] 97.4  F (36.3  C)  Pulse:  [59-77] 72  Resp:  [16-18] 18  BP: (110-145)/(64-86) 127/69  SpO2:  [92 %-95 %] 95 %  I/O last 3 completed shifts:  In: 240 [P.O.:240]  Out: -     General/Constitutional:    NAD, awake, calm, cooperative.  Answers to questions reflect confusion.     HEENT/Head Exam:  atraumatic  Eyes:  PERRL, no conjunctivits; R visual field cut  Mouth/Oral Pharynx:  Buccal mucosa WNL  Chest/Respiratory:  Air exchange bilateral lung fields; no rales or wheeze. Respiration nonlabored.  Cardiovascular:  no " murmur appreciated.  LE edema none  Gastrointestinal/Abdomen:  soft, nontender,   no rebound, guarding or other peritoneal signs.  Musculoskeletal:  extremities warm, dry, noncyanotic; no acitve synovitis.  Neuro.  Gross motor tested, nonfocal x cfor R visual field cut, sensory intact  Psych oriented x 2 however on specific questioning she rita blank could not respond., affect calm   Skin:  No rash noted on gross exam    Medications       acetaminophen  650 mg Oral TID     clopidogrel  75 mg Oral Daily     escitalopram  20 mg Oral Daily     levothyroxine  25 mcg Oral Daily     lisinopril  40 mg Oral Daily     mirtazapine  30 mg Oral At Bedtime     pantoprazole  40 mg Oral QAM AC     polyethylene glycol  17 g Oral Daily       Data   Recent Labs   Lab 10/28/21  1750 10/28/21  1013 10/28/21  0730 10/27/21  2042 10/27/21  2042   WBC  --   --  7.4  --  9.6   HGB  --   --  11.4*  --  12.3   MCV  --   --  93  --  93   PLT  --   --  226  --  259   NA  --   --  139  --  139   POTASSIUM  --   --  3.9  --  4.5   CHLORIDE  --   --  110*  --  111*   CO2  --   --  26  --  24   BUN  --   --  19  --  25   CR  --   --  0.86  --  1.11*   ANIONGAP  --   --  3  --  4   ESTEE  --   --  8.5  --  8.9   GLC 92 88 87   < > 86   ALBUMIN  --   --   --   --  3.5   PROTTOTAL  --   --   --   --  7.6   BILITOTAL  --   --   --   --  0.1*   ALKPHOS  --   --   --   --  73   ALT  --   --   --   --  18   AST  --   --   --   --  15   TROPONIN  --   --   --   --  <0.015    < > = values in this interval not displayed.       Recent Results (from the past 24 hour(s))   MR Brain w/o & w Contrast   Result Value    Radiologist flags 6 mm early subacute infarct medial left thalamus. (Urgent)    Narrative    EXAM: MR BRAIN WITHOUT AND WITH CONTRAST  LOCATION: [facility]  DATE/TIME: 10/30/2021, 3:33 AM    INDICATION: Mental status change, unknown cause.  COMPARISON: Head CT 10/28/2021.  CONTRAST: 8 mL Gadavist.  TECHNIQUE: Routine  multiplanar multisequence head MRI without and with intravenous contrast.    FINDINGS:  INTRACRANIAL CONTENTS: 6 mm focus of restricted diffusion involving the anteromedial left thalamus. There is associated FLAIR signal hyperintensity. No mass effect. No mass, acute hemorrhage, or extra-axial fluid collections. Patchy nonspecific T2/FLAIR   hyperintensities within the cerebral white matter most consistent with mild to moderate chronic microvascular ischemic change. Mild generalized cerebral atrophy. No hydrocephalus. Moderate chronic infarct in the parasagittal left parietal and occipital   lobes. Small chronic infarct in the right parietal lobe. No pathologic contrast enhancement.    SELLA: No abnormality accounting for technique.    OSSEOUS STRUCTURES/SOFT TISSUES: Normal marrow signal. The major intracranial vascular flow-voids are maintained.     ORBITS: No abnormality accounting for technique.     SINUSES/MASTOIDS: No paranasal sinus mucosal disease. No middle ear or mastoid effusion.       Impression    IMPRESSION:  1.  6 mm early subacute infarct in the anteromedial left thalamus without mass effect or hemorrhage.  2.  Moderate chronic infarct in the parasagittal left parietal and occipital lobes.  3.  Small chronic cortical infarct in the right parietal lobe.  4.  Underlying mild to moderate presumed chronic small vessel ischemic changes.    [Urgent Result: 6 mm early subacute infarct medial left thalamus.    Finding was identified on 10/30/2021 at 4:37 AM.      DR FABIANA VALDEZ  was contacted by me on 10/30/2021 at 4:50 AM and verbalized understanding of the critical result.

## 2021-10-30 NOTE — PROGRESS NOTES
[facility]  Neurology Daily Note      Admission Date:10/27/2021   Date of service: 10/30/2021   Hospital Day: 4                                                   Assessment and Plan:   #. Left thalamic stroke explain confusion/visual change.    On clopidogrel-  Vascular neurology recommendation reviewd  Start atorvastatin.   #Known hx of vascular dementia    Overall prognosis and expectations would be that the patient likely to have some progression of dementia  Recommend avoiding cannabis in any psychoactive medications    D/w daughter by phone  Patient will need 24/7 supervision due to cognitive/visual/safety issues.   Complete OT safety assessment for needs on discharge.    Will sign off.  Please contact General Neurology service if questions related to neurologic status or follow up needed during this admission.                 Interval History:   Stable. No new concern       Review of Systems:   noncontributory       Medications:   Scheduled Meds:    acetaminophen  650 mg Oral TID     cefTRIAXone  1 g Intravenous Q24H     clopidogrel  75 mg Oral Daily     escitalopram  20 mg Oral Daily     levothyroxine  25 mcg Oral Daily     lisinopril  40 mg Oral Daily     mirtazapine  30 mg Oral At Bedtime     pantoprazole  40 mg Oral QAM AC     polyethylene glycol  17 g Oral Daily     PRN Meds: acetaminophen, ALPRAZolam, labetalol, melatonin, ondansetron **OR** ondansetron, senna-docusate        Physical Exam:   Vitals: Temp: 97.4  F (36.3  C) Temp src: Oral BP: 127/69 Pulse: 72   Resp: 18 SpO2: 95 % O2 Device: None (Room air)    Vital Signs with Ranges: Temp:  [97.4  F (36.3  C)-98.1  F (36.7  C)] 97.4  F (36.3  C)  Pulse:  [59-77] 72  Resp:  [16-18] 18  BP: (110-145)/(64-86) 127/69  SpO2:  [92 %-95 %] 95 %    General Appearance:  No acute distress  Neuro:       Mental Status Exam:    Alert oriented to Month, not date, hospital (not name)  Not president even w choice.       Cranial Nerves:  Right dense visual field  cut. Although able to see all 3 items on tray on right side.     FS       Motor:   Ix4             Sensory: DSS intact                   Coordination:   I x4           Data:   ROUTINE IP LABS (Last 3results)  CBC RESULTS:     Recent Labs   Lab Test 10/28/21  0730 10/27/21  2042 01/07/21  0834   WBC 7.4 9.6 7.0   RBC 3.78* 4.05 4.27   HGB 11.4* 12.3 13.4   HCT 35.0 37.7 40.4    259 259     Basic Metabolic Panel:  Recent Labs   Lab Test 10/28/21  1750 10/28/21  1013 10/28/21  0730 10/27/21  2042 10/27/21  2042 06/03/21  1359   NA  --   --  139  --  139 143   POTASSIUM  --   --  3.9  --  4.5 4.4   CHLORIDE  --   --  110*  --  111* 110*   CO2  --   --  26  --  24 27   BUN  --   --  19  --  25 20   CR  --   --  0.86  --  1.11* 1.07*   GLC 92 88 87   < > 86 118*   ESTEE  --   --  8.5  --  8.9 8.9    < > = values in this interval not displayed.     Liver panel:  Recent Labs   Lab Test 10/27/21  2042 01/07/21  0834 06/22/20  0756 06/21/20  0727 06/03/20  1206   PROTTOTAL 7.6 7.5 6.3* 6.3* 7.0   ALBUMIN 3.5 3.9 3.1* 3.1* 3.8   BILITOTAL 0.1* 0.4 0.5 0.5 0.7   ALKPHOS 73 70 47 47 54   AST 15 18 30 37 23   ALT 18 22 30 33 18     Thyroid Panel:  Recent Labs   Lab Test 10/27/21  2042 06/03/21  1359 01/29/21  1511 01/07/21  0834 01/07/21  0834 01/22/20  1745 09/23/19  1304 06/30/15  1442 12/03/14  1503   TSH 2.49 1.03 4.50*   < > 4.22*   < > 5.39*   < > 3.12   T4  --   --  0.80  --  0.87  --  0.82   < >  --    TPO  --   --   --   --  11  --  16  --  <10   ATG  --   --   --   --  <20  --  <20  --  <20    < > = values in this interval not displayed.      Vitamin B12:   Recent Labs   Lab Test 01/07/21  0834 01/22/20  1745 09/04/19  1512   B12 [phone]      Ammonia:   Recent Labs   Lab Test 10/27/21  2042   ALEXA 30        IMAGING:   All imaging studies were reviewed personally  EXAM: MR BRAIN WITHOUT AND WITH CONTRAST  LOCATION: [facility]  DATE/TIME: 10/30/2021, 3:33 AM                                                                          IMPRESSION:  1.  6 mm early subacute infarct in the anteromedial left thalamus without mass effect or hemorrhage.  2.  Moderate chronic infarct in the parasagittal left parietal and occipital lobes.  3.  Small chronic cortical infarct in the right parietal lobe.  4.  Underlying mild to moderate presumed chronic small vessel ischemic changes        TIME     35minutes Evaluation/management time     Angelica Plunkett M.D.  Neurologist  [facility] Neurology  Office [phone]

## 2021-10-30 NOTE — CONSULTS
[facility]    Stroke Consult Note    Reason for Consult:  Stroke found on MRI    Chief Complaint: Altered Mental Status       HPI  Anabelle Herbert is a 76 year old female  has a past medical history of Acute sinusitis, unspecified, Allergic rhinitis, cause unspecified, Candidiasis of unspecified site, Candidiasis of vulva and vagina, Diplopia, Dyspnea, Essential hypertension, benign, Hypercalcemia, Memory loss, Osteoporosis, unspecified, Other B-complex deficiencies, Other, multiple and ill-defined closed fractures of lower limb (1990), Unspecified cerebral artery occlusion with cerebral infarction, Unspecified disorder of skin and subcutaneous tissue, Urinary hesitancy, and Urinary tract infection, site not specified.     Patient was seen as part of a stroke activation 10/28 for R inferior quadrant field cut (unclear if new or sequela from previous occipital infarct) and MRI completed 10/30 revealing for Left thalamic subacute infarct.     Patient was admitted 10/27 after she was seen at a neurology appointment (whom she follows for cognitive decline and vascular dementia). During the appointment no abnormal behaviors were observed; however, her daughter reported that patient had started saying some odd ideas (see H&P). Daughter reached out to patient later that day (after pt napped) and she continued to be confused--not recalling daughter or current living situation.     In ED, patient with positive Cannabinoids and evidence of UTI. Initial head CT without hemorrhage.     Patient was stroke activated 10/28 for question of a new field cut. Patient was noted to have been examined by general neurology with questionable findings of field cut. Multiple notes from outside hospital indicate Right field cut.     Patient underwent CT CTA CTP 10/28 following code without acute findings.     Patient states she has had a stroke in the past but was never put on aspirin. Per outside records, she  "was previously on daily aspirin which was switched to plavix in 2016.     Stroke Evaluation Summarized    MRI/Head CT MRI with subacute Left thalamic stroke   Intracranial Vasculature As above   Cervical Vasculature As above     Echocardiogram Pending   EKG/Telemetry Sinus   Other Testing Not Applicable     LDL  No lab value available in past 30 days   A1C  10/30/2021: 5.6 %   Troponin No lab value available in past 48 hrs       Impression  Acute ischemic stroke of L thalamus due to small-vessel occlusion   Vs large vessel atherosclerosis     Patient was previously on Aspirin following previous stroke; however, she was switched to Plavix in 2016    Recommendations  - Neurochecks and Vital Signs every 4 hours   -  goal SBP < 140 mmHg  - Daily Plavix (PTA) 75 mg for secondary stroke prevention  - Statin: Atorvastatin   - Telemetry, EKG  - Bedside Glucose Monitoring  - A1c, Lipid Panel, Troponin x 3  - PT/OT/SLP  - Stroke Education  - Euthermia, Euglycemia    - Plavix resistance lab (ordered)    Patient Follow-up    - in 12 weeks with [facility] or other local neurologist of patient's choice    Thank you for this consult.     The Stroke Staff is Dr. MINNIE Osuna MD  Vascular Neurology Fellow  To page me or covering stroke neurology team member, click here: AMCOM   Choose \"On Call\" tab at top, then search dropdown box for \"Neurology Adult\", select location, press Enter, then look for stroke/neuro ICU/telestroke.    _____________________________________________________    Clinically Significant Risk Factors Present on Admission                 Past Medical History   Past Medical History:   Diagnosis Date     Acute sinusitis, unspecified      Allergic rhinitis, cause unspecified      Candidiasis of unspecified site      Candidiasis of vulva and vagina      Diplopia      Dyspnea     following inhalation of cleaning solution. Treated with inhaler/neb.      Essential hypertension, " benign      Hypercalcemia      Memory loss      Osteoporosis, unspecified     defined by chiropractor who did xrays during tx for back pain     Other B-complex deficiencies      Other, multiple and ill-defined closed fractures of lower limb 1990    prolonged healing     Unspecified cerebral artery occlusion with cerebral infarction      Unspecified disorder of skin and subcutaneous tissue      Urinary hesitancy      Urinary tract infection, site not specified      Past Surgical History   Past Surgical History:   Procedure Laterality Date     COLONOSCOPY       CYSTOSCOPY, BIOPSY BLADDER, COMBINED  1/6/2014    Procedure: COMBINED CYSTOSCOPY, BIOPSY BLADDER;;  Surgeon: Vandana Tang MD;  Location: [facility] OR     CYSTOSCOPY, INJECT COLLAGEN, COMBINED  1/6/2014    Procedure: COMBINED CYSTOSCOPY, INJECT BULKING AGENT;  IC cocktail, bladder biopsy, fulguration, heparin, gentamyacin, lidocaine & kenalog;  Surgeon: Vandana Tang MD;  Location: [facility] OR     GENITOURINARY SURGERY       NO HISTORY OF SURGERY       Medications   Home Meds  Prior to Admission medications    Medication Sig Start Date End Date Taking? Authorizing Provider   acetaminophen (TYLENOL) 500 MG tablet Take 2 tablets (1,000 mg) by mouth every 8 hours as needed for mild pain 6/23/20  Yes Sheila De La O MD   cholecalciferol (VITAMIN D3) 5000 units (125 mcg) CAPS capsule Take 5,000 Units by mouth daily   Yes Reported, Patient   clopidogrel (PLAVIX) 75 MG tablet TAKE 1 TABLET BY MOUTH EVERY DAY 3/9/21  Yes Jyoti Moreno MD   escitalopram (LEXAPRO) 20 MG tablet Take 1 tablet (20 mg) by mouth daily 10/27/21  Yes Elmo Loza MD   esomeprazole (NEXIUM) 40 MG DR capsule Take 1 capsule (40 mg) by mouth every morning (before breakfast) Take 30-60 minutes before eating. 4/30/21  Yes Jyoti Moreno MD   levothyroxine (SYNTHROID/LEVOTHROID) 25 MCG tablet TAKE 1 TABLET BY MOUTH EVERY DAY 6/4/21  Yes Jyoti Moreno MD    lisinopril (ZESTRIL) 30 MG tablet TAKE 1 TABLET BY MOUTH EVERY DAY 6/9/21  Yes Jyoti Moreno MD   meclizine (ANTIVERT) 25 MG tablet Take 1 tablet (25 mg) by mouth 2 times daily as needed for dizziness 5/8/20  Yes Jyoti Moreno MD   mirtazapine (REMERON) 30 MG tablet TAKE 1 TABLET (30 MG) BY MOUTH AT BEDTIME 6/3/21  Yes Jyoti Moreno MD   Multiple Vitamins-Minerals (ANTIOXIDANT PO) Take 1 tablet by mouth daily CONSULT HEALTH WOMENS WELLNESS   Yes Reported, Patient   polyethylene glycol (MIRALAX) 17 GM/SCOOP powder Take 17 g by mouth daily To twice daily as needed for constipation.  Patient taking differently: Take 17 g by mouth daily as needed  6/9/20  Yes Jyoti Moreno MD   senna-docusate (SENOKOT-S/PERICOLACE) 8.6-50 MG tablet Take 1 tablet by mouth 2 times daily as needed for constipation 6/23/20  Yes Sheila De La O MD   Blood Pressure Monitoring (BLOOD PRESSURE CUFF) MISC 1 each daily as needed 7/13/20   Jyoti Moreno MD   Blood Pressure Monitoring (BLOOD PRESSURE CUFF) MISC 1 Device daily as needed 3/20/17   Jyoti Moreno MD   Blood Pressure Monitoring (BLOOD PRESSURE MONITOR/M CUFF) MISC 1 each daily as needed 2/9/21   Jyoti Moreno MD   Nutritional Supplements (ENSURE HIGH PROTEIN) Take 1 Can by mouth 3 times daily (with meals) 6/3/20   Sheba Henriquez, CARLA       Scheduled Meds    acetaminophen  650 mg Oral TID     cefTRIAXone  1 g Intravenous Q24H     clopidogrel  75 mg Oral Daily     escitalopram  20 mg Oral Daily     levothyroxine  25 mcg Oral Daily     lisinopril  40 mg Oral Daily     mirtazapine  30 mg Oral At Bedtime     pantoprazole  40 mg Oral QAM AC     polyethylene glycol  17 g Oral Daily       Infusion Meds      PRN Meds  acetaminophen, ALPRAZolam, labetalol, melatonin, ondansetron **OR** ondansetron, senna-docusate    Allergies   Allergies   Allergen Reactions     Cats      Contrast Dye      Burning,  hematuria     Diatrizoate Other (See Comments)     Feels burning inside body     Sulfa Drugs Unknown     Bupropion Anxiety     Patient reports increased anxiety, panic, difficulty concentrating, and various aches and pains     Family History   Family History   Problem Relation Age of Onset     Cancer Father         colon?     Heart Disease Father      Asthma Mother      Alzheimer Disease Mother 86     Unknown/Adopted Maternal Grandmother      Unknown/Adopted Maternal Grandfather      Unknown/Adopted Paternal Grandmother      Unknown/Adopted Paternal Grandfather      Asthma Sister      Allergies Sister      Unknown/Adopted Son      Diabetes No family hx of      Social History   Social History     Tobacco Use     Smoking status: Never Smoker     Smokeless tobacco: Never Used   Substance Use Topics     Alcohol use: No     Drug use: No       Review of Systems   The 5 point Review of Systems is negative other than noted in the HPI or here.        PHYSICAL EXAMINATION   Temp:  [97.4  F (36.3  C)-98.4  F (36.9  C)] 97.4  F (36.3  C)  Pulse:  [59-77] 62  Resp:  [16-18] 18  BP: (110-147)/(64-86) 145/86  SpO2:  [92 %-95 %] 94 %    General Exam  General:  patient lying in bed without any acute distress    HEENT:  normocephalic/atraumatic  Cardio:  regular rate  Pulmonary:  no respiratory distress  Abdomen:  non-distended  Extremities:  no edema  Skin:  intact     Neuro Exam  Mental Status:  follows commands  Cranial Nerves:  PERRL, EOMI with normal smooth pursuit, facial movements symmetric, hearing not formally tested but intact to conversation, no dysarthria; R inferior Q anopsia - homonomous   Motor:  normal muscle tone and bulk, no abnormal movements, able to move all limbs spontaneously  Sensory:  light touch sensation intact and symmetric throughout upper and lower extremities  Coordination:  no ataxia or tremor observed  Station/Gait:  deferred     Dysphagia Screen  Per Nursing    Stroke Scales    NIHSS  Interval  baseline (10/28/21 1816)   Interval Comments     1a. Level of Consciousness 0-->Alert, keenly responsive   1b. LOC Questions 0-->Answers both questions correctly   1c. LOC Commands 0-->Performs both tasks correctly   2.   Best Gaze 0-->Normal   3.   Visual 1-->Partial hemianopia   4.   Facial Palsy 0-->Normal symmetrical movements   5a. Motor Arm, Left 0-->No drift, limb holds 90 (or 45) degrees for full 10 secs   5b. Motor Arm, Right 0-->No drift, limb holds 90 (or 45) degrees for full 10 secs   6a. Motor Leg, Left 0-->No drift, leg holds 30 degree position for full 5 secs   6b. Motor Leg, right 0-->No drift, leg holds 30 degree position for full 5 secs   7.   Limb Ataxia 0-->Absent   8.   Sensory 0-->Normal, no sensory loss   9.   Best Language 0-->No aphasia, normal   10. Dysarthria 0-->Normal   11. Extinction and Inattention  0-->No abnormality   Total 1 (10/30/21 1105)       Modified Brandon Score (Pre-morbid)  2-Slight disability; unable to carry out all previous activities, but able to look after own affairs    Imaging  I personally reviewed all imaging; relevant findings per HPI.    Labs Data   CBC  Recent Labs   Lab 10/28/21  0730 10/27/21  2042   WBC 7.4 9.6   RBC 3.78* 4.05   HGB 11.4* 12.3   HCT 35.0 37.7    259     Basic Metabolic Panel   Recent Labs   Lab 10/28/21  1750 10/28/21  1013 10/28/21  0730 10/27/21  2042 10/27/21  2042   NA  --   --  139  --  139   POTASSIUM  --   --  3.9  --  4.5   CHLORIDE  --   --  110*  --  111*   CO2  --   --  26  --  24   BUN  --   --  19  --  25   CR  --   --  0.86  --  1.11*   GLC 92 88 87   < > 86   ESTEE  --   --  8.5  --  8.9    < > = values in this interval not displayed.     Liver Panel  Recent Labs   Lab 10/27/21  2042   PROTTOTAL 7.6   ALBUMIN 3.5   BILITOTAL 0.1*   ALKPHOS 73   AST 15   ALT 18     INR    Recent Labs   Lab Test 06/30/15  1539   INR 0.95      Lipid Profile    Recent Labs   Lab Test 08/21/19  1047 11/09/18  1322 05/30/18  1425 07/05/16  0825  06/30/15  1442 11/13/14  1541 11/13/14  1541   CHOL 215* 215* 243*   < > 146   < > 200*   HDL 69 62 73   < > 53   < > 74   * 136* 156*   < > 76   < > 114   TRIG 75 87 71   < > 87   < > 59   CHOLHDLRATIO  --   --   --   --  2.8  --  2.7    < > = values in this interval not displayed.     A1C    Recent Labs   Lab Test 06/03/20  1206 08/21/19  1047 10/31/16  1001   A1C 5.6 5.2 5.6     Troponin I    Recent Labs   Lab 10/27/21  2042   TROPONIN <0.015          Stroke Consult Data Data This was a non-emergent, non-telestroke consult.

## 2021-10-30 NOTE — PROGRESS NOTES
Pt here with AMS/UTI, H/O vascular dementia. MRI 10/29/21 showed 6mm infarct to R medial thalamus. Disoriented to situation and occasionally time/place. Confusion, right sided field cut, and tremors. Neuros otherwise intact. VSS on RA. Tele NSR. Regular diet, thin liquids. Takes pills whole. Up with A1GB. Denies pain. Pt scoring green on the Aggression Stop Light Tool. Plan for echocardiogram. Discharge plan pending PT/OT evaluation.

## 2021-10-31 ENCOUNTER — APPOINTMENT (OUTPATIENT)
Dept: CARDIOLOGY | Facility: CLINIC | Age: 76
DRG: 066 | End: 2021-10-31
Payer: COMMERCIAL

## 2021-10-31 ENCOUNTER — APPOINTMENT (OUTPATIENT)
Dept: PHYSICAL THERAPY | Facility: CLINIC | Age: 76
DRG: 066 | End: 2021-10-31
Payer: COMMERCIAL

## 2021-10-31 LAB
CHOLEST SERPL-MCNC: 252 MG/DL
HDLC SERPL-MCNC: 52 MG/DL
LDLC SERPL CALC-MCNC: 168 MG/DL
LVEF ECHO: NORMAL
NONHDLC SERPL-MCNC: 200 MG/DL
TRIGL SERPL-MCNC: 161 MG/DL

## 2021-10-31 PROCEDURE — 36415 COLL VENOUS BLD VENIPUNCTURE: CPT | Performed by: STUDENT IN AN ORGANIZED HEALTH CARE EDUCATION/TRAINING PROGRAM

## 2021-10-31 PROCEDURE — 82465 ASSAY BLD/SERUM CHOLESTEROL: CPT | Performed by: STUDENT IN AN ORGANIZED HEALTH CARE EDUCATION/TRAINING PROGRAM

## 2021-10-31 PROCEDURE — 97116 GAIT TRAINING THERAPY: CPT | Mod: GP | Performed by: PHYSICAL THERAPIST

## 2021-10-31 PROCEDURE — 93306 TTE W/DOPPLER COMPLETE: CPT

## 2021-10-31 PROCEDURE — 120N000001 HC R&B MED SURG/OB

## 2021-10-31 PROCEDURE — 250N000013 HC RX MED GY IP 250 OP 250 PS 637: Performed by: INTERNAL MEDICINE

## 2021-10-31 PROCEDURE — 99233 SBSQ HOSP IP/OBS HIGH 50: CPT | Performed by: HOSPITALIST

## 2021-10-31 PROCEDURE — 93306 TTE W/DOPPLER COMPLETE: CPT | Mod: 26 | Performed by: INTERNAL MEDICINE

## 2021-10-31 PROCEDURE — 97750 PHYSICAL PERFORMANCE TEST: CPT | Mod: GP | Performed by: PHYSICAL THERAPIST

## 2021-10-31 PROCEDURE — 250N000013 HC RX MED GY IP 250 OP 250 PS 637: Performed by: HOSPITALIST

## 2021-10-31 RX ADMIN — ACETAMINOPHEN 650 MG: 325 TABLET, FILM COATED ORAL at 21:11

## 2021-10-31 RX ADMIN — ACETAMINOPHEN 650 MG: 325 TABLET, FILM COATED ORAL at 16:41

## 2021-10-31 RX ADMIN — LEVOTHYROXINE SODIUM 25 MCG: 25 TABLET ORAL at 10:07

## 2021-10-31 RX ADMIN — ESCITALOPRAM OXALATE 20 MG: 20 TABLET ORAL at 10:07

## 2021-10-31 RX ADMIN — POLYETHYLENE GLYCOL 3350 17 G: 17 POWDER, FOR SOLUTION ORAL at 10:08

## 2021-10-31 RX ADMIN — CLOPIDOGREL BISULFATE 75 MG: 75 TABLET ORAL at 10:07

## 2021-10-31 RX ADMIN — PANTOPRAZOLE SODIUM 40 MG: 40 TABLET, DELAYED RELEASE ORAL at 06:31

## 2021-10-31 RX ADMIN — MIRTAZAPINE 30 MG: 15 TABLET, FILM COATED ORAL at 21:11

## 2021-10-31 RX ADMIN — ATORVASTATIN CALCIUM 40 MG: 40 TABLET, FILM COATED ORAL at 21:11

## 2021-10-31 RX ADMIN — LISINOPRIL 40 MG: 40 TABLET ORAL at 10:07

## 2021-10-31 RX ADMIN — ACETAMINOPHEN 650 MG: 325 TABLET, FILM COATED ORAL at 10:07

## 2021-10-31 ASSESSMENT — ACTIVITIES OF DAILY LIVING (ADL)
ADLS_ACUITY_SCORE: 6
ADLS_ACUITY_SCORE: 6
ADLS_ACUITY_SCORE: 11
ADLS_ACUITY_SCORE: 6
ADLS_ACUITY_SCORE: 11
ADLS_ACUITY_SCORE: 6
ADLS_ACUITY_SCORE: 9
ADLS_ACUITY_SCORE: 11
ADLS_ACUITY_SCORE: 9
ADLS_ACUITY_SCORE: 11
ADLS_ACUITY_SCORE: 9
ADLS_ACUITY_SCORE: 11
ADLS_ACUITY_SCORE: 9
ADLS_ACUITY_SCORE: 11
ADLS_ACUITY_SCORE: 6
ADLS_ACUITY_SCORE: 6
ADLS_ACUITY_SCORE: 11
ADLS_ACUITY_SCORE: 11
ADLS_ACUITY_SCORE: 6
ADLS_ACUITY_SCORE: 11

## 2021-10-31 NOTE — PLAN OF CARE
Pt here with confusion and L thalamic CVA. Alert and oriented x1-2. VSS on RA. Tele NSR. Neuros with confusion, short-term memory loss, and RUE tremor with very slight weakness compared to left. Lung sounds clear. Voiding without difficulty. Ambulating with asst of 1 GB and walker. Tolerating regular diet. Plan for TTE tomorrow. Therapies recommending home with home therapies at discharge.

## 2021-10-31 NOTE — PROGRESS NOTES
Pt here with AMS & L thalamic CVA. A&O to self & place. Neuros intact except R field cut & very confused. VSS on RA. Tele NSR. Regular diet, thin liquids. Takes pills whole with water. Up with SBA. Denies pain. Pt scoring green on the Aggression Stop Light Tool. Plan for TTE today. Discharge to home with daughter & at home therapies.

## 2021-10-31 NOTE — PLAN OF CARE
[id number] note. Pt here with L thalamic CVA. Alert and oriented x1-2. VSS on RA. Tele NSR. Neuros with confusion, short-term memory loss, and RUE tremor with very slight weakness compared to left. Needs frequent re-orientation - unable to remember why she is in the hospital and frequently stating that she is going home. Lung sounds clear. Voiding without difficulty. Ambulating with asst of 1 GB and walker. Tolerating regular diet.  Therapies recommending TCU at discharge.

## 2021-10-31 NOTE — PROGRESS NOTES
Dynamic Gait Index (DGI):The DGI is a measure of balance during gait that is reliable and valid for the elderly and individuals with Parkinson's disease, MS, vestibular disorders, or s/p stroke. Gait assistive device used: None    Patient score: 22/24  Scores ?19/24 indicate an increased risk for falls according to Jennifer et al 2000  Minimal Detectable Change = 2.9 in community dwelling elderly according to Keegan et al 2011    Assessment (rationale for performing, application to patient s function & care plan): To further assess balance  Minutes billed as physical performance test  11

## 2021-10-31 NOTE — PROGRESS NOTES
[facility]    Hospitalist Progress Note      Assessment & Plan   Anabelle Herbert is a 76 year old female who presents with occasional confusion for 1 day.    Altered mental status   Vascular dementia without behavioral disturbance (H)  Acute neurologic focality with right visual field cut.  --- In the emergency department her symptoms were considered to be hallucinations but on  further evaluation her symptoms mostly involves occasional confusion, patient was not seeing things which was not present but that she was talking about things that did not make sense.  ---Patient follows up outpatient with neurology and had visited with them  on 10/27, at that time they had concerns about patient having Alzheimer's dementia along with vascular dementia. Dr Loza had recommended increased dose of Lexapro for anxiety.  ---So far work-up here is negative except for mild abnormality in the UA, considering her confusion will start on antibiotics and order for urine culture. Patient does have history of thickening of the bladder/chronic cystitis and patient had prior follow-up with urology. Patient had mentioned increased frequency of micturition.  ---CT head done here did not indicate any new intracranial hemorrhage, patient has history of intracranial hemorrhage following a fall in June 2020.  --- Of note the urine drug screen is positive for cannabinoids, clarification with patient's daughter this morning states Patient  takes a sleep aid with cannabinoids for insomnia and anxiety and she may have taken extra dose last evening resulting in admission confusion.    ---Patient does not seem to have hallucinations rather have occasional episodes of confusion, she is otherwise alert oriented x3.  ---Neurology consult;  if MRI brain is negative most of her symptoms could be related to lack of sleep on the day of admission (daughter mentioned that patient usually sleeps till 11AM but on the day of admission  due to her neurology follow-up she had to wake up at 6 AM.), the cannabinoid sleep aid as above; UTI and age related/post stroke related cognitive decline.   -Acute neurologic focality with right visual field cut 10/28/2021.  Code stroke called.  Head CT without acute findings, CTA with stenotic left P2.  Seen by stroke neurology felt right inferior quadrantanopsia may be related to prior strokes including left occipital lobe.   - brain MRI ; citation of 6 mm early subacute infarct in anteromedial left thalamus without mass or hemorrhage in addition to chronic changes.  Per stroke neurology continue Plavix 75 mg daily, start atorvastatin 40 mg, BP goal less than 130/80 outpatient, TTE with bubble, [facility] on discharge, follow-up [facility].   -Long telephone discussion with daughter Pily today.  Suspect that her admitting confusion without focal neurologic deficit may have been signs of evolving stroke however initial evaluation included complete CT head imaging which was negative for acute stroke as it was not until 10/28/2021 that she demonstrated acute neurologic focality with subsequent brain MR indicating subacute infarct, see above.   CVA in addition to patient's use of cannabinoid sleep aid and sleep deprivation as noted on admission combined may have been contributory to presenting symptoms of confusion.  As her current functional status and cognition is significantly decreased from admission where she is now total care and cannot remember events 1-2 minutes prior and on admission was fairly independent strong recommendations for TCU for structured rehabilitation in efforts to maximize recovery including SLP, OT and PT.  Daughter in agreement.      Subclinical hypothyroidism  --currently treated with levothyroxine, TSH wnl.      Adjustment reaction with anxiety and depression  --on lexapro, during 10/27/2021 visit with neurologist  plan was to increase dosage to 20 mg; see #1.  This may be appropriate as it  appears patient with anxiety that leads her to use the cannabinoid sleep aid.      Benign essential hypertension  ---restart home meds and add prn,         CKD (chronic kidney disease) stage 2, GFR 60-89 ml/min  --baseline creatinine 1.1, stable here       Hyperlipidemia LDL goal <100  --- , start atorvastatin 40 mg.      Intracranial hemorrhage (H)- 6/2020 following  Fall   ---no new concerns of bleeding as per CT on admission.  Cerebrovascular accident with history of  TIA   --- PTA Plavix.  -brain MRI; see #1.     Shoulder pain.  Positional, felt muscle skeletal.  No motor weakness.  Trial schedule acetaminophen.  Monitor       DVT Prophylaxis: Pneumatic Compression Devices  Code Status: Full Code  Expected discharge: 1-2 days pending clinical improvement; complete CVA w/u with ECHO.  Safe disposition, TCU bed availability, see above    Total unit/floor time 35 minutes:  time consisted of the following, examination of patient, review of records including labs, imaging results, medications, interdisciplinary notes and completing documentation; > 50%  Coordination of Care time with Nursing  and Specialists, Neurology and Stroke neurology regarding Stroke care plan, management and surveillance.    Additional Prolonged Time:  30mins   [non face to face] for phone discussion with Daughter Pily  to discuss patient's clinical condition, presentation to date including initial presentation with confusion without neurologic focality and admission evaluation including complete head CT imaging that did not indicate acute stroke and subsequent findings on MRI on onset of neurologic focality.  Discussed decline in functional status with discharge recommendations for TCU for structured rehabilitation.   medical condition, assessment,        Tl Moffett MD  Text Page (7am - 6pm, M-F)    Interval History   Patient awake and pleasant however she now has near absence of short-term memory which is markedly changed  "from admission where she had only episodic periods of \"blanking out\" on answering questions.  No new neurologic focality.    SH: No tobacco.  Lives independently.     ROS: Complete ROS negative except as above.     -Data reviewed today: I reviewed all new labs and imaging results over the last 24 hours.    Physical Exam   Temp: 97.6  F (36.4  C) Temp src: Oral BP: (!) 146/84 Pulse: 72   Resp: 16 SpO2: 93 % O2 Device: None (Room air)    Vitals:    10/28/21 0100   Weight: 77.9 kg (171 lb 12.8 oz)     Vital Signs with Ranges  Temp:  [97.4  F (36.3  C)-98  F (36.7  C)] 97.6  F (36.4  C)  Pulse:  [58-77] 72  Resp:  [16-18] 16  BP: (130-168)/(71-99) 146/84  SpO2:  [92 %-96 %] 93 %  I/O last 3 completed shifts:  In: 160 [P.O.:160]  Out: -     General/Constitutional:     NAD, awake, calm, cooperative, answers to questions reflect near complete absence of short-term memory.  HEENT/Head Exam:  atraumatic  Eyes:  PERRL, no conjunctivits; R visual field cut  Mouth/Oral Pharynx:  Buccal mucosa WNL  Chest/Respiratory:  Air exchange bilateral lung fields; no rales or wheeze. Respiration nonlabored.  Cardiovascular:  no murmur appreciated.  LE edema none  Gastrointestinal/Abdomen:  soft, nontender,   no rebound, guarding or other peritoneal signs.  Musculoskeletal:  extremities warm, dry, noncyanotic; no acitve synovitis.  Neuro.  Gross motor tested, nonfocal except for R visual field cut, sensory intact  Psych oriented x1; affect calm   Skin:  No rash noted on gross exam    Medications       acetaminophen  650 mg Oral TID     atorvastatin  40 mg Oral QPM     clopidogrel  75 mg Oral Daily     escitalopram  20 mg Oral Daily     levothyroxine  25 mcg Oral Daily     lisinopril  40 mg Oral Daily     mirtazapine  30 mg Oral At Bedtime     pantoprazole  40 mg Oral QAM AC     polyethylene glycol  17 g Oral Daily       Data   Recent Labs   Lab 10/28/21  1750 10/28/21  1013 10/28/21  0730 10/27/21  2042 10/27/21  2042   WBC  --   --  7.4  " --  9.6   HGB  --   --  11.4*  --  12.3   MCV  --   --  93  --  93   PLT  --   --  226  --  259   NA  --   --  139  --  139   POTASSIUM  --   --  3.9  --  4.5   CHLORIDE  --   --  110*  --  111*   CO2  --   --  26  --  24   BUN  --   --  19  --  25   CR  --   --  0.86  --  1.11*   ANIONGAP  --   --  3  --  4   ESTEE  --   --  8.5  --  8.9   GLC 92 88 87   < > 86   ALBUMIN  --   --   --   --  3.5   PROTTOTAL  --   --   --   --  7.6   BILITOTAL  --   --   --   --  0.1*   ALKPHOS  --   --   --   --  73   ALT  --   --   --   --  18   AST  --   --   --   --  15   TROPONIN  --   --   --   --  <0.015    < > = values in this interval not displayed.

## 2021-10-31 NOTE — PLAN OF CARE
Pt here with AMS/UTI, H/O vascular dementia. MRI 10/29/21 showed 6mm infarct to R medial thalamus. Disoriented to situation. Confusion, right sided field cut, and tremors- neuros otherwise intact. VSS on RA. Tele NSR. Echo completed today- waiting on interpretation. Regular diet, thin liquids. Takes pills whole. Up with A1GB. Denies pain. Pt scoring green on the Aggression Stop Light Tool. Discharge plan to TCU pending.

## 2021-10-31 NOTE — PROGRESS NOTES
Care coordination:    Spoke briefly to daughter Ratna.  After speaking with physician she has decided TCU would be best for her mother. SW to speak with Ratna on Monday.    Vianney Smiley RN, BSN, PHN  Inpatient Care Coordination

## 2021-11-01 ENCOUNTER — APPOINTMENT (OUTPATIENT)
Dept: OCCUPATIONAL THERAPY | Facility: CLINIC | Age: 76
DRG: 066 | End: 2021-11-01
Payer: COMMERCIAL

## 2021-11-01 PROCEDURE — 250N000013 HC RX MED GY IP 250 OP 250 PS 637: Performed by: INTERNAL MEDICINE

## 2021-11-01 PROCEDURE — 250N000013 HC RX MED GY IP 250 OP 250 PS 637: Performed by: HOSPITALIST

## 2021-11-01 PROCEDURE — 97535 SELF CARE MNGMENT TRAINING: CPT | Mod: GO

## 2021-11-01 PROCEDURE — 99232 SBSQ HOSP IP/OBS MODERATE 35: CPT | Performed by: INTERNAL MEDICINE

## 2021-11-01 PROCEDURE — 120N000001 HC R&B MED SURG/OB

## 2021-11-01 RX ADMIN — PANTOPRAZOLE SODIUM 40 MG: 40 TABLET, DELAYED RELEASE ORAL at 06:31

## 2021-11-01 RX ADMIN — ACETAMINOPHEN 650 MG: 325 TABLET, FILM COATED ORAL at 21:13

## 2021-11-01 RX ADMIN — MIRTAZAPINE 30 MG: 15 TABLET, FILM COATED ORAL at 21:13

## 2021-11-01 RX ADMIN — LISINOPRIL 40 MG: 40 TABLET ORAL at 08:31

## 2021-11-01 RX ADMIN — CLOPIDOGREL BISULFATE 75 MG: 75 TABLET ORAL at 08:31

## 2021-11-01 RX ADMIN — ACETAMINOPHEN 650 MG: 325 TABLET, FILM COATED ORAL at 17:18

## 2021-11-01 RX ADMIN — LEVOTHYROXINE SODIUM 25 MCG: 25 TABLET ORAL at 08:31

## 2021-11-01 RX ADMIN — ACETAMINOPHEN 650 MG: 325 TABLET, FILM COATED ORAL at 08:30

## 2021-11-01 RX ADMIN — ATORVASTATIN CALCIUM 40 MG: 40 TABLET, FILM COATED ORAL at 21:13

## 2021-11-01 RX ADMIN — ESCITALOPRAM OXALATE 20 MG: 20 TABLET ORAL at 08:31

## 2021-11-01 ASSESSMENT — ACTIVITIES OF DAILY LIVING (ADL)
ADLS_ACUITY_SCORE: 9
ADLS_ACUITY_SCORE: 6
ADLS_ACUITY_SCORE: 6
ADLS_ACUITY_SCORE: 9
ADLS_ACUITY_SCORE: 9
ADLS_ACUITY_SCORE: 6
ADLS_ACUITY_SCORE: 6
ADLS_ACUITY_SCORE: 9
ADLS_ACUITY_SCORE: 9
ADLS_ACUITY_SCORE: 8
ADLS_ACUITY_SCORE: 9
ADLS_ACUITY_SCORE: 6
ADLS_ACUITY_SCORE: 9
ADLS_ACUITY_SCORE: 6
ADLS_ACUITY_SCORE: 9
ADLS_ACUITY_SCORE: 9
ADLS_ACUITY_SCORE: 6
ADLS_ACUITY_SCORE: 6
ADLS_ACUITY_SCORE: 8
ADLS_ACUITY_SCORE: 6
ADLS_ACUITY_SCORE: 9

## 2021-11-01 NOTE — CONSULTS
Care Management Initial Consult    General Information  Assessment completed with: Children,  (Ratna)  Type of CM/SW Visit: Initial Assessment    Primary Care Provider verified and updated as needed: Yes   Readmission within the last 30 days: no previous admission in last 30 days      Reason for Consult: discharge planning  Advance Care Planning: Advance Care Planning Reviewed:  (Has ACP docs)          Communication Assessment  Patient's communication style: spoken language (English or Bilingual)    Hearing Difficulty or Deaf: no   Wear Glasses or Blind: yes    Cognitive  Cognitive/Neuro/Behavioral: .WDL except  Level of Consciousness: confused, alert  Arousal Level: opens eyes spontaneously  Orientation: disoriented to, time, situation  Mood/Behavior: calm, cooperative  Best Language: 0 - No aphasia  Speech: clear, spontaneous, logical    Living Environment:   People in home: alone     Current living Arrangements: house      Able to return to prior arrangements: no  Living Arrangement Comments:  (TCU is being recommended)    Family/Social Support:  Care provided by: self, child(melinda)  Provides care for: no one  Marital Status:   Children, Sibling(s)          Description of Support System: Supportive, Involved    Support Assessment: Adequate family and caregiver support, Adequate social supports    Current Resources:   Patient receiving home care services: No     Community Resources: None  Equipment currently used at home: cane, straight, walker, rolling  Supplies currently used at home: None    Employment/Financial:  Employment Status: retired        Financial Concerns:             Lifestyle & Psychosocial Needs:  Social Determinants of Health     Tobacco Use: Low Risk      Smoking Tobacco Use: Never Smoker     Smokeless Tobacco Use: Never Used   Alcohol Use:      Frequency of Alcohol Consumption:      Average Number of Drinks:      Frequency of Binge Drinking:    Financial Resource Strain:      Difficulty  of Paying Living Expenses:    Food Insecurity:      Worried About Running Out of Food in the Last Year:      Ran Out of Food in the Last Year:    Transportation Needs:      Lack of Transportation (Medical):      Lack of Transportation (Non-Medical):    Physical Activity:      Days of Exercise per Week:      Minutes of Exercise per Session:    Stress:      Feeling of Stress :    Social Connections:      Frequency of Communication with Friends and Family:      Frequency of Social Gatherings with Friends and Family:      Attends Nondenominational Services:      Active Member of Clubs or Organizations:      Attends Club or Organization Meetings:      Marital Status:    Intimate Partner Violence:      Fear of Current or Ex-Partner:      Emotionally Abused:      Physically Abused:      Sexually Abused:    Depression: Not at risk     PHQ-2 Score: 2   Housing Stability:      Unable to Pay for Housing in the Last Year:      Number of Places Lived in the Last Year:      Unstable Housing in the Last Year:        Functional Status:  Prior to admission patient needed assistance:              Mental Health Status:          Chemical Dependency Status:                Values/Beliefs:  Spiritual, Cultural Beliefs, Nondenominational Practices, Values that affect care: no               Additional Information:  Per care management/social work consult for discharge planning, patient was admitted on 10/27/2021 with hallucinations, confusion. Tentative date of discharge is 11/2/2021. SW reviewed patient's chart. Called daughter, Ratna, and completed the initial. She stated that patient lives in a house in [place] by herself. She said that her home has two stairs that she needs to go up or down. Ratna stated that she visits her mom 3-5 times a week to arrange her medication, grocery shop, and cook for her. She said that her mother was able to dress, groom, and bath herself before the hospital. Patient has a cane and walker. She only uses these when she  feels off balance. Talked to Ratna about TCU options and she is a little hesitant because her father went to a TCU that she didn't like. She said that the TCU her father went to was [facility] [place]. Explained that SW won't make a referral there. Told her some of the facilities that have 4/5 stars. She wanted referrals sent to [facility] (1st choice), then [facility], [facility], and [facility]. Told her that  would make these referrals. She said that she could transport her mother at discharge.     Sent referrals via discharge on the double to the TCUs above. Sent a referral to [facility] as well since she may need a memory care TCU.     Will continue to follow.    AKIRA Christina

## 2021-11-01 NOTE — PLAN OF CARE
Pt here with L sided stroke. A&Ox2-3; dementia at baseline. Neuros intact ex for generalized weakness and R field cut. VSS, elevated but within parameters of less than 160. Tele SB. Regular diet, thin liquids. Takes pills whole, one at a time. Up with A1/GB/W. Denies pain. Pt scoring green on the Aggression Stop Light Tool. Plan pending. Discharge to TCU.  No home meds  Patient belongings remain in room

## 2021-11-01 NOTE — PROGRESS NOTES
[facility]    Hospitalist Progress Note      Assessment & Plan   Anabelle Herbert is a 76 year old female who presents with occasional confusion for 1 day.    Altered mental status   Vascular dementia without behavioral disturbance (H)  Acute neurologic focality with right visual field cut.  --- In the emergency department her symptoms were considered to be hallucinations but on  further evaluation her symptoms mostly involves occasional confusion, patient was not seeing things which was not present but that she was talking about things that did not make sense.  ---Patient follows up outpatient with neurology and had visited with them  on 10/27, at that time they had concerns about patient having Alzheimer's dementia along with vascular dementia. Dr Loza had recommended increased dose of Lexapro for anxiety.  ---So far work-up here is negative except for mild abnormality in the UA, considering her confusion will start on antibiotics and order for urine culture. Patient does have history of thickening of the bladder/chronic cystitis and patient had prior follow-up with urology. Patient had mentioned increased frequency of micturition.  ---CT head done here did not indicate any new intracranial hemorrhage, patient has history of intracranial hemorrhage following a fall in June 2020.  --- Of note the urine drug screen is positive for cannabinoids, clarification with patient's daughter this morning states Patient  takes a sleep aid with cannabinoids for insomnia and anxiety and she may have taken extra dose last evening resulting in admission confusion.    ---Patient does not seem to have hallucinations rather have occasional episodes of confusion, she is otherwise alert oriented x3.   -Acute neurologic focality with right visual field cut 10/28/2021.  Code stroke called.  Head CT without acute findings, CTA with stenotic left P2.  Seen by stroke neurology felt right inferior quadrantanopsia  may be related to prior strokes including left occipital lobe.   - brain MRI ; citation of 6 mm early subacute infarct in anteromedial left thalamus without mass or hemorrhage in addition to chronic changes.  Per stroke neurology continue Plavix 75 mg daily, start atorvastatin 40 mg, BP goal less than 130/80 outpatient, TTE with bubble, CardioNet on discharge, follow-up [facility].  Echocardiogram done 10/31 shows good ejection fraction with no shunt.  -Plan for discharge to TCU, pending placement social work following, patient medically stable for discharge to ECF.      Subclinical hypothyroidism  --currently treated with levothyroxine, TSH wnl.      Adjustment reaction with anxiety and depression  --on lexapro, during 10/27/2021 visit with neurologist  plan was to increase dosage to 20 mg; see #1.  This may be appropriate as it appears patient with anxiety that leads her to use the cannabinoid sleep aid.      Benign essential hypertension  ---restart home meds and add prn,         CKD (chronic kidney disease) stage 2, GFR 60-89 ml/min  --baseline creatinine 1.1, stable here       Hyperlipidemia LDL goal <100  --- , start atorvastatin 40 mg.      Intracranial hemorrhage (H)- 6/2020 following  Fall   ---no new concerns of bleeding as per CT on admission.  Cerebrovascular accident with history of  TIA   --- PTA Plavix.  -brain MRI; see #1.     Shoulder pain.  Positional, felt muscle skeletal.  No motor weakness.  Trial schedule acetaminophen.  Monitor       DVT Prophylaxis: Pneumatic Compression Devices  Code Status: Full Code  Expected discharge: To TCU when bed available    Sheila De La O MD  Text Page (7am - 6pm, M-F)    Interval History   Is resting comfortably, she is alert oriented x3, does not remember few events during her hospitalization but currently remembers where she is in her situation.  We briefly discussed about possibility of TCU transfer and she is open about it.  Denies any new concerns.  SH: No  tobacco.  Lives independently.     ROS: Complete ROS negative except as above.     -Data reviewed today: I reviewed all new labs and imaging results over the last 24 hours.    Physical Exam   Temp: 98.1  F (36.7  C) Temp src: Oral BP: (!) 153/88 Pulse: 66   Resp: 17 SpO2: 96 % O2 Device: None (Room air)    Vitals:    10/28/21 0100   Weight: 77.9 kg (171 lb 12.8 oz)     Vital Signs with Ranges  Temp:  [97.1  F (36.2  C)-98.1  F (36.7  C)] 98.1  F (36.7  C)  Pulse:  [64-89] 66  Resp:  [16-17] 17  BP: (123-169)/(11-99) 153/88  SpO2:  [93 %-96 %] 96 %  No intake/output data recorded.    Constitutional: Awake, alert, cooperative, no apparent distress  Respiratory: Clear to auscultation bilaterally, no crackles or wheezing  Cardiovascular: Regular rate and rhythm, normal S1 and S2, and no murmur noted  GI: Normal bowel sounds, soft, non-distended, non-tender  Skin/Integumen: No rashes, no cyanosis, no edema  Neuro : moving all 4 extremities, right visual field cut and noted      Medications       acetaminophen  650 mg Oral TID     atorvastatin  40 mg Oral QPM     clopidogrel  75 mg Oral Daily     escitalopram  20 mg Oral Daily     levothyroxine  25 mcg Oral Daily     lisinopril  40 mg Oral Daily     mirtazapine  30 mg Oral At Bedtime     pantoprazole  40 mg Oral QAM AC     polyethylene glycol  17 g Oral Daily       Data   Recent Labs   Lab 10/28/21  1750 10/28/21  1013 10/28/21  0730 10/27/21  2042 10/27/21  2042   WBC  --   --  7.4  --  9.6   HGB  --   --  11.4*  --  12.3   MCV  --   --  93  --  93   PLT  --   --  226  --  259   NA  --   --  139  --  139   POTASSIUM  --   --  3.9  --  4.5   CHLORIDE  --   --  110*  --  111*   CO2  --   --  26  --  24   BUN  --   --  19  --  25   CR  --   --  0.86  --  1.11*   ANIONGAP  --   --  3  --  4   ESTEE  --   --  8.5  --  8.9   GLC 92 88 87   < > 86   ALBUMIN  --   --   --   --  3.5   PROTTOTAL  --   --   --   --  7.6   BILITOTAL  --   --   --   --  0.1*   ALKPHOS  --   --   --    --  73   ALT  --   --   --   --  18   AST  --   --   --   --  15   TROPONIN  --   --   --   --  <0.015    < > = values in this interval not displayed.

## 2021-11-01 NOTE — PROGRESS NOTES
Pt here with L thalamic CVA. A&O to self & place. Neuros intact except R field cut & short term memory loss-very confused. VSS on RA. Tele NSR. Regular diet, thin liquids. Takes pills whole with water. Up with SBA. Denies pain. Pt scoring green on the Aggression Stop Light Tool. Plan for discharge to TCU pending placement.

## 2021-11-02 ENCOUNTER — APPOINTMENT (OUTPATIENT)
Dept: PHYSICAL THERAPY | Facility: CLINIC | Age: 76
DRG: 066 | End: 2021-11-02
Payer: COMMERCIAL

## 2021-11-02 ENCOUNTER — APPOINTMENT (OUTPATIENT)
Dept: OCCUPATIONAL THERAPY | Facility: CLINIC | Age: 76
DRG: 066 | End: 2021-11-02
Payer: COMMERCIAL

## 2021-11-02 PROCEDURE — 97530 THERAPEUTIC ACTIVITIES: CPT | Mod: GP

## 2021-11-02 PROCEDURE — 97530 THERAPEUTIC ACTIVITIES: CPT | Mod: GO

## 2021-11-02 PROCEDURE — 250N000013 HC RX MED GY IP 250 OP 250 PS 637: Performed by: INTERNAL MEDICINE

## 2021-11-02 PROCEDURE — 97110 THERAPEUTIC EXERCISES: CPT | Mod: GP

## 2021-11-02 PROCEDURE — 120N000001 HC R&B MED SURG/OB

## 2021-11-02 PROCEDURE — 99232 SBSQ HOSP IP/OBS MODERATE 35: CPT | Performed by: INTERNAL MEDICINE

## 2021-11-02 PROCEDURE — 97116 GAIT TRAINING THERAPY: CPT | Mod: GP

## 2021-11-02 PROCEDURE — 250N000013 HC RX MED GY IP 250 OP 250 PS 637: Performed by: HOSPITALIST

## 2021-11-02 RX ADMIN — ESCITALOPRAM OXALATE 20 MG: 20 TABLET ORAL at 08:58

## 2021-11-02 RX ADMIN — ATORVASTATIN CALCIUM 40 MG: 40 TABLET, FILM COATED ORAL at 19:30

## 2021-11-02 RX ADMIN — POLYETHYLENE GLYCOL 3350 17 G: 17 POWDER, FOR SOLUTION ORAL at 08:58

## 2021-11-02 RX ADMIN — CLOPIDOGREL BISULFATE 75 MG: 75 TABLET ORAL at 08:58

## 2021-11-02 RX ADMIN — LEVOTHYROXINE SODIUM 25 MCG: 25 TABLET ORAL at 08:58

## 2021-11-02 RX ADMIN — PANTOPRAZOLE SODIUM 40 MG: 40 TABLET, DELAYED RELEASE ORAL at 08:58

## 2021-11-02 RX ADMIN — ACETAMINOPHEN 650 MG: 325 TABLET, FILM COATED ORAL at 08:58

## 2021-11-02 RX ADMIN — LISINOPRIL 40 MG: 40 TABLET ORAL at 08:58

## 2021-11-02 RX ADMIN — MIRTAZAPINE 30 MG: 15 TABLET, FILM COATED ORAL at 22:10

## 2021-11-02 RX ADMIN — ACETAMINOPHEN 650 MG: 325 TABLET, FILM COATED ORAL at 22:10

## 2021-11-02 ASSESSMENT — ACTIVITIES OF DAILY LIVING (ADL)
ADLS_ACUITY_SCORE: 9
ADLS_ACUITY_SCORE: 11
ADLS_ACUITY_SCORE: 10
ADLS_ACUITY_SCORE: 9
ADLS_ACUITY_SCORE: 11
ADLS_ACUITY_SCORE: 10
ADLS_ACUITY_SCORE: 11
ADLS_ACUITY_SCORE: 11
ADLS_ACUITY_SCORE: 9
ADLS_ACUITY_SCORE: 9
ADLS_ACUITY_SCORE: 11
ADLS_ACUITY_SCORE: 10
ADLS_ACUITY_SCORE: 9
ADLS_ACUITY_SCORE: 11
ADLS_ACUITY_SCORE: 11
ADLS_ACUITY_SCORE: 9
ADLS_ACUITY_SCORE: 10

## 2021-11-02 NOTE — PROGRESS NOTES
Pt hospitalized due to L thamalic CVA.  Pt A/Ox2, disoriented to time and situation, forgetfulness with short term memory loss, baseline dementia.  Inconsistent with call light use, bed alarm on.  Pt ambulating with A1 GBW to the bathroom and voiding adequately.  PIV in place.  Denies pain.  Regular diet, thin liquids, pills whole.  Vitals stable, on room air, tele SR.  TCU pending bed availability.

## 2021-11-02 NOTE — PLAN OF CARE
Reason for Admission: L thamalic CVA    Cognitive/Mentation: A/Ox 2. Disoriented to time and situation. Forgetful and short term memory loss r/t dementia dx  Neuros/CMS: Intact ex generalized weakness and R field cut  VS: stable. Tele: SR.  GI: BS active, + flatus. Continent.  : voiding. Continent.  Pulmonary: LS clear.  Pain: denies.     Drains: PIV  Skin: intact  Activity: Assist x 1 with GBW.  Diet: Regular with thin liquids. Takes pills whole.     Therapies recs: TCU  Discharge: pending placement    End of shift summary: Patient stable throughout shift.

## 2021-11-02 NOTE — PLAN OF CARE
Pt here with AMS/UTI, H/O vascular dementia, and MRI confirmed infarct to R medial thalamus. Disoriented to situation. Confusion, right sided field cut, and tremors- neuros otherwise intact. VSS on RA. Tele NSR. Regular diet, thin liquids. Takes pills whole. Up with A1GB. Denies pain. Pt scoring green on the Aggression Stop Light Tool. Discharge plan to TCU pending.

## 2021-11-02 NOTE — PROGRESS NOTES
Care Management Follow Up    Length of Stay (days): 4    Expected Discharge Date: 11/02/2021     Concerns to be Addressed: discharge planning     Patient plan of care discussed at interdisciplinary rounds: Yes    Anticipated Discharge Disposition: Transitional Care     Anticipated Discharge Services: None  Anticipated Discharge DME: None    Patient/family educated on Medicare website which has current facility and service quality ratings: no  Education Provided on the Discharge Plan:    Patient/Family in Agreement with the Plan: yes    Referrals Placed by CM/SW: Post Acute Facilities  Private pay costs discussed: Not applicable    Additional Information:  SW sent additional TCU referrals.  SW received voicemail from [facility] that they currently have no beds and estimate that they may have beds available on Friday 11/5 or the following week.  All other TCUs declined.  SW spoke with daughter about rationale for memory care, and daughter understands.  She states prior to hospitalization she provided assistance to pt 3-5 times per week for med setup and various other household tasks.  Daughter noticed pt had severely altered cognition which lead to this hospitalization.  Daughter is concerned that pt needs significant therapy, and that more therapy is available at a TCU than what could be arranged outpt or through [facility].  SW informed daughter that SW send additional TCU referrals, and requested that [facility] continue to consider pt.  Daughter understands that we do not yet have a safe discharge plan identified.        Cindy López, ELVINSW

## 2021-11-02 NOTE — PROGRESS NOTES
[facility]    Hospitalist Progress Note      Assessment & Plan   Anabelle Herbert is a 76 year old female who presents with occasional confusion for 1 day.    Altered mental status   Vascular dementia without behavioral disturbance (H)  Acute neurologic focality with right visual field cut.  --- In the emergency department her symptoms were considered to be hallucinations but on  further evaluation her symptoms mostly involves occasional confusion, patient was not seeing things which was not present but that she was talking about things that did not make sense.  ---Patient follows up outpatient with neurology and had visited with them  on 10/27, at that time they had concerns about patient having Alzheimer's dementia along with vascular dementia. Dr Loza had recommended increased dose of Lexapro for anxiety.  ---So far work-up here is negative except for mild abnormality in the UA, considering her confusion will start on antibiotics and order for urine culture. Patient does have history of thickening of the bladder/chronic cystitis and patient had prior follow-up with urology. Patient had mentioned increased frequency of micturition.  ---CT head done here did not indicate any new intracranial hemorrhage, patient has history of intracranial hemorrhage following a fall in June 2020.  --- Of note the urine drug screen is positive for cannabinoids, clarification with patient's daughter this morning states Patient  takes a sleep aid with cannabinoids for insomnia and anxiety and she may have taken extra dose last evening resulting in admission confusion.    ---Patient does not seem to have hallucinations rather have occasional episodes of confusion, she is otherwise alert oriented x3.   -Acute neurologic focality with right visual field cut 10/28/2021.  Code stroke called.  Head CT without acute findings, CTA with stenotic left P2.  Seen by stroke neurology felt right inferior quadrantanopsia  may be related to prior strokes including left occipital lobe.   - brain MRI ; citation of 6 mm early subacute infarct in anteromedial left thalamus without mass or hemorrhage in addition to chronic changes.  Per stroke neurology continue Plavix 75 mg daily, start atorvastatin 40 mg, BP goal less than 130/80 outpatient, TTE with bubble, CardioNet on discharge, follow-up [facility].  Echocardiogram done 10/31 shows good ejection fraction with no shunt.  -Plan for discharge to TCU, pending placement social work following, patient medically stable for discharge to nursing home .      Subclinical hypothyroidism  --currently treated with levothyroxine, TSH wnl.      Adjustment reaction with anxiety and depression  --on lexapro, during 10/27/2021 visit with neurologist  plan was to increase dosage to 20 mg; see #1.  This may be appropriate as it appears patient with anxiety that leads her to use the cannabinoid sleep aid.      Benign essential hypertension  ---restart home meds and add prn,         CKD (chronic kidney disease) stage 2, GFR 60-89 ml/min  --baseline creatinine 1.1, stable here       Hyperlipidemia LDL goal <100  --- , start atorvastatin 40 mg.      Intracranial hemorrhage (H)- 6/2020 following  Fall   ---no new concerns of bleeding as per CT on admission.  Cerebrovascular accident with history of  TIA   --- PTA Plavix.  -brain MRI; see #1.     Shoulder pain.  Positional, felt muscle skeletal.  No motor weakness.  Trial schedule acetaminophen.  Monitor       DVT Prophylaxis: Pneumatic Compression Devices  Code Status: Full Code  Expected discharge: To TCU when bed available    Sheila De La O MD  Text Page (7am - 6pm, M-F)    Interval History   I patient is resting comfortably, short-term memory loss noted, otherwise very pleasant and tolerating diet well.  SH: No tobacco.  Lives independently.     ROS: Complete ROS negative except as above.     -Data reviewed today: I reviewed all new labs and imaging  results over the last 24 hours.    Physical Exam   Temp: 97  F (36.1  C) Temp src: Oral BP: (!) 150/87 (BP after went to bathroom) Pulse: 65   Resp: 15 SpO2: 94 % O2 Device: None (Room air)    Vitals:    10/28/21 0100   Weight: 77.9 kg (171 lb 12.8 oz)     Vital Signs with Ranges  Temp:  [97  F (36.1  C)-98.2  F (36.8  C)] 97  F (36.1  C)  Pulse:  [64-75] 65  Resp:  [14-16] 15  BP: (129-150)/(78-87) 150/87  SpO2:  [92 %-96 %] 94 %  I/O last 3 completed shifts:  In: 340 [P.O.:340]  Out: -     Constitutional: Awake, alert, cooperative, no apparent distress  Respiratory: Clear to auscultation bilaterally, no crackles or wheezing  Cardiovascular: Regular rate and rhythm, normal S1 and S2, and no murmur noted  GI: Normal bowel sounds, soft, non-distended, non-tender  Skin/Integumen: No rashes, no cyanosis, no edema  Neuro : moving all 4 extremities, right visual field cut and noted      Medications       acetaminophen  650 mg Oral TID     atorvastatin  40 mg Oral QPM     clopidogrel  75 mg Oral Daily     escitalopram  20 mg Oral Daily     levothyroxine  25 mcg Oral Daily     lisinopril  40 mg Oral Daily     mirtazapine  30 mg Oral At Bedtime     pantoprazole  40 mg Oral QAM AC     polyethylene glycol  17 g Oral Daily       Data   Recent Labs   Lab 10/28/21  1750 10/28/21  1013 10/28/21  0730 10/27/21  2042 10/27/21  2042   WBC  --   --  7.4  --  9.6   HGB  --   --  11.4*  --  12.3   MCV  --   --  93  --  93   PLT  --   --  226  --  259   NA  --   --  139  --  139   POTASSIUM  --   --  3.9  --  4.5   CHLORIDE  --   --  110*  --  111*   CO2  --   --  26  --  24   BUN  --   --  19  --  25   CR  --   --  0.86  --  1.11*   ANIONGAP  --   --  3  --  4   ESTEE  --   --  8.5  --  8.9   GLC 92 88 87   < > 86   ALBUMIN  --   --   --   --  3.5   PROTTOTAL  --   --   --   --  7.6   BILITOTAL  --   --   --   --  0.1*   ALKPHOS  --   --   --   --  73   ALT  --   --   --   --  18   AST  --   --   --   --  15   TROPONIN  --   --   --   --   <0.015    < > = values in this interval not displayed.

## 2021-11-03 ENCOUNTER — APPOINTMENT (OUTPATIENT)
Dept: PHYSICAL THERAPY | Facility: CLINIC | Age: 76
DRG: 066 | End: 2021-11-03
Payer: COMMERCIAL

## 2021-11-03 ENCOUNTER — APPOINTMENT (OUTPATIENT)
Dept: OCCUPATIONAL THERAPY | Facility: CLINIC | Age: 76
DRG: 066 | End: 2021-11-03
Payer: COMMERCIAL

## 2021-11-03 PROCEDURE — 97535 SELF CARE MNGMENT TRAINING: CPT | Mod: GO

## 2021-11-03 PROCEDURE — 99232 SBSQ HOSP IP/OBS MODERATE 35: CPT | Performed by: INTERNAL MEDICINE

## 2021-11-03 PROCEDURE — 97530 THERAPEUTIC ACTIVITIES: CPT | Mod: GO

## 2021-11-03 PROCEDURE — 120N000001 HC R&B MED SURG/OB

## 2021-11-03 PROCEDURE — 250N000013 HC RX MED GY IP 250 OP 250 PS 637: Performed by: HOSPITALIST

## 2021-11-03 PROCEDURE — 250N000013 HC RX MED GY IP 250 OP 250 PS 637: Performed by: INTERNAL MEDICINE

## 2021-11-03 PROCEDURE — 97116 GAIT TRAINING THERAPY: CPT | Mod: GP | Performed by: PHYSICAL THERAPIST

## 2021-11-03 RX ADMIN — ESCITALOPRAM OXALATE 20 MG: 20 TABLET ORAL at 08:01

## 2021-11-03 RX ADMIN — ACETAMINOPHEN 650 MG: 325 TABLET, FILM COATED ORAL at 22:05

## 2021-11-03 RX ADMIN — LEVOTHYROXINE SODIUM 25 MCG: 25 TABLET ORAL at 08:01

## 2021-11-03 RX ADMIN — MIRTAZAPINE 30 MG: 15 TABLET, FILM COATED ORAL at 22:05

## 2021-11-03 RX ADMIN — POLYETHYLENE GLYCOL 3350 17 G: 17 POWDER, FOR SOLUTION ORAL at 08:01

## 2021-11-03 RX ADMIN — LISINOPRIL 40 MG: 40 TABLET ORAL at 08:01

## 2021-11-03 RX ADMIN — ACETAMINOPHEN 650 MG: 325 TABLET, FILM COATED ORAL at 08:01

## 2021-11-03 RX ADMIN — ACETAMINOPHEN 650 MG: 325 TABLET, FILM COATED ORAL at 15:48

## 2021-11-03 RX ADMIN — CLOPIDOGREL BISULFATE 75 MG: 75 TABLET ORAL at 08:01

## 2021-11-03 RX ADMIN — PANTOPRAZOLE SODIUM 40 MG: 40 TABLET, DELAYED RELEASE ORAL at 08:01

## 2021-11-03 RX ADMIN — ATORVASTATIN CALCIUM 40 MG: 40 TABLET, FILM COATED ORAL at 20:19

## 2021-11-03 ASSESSMENT — ACTIVITIES OF DAILY LIVING (ADL)
ADLS_ACUITY_SCORE: 10

## 2021-11-03 NOTE — PROGRESS NOTES
Care Management Follow Up    Length of Stay (days): 5    Expected Discharge Date: 11/03/2021     Concerns to be Addressed: discharge planning     Patient plan of care discussed at interdisciplinary rounds: Yes    Anticipated Discharge Disposition: Transitional Care     Anticipated Discharge Services: None  Anticipated Discharge DME: None    Patient/family educated on Medicare website which has current facility and service quality ratings: no  Education Provided on the Discharge Plan:    Patient/Family in Agreement with the Plan: yes    Referrals Placed by CM/SW: Post Acute Facilities  Private pay costs discussed: transportation costs    Additional Information:  SW spoke with patients daughter, Ratna about status update. Ratna requested we expand the TCU search for anything in the [facility], [place], and [place] area. SW sent additional referrals to [facility], [facility], and [facility]    Will continue to follow.    AKIRA Morgan

## 2021-11-03 NOTE — PLAN OF CARE
Pt here with AMS/UTI, H/O vascular dementia, and infarct to R medial thalamus. Disoriented to time and situation. Confusion, right sided field cut, and tremors- neuros otherwise intact. VSS on RA. Tele NSR. Regular diet, thin liquids. Takes pills whole. Up with A1GB. Bed alarms chair alarm in use- patient will try to get up herself to use the bathroom d/t confusion. Denies pain. Pt scoring green on the Aggression Stop Light Tool. Discharge plan to TCU pending placement.

## 2021-11-03 NOTE — PLAN OF CARE
0738-1424: Alert to self and time. VSS. Denies pain. Up SBA, frequently sets of alarm but is actually very steady. Voiding well. Neuros intact except for rt field cut. Tele NSR. Tolerating diet. Plan to continue to monitor tonight. Likely discharge to TCU when able.

## 2021-11-03 NOTE — PROGRESS NOTES
[facility]    Hospitalist Progress Note      Assessment & Plan   Anabelle Herbert is a 76 year old female who presents with occasional confusion for 1 day.    Altered mental status   Vascular dementia without behavioral disturbance (H)  Acute neurologic focality with right visual field cut.  --- In the emergency department her symptoms were considered to be hallucinations but on  further evaluation her symptoms mostly involves occasional confusion, patient was not seeing things which was not present but that she was talking about things that did not make sense.  ---Patient follows up outpatient with neurology and had visited with them  on 10/27, at that time they had concerns about patient having Alzheimer's dementia along with vascular dementia. Dr Loza had recommended increased dose of Lexapro for anxiety.  ---So far work-up here is negative except for mild abnormality in the UA, considering her confusion will start on antibiotics and order for urine culture. Patient does have history of thickening of the bladder/chronic cystitis and patient had prior follow-up with urology. Patient had mentioned increased frequency of micturition.  ---CT head done here did not indicate any new intracranial hemorrhage, patient has history of intracranial hemorrhage following a fall in June 2020.  --- Of note the urine drug screen is positive for cannabinoids, clarification with patient's daughter this morning states Patient  takes a sleep aid with cannabinoids for insomnia and anxiety and she may have taken extra dose last evening resulting in admission confusion.    ---Patient does not seem to have hallucinations rather have occasional episodes of confusion, she is otherwise alert oriented x3.   -Acute neurologic focality with right visual field cut 10/28/2021.  Code stroke called.  Head CT without acute findings, CTA with stenotic left P2.  Seen by stroke neurology felt right inferior quadrantanopsia  may be related to prior strokes including left occipital lobe.   - brain MRI ; citation of 6 mm early subacute infarct in anteromedial left thalamus without mass or hemorrhage in addition to chronic changes.  Per stroke neurology continue Plavix 75 mg daily, start atorvastatin 40 mg, BP goal less than 130/80 outpatient, TTE with bubble, CardioNet on discharge, follow-up [facility].  Echocardiogram done 10/31 shows good ejection fraction with no shunt.  -Plan for discharge to TCU, pending placement social work following, patient medically stable for discharge to nursing home .      Subclinical hypothyroidism  --currently treated with levothyroxine, TSH wnl.      Adjustment reaction with anxiety and depression  --on lexapro, during 10/27/2021 visit with neurologist  plan was to increase dosage to 20 mg; see #1.  This may be appropriate as it appears patient with anxiety that leads her to use the cannabinoid sleep aid.      Benign essential hypertension  ---restart home meds and add prn,         CKD (chronic kidney disease) stage 2, GFR 60-89 ml/min  --baseline creatinine 1.1, stable here       Hyperlipidemia LDL goal <100  --- , start atorvastatin 40 mg.      Intracranial hemorrhage (H)- 6/2020 following  Fall   ---no new concerns of bleeding as per CT on admission.  Cerebrovascular accident with history of  TIA   --- PTA Plavix.  -brain MRI; see #1.     Shoulder pain.  Positional, felt muscle skeletal.  No motor weakness.  Trial schedule acetaminophen.  Monitor       DVT Prophylaxis: Pneumatic Compression Devices  Code Status: Full Code  Expected discharge: To TCU when bed available  Total time spend 25 Min >50% spend on coordination of care including    discussion with social work, nursing etc. regarding discharge planning.   Sheila DeL a O MD  Text Page (7am - 6pm, M-F)    Interval History   Patient is resting comfortably, no new issues overnight, very pleasant.  SH: No tobacco.  Lives independently.     ROS:  Complete ROS negative except as above.     -Data reviewed today: I reviewed all new labs and imaging results over the last 24 hours.    Physical Exam   Temp: 97.6  F (36.4  C) Temp src: Oral BP: 120/72 Pulse: 68   Resp: 16 SpO2: 97 % O2 Device: None (Room air)    Vitals:    10/28/21 0100   Weight: 77.9 kg (171 lb 12.8 oz)     Vital Signs with Ranges  Temp:  [97.3  F (36.3  C)-97.6  F (36.4  C)] 97.6  F (36.4  C)  Pulse:  [66-77] 68  Resp:  [14-18] 16  BP: (120-162)/(71-97) 120/72  SpO2:  [95 %-97 %] 97 %  I/O last 3 completed shifts:  In: 530 [P.O.:530]  Out: -     Constitutional: Awake, alert, cooperative, no apparent distress  Respiratory: Clear to auscultation bilaterally, no crackles or wheezing  Cardiovascular: Regular rate and rhythm, normal S1 and S2, and no murmur noted  GI: Normal bowel sounds, soft, non-distended, non-tender  Skin/Integumen: No rashes, no cyanosis, no edema  Neuro : moving all 4 extremities, right visual field cut and noted      Medications       acetaminophen  650 mg Oral TID     atorvastatin  40 mg Oral QPM     clopidogrel  75 mg Oral Daily     escitalopram  20 mg Oral Daily     levothyroxine  25 mcg Oral Daily     lisinopril  40 mg Oral Daily     mirtazapine  30 mg Oral At Bedtime     pantoprazole  40 mg Oral QAM AC     polyethylene glycol  17 g Oral Daily       Data   Recent Labs   Lab 10/28/21  1750 10/28/21  1013 10/28/21  0730 10/27/21  2042 10/27/21  2042   WBC  --   --  7.4  --  9.6   HGB  --   --  11.4*  --  12.3   MCV  --   --  93  --  93   PLT  --   --  226  --  259   NA  --   --  139  --  139   POTASSIUM  --   --  3.9  --  4.5   CHLORIDE  --   --  110*  --  111*   CO2  --   --  26  --  24   BUN  --   --  19  --  25   CR  --   --  0.86  --  1.11*   ANIONGAP  --   --  3  --  4   ESTEE  --   --  8.5  --  8.9   GLC 92 88 87   < > 86   ALBUMIN  --   --   --   --  3.5   PROTTOTAL  --   --   --   --  7.6   BILITOTAL  --   --   --   --  0.1*   ALKPHOS  --   --   --   --  73   ALT  --    --   --   --  18   AST  --   --   --   --  15   TROPONIN  --   --   --   --  <0.015    < > = values in this interval not displayed.

## 2021-11-03 NOTE — PROGRESS NOTES
Pt here with left thalamus CVA.  A/O x person and place.  CMS intact with the exception of right field cut.  Pt denies any pain or numbness and tingling. Pt is forgetful and needs consistent reorientation and education regarding call light use.  Pts vitals have been stable, BP within goals, not supplemental oxygen in use, tele for hx of Afib reading NSR-SB. Pt is impulsive at times forgetting to use the call light and will get up without assistance to use the bathroom, bed alarms and chair alarms in use.  Pt is on a regular diet, thin liquids and takes pills whole.  PIV saline locked.  Discharge plans pending, awaiting bed availability.

## 2021-11-03 NOTE — PLAN OF CARE
Pt here with L thalamic CVA.  A/O self and place.  R field cut.  Forgetful.  Vitals stable.  NSR.  LS clear on RA.  Tolerating reg diet thin liquids.  No voiding complaints.  Somewhat impulsive.  Scores yellow on aggression stoplight tool.  PIV saline locked.  Denies pain.  Discharge pending placement.

## 2021-11-04 ENCOUNTER — APPOINTMENT (OUTPATIENT)
Dept: OCCUPATIONAL THERAPY | Facility: CLINIC | Age: 76
DRG: 066 | End: 2021-11-04
Payer: COMMERCIAL

## 2021-11-04 LAB
ANION GAP SERPL CALCULATED.3IONS-SCNC: 3 MMOL/L (ref 3–14)
BUN SERPL-MCNC: 21 MG/DL (ref 7–30)
CALCIUM SERPL-MCNC: 9.2 MG/DL (ref 8.5–10.1)
CHLORIDE BLD-SCNC: 107 MMOL/L (ref 94–109)
CO2 SERPL-SCNC: 28 MMOL/L (ref 20–32)
CREAT SERPL-MCNC: 1 MG/DL (ref 0.52–1.04)
GFR SERPL CREATININE-BSD FRML MDRD: 56 ML/MIN/1.73M2
GLUCOSE BLD-MCNC: 83 MG/DL (ref 70–99)
POTASSIUM BLD-SCNC: 3.6 MMOL/L (ref 3.4–5.3)
SARS-COV-2 RNA RESP QL NAA+PROBE: NEGATIVE
SODIUM SERPL-SCNC: 138 MMOL/L (ref 133–144)

## 2021-11-04 PROCEDURE — 250N000013 HC RX MED GY IP 250 OP 250 PS 637: Performed by: INTERNAL MEDICINE

## 2021-11-04 PROCEDURE — 97535 SELF CARE MNGMENT TRAINING: CPT | Mod: GO

## 2021-11-04 PROCEDURE — 250N000013 HC RX MED GY IP 250 OP 250 PS 637: Performed by: HOSPITALIST

## 2021-11-04 PROCEDURE — 120N000001 HC R&B MED SURG/OB

## 2021-11-04 PROCEDURE — U0005 INFEC AGEN DETEC AMPLI PROBE: HCPCS | Performed by: INTERNAL MEDICINE

## 2021-11-04 PROCEDURE — 99232 SBSQ HOSP IP/OBS MODERATE 35: CPT | Performed by: INTERNAL MEDICINE

## 2021-11-04 PROCEDURE — 36415 COLL VENOUS BLD VENIPUNCTURE: CPT | Performed by: INTERNAL MEDICINE

## 2021-11-04 PROCEDURE — 80048 BASIC METABOLIC PNL TOTAL CA: CPT | Performed by: INTERNAL MEDICINE

## 2021-11-04 RX ORDER — AMLODIPINE BESYLATE 5 MG/1
5 TABLET ORAL DAILY
Status: DISCONTINUED | OUTPATIENT
Start: 2021-11-04 | End: 2021-11-05 | Stop reason: HOSPADM

## 2021-11-04 RX ADMIN — ACETAMINOPHEN 650 MG: 325 TABLET, FILM COATED ORAL at 21:46

## 2021-11-04 RX ADMIN — LISINOPRIL 40 MG: 40 TABLET ORAL at 09:17

## 2021-11-04 RX ADMIN — CLOPIDOGREL BISULFATE 75 MG: 75 TABLET ORAL at 09:18

## 2021-11-04 RX ADMIN — ESCITALOPRAM OXALATE 20 MG: 20 TABLET ORAL at 09:17

## 2021-11-04 RX ADMIN — ACETAMINOPHEN 650 MG: 325 TABLET, FILM COATED ORAL at 17:10

## 2021-11-04 RX ADMIN — ACETAMINOPHEN 650 MG: 325 TABLET, FILM COATED ORAL at 09:17

## 2021-11-04 RX ADMIN — PANTOPRAZOLE SODIUM 40 MG: 40 TABLET, DELAYED RELEASE ORAL at 06:49

## 2021-11-04 RX ADMIN — MIRTAZAPINE 30 MG: 15 TABLET, FILM COATED ORAL at 21:46

## 2021-11-04 RX ADMIN — ATORVASTATIN CALCIUM 40 MG: 40 TABLET, FILM COATED ORAL at 20:35

## 2021-11-04 RX ADMIN — LEVOTHYROXINE SODIUM 25 MCG: 25 TABLET ORAL at 09:17

## 2021-11-04 RX ADMIN — POLYETHYLENE GLYCOL 3350 17 G: 17 POWDER, FOR SOLUTION ORAL at 09:17

## 2021-11-04 RX ADMIN — AMLODIPINE BESYLATE 5 MG: 5 TABLET ORAL at 12:09

## 2021-11-04 ASSESSMENT — ACTIVITIES OF DAILY LIVING (ADL)
ADLS_ACUITY_SCORE: 8
ADLS_ACUITY_SCORE: 10
ADLS_ACUITY_SCORE: 8
ADLS_ACUITY_SCORE: 8
ADLS_ACUITY_SCORE: 10
ADLS_ACUITY_SCORE: 8
ADLS_ACUITY_SCORE: 8
ADLS_ACUITY_SCORE: 10
ADLS_ACUITY_SCORE: 10
ADLS_ACUITY_SCORE: 8
ADLS_ACUITY_SCORE: 10
ADLS_ACUITY_SCORE: 8

## 2021-11-04 NOTE — PROGRESS NOTES
[facility]    Hospitalist Progress Note      Assessment & Plan   Anabelle Herbert is a 76 year old female who presents with occasional confusion for 1 day.    Altered mental status   Vascular dementia without behavioral disturbance (H)  Acute neurologic focality with right visual field cut.  --- In the emergency department her symptoms were considered to be hallucinations but on  further evaluation her symptoms mostly involves occasional confusion, patient was not seeing things which was not present but that she was talking about things that did not make sense.  ---Patient follows up outpatient with neurology and had visited with them  on 10/27, at that time they had concerns about patient having Alzheimer's dementia along with vascular dementia. Dr Loza had recommended increased dose of Lexapro for anxiety.  ---So far work-up here is negative except for mild abnormality in the UA, considering her confusion will start on antibiotics and order for urine culture. Patient does have history of thickening of the bladder/chronic cystitis and patient had prior follow-up with urology. Patient had mentioned increased frequency of micturition.  ---CT head done here did not indicate any new intracranial hemorrhage, patient has history of intracranial hemorrhage following a fall in June 2020.  --- Of note the urine drug screen is positive for cannabinoids, clarification with patient's daughter this morning states Patient  takes a sleep aid with cannabinoids for insomnia and anxiety and she may have taken extra dose last evening resulting in admission confusion.    ---Patient does not seem to have hallucinations rather have occasional episodes of confusion, she is otherwise alert oriented x3.   -Acute neurologic focality with right visual field cut 10/28/2021.  Code stroke called.  Head CT without acute findings, CTA with stenotic left P2.  Seen by stroke neurology felt right inferior quadrantanopsia  may be related to prior strokes including left occipital lobe.   - brain MRI ; citation of 6 mm early subacute infarct in anteromedial left thalamus without mass or hemorrhage in addition to chronic changes.  Per stroke neurology continue Plavix 75 mg daily, start atorvastatin 40 mg, BP goal less than 130/80 outpatient, TTE with bubble, CardioNet on discharge, follow-up [facility].  Echocardiogram done 10/31 shows good ejection fraction with no shunt.  -blood pressure has been high here , will add amlodipine to her medications, started 5 mg daily 11/4/2021.  -Plan for discharge to TCU, pending placement social work following, patient medically stable for discharge to nursing home .      Subclinical hypothyroidism  --currently treated with levothyroxine, TSH wnl.      Adjustment reaction with anxiety and depression  --on lexapro, during 10/27/2021 visit with neurologist  plan was to increase dosage to 20 mg; see #1.  This may be appropriate as it appears patient with anxiety that leads her to use the cannabinoid sleep aid.      Benign essential hypertension  ---on PTA lisinopril 40 mg , added amlodipine 5 mg , goal is less than 130/80        CKD (chronic kidney disease) stage 2, GFR 60-89 ml/min  --baseline creatinine 1.1, stable here       Hyperlipidemia LDL goal <100  --- , start atorvastatin 40 mg.      Intracranial hemorrhage (H)- 6/2020 following  Fall   ---no new concerns of bleeding as per CT on admission.  Cerebrovascular accident with history of  TIA   --- PTA Plavix.  -brain MRI; see #1.     Shoulder pain.  Positional, felt muscle skeletal.  No motor weakness.  Trial schedule acetaminophen.  Monitor       DVT Prophylaxis: Pneumatic Compression Devices  Code Status: Full Code  Expected discharge: To TCU when bed available    Sheila De La O MD  Text Page (7am - 6pm, M-F)    Interval History   Patient is resting comfortably, no new issues overnight, very pleasant.asking whats wrong with her .  SH: No tobacco.   Lives independently.     ROS: Complete ROS negative except as above.     -Data reviewed today: I reviewed all new labs and imaging results over the last 24 hours.    Physical Exam   Temp: 97.4  F (36.3  C) Temp src: Oral BP: (!) 150/89 Pulse: 70   Resp: 16 SpO2: 95 % O2 Device: None (Room air)    Vitals:    10/28/21 0100   Weight: 77.9 kg (171 lb 12.8 oz)     Vital Signs with Ranges  Temp:  [97.1  F (36.2  C)-98.2  F (36.8  C)] 97.4  F (36.3  C)  Pulse:  [61-70] 70  Resp:  [16] 16  BP: (120-157)/(65-97) 150/89  SpO2:  [93 %-97 %] 95 %  No intake/output data recorded.    Constitutional: Awake, alert, cooperative, no apparent distress  Respiratory: Clear to auscultation bilaterally, no crackles or wheezing  Cardiovascular: Regular rate and rhythm, normal S1 and S2, and no murmur noted  GI: Normal bowel sounds, soft, non-distended, non-tender  Skin/Integumen: No rashes, no cyanosis, no edema  Neuro : moving all 4 extremities, right visual field cut and noted      Medications       acetaminophen  650 mg Oral TID     atorvastatin  40 mg Oral QPM     clopidogrel  75 mg Oral Daily     escitalopram  20 mg Oral Daily     levothyroxine  25 mcg Oral Daily     lisinopril  40 mg Oral Daily     mirtazapine  30 mg Oral At Bedtime     pantoprazole  40 mg Oral QAM AC     polyethylene glycol  17 g Oral Daily       Data   Recent Labs   Lab 10/28/21  1750   GLC 92

## 2021-11-04 NOTE — PROVIDER NOTIFICATION
"MD Notification    Notified Person: MD    Notified Person Name: dr. De La O     Notification Date/Time: 1535 11/4/21     Notification Interaction: web based page  Purpose of Notification: \"Is pt medically ready for discharge? fam hoping to discharge home with therapies and able to  patient at 1600. Thank you! \"    Comments: Informed by care coordinator that miscommunication occurred with daughter; patient will discharge tomorrow 11/5; MD made aware    "

## 2021-11-04 NOTE — DISCHARGE INSTRUCTIONS
HOMECARE NOTE:   Your doctor has ordered home care to help you after your hospital stay.  The staff will contact you to schedule your first visit.  This service will be provided by [facility].  If you have any question, or have not received a call within 48 hours of discharge, please call them at [phone], choose option #1 for Home Health, then choose option #1 for scheduling.    *please see homecare quality ratings for all homecares in your area at www.medicare.gov     Please follow up with neurology in 4-6 weeks. You may call one of the following neurology clinics for an appointment or a clinic of your choice. Tell them you were recently hospitalized and need follow up as recommended at discharge.  You were seen by Dr. Plunkett while in the hospital who is at the [facility].    1. [facility]   [address] , [address]   [place] [address]   [phone]  2. [facility]   [address], [address]   [place] [address]   [phone]

## 2021-11-04 NOTE — PROGRESS NOTES
Care Management Follow Up    Length of Stay (days): 6    Expected Discharge Date: 11/04/2021     Concerns to be Addressed: discharge planning     Patient plan of care discussed at interdisciplinary rounds: Yes    Anticipated Discharge Disposition: Transitional Care     Anticipated Discharge Services: None  Anticipated Discharge DME: None    Patient/family educated on Medicare website which has current facility and service quality ratings: no  Education Provided on the Discharge Plan:    Patient/Family in Agreement with the Plan: yes    Referrals Placed by CM/SW: Post Acute Facilities  Private pay costs discussed: Not applicable    Additional Information:  Additional TCU referral sent.   ADDENDUM: SW received voicemail from daughter that she would like to take pt home with her and get therapy in the community.  Daughter expresses concern that no TCU bed has been located and would like to take care of pt at home.    Addendum: SW spoke to pt daughter Ratna at length.  Ratna states that she is prepared to provide 24/7 care to pt.  Ratna provided care to her father in home for over 5 years.  Ratna is aware of pt defficits currently, and Ratna is more concerned with pt balance than with her memory deficits.  Ratna would like therapy at her home.  Ratna's address is [address] [place] [address] [place] [address].  Ratna can pick pt up today after 1600 or 11/5/21 after 1200.  [facility] CC will arrange homecare and follow up with Ratna about discharge plans.     Cindy López, [facility] HospitalSW

## 2021-11-04 NOTE — PLAN OF CARE
Pt here with infarct to R medial thalamus. Disoriented to situation and occasionally to time/place. Confusion, right sided field cut, and tremors in B/L thumbs. Neuros otherwise intact. VSS on RA. Tele NSR. Regular diet, thin liquids. Takes pills whole. Up with A1GB. Denies pain. Pt scoring green on the Aggression Stop Light Tool. Spoke to patient's daughter (Ratna) who states that she has decided to care for her Mother at home versus sending her to TCU. States that she did speak to social work her about an hour ago to update discharge plan.

## 2021-11-04 NOTE — PROGRESS NOTES
Care Management Follow Up    Length of Stay (days): 6    Expected Discharge Date: 11/04/2021     Concerns to be Addressed: discharge planning     Patient plan of care discussed at interdisciplinary rounds: Yes    Anticipated Discharge Disposition: Home with family     Anticipated Discharge Services: None  Anticipated Discharge DME: None    Patient/family educated on Medicare website which has current facility and service quality ratings: no  Education Provided on the Discharge Plan:  yes  Patient/Family in Agreement with the Plan: yes    Referrals Placed by CM/SW: home care  Private pay costs discussed: Not applicable    Additional Information:  Per SW, daughter Ratna has decided to bring her mother home to stay with her rather than continuing to pursue TCU.  She would like [facility] for home care services.  Referral sent to [facility] and they are able to accept.  Contacted Ratna to update her with this information and ask if she would be able to pick her mother up at 6pm.  Per Ratna, her understanding that her mother would be discharged tomorrow.  This was a sudden change of plans and she is preparing her home for her mother.  She will be able to pick her mother up on Friday at 10:00 a.m.  Updated the charge nurse, Dr. De La O and [facility] of this plan.    Vianney Smiley RN, BSN, PHN  Inpatient Care Coordination  [facility]  Phone: [phone]

## 2021-11-04 NOTE — PROGRESS NOTES
Pt here with L thalamic CVA. A&O to self & place. Neuros intact except R field cut & short term memory loss-very, confused. VSS on RA. Tele NSR. Regular diet, thin liquids. Takes pills whole with water. Up with SBA. Denies pain. Pt scoring green on the Aggression Stop Light Tool. Plan for discharge to TCU pending placement.

## 2021-11-05 ENCOUNTER — APPOINTMENT (OUTPATIENT)
Dept: OCCUPATIONAL THERAPY | Facility: CLINIC | Age: 76
DRG: 066 | End: 2021-11-05
Payer: COMMERCIAL

## 2021-11-05 ENCOUNTER — APPOINTMENT (OUTPATIENT)
Dept: CARDIOLOGY | Facility: CLINIC | Age: 76
DRG: 066 | End: 2021-11-05
Attending: INTERNAL MEDICINE
Payer: COMMERCIAL

## 2021-11-05 VITALS
DIASTOLIC BLOOD PRESSURE: 75 MMHG | OXYGEN SATURATION: 92 % | HEART RATE: 75 BPM | WEIGHT: 171.8 LBS | TEMPERATURE: 98 F | HEIGHT: 62 IN | SYSTOLIC BLOOD PRESSURE: 133 MMHG | RESPIRATION RATE: 16 BRPM | BODY MASS INDEX: 31.62 KG/M2

## 2021-11-05 DIAGNOSIS — R79.89 ABNORMAL TSH: ICD-10-CM

## 2021-11-05 DIAGNOSIS — F43.23 ADJUSTMENT REACTION WITH ANXIETY AND DEPRESSION: ICD-10-CM

## 2021-11-05 PROCEDURE — 93272 ECG/REVIEW INTERPRET ONLY: CPT | Performed by: INTERNAL MEDICINE

## 2021-11-05 PROCEDURE — 97535 SELF CARE MNGMENT TRAINING: CPT | Mod: GO

## 2021-11-05 PROCEDURE — 250N000013 HC RX MED GY IP 250 OP 250 PS 637: Performed by: INTERNAL MEDICINE

## 2021-11-05 PROCEDURE — 250N000013 HC RX MED GY IP 250 OP 250 PS 637: Performed by: HOSPITALIST

## 2021-11-05 PROCEDURE — 93270 REMOTE 30 DAY ECG REV/REPORT: CPT

## 2021-11-05 PROCEDURE — 99239 HOSP IP/OBS DSCHRG MGMT >30: CPT | Performed by: INTERNAL MEDICINE

## 2021-11-05 RX ORDER — ATORVASTATIN CALCIUM 40 MG/1
40 TABLET, FILM COATED ORAL EVERY EVENING
Qty: 30 TABLET | Refills: 1 | Status: SHIPPED | OUTPATIENT
Start: 2021-11-05 | End: 2021-11-17

## 2021-11-05 RX ORDER — LISINOPRIL 40 MG/1
40 TABLET ORAL DAILY
Qty: 30 TABLET | Refills: 1 | Status: SHIPPED | OUTPATIENT
Start: 2021-11-05 | End: 2021-11-17

## 2021-11-05 RX ORDER — AMLODIPINE BESYLATE 5 MG/1
5 TABLET ORAL DAILY
Qty: 30 TABLET | Refills: 1 | Status: SHIPPED | OUTPATIENT
Start: 2021-11-05 | End: 2021-11-17

## 2021-11-05 RX ADMIN — CLOPIDOGREL BISULFATE 75 MG: 75 TABLET ORAL at 08:26

## 2021-11-05 RX ADMIN — LEVOTHYROXINE SODIUM 25 MCG: 25 TABLET ORAL at 08:26

## 2021-11-05 RX ADMIN — ACETAMINOPHEN 650 MG: 325 TABLET, FILM COATED ORAL at 08:27

## 2021-11-05 RX ADMIN — LISINOPRIL 40 MG: 40 TABLET ORAL at 08:27

## 2021-11-05 RX ADMIN — ESCITALOPRAM OXALATE 20 MG: 20 TABLET ORAL at 08:26

## 2021-11-05 RX ADMIN — AMLODIPINE BESYLATE 5 MG: 5 TABLET ORAL at 08:27

## 2021-11-05 RX ADMIN — PANTOPRAZOLE SODIUM 40 MG: 40 TABLET, DELAYED RELEASE ORAL at 06:24

## 2021-11-05 ASSESSMENT — ACTIVITIES OF DAILY LIVING (ADL)
ADLS_ACUITY_SCORE: 8

## 2021-11-05 NOTE — DISCHARGE SUMMARY
[facility]  Hospitalist Discharge Summary      Date of Admission:  10/27/2021  Date of Discharge:  11/5/2021  Discharging Provider: Bruce Bermudez MD, MD    Discharge Diagnoses     Altered mental status   Vascular dementia without behavioral disturbance (H)  Acute neurologic focality with right visual field cut.right inferior quadrantanopsia may be related to prior strokes including left occipital lobe.   - brain MRI ; citation of 6 mm early subacute infarct in anteromedial left thalamus without mass or hemorrhage in addition to chronic changes    Chronic:     Subclinical hypothyroidism    Adjustment reaction with anxiety and depression  -  Benign essential hypertension    CKD (chronic kidney disease) stage 2, GFR 60-89 ml/min    Hyperlipidemia LDL goal <100    Intracranial hemorrhage (H)- 6/2020 following  Fall   Shoulder pain.  Positional, felt muscle skeletal.      Follow-ups Needed After Discharge   Follow-up Appointments     Follow-up and recommended labs and tests       Follow up with primary care provider, Jyoti Moreno, within 7   days for hospital follow- up.        Follow up with Dr. Shaikh/ Neurology, within 2-3 weeks  for hospital   follow- up. The following labs/tests are recommended: follow up holter.           Unresulted Labs Ordered in the Past 30 Days of this Admission     No orders found from 9/27/2021 to 10/28/2021.        Discharge Disposition   Discharged to home  Condition at discharge: Stable    Hospital Course   Anabelle Herbert is a 76 year old female who presents with occasional confusion for 1 day.     Altered mental status   Vascular dementia without behavioral disturbance (H)  Acute neurologic focality with right visual field cut.  --- In the emergency department her symptoms were considered to be hallucinations but on  further evaluation her symptoms mostly involves occasional confusion, patient was not seeing things which was not present but that  she was talking about things that did not make sense.  ---Patient follows up outpatient with neurology and had visited with them  on 10/27, at that time they had concerns about patient having Alzheimer's dementia along with vascular dementia. Dr Loza had recommended increased dose of Lexapro for anxiety.  ---So far work-up here is negative except for mild abnormality in the UA, considering her confusion will start on antibiotics and order for urine culture. Patient does have history of thickening of the bladder/chronic cystitis and patient had prior follow-up with urology. Patient had mentioned increased frequency of micturition.  ---CT head done here did not indicate any new intracranial hemorrhage, patient has history of intracranial hemorrhage following a fall in June 2020.  --- Of note the urine drug screen is positive for cannabinoids, clarification with patient's daughter this morning states Patient  takes a sleep aid with cannabinoids for insomnia and anxiety and she may have taken extra dose last evening resulting in admission confusion.    ---Patient does not seem to have hallucinations rather have occasional episodes of confusion, she is otherwise alert oriented x3.   -Acute neurologic focality with right visual field cut 10/28/2021.  Code stroke called.  Head CT without acute findings, CTA with stenotic left P2.  Seen by stroke neurology felt right inferior quadrantanopsia may be related to prior strokes including left occipital lobe.   - brain MRI ; citation of 6 mm early subacute infarct in anteromedial left thalamus without mass or hemorrhage in addition to chronic changes.  Per stroke neurology continue Plavix 75 mg daily, start atorvastatin 40 mg, BP goal less than 130/80 outpatient, TTE with bubble, [facility] on discharge, follow-up [facility].  Echocardiogram done 10/31 shows good ejection fraction with no shunt.  -blood pressure has been high here , added amlodipine to her medications, started 5 mg  daily 11/4/2021.  -Plan for discharge to TCU, but family felt safe for home with therapies so discharged.         Subclinical hypothyroidism  --currently treated with levothyroxine, TSH wnl.       Adjustment reaction with anxiety and depression  --on lexapro, during 10/27/2021 visit with neurologist  plan was to increase dosage to 20 mg; see #1.  This may be appropriate as it appears patient with anxiety that leads her to use the cannabinoid sleep aid.       Benign essential hypertension  ---on PTA lisinopril 40 mg , added amlodipine 5 mg , goal is less than 130/80        CKD (chronic kidney disease) stage 2, GFR 60-89 ml/min  --baseline creatinine 1.1, stable here        Hyperlipidemia LDL goal <100  --- , start atorvastatin 40 mg.       Intracranial hemorrhage (H)- 6/2020 following  Fall   ---no new concerns of bleeding as per CT on admission.  Cerebrovascular accident with history of  TIA   --- PTA Plavix.  -brain MRI; see #1.      Shoulder pain.  Positional, felt muscle skeletal.  No motor weakness.  Trial schedule acetaminophen.  Monitor       Consultations This Hospital Stay   NEUROLOGY IP CONSULT  CARE MANAGEMENT / SOCIAL WORK IP CONSULT  OCCUPATIONAL THERAPY ADULT IP CONSULT  PHYSICAL THERAPY ADULT IP CONSULT  NEUROLOGY IP CONSULT    Code Status   Full Code    Time Spent on this Encounter   I, Bruce Bermudez MD, MD, personally saw the patient today and spent greater than 30 minutes discharging this patient.       Bruce Bermudez MD, MD  [place] [address] [facility] [address]  [facility] [place] [address]  Phone: [phone]  ______________________________________________________________________    Physical Exam   Vital Signs: Temp: 98  F (36.7  C) Temp src: Oral BP: 133/75 Pulse: 75   Resp: 16 SpO2: 92 % O2 Device: None (Room air)    Weight: 171 lbs 12.8 oz  Gen- pleasant   HEENT- NAD, MARCELLUS  Neck- supple  CVS- I+II+ no m/r/g  RS- CTABS  Abdo- soft, no tenderness . No g/r/r  Ext-  no edema   CNS- moving all 4 limbs     Primary Care Physician   Jyoti Moreno    Discharge Orders      Home Care PT Referral for Hospital Discharge      Home Care OT Referral for Hospital Discharge      Reason for your hospital stay    Anabelle Herbert is a 76 year old female who presents with occasional confusion for 1 day. Found to have subacute stroke     Follow-up and recommended labs and tests     Follow up with primary care provider, Jyoti Moreno, within 7 days for hospital follow- up.        Follow up with Dr. Shaikh/ Neurology, within 2-3 weeks  for hospital follow- up. The following labs/tests are recommended: follow up holter.     Activity    Your activity upon discharge: activity as tolerated     When to contact your care team    Call your primary doctor if you have any of the following: temperature greater than 100.4 or less than 96,  increased shortness of breath, any confusion, or weakness.     MD face to face encounter    Documentation of Face to Face and Certification for Home Health Services    I certify that patient: Anabelle Herbert is under my care and that I, or a nurse practitioner or physician's assistant working with me, had a face-to-face encounter that meets the physician face-to-face encounter requirements with this patient on: 11/5/2021.    This encounter with the patient was in whole, or in part, for the following medical condition, which is the primary reason for home health care: Stroke.    I certify that, based on my findings, the following services are medically necessary home health services: Nursing and Physical Therapy, Occupational Therapy    My clinical findings support the need for the above services because: Nurse is needed: To assess effects after changes in medications or other medical regimen.. and Physical Therapy Services are needed to assess and treat the following functional impairments: Decreased mobility requiring assist of 1  Occupational  therapy due to cognitive deficits    Further, I certify that my clinical findings support that this patient is homebound (i.e. absences from home require considerable and taxing effort and are for medical reasons or [employer] services or infrequently or of short duration when for other reasons) because: Leaving home is medically contraindicated for the following reason(s): safety.  Home health recommended due to taxing effort to leave the home, Ax1 and requires use of an AD.     Based on the above findings. I certify that this patient is confined to the home and needs intermittent skilled nursing care, physical therapy and/or speech therapy.  The patient is under my care, and I have initiated the establishment of the plan of care.  This patient will be followed by a physician who will periodically review the plan of care.  Physician/Provider to provide follow up care: Jyoti Moreno    Attending hospital physician (the Medicare certified PEC provider): Bruce Bermudez MD, MD  Physician Signature: See electronic signature associated with these discharge orders.  Date: 11/5/2021     Cardiac Event Monitor Adult/Pediatric     Diet    Follow this diet upon discharge: Orders Placed This Encounter      Room Service      Room Service      Regular Diet Adult       Significant Results and Procedures   Results for orders placed or performed during the hospital encounter of 10/27/21   CT Head w/o Contrast    Narrative    EXAM: CT HEAD W/O CONTRAST  LOCATION: [facility]  DATE/TIME: 10/27/2021 9:15 PM    INDICATION: Mental status change, unknown cause  COMPARISON: 07/20/2020  TECHNIQUE: Routine CT Head without IV contrast. Multiplanar reformats. Dose reduction techniques were used.    FINDINGS:  INTRACRANIAL CONTENTS: No intracranial hemorrhage, extraaxial collection, or mass effect.  No CT evidence of acute infarct. Unchanged chronic cortical infarctions in the superior left occipital lobe  and in the superior right parietal lobe. Mild presumed   chronic small vessel ischemic changes. Mild generalized volume loss. No hydrocephalus.     VISUALIZED ORBITS/SINUSES/MASTOIDS: No intraorbital abnormality. No paranasal sinus mucosal disease. No middle ear or mastoid effusion.    BONES/SOFT TISSUES: No acute abnormality.        Impression    IMPRESSION:  1.  No acute intracranial hemorrhage, mass effect, or CT evidence for acute infarction.  2.  Unchanged chronic cortical infarctions in the superior left occipital lobe and superior right parietal lobe.  3.  Stable background of mild chronic age-related change.   MR Brain w/o & w Contrast     Value    Radiologist flags 6 mm early subacute infarct medial left thalamus. (Urgent)    Narrative    EXAM: MR BRAIN WITHOUT AND WITH CONTRAST  LOCATION: [facility]  DATE/TIME: 10/30/2021, 3:33 AM    INDICATION: Mental status change, unknown cause.  COMPARISON: Head CT 10/28/2021.  CONTRAST: 8 mL Gadavist.  TECHNIQUE: Routine multiplanar multisequence head MRI without and with intravenous contrast.    FINDINGS:  INTRACRANIAL CONTENTS: 6 mm focus of restricted diffusion involving the anteromedial left thalamus. There is associated FLAIR signal hyperintensity. No mass effect. No mass, acute hemorrhage, or extra-axial fluid collections. Patchy nonspecific T2/FLAIR   hyperintensities within the cerebral white matter most consistent with mild to moderate chronic microvascular ischemic change. Mild generalized cerebral atrophy. No hydrocephalus. Moderate chronic infarct in the parasagittal left parietal and occipital   lobes. Small chronic infarct in the right parietal lobe. No pathologic contrast enhancement.    SELLA: No abnormality accounting for technique.    OSSEOUS STRUCTURES/SOFT TISSUES: Normal marrow signal. The major intracranial vascular flow-voids are maintained.     ORBITS: No abnormality accounting for technique.     SINUSES/MASTOIDS: No  paranasal sinus mucosal disease. No middle ear or mastoid effusion.       Impression    IMPRESSION:  1.  6 mm early subacute infarct in the anteromedial left thalamus without mass effect or hemorrhage.  2.  Moderate chronic infarct in the parasagittal left parietal and occipital lobes.  3.  Small chronic cortical infarct in the right parietal lobe.  4.  Underlying mild to moderate presumed chronic small vessel ischemic changes.    [Urgent Result: 6 mm early subacute infarct medial left thalamus.    Finding was identified on 10/30/2021 at 4:37 AM.      DR FABIANA VALDEZ  was contacted by me on 10/30/2021 at 4:50 AM and verbalized understanding of the critical result.           CT Head Perfusion w Contrast    Narrative    CT BRAIN PERFUSION 10/28/2021 6:47 PM    HISTORY: Transient ischemic attack (TIA); Evaluate mismatch between  penumbra and core infarct    TECHNIQUE: Time sequential axial CT images of the head were acquired  during the administration of 120mL Isovue 370. IV. Color perfusion  maps of the brain were created from this time sequential axial source  data.     Radiation dose for this scan was reduced using automated exposure  control, adjustment of the mA and/or kV according to patient size, or  iterative reconstruction technique.    COMPARISON: CTA head and neck of same day.      Impression    Impression: There appears to be a subtle asymmetric prolongation of  the contrast transit time to the left posterior cerebral artery  territory, which may be related to the severe left distal P2 stenosis  (series 206 image 24). There appear to be small matched perfusion  defects corresponding to the patient's known right parietal and left  occipital infarcts (series 204 image 26, series 204 image 31). No  definite acute infarct by perfusion criteria.    RAFA CORNEJO MD         SYSTEM ID:  [id number]   CT Head w/o Contrast    Narrative    CT SCAN OF THE HEAD WITHOUT CONTRAST   10/28/2021 6:30 PM     HISTORY:  Transient ischemic attack (TIA); Code Stroke    TECHNIQUE:  Axial images of the head and coronal reformations without  IV contrast material. Radiation dose for this scan was reduced using  automated exposure control, adjustment of the mA and/or kV according  to patient size, or iterative reconstruction technique.    COMPARISON: CT head 10/27/2021.    FINDINGS: The ventricles are normal in size and configuration. Chronic  cortical/subcortical infarct involving the cuneus gyrus of the left  occipital lobe, as before. Chronic small cortical/subcortical infarct  involving the right parietal lobe, as before. Mild patchy nonspecific  hypoattenuation is again noted in the cerebral white matter,  presumably representing chronic small vessel ischemic changes. There  is mild generalized brain parenchymal volume loss. No definite acute  extended infarct signs. No new/acute intracranial hemorrhage,  extra-axial fluid collection, or mass effect/herniation. Visualized  orbits, paranasal sinuses, and mastoids are normal.      Impression    IMPRESSION:  1. No CT findings of acute intracranial process.  2. Unchanged chronic left occipital and right parietal infarcts.  3. Brain atrophy and presumed chronic small vessel ischemic changes,  as described.    The findings were discussed with Dr. Osuna by myself at approximately  6:50 PM on 10/28/2021.    RAFA CORNEJO MD         SYSTEM ID:  [id number]   CTA Head Neck with Contrast    Narrative    CT ANGIOGRAM OF THE HEAD AND NECK WITH CONTRAST  10/28/2021 6:32 PM     HISTORY: Transient ischemic attack (TIA); Code Stroke     TECHNIQUE:  CT angiography with an injection of 70 mL Isovue-370 IV  with scans through the head and neck. Images were transferred to a  separate 3-D workstation where multiplanar reformations and 3-D images  were created. Estimates of carotid stenoses are made relative to the  distal internal carotid artery diameters except as noted. Radiation  dose for this scan was  Modify Regimen: Alternate otc Selson blue/ head & shoulders and T-sal/T-gel into washing regimen. reduced using automated exposure control,  adjustment of the mA and/or kV according to patient size, or iterative  reconstruction technique.    COMPARISON: CT scan of same day. MRA of the head 11/2/2016 MRA of the  neck 11/2/2016.     CT HEAD FINDINGS: No contrast enhancing lesions. Cerebral blood flow  is grossly normal.     CT ANGIOGRAM HEAD FINDINGS:  There is severe focal stenosis of the  distal P2 segment of the left posterior cerebral artery (series 9  image 376, series 12 image 96). There is moderate stenosis of the mid  P2 segment of the left posterior cerebral artery (series 9 image 368).  No definite large vessel occlusion identified. There is a somewhat  diminutive appearance of the distal left ZOË branches in the region of  the known chronic left cuneus gyrus infarct, likely chronic.    Mild nonflow calcified atherosclerosis involving right greater than  left carotid siphons. No high-grade stenosis or large vessel occlusion  involving the major proximal visualized branches of the anterior  cerebral or middle cerebral arteries is identified. No aneurysm or  high flow vascular malformation is seen.    CT ANGIOGRAM NECK FINDINGS:   Mild atherosclerosis of the aortic arch without significant stenosis  of the origins of the great vessels. Conventional three-vessel aortic  arch branching pattern.     Right carotid artery: There is mixed plaque in the distal common  carotid artery extending into the carotid bifurcation/proximal  internal carotid artery resulting in up to approximately 50-55%  stenosis of the proximal right internal carotid artery by NASCET  criteria.    Left carotid artery: The left common and internal carotid arteries are  patent. Mild calcified left carotid bifurcation/proximal internal  carotid artery atherosclerotic disease without significant stenosis by  NASCET criteria.     Vertebral arteries: Vertebral arteries are patent without evidence of  dissection. No significant stenosis.      Other findings: None.       Impression    IMPRESSION:  1. Multifocal stenosis of the left posterior cerebral artery,  including a severe focal stenosis of the distal P2 segment.  2. No definite large vessel occlusion. No other high-grade central  intracranial arterial stenosis.  3. Approximately 50-55% stenosis of the proximal right internal  carotid artery by NASCET criteria due to mixed atherosclerotic plaque.  4. No significant stenosis of the left cervical carotid artery or  bilateral cervical vertebral arteries.    The findings were discussed with Dr. Weston by myself at approximately  6:50 PM on 10/28/2021.    RAFA CORNEJO MD         SYSTEM ID:  [id number]   Echocardiogram Complete     Value    LVEF  60-65%    Narrative    [id number]  [id number]  [id number]  [id number]^TRISTA^KALYN     [facility]  Echocardiography Laboratory  [address] [place] [address]     Name: SABRINA GREENE  MRN: [id number]  : 1945  Study Date: 10/31/2021 01:30 PM  Age: 76 yrs  Gender: Female  Patient Location: [facility]  Reason For Study: CVA  Ordering Physician: KALYN WESTON  Referring Physician: Jyoti Moreno  Performed By: Hal Santos     BSA: 1.8 m2  Height: 62 in  Weight: 171 lb  HR: 77  BP: 127/69 mmHg  ______________________________________________________________________________  Procedure  Complete Portable Echo Adult.  ______________________________________________________________________________  Interpretation Summary     1. Left ventricular systolic function is normal. The visual ejection fraction  is 60-65%.  2. No regional wall motion abnormalities noted.  3. The right ventricle is mildly dilated. The right ventricular systolic  function is normal.  4. The right atrium is moderately dilated.  5. The aortic valve is trileaflet with aortic valve sclerosis.  6. There is no color Doppler evidence of an atrial  Detail Level: Zone Render In Strict Bullet Format?: No shunt.  ______________________________________________________________________________  Left Ventricle  The left ventricle is normal in size. There is normal left ventricular wall  thickness. Left ventricular systolic function is normal. The visual ejection  fraction is 60-65%. Grade I or early diastolic dysfunction. No regional wall  motion abnormalities noted.     Right Ventricle  The right ventricle is mildly dilated. The right ventricular systolic function  is normal.     Atria  Normal left atrial size. The right atrium is moderately dilated. There is no  color Doppler evidence of an atrial shunt.     Mitral Valve  There is mild mitral annular calcification. There is trace mitral  regurgitation.     Tricuspid Valve  The tricuspid valve is normal in structure and function. There is trace  tricuspid regurgitation.     Aortic Valve  The aortic valve is trileaflet with aortic valve sclerosis.     Pulmonic Valve  The pulmonic valve is not well seen, but is grossly normal.     Vessels  Normal size aorta. IVC diameter <2.1 cm collapsing >50% with sniff suggests a  normal RA pressure of 3 mmHg.     Pericardium  There is no pericardial effusion.     Rhythm  Sinus rhythm was noted.  ______________________________________________________________________________  MMode/2D Measurements & Calculations  IVSd: 0.90 cm     LVIDd: 4.8 cm  LVIDs: 3.1 cm  LVPWd: 0.79 cm  FS: 35.9 %  LV mass(C)d: 137.5 grams  LV mass(C)dI: 76.9 grams/m2  Ao root diam: 3.0 cm  LA dimension: 3.4 cm  asc Aorta Diam: 3.5 cm  LA/Ao: 1.2  LVOT diam: 2.0 cm  LVOT area: 3.0 cm2  LA Volume (BP): 57.3 ml  LA Volume Index (BP): 32.0 ml/m2  RWT: 0.33     Doppler Measurements & Calculations  MV E max guillermina: 72.3 cm/sec  MV A max guillermina: 96.0 cm/sec  MV E/A: 0.75  MV dec slope: 440.8 cm/sec2  PA acc time: 0.12 sec  TR max guillermina: 263.3 cm/sec  TR max P.7 mmHg  E/E' av.9  Lateral E/e': 10.2  Medial E/e': 13.6      ______________________________________________________________________________  Report approved by: Cyndy Ahumada 10/31/2021 03:38 PM               Discharge Medications   Current Discharge Medication List      START taking these medications    Details   amLODIPine (NORVASC) 5 MG tablet Take 1 tablet (5 mg) by mouth daily  Qty: 30 tablet, Refills: 1    Associated Diagnoses: Acute focal neurological deficit      atorvastatin (LIPITOR) 40 MG tablet Take 1 tablet (40 mg) by mouth every evening  Qty: 30 tablet, Refills: 1    Associated Diagnoses: Acute focal neurological deficit         CONTINUE these medications which have CHANGED    Details   lisinopril (ZESTRIL) 40 MG tablet Take 1 tablet (40 mg) by mouth daily  Qty: 30 tablet, Refills: 1    Associated Diagnoses: Hypertension goal BP (blood pressure) < 130/80         CONTINUE these medications which have NOT CHANGED    Details   acetaminophen (TYLENOL) 500 MG tablet Take 2 tablets (1,000 mg) by mouth every 8 hours as needed for mild pain      cholecalciferol (VITAMIN D3) 5000 units (125 mcg) CAPS capsule Take 5,000 Units by mouth daily      clopidogrel (PLAVIX) 75 MG tablet TAKE 1 TABLET BY MOUTH EVERY DAY  Qty: 90 tablet, Refills: 3    Associated Diagnoses: Cerebrovascular accident (CVA), unspecified mechanism (H)      escitalopram (LEXAPRO) 20 MG tablet Take 1 tablet (20 mg) by mouth daily  Qty: 90 tablet, Refills: 3    Associated Diagnoses: Anxiety; Memory change      esomeprazole (NEXIUM) 40 MG DR capsule Take 1 capsule (40 mg) by mouth every morning (before breakfast) Take 30-60 minutes before eating.  Qty: 90 capsule, Refills: 3    Associated Diagnoses: Gastroesophageal reflux disease, unspecified whether esophagitis present      levothyroxine (SYNTHROID/LEVOTHROID) 25 MCG tablet TAKE 1 TABLET BY MOUTH EVERY DAY  Qty: 90 tablet, Refills: 1    Associated Diagnoses: Abnormal TSH      meclizine (ANTIVERT) 25 MG tablet Take 1 tablet (25 mg) by mouth 2  Initiate Treatment: Ketoconazole cream and shampoo times daily as needed for dizziness  Qty: 30 tablet, Refills: 2    Associated Diagnoses: Dizziness      mirtazapine (REMERON) 30 MG tablet TAKE 1 TABLET (30 MG) BY MOUTH AT BEDTIME  Qty: 90 tablet, Refills: 1    Associated Diagnoses: Adjustment reaction with anxiety and depression      Multiple Vitamins-Minerals (ANTIOXIDANT PO) Take 1 tablet by mouth daily CONSULT [facility]      polyethylene glycol (MIRALAX) 17 GM/SCOOP powder Take 17 g by mouth daily To twice daily as needed for constipation.  Qty: 850 g, Refills: 11    Associated Diagnoses: Bloating; Constipation, unspecified constipation type      senna-docusate (SENOKOT-S/PERICOLACE) 8.6-50 MG tablet Take 1 tablet by mouth 2 times daily as needed for constipation  Qty: 30 tablet, Refills: 1    Associated Diagnoses: Intracranial hemorrhage (H)      !! Blood Pressure Monitoring (BLOOD PRESSURE CUFF) MISC 1 each daily as needed  Qty: 1 each, Refills: 0    Associated Diagnoses: Dizziness      !! Blood Pressure Monitoring (BLOOD PRESSURE CUFF) MISC 1 Device daily as needed  Qty: 1 each, Refills: 0    Associated Diagnoses: Hypertension goal BP (blood pressure) < 130/80      Blood Pressure Monitoring (BLOOD PRESSURE MONITOR/M CUFF) MISC 1 each daily as needed  Qty: 1 each, Refills: 0    Associated Diagnoses: Hypertension goal BP (blood pressure) < 130/80      Nutritional Supplements (ENSURE HIGH PROTEIN) Take 1 Can by mouth 3 times daily (with meals)  Qty: 90 Can, Refills: 3    Associated Diagnoses: Poor appetite       !! - Potential duplicate medications found. Please discuss with provider.        Allergies   Allergies   Allergen Reactions     Cats      Contrast Dye      Burning, hematuria     Diatrizoate Other (See Comments)     Feels burning inside body     Sulfa Drugs Unknown     Bupropion Anxiety     Patient reports increased anxiety, panic, difficulty concentrating, and various aches and pains

## 2021-11-05 NOTE — PLAN OF CARE
Pt here with L thalamic CVA. &Ox3 disoriented to situation Neuros intact except R field cut & short term memory loss VSS on RA, Tele NSR. Regular diet, thin liquids. Takes pills whole with water . Denies pain. Pt scoring green on the Aggression Stop Light Tool. Discharging with daughter to home, discharge education completed, discharge with new medication,

## 2021-11-05 NOTE — PROGRESS NOTES
Pt here with L thalamic CVA. A&O to self & place. Neuros intact except R field cut & short term memory loss, very confused. VSS on RA. Tele NSR. Regular diet, thin liquids. Takes pills whole with water. Up with SBA. Denies pain. Pt scoring green on the Aggression Stop Light Tool. Plan for discharge to home with daughter & at home therapies at 10 am today.

## 2021-11-05 NOTE — PROGRESS NOTES
SPIRITUAL HEALTH SERVICES Progress Note    [facility] 75    Saw Anabelle due to length of stay and pre-discharge.  Introduced SHS and offered support for her returning to home.  She thanked me for stopping and said she didn't need anything.    [facility]S will be available on request while Anabelle is in hospital.    Niki Napoles     Intern    Pager: [phone]

## 2021-11-05 NOTE — TELEPHONE ENCOUNTER
Overdue for PHQ-9/ depression follow-up with provider.     No appointment pending at this time.  Routing to provider to advise.    Alivia LOYDN, RN

## 2021-11-05 NOTE — PROGRESS NOTES
Care Management Discharge Note    Discharge Date: 11/05/2021       Discharge Disposition: Home, Home Care    Discharge Services: None    Discharge DME: None    Discharge Transportation: family or friend will provide    Private pay costs discussed: Not applicable     Patient/family educated on Medicare website which has current facility and service quality ratings: no    Education Provided on the Discharge Plan:  yes  Persons Notified of Discharge Plans: Chg RN, Hospitalist  Patient/Family in Agreement with the Plan: yes    Handoff Referral Completed: Yes    Additional Information:  Patient discharging home with daughter Ratna this morning at 10am.  Spoke with Ratna yesterday and she is aware of this plan.  Patient will have HHC.  Daughter will schedule follow up appointments.    Vianney Smiley RN, BSN, PHN  Inpatient Care Coordination  [facility]  Phone: [phone]

## 2021-11-05 NOTE — PLAN OF CARE
"[id number]: Pt here with L thalamic CVA. A&Ox3 this shift, disoriented to situation but aware she was at [facility] \"to get better.\" Knew month and year. Right field cut noted and short term memory loss. Pleasantly confused, but does require some reorientation intermittently. Had an approx. 10 min period where she continued to try and get up from chair to get ready to discharge; staff reminding discharge is planned for tomorrow and action repeating a few times before patient seemed to comprehend. VSS. Tele NSR. Tolerating regular diet. Ate 100% dinner. COVID-19 swab from this AM resulted negative. Plan to discharge tomorrow 11/5 at 1000 with daughter to home w/ assist with home therapies.  "

## 2021-11-06 ENCOUNTER — MEDICAL CORRESPONDENCE (OUTPATIENT)
Dept: HEALTH INFORMATION MANAGEMENT | Facility: CLINIC | Age: 76
End: 2021-11-06
Payer: COMMERCIAL

## 2021-11-06 NOTE — PLAN OF CARE
Physical Therapy Discharge Summary    Reason for therapy discharge:    Discharged to transitional care facility.    Progress towards therapy goal(s). See goals on Care Plan in [facility] electronic health record for goal details.  Goals partially met.  Barriers to achieving goals:   limited tolerance for therapy and discharge from facility.    Therapy recommendation(s): PT in TCU to address safety and independence in mobility and endurance

## 2021-11-08 ENCOUNTER — TELEPHONE (OUTPATIENT)
Dept: FAMILY MEDICINE | Facility: CLINIC | Age: 76
End: 2021-11-08
Payer: COMMERCIAL

## 2021-11-08 DIAGNOSIS — I63.9 CEREBROVASCULAR ACCIDENT (CVA), UNSPECIFIED MECHANISM (H): ICD-10-CM

## 2021-11-08 RX ORDER — MIRTAZAPINE 30 MG/1
30 TABLET, FILM COATED ORAL AT BEDTIME
Qty: 90 TABLET | Refills: 1 | Status: SHIPPED | OUTPATIENT
Start: 2021-11-08 | End: 2021-11-17

## 2021-11-08 RX ORDER — CLOPIDOGREL BISULFATE 75 MG/1
75 TABLET ORAL DAILY
Qty: 90 TABLET | Refills: 3 | Status: SHIPPED | OUTPATIENT
Start: 2021-11-08 | End: 2021-11-17

## 2021-11-08 RX ORDER — LEVOTHYROXINE SODIUM 25 UG/1
TABLET ORAL
Qty: 90 TABLET | Refills: 1 | Status: SHIPPED | OUTPATIENT
Start: 2021-11-08 | End: 2021-11-16

## 2021-11-08 NOTE — TELEPHONE ENCOUNTER
Routing to provider to review and advise on requested orders below, patient had a change in status, was discharged from [facility] on 11/5/21, admitted on 10/27. Records are in Chart Review.      Beba Kelley RN  [facility]

## 2021-11-08 NOTE — TELEPHONE ENCOUNTER
clopidogrel (PLAVIX) 75 MG tablet 90 tablet 3 3/9/2021     Is Dr. Moreno aware of DDI between Clopidogrel & Esomeprazole (can decrease Clopidogrel effectiveness) Do you want to continue?

## 2021-11-08 NOTE — CONFIDENTIAL NOTE
Routing to provider to review and advise on requested home care orders below, patient had a change in status, was discharged from [facility] on 11/5/21, admitted on 10/27. Records are in Chart Review.      Beba Kelley RN  [facility]

## 2021-11-08 NOTE — TELEPHONE ENCOUNTER
Micki with [facility] calling requesting verbal orders for skilled nursing 1x a week for 2 weeks then 1 x a week every other week for seven weeks. PT & OT eval as well  OK to leave verbal orders on secured line.  Christiano Rutherford

## 2021-11-09 ENCOUNTER — PATIENT OUTREACH (OUTPATIENT)
Dept: CARE COORDINATION | Facility: CLINIC | Age: 76
End: 2021-11-09

## 2021-11-09 ENCOUNTER — TELEPHONE (OUTPATIENT)
Dept: FAMILY MEDICINE | Facility: CLINIC | Age: 76
End: 2021-11-09

## 2021-11-09 ENCOUNTER — PATIENT OUTREACH (OUTPATIENT)
Dept: NURSING | Facility: CLINIC | Age: 76
End: 2021-11-09
Payer: COMMERCIAL

## 2021-11-09 NOTE — LETTER
Dear Anabelle,    I am a clinic care coordinator who works with Jyoti Moreno MD. Below is a description of clinic care coordination and how I can further assist you.      The clinic care coordination team is made up of a registered nurse,  and community health worker who understand the health care system. The goal of clinic care coordination is to help you manage your health and improve access to the health care system in the most efficient manner. The team can assist you in meeting your health care goals by providing education, coordinating services, strengthening the communication among your providers and supporting you with any resource needs.    Please feel free to contact me at [phone] with any questions or concerns. We are focused on providing you with the highest-quality healthcare experience possible and that all starts with you.     Sincerely,   Kalie Dumas RN, BSN, PHN Care Coordinator  [facility]   Phone: [phone]

## 2021-11-09 NOTE — TELEPHONE ENCOUNTER
Reason for call:  Other   Patient called regarding (reason for call): appointment  Additional comments: Patient needs hospital follow up on or before 11/12. Can do Virtual, prefers to be seen only by PCP. Please call daughter to schedule    Phone number to reach patient:  Other phone number:  [phone]    Best Time:  any    Can we leave a detailed message on this number?  YES    Travel screening: Not Applicable

## 2021-11-09 NOTE — PROGRESS NOTES
Clinic Care Coordination Contact  [facility]/Voicemail      Clinical Data: Care Coordinator Post Hospital follow up Outreach    Outreach attempted x 1.  Left message on Ratna's (pts daughter who is on her consent to communicate) voicemail with call back information and requested return call.    Plan: Care Coordinator will send care coordination introduction letter with care coordinator contact information and explanation of care coordination services via [employer]. Care Coordinator will try to reach patient again in 1-2 business days.    Kalie Dumas RN, BSN, PHN Care Coordinator  [place] [place]   Phone: [phone]

## 2021-11-09 NOTE — PROGRESS NOTES
Clinic Care Coordination Contact  [facility]: Post-Discharge Note  SITUATION                                                      Admission:    Admission Date: 10/27/21   Reason for Admission: Altered mental status  Discharge:   Discharge Date: 11/05/21  Discharge Diagnosis: Anabelle Herbert is a 76 year old female who presentswith occasional confusion for 1 day. Found to have subacutestroke    BACKGROUND                                                      Per IP hospital course: Anabelle Herbert is a 76 year old female who presents with occasional confusion for 1 day.    ASSESSMENT      Enrollment  Primary Care Care Coordination Status: Declined    Discharge Assessment  How are you doing now that you are home?: CC RN connected with Ratna, patients daughter who is on her consent to communicate. Patient discharged from hospital with daughter. [facility] home care has been out to see patient for initial visit. Overall, Ratna stated that patient is doing well at home. Doing better where things, people and routines are familiar. Ratna has been patients main caregiver x2 years. CC RN helped contact clinic to make PCP follow up appointment. Ratna will get a call back from care team for this appointment. She also has the [place] neurology scheduling line to make a follow up appointment in 2-3 wks. CC RN provided her with the [email or web address] resource, she was familiar with this resource. CC RN offered CC services and she declined. Stated she felt comfortable with plans going forward.  How are your symptoms? (Red Flag symptoms escalate to triage hotline per guidelines): Improved  Do you feel your condition is stable enough to be safe at home until your provider visit?: Yes  Does the patient have their discharge instructions? : Yes  Does the patient have questions regarding their discharge instructions? : No  Were you started on any new medications or were there changes to any of your previous  medications? : Yes  Does the patient have all of their medications?: Yes  Do you have questions regarding any of your medications? : No  Do you have all of your needed medical supplies or equipment (DME)?  (i.e. oxygen tank, CPAP, cane, etc.): Yes  Discharge follow-up appointment scheduled within 14 calendar days? : No  Is patient agreeable to assistance with scheduling? : Yes         Post-op (Clinicians Only)  Did the patient have surgery or a procedure: No  Fever: No  Chills: No  Eating & Drinking: eating and drinking without complaints/concerns  PO Intake: regular diet  Additional Symptoms: decreased appetite (Ratna daughter is prompting pt to eat and drink)  Bowel Function: normal  Urinary Status: voiding without complaint/concerns    Care Management       Care Mgmt General Assessment  Referral  Referral Source: IP Handoff     Living Situation  Current living arrangement:: I live in a private home with family (pt lives with her daughter)  Type of residence:: Private home - stairs  Resources  Patient receiving home care services:: Yes  Skilled Home Care Services: Skilled Nursing;Physicial Therapy;Occupational Therapy  Community Resources: Home Care  Supplies used at home:: None  Equipment Currently Used at Home: cane, straight;walker, rolling  Referrals Placed: None  Employment Status: retired  Psychosocial  Confucianism or spiritual beliefs that impact treatment:: No  Mental health management concern (GOAL):: No  Informal Support system:: Children  Functional Status  Dependent ADLs:: Ambulation-cane;Ambulation-walker  Dependent IADLs:: Cooking;Laundry;Shopping;Medication Management;Transportation;Meal Preparation;Cleaning  Bed or wheelchair confined:: No  Mobility Status: Independent w/Device    and     Care Mgmt Encounter Assessment  Preventative Care  Routine Health maintenance Reviewed: Not assessed  Clinic Utilization  Difficulty keeping appointments:: No  Compliance Concerns: No  No-Show Concerns: No  No PCP  office visit in Past Year: No  Transportation  Transportation means:: Regular car     Primary Diagnosis  Primary Diagnosis: Other (include Comment box) (Altered mental status, and stoke)  Barriers in Communication  Other concerns:: Cognitive impairment  How confident are you filling out medical forms by yourself:: Not Assessed  Pain  Pain (GOAL):: No  Medication Review  Medication adherence problem (GOAL):: No  Knowledgeable about how to use meds:: Yes  Medication side effects suspected:: No  Diet/Exercise/Sleep  Diet:: Regular  Inadequate nutrition (GOAL):: No  Tube Feeding: No  Inadequate activity/exercise (GOAL):: No  Significant changes in sleep pattern (GOAL): No    PLAN                                                      Outpatient Plan:  1. Ratna verbalized good understanding of discharge summary and will follow discharge summary advice.  2. Care Coordination goals not identified at this time. Patient may be referred to Care Coordination in the future if additional needs arise.    3. Ratna encouraged to contact the clinic if situation changes and assistance is needed.   4. No follow-up planned.    Future Appointments   Date Time Provider Department Center   4/27/2022  9:00 AM Elmo Loza MD [facility] [place]     For any urgent concerns, please contact our 24 hour nurse triage line: [phone] ([phone])       Kalie Dumas RN, BSN, PHN Care Coordinator  [facility]   Phone: [phone]

## 2021-11-09 NOTE — TELEPHONE ENCOUNTER
TC called and spoke with PT DTR to set up video visit. PT is scheduled with PCP 11/10 with arrival time of 5:20pm. PT DTR confirmed appointment time.

## 2021-11-09 NOTE — TELEPHONE ENCOUNTER
Called and spoke to Holly and gave the verbal okay for the requested orders as written below as approved by the Provider below.     Sylvia Garber RN

## 2021-11-10 ENCOUNTER — VIRTUAL VISIT (OUTPATIENT)
Dept: FAMILY MEDICINE | Facility: CLINIC | Age: 76
End: 2021-11-10
Payer: COMMERCIAL

## 2021-11-10 DIAGNOSIS — I63.9 CEREBROVASCULAR ACCIDENT (CVA), UNSPECIFIED MECHANISM (H): Primary | ICD-10-CM

## 2021-11-10 DIAGNOSIS — I10 HYPERTENSION GOAL BP (BLOOD PRESSURE) < 130/80: ICD-10-CM

## 2021-11-10 DIAGNOSIS — F01.50 VASCULAR DEMENTIA WITHOUT BEHAVIORAL DISTURBANCE (H): ICD-10-CM

## 2021-11-10 DIAGNOSIS — F43.23 ADJUSTMENT DISORDER WITH MIXED ANXIETY AND DEPRESSED MOOD: ICD-10-CM

## 2021-11-10 PROCEDURE — 99214 OFFICE O/P EST MOD 30 MIN: CPT | Performed by: FAMILY MEDICINE

## 2021-11-11 NOTE — PATIENT INSTRUCTIONS
Decrease atorvastatin to 1/2 tablet (20 mg )  Schedule labs at the clinic in one week.     Update me if the muscle aches are not improving with the dose change of the atorvastatin.

## 2021-11-11 NOTE — PROGRESS NOTES
"Anabelle is a 76 year old who is being evaluated via a billable video visit.      How would you like to obtain your AVS? MyChart  If the video visit is dropped, the invitation should be resent by: Text to cell phone: see epci  Will anyone else be joining your video visit? No    Video Start Time: 6:12pm    Assessment & Plan     Cerebrovascular accident (CVA), unspecified mechanism (H)  Thankfully she is getting back to her baseline but still requires a lot of redirection and reminders.  I do question if she is having myalgias from the start of atorvastatin 40 mg in the hospital.  She has struggled to tolerate statins in the past.  Will trial cutting this dose in half and see if this improves her symptoms.  May need to stop it altogether.  We will continue Plavix, blood pressure meds and follow-up with neurology. Heart monitoring pending but will need to watch for the results.     Vascular dementia without behavioral disturbance (H)  Follows with neurology, would likely anticipate some progression of her dementia with this recent stroke.    Hypertension goal BP (blood pressure) < 130/80  Home BPs reported as being very good  - Basic metabolic panel  (Ca, Cl, CO2, Creat, Gluc, K, Na, BUN); Future  - CK total; Future    Adjustment disorder with mixed anxiety and depressed mood  Her Lexapro dose was increased from 10 to 20 mg recently by neurology.  Her mood is actually doing better now except for when she gets tired.  We will continue to monitor           BMI:   Estimated body mass index is 31.42 kg/m  as calculated from the following:    Height as of 10/28/21: 1.575 m (5' 2\").    Weight as of 10/28/21: 77.9 kg (171 lb 12.8 oz).       Patient Instructions   Decrease atorvastatin to 1/2 tablet (20 mg )  Schedule labs at the clinic in one week.     Update me if the muscle aches are not improving with the dose change of the atorvastatin.       Return in about 4 weeks (around 12/8/2021) for Follow up.    Jyoti Mendieta " MD Josh  [facility] TIKA Ahn is a 76 year old who presents for the following health issues   History of Present Illness       She eats 2-3 servings of fruits and vegetables daily.She consumes 1 sweetened beverage(s) daily.She exercises with enough effort to increase her heart rate 9 or less minutes per day.  She exercises with enough effort to increase her heart rate 3 or less days per week.   She is taking medications regularly.       Post Discharge Outreach 11/9/2021   Admission Date 10/27/2021   Reason for Admission Altered mental status   Discharge Date 11/5/2021   Discharge Diagnosis Anabelle Herbert is a 76 year old female who presentswith occasional confusion for 1 day. Found to have subacutestroke   How are you doing now that you are home? CC RN connected with Ratna, patients daughter who is on her consent to communicate. Patient discharged from hospital with daughter. [facility] home care has been out to see patient for initial visit. Overall, Ratna stated that patient is doing well at home. Doing better where things, people and routines are familiar. Ratna has been patients main caregiver x2 years. CC RN helped contact clinic to make PCP follow up appointment. Ratna will get a call back from care team for this appointment. She also has the [place] neurology scheduling line to make a follow up appointment in 2-3 wks. CC RN provided her with the [email or web address] resource, she was familiar with this resource. CC RN offered CC services and she declined. Stated she felt comfortable with plans going forward.   How are your symptoms? (Red Flag symptoms escalate to triage hotline per guidelines) Improved   Do you feel your condition is stable enough to be safe at home until your provider visit? Yes   Does the patient have their discharge instructions?  Yes   Does the patient have questions regarding their discharge instructions?  No   Were you started on  any new medications or were there changes to any of your previous medications?  Yes   Does the patient have all of their medications? Yes   Do you have questions regarding any of your medications?  No   Do you have all of your needed medical supplies or equipment (DME)?  (i.e. oxygen tank, CPAP, cane, etc.) Yes   Discharge follow-up appointment scheduled within 14 calendar days?  No     Hospital Follow-up Visit:    Hospital/Nursing Home/IP Rehab Facility: [facility]  Date of Admission: 10/27/21  Date of Discharge: 11/5/21  Reason(s) for Admission: hallucinations/altered mental status.       Was your hospitalization related to COVID-19? No   Problems taking medications regularly:  None  Medication changes since discharge: started amlodipine, atorvastatin, lisinopril dose increased to 40 mg   Problems adhering to non-medication therapy:  None-   Summary of hospitalization:  [facility] discharge summary reviewed  Diagnostic Tests/Treatments reviewed.  Follow up needed: none  Other Healthcare Providers Involved in Patient s Care:         neurology  Update since discharge: stable.   Post Discharge Medication Reconciliation: discharge medications reconciled and changed, per note/orders.  Plan of care communicated with patient, family and caregiver          Has been staying with  Her daughter since discharge.   Slept the first few days with just getting up to eat and drink.   Slowly getting up more and today was awake most of the day.   Asked last night if she could sleep over. At least once a day has to review that she was in the hospital as she has forgotten this    Per her daughter, Short term memory is worse (same as hospital) but confusion is better and balance is off since this hospital stay but other wise back to baseline  Happy most of the time but when get tired starts to feel down   Excited to see her grandkids kids. Oriented most of the time.   Improved overall since  discharge    Will be doing home PHYSICAL THERAPY as part of her home cares. Daughter is making her use the walker. Tends to forget about it without reminder.   No falls.     Appetite is same. stooling normally    Was evaluated for uti but cx was neg  Patient notes bladder has been doing well with no recent symptoms.    No fever, chills.  C/o mild diffuse body ache  No new headache. No new vision changes.   No new weakness in arms or legs.     HTN: started amlodipine and home reading have been good per daughter.     Just sent message to neurology office to help schedule the appointment for follow-up    Wearing the holter/?event monitor.     Neuro eval/recs:  Acute L anterior paramedian thalamic infarct. Suspect this is causing new onset short term memory issues. This is not responsible for her visual field deficits, which are chronic and explained by her prior L occipital lobe stroke (please see documented deficits going back to 2016 in chart).   Etiology of stroke may be intracranial atherosclerosis (although identified L P2 stenosis is too distal to correlate to the stroke) vs small vessel, less likely cardioembolic although will complete workup for all etiologies.    Recommendations:  -TTE with bubble  -Cardionet on discharge  -Continue Plavix 75mg daily  -LDL pending, atorvastatin 40mg daily--adjust for goal LDL 40-70  -BP <130/80 outpatient goal  -Follow-up with [facility] per their preference  -PT/OT/ST    Review of Systems   Constitutional, HEENT, cardiovascular, pulmonary, gi and gu systems are negative, except as otherwise noted.      Objective           Vitals:  No vitals were obtained today due to virtual visit.    Physical Exam   GENERAL: Healthy, alert and no distress  EYES: Eyes grossly normal to inspection.  No discharge or erythema, or obvious scleral/conjunctival abnormalities.  RESP: No audible wheeze, cough, or visible cyanosis.  No visible retractions or increased work of breathing.    SKIN:  Visible skin clear. No significant rash, abnormal pigmentation or lesions.  NEURO: Cranial nerves grossly intact.  Mentation and speech appropriate for age.  PSYCH: Mentation appears normal, affect normal/bright, judgement and insight intact, normal speech and appearance well-groomed.          Video-Visit Details    Type of service:  Video Visit    Video End Time:6:35 PM    Originating Location (pt. Location): Home    Distant Location (provider location):  [facility]     Platform used for Video Visit: Kae

## 2021-11-12 ENCOUNTER — MEDICAL CORRESPONDENCE (OUTPATIENT)
Dept: HEALTH INFORMATION MANAGEMENT | Facility: CLINIC | Age: 76
End: 2021-11-12
Payer: COMMERCIAL

## 2021-11-12 ENCOUNTER — TELEPHONE (OUTPATIENT)
Dept: FAMILY MEDICINE | Facility: CLINIC | Age: 76
End: 2021-11-12
Payer: COMMERCIAL

## 2021-11-12 NOTE — TELEPHONE ENCOUNTER
Reason for Call: Request for an order or referral:    Order or referral being requested: verbal orders  2 visit for cognitive testing      Date needed: as soon as possible    Has the patient been seen by the PCP for this problem? YES    Additional comments:     Phone number Patient can be reached at:   [employer]  [phone]    Best Time:  any    Can we leave a detailed message on this number?  YES    Call taken on 11/12/2021 at 2:15 PM by Diana Jones

## 2021-11-12 NOTE — TELEPHONE ENCOUNTER
Routing to provider to review and advise. Home care requesting orders bellow.      Fiona Daniels RN  [facility]

## 2021-11-12 NOTE — TELEPHONE ENCOUNTER
Home care needs PT orders as stated bellow.  Pt just had virtual appointment on 11/10/21.    Routing to provide to review and advise.    Fiona Daniels RN  [facility]

## 2021-11-12 NOTE — TELEPHONE ENCOUNTER
[facility] is calling requesting verbal orders to be sent--   PT 1 time a week for 2 weeks  And then once every other week for 4 weeks for gate training and balance for fall reduction.     Call back [phone]   secure line so ok to ROSEANNE LEY,   [facility]

## 2021-11-15 ENCOUNTER — TELEPHONE (OUTPATIENT)
Dept: FAMILY MEDICINE | Facility: CLINIC | Age: 76
End: 2021-11-15
Payer: COMMERCIAL

## 2021-11-15 DIAGNOSIS — Z53.9 DIAGNOSIS NOT YET DEFINED: Primary | ICD-10-CM

## 2021-11-15 PROCEDURE — G0180 MD CERTIFICATION HHA PATIENT: HCPCS | Performed by: FAMILY MEDICINE

## 2021-11-15 NOTE — TELEPHONE ENCOUNTER
Called Erika with [facility] to give orders as stated below.  Cherelle Mckeon RN  [facility]

## 2021-11-15 NOTE — TELEPHONE ENCOUNTER
Left message for Kalee with [facility] with verbal orders as stated below.  Cherelle Mckeon RN  [facility]

## 2021-11-16 ENCOUNTER — TELEPHONE (OUTPATIENT)
Dept: FAMILY MEDICINE | Facility: CLINIC | Age: 76
End: 2021-11-16
Payer: COMMERCIAL

## 2021-11-16 ENCOUNTER — TELEPHONE (OUTPATIENT)
Dept: NEUROLOGY | Facility: CLINIC | Age: 76
End: 2021-11-16
Payer: COMMERCIAL

## 2021-11-16 DIAGNOSIS — K21.9 GASTROESOPHAGEAL REFLUX DISEASE, UNSPECIFIED WHETHER ESOPHAGITIS PRESENT: ICD-10-CM

## 2021-11-16 DIAGNOSIS — R29.818 ACUTE FOCAL NEUROLOGICAL DEFICIT: ICD-10-CM

## 2021-11-16 DIAGNOSIS — R41.3 MEMORY CHANGE: ICD-10-CM

## 2021-11-16 DIAGNOSIS — F43.23 ADJUSTMENT REACTION WITH ANXIETY AND DEPRESSION: ICD-10-CM

## 2021-11-16 DIAGNOSIS — I63.9 CEREBROVASCULAR ACCIDENT (CVA), UNSPECIFIED MECHANISM (H): ICD-10-CM

## 2021-11-16 DIAGNOSIS — R79.89 ABNORMAL TSH: ICD-10-CM

## 2021-11-16 DIAGNOSIS — I10 HYPERTENSION GOAL BP (BLOOD PRESSURE) < 130/80: ICD-10-CM

## 2021-11-16 DIAGNOSIS — I62.9 INTRACRANIAL HEMORRHAGE (H): ICD-10-CM

## 2021-11-16 DIAGNOSIS — F41.9 ANXIETY: ICD-10-CM

## 2021-11-16 NOTE — TELEPHONE ENCOUNTER
amLODIPine (NORVASC) 5 MG tablet 30 tablet 1 11/5/2021     atorvastatin (LIPITOR) 40 MG tablet 30 tablet 1 11/5/2021     clopidogrel (PLAVIX) 75 MG tablet 90 tablet 3 11/8/2021     esomeprazole (NEXIUM) 40 MG DR capsule 90 capsule 3 4/30/2021     escitalopram (LEXAPRO) 20 MG tablet 90 tablet 3 10/27/2021     levothyroxine (SYNTHROID/LEVOTHROID) 25 MCG tablet 90 tablet 1 11/8/2021     lisinopril (ZESTRIL) 40 MG tablet 30 tablet 1 11/5/2021     mirtazapine (REMERON) 30 MG tablet 90 tablet 1 11/8/2021     senna-docusate (SENOKOT-S/PERICOLACE) 8.6-50 MG tablet 30 tablet 1 6/23/2020     Women's 50 plus Multivitamin Oral 400 McG-500 MG Calcium 2o McG      [facility] needs approval for the following prescriptions in order to synchronize the medications and initiate compliance packaging. Please review for accuracy as this list is patient reported.

## 2021-11-16 NOTE — TELEPHONE ENCOUNTER
[facility] Call Center    Phone Message    May a detailed message be left on voicemail: no     Reason for Call: Other: Dept squeezed patient in tomorrow morning.  Daughter who is bringing pt does not have a  and they are asking for a reschedule.  Please advise.      Action Taken: Message routed to:  Adult Clinics: Neurology p [id number]    Travel Screening: Not Applicable

## 2021-11-16 NOTE — TELEPHONE ENCOUNTER
Discussed with Dr. Loza that virtual visit would be acceptable. Pt's daughter informed and they are able to participate in virtual appt tomorrow. Appt mode updated.     Melanie Raymond, RNCC  Neurology

## 2021-11-17 ENCOUNTER — VIRTUAL VISIT (OUTPATIENT)
Dept: NEUROLOGY | Facility: CLINIC | Age: 76
End: 2021-11-17
Payer: COMMERCIAL

## 2021-11-17 DIAGNOSIS — F03.90 DEMENTIA WITHOUT BEHAVIORAL DISTURBANCE, UNSPECIFIED DEMENTIA TYPE: Primary | ICD-10-CM

## 2021-11-17 DIAGNOSIS — I63.81 THALAMIC STROKE (H): ICD-10-CM

## 2021-11-17 PROCEDURE — 99214 OFFICE O/P EST MOD 30 MIN: CPT | Mod: 95 | Performed by: INTERNAL MEDICINE

## 2021-11-17 RX ORDER — LEVOTHYROXINE SODIUM 25 UG/1
25 TABLET ORAL DAILY
Qty: 90 TABLET | Refills: 1 | Status: SHIPPED | OUTPATIENT
Start: 2021-11-17 | End: 2022-08-15

## 2021-11-17 RX ORDER — MEMANTINE HYDROCHLORIDE 5 MG/1
TABLET ORAL
Qty: 60 TABLET | Refills: 6 | Status: SHIPPED | OUTPATIENT
Start: 2021-11-17 | End: 2022-05-10

## 2021-11-17 RX ORDER — LISINOPRIL 40 MG/1
40 TABLET ORAL DAILY
Qty: 90 TABLET | Refills: 1 | Status: SHIPPED | OUTPATIENT
Start: 2021-11-17 | End: 2022-06-29

## 2021-11-17 RX ORDER — MIRTAZAPINE 30 MG/1
30 TABLET, FILM COATED ORAL AT BEDTIME
Qty: 90 TABLET | Refills: 1 | Status: SHIPPED | OUTPATIENT
Start: 2021-11-17 | End: 2022-08-15

## 2021-11-17 RX ORDER — ESCITALOPRAM OXALATE 20 MG/1
20 TABLET ORAL DAILY
Qty: 90 TABLET | Refills: 3 | Status: SHIPPED | OUTPATIENT
Start: 2021-11-17

## 2021-11-17 RX ORDER — AMOXICILLIN 250 MG
1 CAPSULE ORAL 2 TIMES DAILY PRN
Qty: 180 TABLET | Refills: 0 | Status: SHIPPED | OUTPATIENT
Start: 2021-11-17

## 2021-11-17 RX ORDER — ATORVASTATIN CALCIUM 40 MG/1
20-40 TABLET, FILM COATED ORAL EVERY EVENING
Qty: 90 TABLET | Refills: 1 | Status: SHIPPED | OUTPATIENT
Start: 2021-11-17 | End: 2022-06-29

## 2021-11-17 RX ORDER — ESOMEPRAZOLE MAGNESIUM 40 MG/1
40 CAPSULE, DELAYED RELEASE ORAL
Qty: 90 CAPSULE | Refills: 3 | Status: SHIPPED | OUTPATIENT
Start: 2021-11-17

## 2021-11-17 RX ORDER — CLOPIDOGREL BISULFATE 75 MG/1
75 TABLET ORAL DAILY
Qty: 90 TABLET | Refills: 3 | Status: SHIPPED | OUTPATIENT
Start: 2021-11-17 | End: 2023-01-12

## 2021-11-17 RX ORDER — AMLODIPINE BESYLATE 5 MG/1
5 TABLET ORAL DAILY
Qty: 90 TABLET | Refills: 1 | Status: SHIPPED | OUTPATIENT
Start: 2021-11-17 | End: 2022-06-29

## 2021-11-17 NOTE — TELEPHONE ENCOUNTER
Routing refill request to provider for review/approval because:  I refilled senna and levothyroxine per protocol.  Others did not have enough refills to send or did not pass protocol.  To the provider to advise.  Thank you. Rocío Braxton R.N.

## 2021-11-17 NOTE — PROGRESS NOTES
"[facility] Neurology Follow Up Visit    Anabelle Herbert MRN# [id number]   Age: 76 year old YOB: 1945     Brief history of symptoms: The patient was initially seen in neurologic consultation on 6/2/2021 for evaluation of memory changes. Please see the comprehensive neurologic consultation note from that date in the Epic records for details.     Interval history:   Patient had acute mental status change after our appointment last month. She suddenly became confused, repeating phrases like \"I forgot my things\" and \"we forgot the little dog\". She reported seeing a lady and a little girl. She was unable to recognize who her daughter was. She became distraught over not being able to find \"the baby\". Her daughter took her to the hospital. Eventually a left thalamic stroke was found.    Short term memory is worse now, but other symptoms have improved back to baseline.     Anxiety has been well controlled. She is on the increased dosage of Lexapro.       Past Medical History:     Patient Active Problem List   Diagnosis     Chronic interstitial cystitis     Adjustment reaction with anxiety and depression     Abdominal pain     Benign essential hypertension     CKD (chronic kidney disease) stage 2, GFR 60-89 ml/min     Hyperlipidemia LDL goal <100     Mild persistent asthma     Vitamin D deficiency     GERD (gastroesophageal reflux disease)     B12 deficiency     Transient cerebral ischemia     Carotid stenosis     Diverticulosis of colon     Colon polyp     Atrophic vaginitis     Interstitial cystitis     Chronic constipation     Cognitive complaints     Prediabetes     Osteoporosis     Cerebrovascular accident (CVA), unspecified mechanism (H)     SHANEKA (obstructive sleep apnea)     Intracranial hemorrhage (H)     Vascular dementia without behavioral disturbance (H)     Subclinical hypothyroidism     Hallucinations     Acute focal neurological deficit     Past Medical History:   Diagnosis Date     Acute sinusitis, " unspecified      Allergic rhinitis, cause unspecified      Candidiasis of unspecified site      Candidiasis of vulva and vagina      Diplopia      Dyspnea     following inhalation of cleaning solution. Treated with inhaler/neb.      Essential hypertension, benign      Hypercalcemia      Memory loss      Osteoporosis, unspecified     defined by chiropractor who did xrays during tx for back pain     Other B-complex deficiencies      Other, multiple and ill-defined closed fractures of lower limb 1990    prolonged healing     Unspecified cerebral artery occlusion with cerebral infarction      Unspecified disorder of skin and subcutaneous tissue      Urinary hesitancy      Urinary tract infection, site not specified         Past Surgical History:     Past Surgical History:   Procedure Laterality Date     COLONOSCOPY       CYSTOSCOPY, BIOPSY BLADDER, COMBINED  1/6/2014    Procedure: COMBINED CYSTOSCOPY, BIOPSY BLADDER;;  Surgeon: Vandana Tang MD;  Location: [facility] OR     CYSTOSCOPY, INJECT COLLAGEN, COMBINED  1/6/2014    Procedure: COMBINED CYSTOSCOPY, INJECT BULKING AGENT;  IC cocktail, bladder biopsy, fulguration, heparin, gentamyacin, lidocaine & kenalog;  Surgeon: Vandana Tang MD;  Location: [facility] OR     GENITOURINARY SURGERY       NO HISTORY OF SURGERY          Social History:     Social History     Tobacco Use     Smoking status: Never Smoker     Smokeless tobacco: Never Used   Substance Use Topics     Alcohol use: No     Drug use: No        Family History:     Family History   Problem Relation Age of Onset     Cancer Father         colon?     Heart Disease Father      Asthma Mother      Alzheimer Disease Mother 86     Unknown/Adopted Maternal Grandmother      Unknown/Adopted Maternal Grandfather      Unknown/Adopted Paternal Grandmother      Unknown/Adopted Paternal Grandfather      Asthma Sister      Allergies Sister      Unknown/Adopted Son      Diabetes No family hx of         Medications:     Current  Outpatient Medications   Medication Sig     amLODIPine (NORVASC) 5 MG tablet Take 1 tablet (5 mg) by mouth daily     atorvastatin (LIPITOR) 40 MG tablet Take 1 tablet (40 mg) by mouth every evening     cholecalciferol (VITAMIN D3) 5000 units (125 mcg) CAPS capsule Take 5,000 Units by mouth daily     clopidogrel (PLAVIX) 75 MG tablet Take 1 tablet (75 mg) by mouth daily     escitalopram (LEXAPRO) 20 MG tablet Take 1 tablet (20 mg) by mouth daily     esomeprazole (NEXIUM) 40 MG DR capsule Take 1 capsule (40 mg) by mouth every morning (before breakfast) Take 30-60 minutes before eating.     levothyroxine (SYNTHROID/LEVOTHROID) 25 MCG tablet TAKE 1 TABLET BY MOUTH EVERY DAY     lisinopril (ZESTRIL) 40 MG tablet Take 1 tablet (40 mg) by mouth daily     mirtazapine (REMERON) 30 MG tablet TAKE 1 TABLET (30 MG) BY MOUTH AT BEDTIME     Multiple Vitamins-Minerals (ANTIOXIDANT PO) Take 1 tablet by mouth daily CONSULT HEALTH WOMENS WELLNESS     polyethylene glycol (MIRALAX) 17 GM/SCOOP powder Take 17 g by mouth daily To twice daily as needed for constipation. (Patient taking differently: Take 17 g by mouth daily as needed )     senna-docusate (SENOKOT-S/PERICOLACE) 8.6-50 MG tablet Take 1 tablet by mouth 2 times daily as needed for constipation     acetaminophen (TYLENOL) 500 MG tablet Take 2 tablets (1,000 mg) by mouth every 8 hours as needed for mild pain     Blood Pressure Monitoring (BLOOD PRESSURE CUFF) MISC 1 each daily as needed     Blood Pressure Monitoring (BLOOD PRESSURE MONITOR/M CUFF) MISC 1 each daily as needed     meclizine (ANTIVERT) 25 MG tablet Take 1 tablet (25 mg) by mouth 2 times daily as needed for dizziness (Patient not taking: Reported on 11/17/2021)     Nutritional Supplements (ENSURE HIGH PROTEIN) Take 1 Can by mouth 3 times daily (with meals)     No current facility-administered medications for this visit.        Allergies:     Allergies   Allergen Reactions     Cats      Contrast Dye      Burning,  hematuria     Diatrizoate Other (See Comments)     Feels burning inside body     Sulfa Drugs Unknown     Bupropion Anxiety     Patient reports increased anxiety, panic, difficulty concentrating, and various aches and pains        Review of Systems:   As above     Physical Exam:   Vitals: There were no vitals taken for this visit.   Neurologic:     Mental Status: Alert. Oriented to person. Knows the month, but not the day or year. Knows the president, but not the former president. Able do 1/3 delayed recall after 1 minute.   Cranial nerves: no dysarthria.   Motor: intermittent mild resting tremor in bilateral upper extremities. Mild postural/intention/action tremor in bilateral upper extremities. Normal muscle tone. No significant bradykinesia in bilateral finger taps.   Sensory: intact to light touch in all extremities  Gait: Mildly wide based gait.     Data reviewed on previous visits    [id number]  MRI brain     MRI brain 2016  Impression:  Subacute infarction in the left cuneus.  Chronic infarction in the right parietal lobe.   Unchanged mild chronic microvascular ischemic changes involving the  supratentorial periventricular white matter and central carol.   Head MRA demonstrates no definite aneurysm or stenosis of the major  intracranial arteries.  Neck MRA demonstrates tortuosity of the vertebral arteries and  atherosclerotic irregularities of the carotid bulbs bilaterally  without significant stenosis..     Laboratory:  B12 ~800, TSH high with normal free T4, CBC normal, CMP with CKD otherwise unremarkable (2021)    Pertinent Investigations since last visit:   MRI brain 10/30/2021  IMPRESSION:  1.  6 mm early subacute infarct in the anteromedial left thalamus without mass effect or hemorrhage.  2.  Moderate chronic infarct in the parasagittal left parietal and occipital lobes.  3.  Small chronic cortical infarct in the right parietal lobe.  4.  Underlying mild to moderate presumed chronic small vessel  ischemic changes.     (10/2021)         Assessment and Plan:   Assessment:  Anabelle Herbert is a 76 year old female who presents today for follow-up of dementia. Symptoms seem to start around stroke in 2016. She has history of right parietal stroke in 2010 and left cuneus stroke in 2016. Symptoms have steadily worsened since then. At initial visit (6/2021) MoCA with score 15/30. After visit last month patient became acutely confused. She was found to have a new left thalamic stroke, likely secondary to small vessel atherosclerosis. LDL was elevated to 168 and she was restarted on Lipitor. Short term memory is a little worse, but otherwise symptoms are at baseline.      There is likely an element of vascular dementia to explain memory changes. Given reported progression over time, neurodegenerative process, possibly Alzheimer's, is a consideration as well. Anxiety has been improved since starting Lexapro and this has also reported in some improvement in cognition. She had no benefit from donepezil in the past. We discussed trial of memantine today.       Plan:  - Continue Lexapro to 20 mg daily  - Start memantine 5 mg daily; increase to 5 mg BID after 1 week if tolerated    Follow up in Neurology clinic in 6 months or earlier as needed should new concerns arise.    Elmo Loza MD   of Neurology  [facility]    The total time of this encounter today amounted to 33 minutes. This time included time spent with the patient, prep work, ordering tests, and performing post visit documentation.

## 2021-11-17 NOTE — LETTER
"    11/17/2021         RE: Anabelle Herbert  [address]  [facility] [place] [address]        Dear Colleague,    Thank you for referring your patient, Anabelle Herbert, to the [facility] [place]. Please see a copy of my visit note below.    [facility] Neurology Follow Up Visit    Anabelle Herbert MRN# [id number]   Age: 76 year old YOB: 1945     Brief history of symptoms: The patient was initially seen in neurologic consultation on 6/2/2021 for evaluation of memory changes. Please see the comprehensive neurologic consultation note from that date in the Epic records for details.     Interval history:   Patient had acute mental status change after our appointment last month. She suddenly became confused, repeating phrases like \"I forgot my things\" and \"we forgot the little dog\". She reported seeing a lady and a little girl. She was unable to recognize who her daughter was. She became distraught over not being able to find \"the baby\". Her daughter took her to the hospital. Eventually a left thalamic stroke was found.    Short term memory is worse now, but other symptoms have improved back to baseline.     Anxiety has been well controlled. She is on the increased dosage of Lexapro.       Past Medical History:     Patient Active Problem List   Diagnosis     Chronic interstitial cystitis     Adjustment reaction with anxiety and depression     Abdominal pain     Benign essential hypertension     CKD (chronic kidney disease) stage 2, GFR 60-89 ml/min     Hyperlipidemia LDL goal <100     Mild persistent asthma     Vitamin D deficiency     GERD (gastroesophageal reflux disease)     B12 deficiency     Transient cerebral ischemia     Carotid stenosis     Diverticulosis of colon     Colon polyp     Atrophic vaginitis     Interstitial cystitis     Chronic constipation     Cognitive complaints     Prediabetes     Osteoporosis     Cerebrovascular accident (CVA), unspecified mechanism (H)     SHANEKA " (obstructive sleep apnea)     Intracranial hemorrhage (H)     Vascular dementia without behavioral disturbance (H)     Subclinical hypothyroidism     Hallucinations     Acute focal neurological deficit     Past Medical History:   Diagnosis Date     Acute sinusitis, unspecified      Allergic rhinitis, cause unspecified      Candidiasis of unspecified site      Candidiasis of vulva and vagina      Diplopia      Dyspnea     following inhalation of cleaning solution. Treated with inhaler/neb.      Essential hypertension, benign      Hypercalcemia      Memory loss      Osteoporosis, unspecified     defined by chiropractor who did xrays during tx for back pain     Other B-complex deficiencies      Other, multiple and ill-defined closed fractures of lower limb 1990    prolonged healing     Unspecified cerebral artery occlusion with cerebral infarction      Unspecified disorder of skin and subcutaneous tissue      Urinary hesitancy      Urinary tract infection, site not specified         Past Surgical History:     Past Surgical History:   Procedure Laterality Date     COLONOSCOPY       CYSTOSCOPY, BIOPSY BLADDER, COMBINED  1/6/2014    Procedure: COMBINED CYSTOSCOPY, BIOPSY BLADDER;;  Surgeon: Vandana Tang MD;  Location: [facility]     CYSTOSCOPY, INJECT COLLAGEN, COMBINED  1/6/2014    Procedure: COMBINED CYSTOSCOPY, INJECT BULKING AGENT;  IC cocktail, bladder biopsy, fulguration, heparin, gentamyacin, lidocaine & kenalog;  Surgeon: Vandana Tang MD;  Location: [facility] OR     GENITOURINARY SURGERY       NO HISTORY OF SURGERY          Social History:     Social History     Tobacco Use     Smoking status: Never Smoker     Smokeless tobacco: Never Used   Substance Use Topics     Alcohol use: No     Drug use: No        Family History:     Family History   Problem Relation Age of Onset     Cancer Father         colon?     Heart Disease Father      Asthma Mother      Alzheimer Disease Mother 86     Unknown/Adopted Maternal  Grandmother      Unknown/Adopted Maternal Grandfather      Unknown/Adopted Paternal Grandmother      Unknown/Adopted Paternal Grandfather      Asthma Sister      Allergies Sister      Unknown/Adopted Son      Diabetes No family hx of         Medications:     Current Outpatient Medications   Medication Sig     amLODIPine (NORVASC) 5 MG tablet Take 1 tablet (5 mg) by mouth daily     atorvastatin (LIPITOR) 40 MG tablet Take 1 tablet (40 mg) by mouth every evening     cholecalciferol (VITAMIN D3) 5000 units (125 mcg) CAPS capsule Take 5,000 Units by mouth daily     clopidogrel (PLAVIX) 75 MG tablet Take 1 tablet (75 mg) by mouth daily     escitalopram (LEXAPRO) 20 MG tablet Take 1 tablet (20 mg) by mouth daily     esomeprazole (NEXIUM) 40 MG DR capsule Take 1 capsule (40 mg) by mouth every morning (before breakfast) Take 30-60 minutes before eating.     levothyroxine (SYNTHROID/LEVOTHROID) 25 MCG tablet TAKE 1 TABLET BY MOUTH EVERY DAY     lisinopril (ZESTRIL) 40 MG tablet Take 1 tablet (40 mg) by mouth daily     mirtazapine (REMERON) 30 MG tablet TAKE 1 TABLET (30 MG) BY MOUTH AT BEDTIME     Multiple Vitamins-Minerals (ANTIOXIDANT PO) Take 1 tablet by mouth daily CONSULT HEALTH WOMENS WELLNESS     polyethylene glycol (MIRALAX) 17 GM/SCOOP powder Take 17 g by mouth daily To twice daily as needed for constipation. (Patient taking differently: Take 17 g by mouth daily as needed )     senna-docusate (SENOKOT-S/PERICOLACE) 8.6-50 MG tablet Take 1 tablet by mouth 2 times daily as needed for constipation     acetaminophen (TYLENOL) 500 MG tablet Take 2 tablets (1,000 mg) by mouth every 8 hours as needed for mild pain     Blood Pressure Monitoring (BLOOD PRESSURE CUFF) MISC 1 each daily as needed     Blood Pressure Monitoring (BLOOD PRESSURE MONITOR/M CUFF) MISC 1 each daily as needed     meclizine (ANTIVERT) 25 MG tablet Take 1 tablet (25 mg) by mouth 2 times daily as needed for dizziness (Patient not taking: Reported on  2021)     Nutritional Supplements (ENSURE HIGH PROTEIN) Take 1 Can by mouth 3 times daily (with meals)     No current facility-administered medications for this visit.        Allergies:     Allergies   Allergen Reactions     Cats      Contrast Dye      Burning, hematuria     Diatrizoate Other (See Comments)     Feels burning inside body     Sulfa Drugs Unknown     Bupropion Anxiety     Patient reports increased anxiety, panic, difficulty concentrating, and various aches and pains        Review of Systems:   As above     Physical Exam:   Vitals: There were no vitals taken for this visit.   Neurologic:     Mental Status: Alert. Oriented to person. Knows the month, but not the day or year. Knows the president, but not the former president. Able do 1/3 delayed recall after 1 minute.   Cranial nerves: no dysarthria.   Motor: intermittent mild resting tremor in bilateral upper extremities. Mild postural/intention/action tremor in bilateral upper extremities. Normal muscle tone. No significant bradykinesia in bilateral finger taps.   Sensory: intact to light touch in all extremities  Gait: Mildly wide based gait.     Data reviewed on previous visits    [email or web address]  MRI brain     MRI brain 2016  Impression:  Subacute infarction in the left cuneus.  Chronic infarction in the right parietal lobe.   Unchanged mild chronic microvascular ischemic changes involving the  supratentorial periventricular white matter and central carol.   Head MRA demonstrates no definite aneurysm or stenosis of the major  intracranial arteries.  Neck MRA demonstrates tortuosity of the vertebral arteries and  atherosclerotic irregularities of the carotid bulbs bilaterally  without significant stenosis..     Laboratory:  B12 ~800, TSH high with normal free T4, CBC normal, CMP with CKD otherwise unremarkable (2021)    Pertinent Investigations since last visit:   MRI brain 10/30/2021  IMPRESSION:  1.  6 mm early subacute infarct in the  anteromedial left thalamus without mass effect or hemorrhage.  2.  Moderate chronic infarct in the parasagittal left parietal and occipital lobes.  3.  Small chronic cortical infarct in the right parietal lobe.  4.  Underlying mild to moderate presumed chronic small vessel ischemic changes.     (10/2021)         Assessment and Plan:   Assessment:  Anabelle Herbert is a 76 year old female who presents today for follow-up of dementia. Symptoms seem to start around stroke in 2016. She has history of right parietal stroke in 2010 and left cuneus stroke in 2016. Symptoms have steadily worsened since then. At initial visit (6/2021) MoCA with score 15/30. After visit last month patient became acutely confused. She was found to have a new left thalamic stroke, likely secondary to small vessel atherosclerosis. LDL was elevated to 168 and she was restarted on Lipitor. Short term memory is a little worse, but otherwise symptoms are at baseline.      There is likely an element of vascular dementia to explain memory changes. Given reported progression over time, neurodegenerative process, possibly Alzheimer's, is a consideration as well. Anxiety has been improved since starting Lexapro and this has also reported in some improvement in cognition. She had no benefit from donepezil in the past. We discussed trial of memantine today.       Plan:  - Continue Lexapro to 20 mg daily  - Start memantine 5 mg daily; increase to 5 mg BID after 1 week if tolerated    Follow up in Neurology clinic in 6 months or earlier as needed should new concerns arise.    Elmo Loza MD   of Neurology  [facility]    The total time of this encounter today amounted to 33 minutes. This time included time spent with the patient, prep work, ordering tests, and performing post visit documentation.        Anabelle is a 76 year old who is being evaluated via a billable video visit.      How would you like to obtain your AVS?  Lilia  If the video visit is dropped, the invitation should be resent by: Text to cell phone: [phone]  Will anyone else be joining your video visit? No        Video time: 28 minutes.         Again, thank you for allowing me to participate in the care of your patient.        Sincerely,        Elmo Loza MD

## 2021-11-17 NOTE — PROGRESS NOTES
Anabelle is a 76 year old who is being evaluated via a billable video visit.      How would you like to obtain your AVS? [employer]  If the video visit is dropped, the invitation should be resent by: Text to cell phone: [phone]  Will anyone else be joining your video visit? No        Video time: 28 minutes.

## 2021-11-18 ENCOUNTER — TELEPHONE (OUTPATIENT)
Dept: FAMILY MEDICINE | Facility: CLINIC | Age: 76
End: 2021-11-18
Payer: COMMERCIAL

## 2021-11-19 ENCOUNTER — TELEPHONE (OUTPATIENT)
Dept: FAMILY MEDICINE | Facility: CLINIC | Age: 76
End: 2021-11-19
Payer: COMMERCIAL

## 2021-11-19 NOTE — TELEPHONE ENCOUNTER
Patient Quality Outreach Summary      Summary:    Patient is due/failing the following:   Breast Cancer Screening - Mammogram    Type of outreach:    Sent [employer] message.    Questions for provider review:    None                                                                                                                    MILLI Khan.

## 2021-11-23 NOTE — PLAN OF CARE
Occupational Therapy Discharge Summary    Reason for therapy discharge:    Discharged to [facility] home.    Progress towards therapy goal(s). See goals on Care Plan in [facility] electronic health record for goal details.  Goals not met.  Barriers to achieving goals:   discharge from facility.    Therapy recommendation(s):    Skilled OT in TCU to address safety and independence in I/ADLs and cognition. If home, 24/7 A/supervision in all I/ADLs and home OT. Pt may benefit from a discussion regarding more supported living.      None

## 2021-12-22 NOTE — PROGRESS NOTES
Quality 226: Preventive Care And Screening: Tobacco Use: Screening And Cessation Intervention: Patient screened for tobacco use and is an ex/non-smoker Spoke with daughter Ratna. Told her to tell Anabelle to stop taking the fish oil. Ratna states that she sets up her medications and will tell her to stop taking the fish oil.    Yumiko Daimond RN     Quality 110: Preventive Care And Screening: Influenza Immunization: Influenza Immunization Administered during Influenza season Quality 397: Melanoma: Reporting: Pathology does not include pT category and/or statement on Breslow depth, ulceration, and pT1 mitotic reporting. Quality 137: Melanoma: Continuity Of Care - Recall System: Patient information entered into a recall system that includes: target date for the next exam specified AND a process to follow up with patients regarding missed or unscheduled appointments Detail Level: Detailed Quality 138: Melanoma: Coordination Of Care: A treatment plan was communicated to the physicians providing continuing care within one month of diagnosis outlining: diagnosis, tumor thickness and a plan for surgery or alternate care.

## 2021-12-30 ENCOUNTER — MEDICAL CORRESPONDENCE (OUTPATIENT)
Dept: HEALTH INFORMATION MANAGEMENT | Facility: CLINIC | Age: 76
End: 2021-12-30

## 2021-12-30 ENCOUNTER — LAB (OUTPATIENT)
Dept: LAB | Facility: CLINIC | Age: 76
End: 2021-12-30
Payer: COMMERCIAL

## 2021-12-30 ENCOUNTER — TELEPHONE (OUTPATIENT)
Dept: FAMILY MEDICINE | Facility: CLINIC | Age: 76
End: 2021-12-30

## 2021-12-30 DIAGNOSIS — I10 HYPERTENSION GOAL BP (BLOOD PRESSURE) < 130/80: ICD-10-CM

## 2021-12-30 LAB
ANION GAP SERPL CALCULATED.3IONS-SCNC: 5 MMOL/L (ref 3–14)
BUN SERPL-MCNC: 12 MG/DL (ref 7–30)
CALCIUM SERPL-MCNC: 9.1 MG/DL (ref 8.5–10.1)
CHLORIDE BLD-SCNC: 110 MMOL/L (ref 94–109)
CK SERPL-CCNC: 60 U/L (ref 30–225)
CO2 SERPL-SCNC: 27 MMOL/L (ref 20–32)
CREAT SERPL-MCNC: 0.94 MG/DL (ref 0.52–1.04)
GFR SERPL CREATININE-BSD FRML MDRD: 63 ML/MIN/1.73M2
GLUCOSE BLD-MCNC: 98 MG/DL (ref 70–99)
POTASSIUM BLD-SCNC: 4.8 MMOL/L (ref 3.4–5.3)
SODIUM SERPL-SCNC: 142 MMOL/L (ref 133–144)

## 2021-12-30 PROCEDURE — 80048 BASIC METABOLIC PNL TOTAL CA: CPT

## 2021-12-30 PROCEDURE — 82550 ASSAY OF CK (CPK): CPT

## 2021-12-30 PROCEDURE — 36415 COLL VENOUS BLD VENIPUNCTURE: CPT

## 2021-12-31 NOTE — RESULT ENCOUNTER NOTE
Anabelle,  Thanks for getting the labs completed.   The kidney and electrolyte panel looked great with improvement of your kidney function. The muscle test was also normal.   Please continue your current medications.  Please MyChart or call if you have any concerns or questions.   Sincerely,  Jyoti Moreno MD

## 2022-01-21 ENCOUNTER — TELEPHONE (OUTPATIENT)
Dept: NEUROLOGY | Facility: CLINIC | Age: 77
End: 2022-01-21
Payer: COMMERCIAL

## 2022-01-21 NOTE — TELEPHONE ENCOUNTER
1/21/2022 called patient and LVM on Ratna vm that patients 4/27/2022 appt needed to be moved to 8:30 rather than the original time of 9:00.      [facility]   Neurology & Neurosurgery Specialties  [facility]   [phone]

## 2022-03-12 ENCOUNTER — HEALTH MAINTENANCE LETTER (OUTPATIENT)
Age: 77
End: 2022-03-12

## 2022-05-10 DIAGNOSIS — F03.90 DEMENTIA WITHOUT BEHAVIORAL DISTURBANCE, UNSPECIFIED DEMENTIA TYPE: ICD-10-CM

## 2022-05-10 RX ORDER — MEMANTINE HYDROCHLORIDE 5 MG/1
TABLET ORAL
Qty: 60 TABLET | Refills: 3 | Status: SHIPPED | OUTPATIENT
Start: 2022-05-10

## 2022-05-10 NOTE — TELEPHONE ENCOUNTER
Authorized per medication refill protocol. Enough medication provided to last her to her appt on 9/6/22.    Vicki Briones RN   Neurology Care Coordinator

## 2022-05-10 NOTE — TELEPHONE ENCOUNTER
Writer received a refill request from: [facility] in [place].     Pending Prescriptions:                       Disp   Refills    memantine (NAMENDA) 5 MG tablet           60 tab*6            Sig: Take 1 tablet in the morning. If tolerated after           1 week, increase to 1 tablet in the morning and 1           tablet at night.            Pt's last office visit: 11/17/21  Next scheduled office visit: 9/6/22      Per the RN/LPN medication refill protocol, writer is unable to refill this request.

## 2022-05-24 ENCOUNTER — VIRTUAL VISIT (OUTPATIENT)
Dept: FAMILY MEDICINE | Facility: CLINIC | Age: 77
End: 2022-05-24
Payer: COMMERCIAL

## 2022-05-24 ENCOUNTER — TELEPHONE (OUTPATIENT)
Dept: FAMILY MEDICINE | Facility: CLINIC | Age: 77
End: 2022-05-24

## 2022-05-24 DIAGNOSIS — E55.9 VITAMIN D DEFICIENCY: ICD-10-CM

## 2022-05-24 DIAGNOSIS — R79.89 ABNORMAL TSH: ICD-10-CM

## 2022-05-24 DIAGNOSIS — Z12.11 SCREEN FOR COLON CANCER: ICD-10-CM

## 2022-05-24 DIAGNOSIS — Z12.31 VISIT FOR SCREENING MAMMOGRAM: ICD-10-CM

## 2022-05-24 DIAGNOSIS — F43.23 ADJUSTMENT REACTION WITH ANXIETY AND DEPRESSION: ICD-10-CM

## 2022-05-24 DIAGNOSIS — I63.9 CEREBROVASCULAR ACCIDENT (CVA), UNSPECIFIED MECHANISM (H): Primary | ICD-10-CM

## 2022-05-24 DIAGNOSIS — F01.50 VASCULAR DEMENTIA WITHOUT BEHAVIORAL DISTURBANCE (H): ICD-10-CM

## 2022-05-24 DIAGNOSIS — Z79.899 OTHER LONG TERM (CURRENT) DRUG THERAPY: ICD-10-CM

## 2022-05-24 DIAGNOSIS — N18.2 CKD (CHRONIC KIDNEY DISEASE) STAGE 2, GFR 60-89 ML/MIN: ICD-10-CM

## 2022-05-24 DIAGNOSIS — E53.8 B12 DEFICIENCY: ICD-10-CM

## 2022-05-24 PROCEDURE — 99214 OFFICE O/P EST MOD 30 MIN: CPT | Mod: 95 | Performed by: FAMILY MEDICINE

## 2022-05-24 NOTE — PATIENT INSTRUCTIONS
Schedule medical assistant only visit for covid 19 booster vaccine    Schedule lab only visit.     Here is copy of one of the last care coordination notes. I thought the info may be good to review again and the phone number is at the bottom.     Good afternoon Ratna!!     It was great to talk to you on the phone today!! Here are the resources we discussed:     https://www.careoptionsnetwork.org/search/index  - This website will take you to a list of some adult day care options to review. Typically, the adult day programs offer transportation as well.     2. Senior Linkage Line  Ph: [phone]  -Senior Linkage Line provides several resources that are available to help mom age in place. If a higher level of care is something you would like to explore at some point, they can talk you through the process and provide resources for that as well.     If any further needs arise, or you would like some additional resources, please reach out to Anabelle's care team, or you can contact me directly.     Thank you again for talking with me!!     Ravi Wolff [facility]  Primary Care Clinic- Social Work Care Coordinator  [facility]  Ph: [phone]

## 2022-05-24 NOTE — PROGRESS NOTES
"Anabelle is a 77 year old who is being evaluated via a billable video visit.      How would you like to obtain your AVS? MyChart  If the video visit is dropped, the invitation should be resent by: Text to cell phone: [phone]   Will anyone else be joining your video visit? Yes daughter Ratna will be on call also   Video Start Time: 11:17 AM    Assessment & Plan     Cerebrovascular accident (CVA), unspecified mechanism (H)  Stable with no new sx's. Reinforced using walker regularly for fall prevention    Vascular dementia without behavioral disturbance (H)  No significant worsening. Continue namenda, consider trialing bid dosing again now that has been doing well on the once daily dosing for a while. F/u neuro this fall  Will see if she qualifies for additonal home care, pca. Daughter wondering about other people to check in on her mom when she can't be there.     Visit for screening mammogram  Screen for colon cancer  Has elected to stop screening testing which is reasonable. Will update HM    Abnormal TSH  On low dose levothyroxine. Check tsh    CKD (chronic kidney disease) stage 2, GFR 60-89 ml/min  Monitoring. On arb, statin    B12 deficiency  Supplementing. Will check levels    Vitamin D deficiency  Supplementing, will check levels.     Adjustment reaction with anxiety and depression  Stable on mirtazapine and lexapro.            BMI:   Estimated body mass index is 31.42 kg/m  as calculated from the following:    Height as of 10/28/21: 1.575 m (5' 2\").    Weight as of 10/28/21: 77.9 kg (171 lb 12.8 oz).       Patient Instructions   Schedule medical assistant only visit for covid 19 booster vaccine    Schedule lab only visit.     Here is copy of one of the last care coordination notes. I thought the info may be good to review again and the phone number is at the bottom.     Good afternoon Ratna!!     It was great to talk to you on the phone today!! Here are the resources we discussed:   "   1. https://www.careoptionsnetwork.org/search/index  - This website will take you to a list of some adult day care options to review. Typically, the adult day programs offer transportation as well.     2. Senior Linkage Line  Ph: [phone]  -[facility] Linkage Line provides several resources that are available to help mom age in place. If a higher level of care is something you would like to explore at some point, they can talk you through the process and provide resources for that as well.     If any further needs arise, or you would like some additional resources, please reach out to Anabelle's care team, or you can contact me directly.     Thank you again for talking with me!!     Ravi Wolff, AKIRA  Primary Care Clinic- Social Work Care Coordinator  [facility]  Ph: [phone]      Return in about 6 months (around 11/24/2022) for Follow up, med check.    Jyoti Moreno MD  [facility]    Hung Ahn is a 77 year old who presents for the following health issues     HPI     How many servings of fruits and vegetables do you eat daily?  2-3    On average, how many sweetened beverages do you drink each day (Examples: soda, juice, sweet tea, etc.  Do NOT count diet or artificially sweetened beverages)?   1    How many days per week do you exercise enough to make your heart beat faster? 3 or less    How many minutes a day do you exercise enough to make your heart beat faster? 10 - 19  How many days per week do you miss taking your medication? 1    What makes it hard for you to take your medications?  weather she actually takes it if daughter is there or not     Concern - dementia   Staying the same since the last stroke     Started on Nemanda after neuro visit last fall. Twice a day dosing caused to much fogginess . Tried for few weeks and didn't really improve with  AE so moved back down once a day and seems to be tolerating that  well.     Voiding and stooling without concern.  Appetite is okay.   Wt stable.   No uri's  Stomach bug few weeks ago that's resolved.   Breathing has been good.   No gerd  HTN: home bp's have been in normal range. Few months ago up a bit but resolved.   Mood is good. Stable.   No bleeding issues on plavix. No falls. Balance is off at times. Walker.     Lives in her own home. Daughter calls/visits regularly and has cameras in home to assist with monitoring.               Objective           Vitals:  No vitals were obtained today due to virtual visit.    Physical Exam   GENERAL: Healthy, alert and no distress  EYES: Eyes grossly normal to inspection.  No discharge or erythema, or obvious scleral/conjunctival abnormalities.  RESP: No audible wheeze, cough, or visible cyanosis.  No visible retractions or increased work of breathing.    SKIN: Visible skin clear. No significant rash, abnormal pigmentation or lesions.  NEURO: Cranial nerves grossly intact.  Mentation and speech appropriate for age.  PSYCH: Mentation appears normal, affect normal/bright, judgement and insight intact, normal speech and appearance well-groomed.              Video-Visit Details    Type of service:  Video Visit    Video End Time:11:35 AM    Originating Location (pt. Location): Home    Distant Location (provider location):  [facility]     Platform used for Video Visit: [employer]

## 2022-05-24 NOTE — TELEPHONE ENCOUNTER
[facility] calling regarding home care referral that was placed. They are currently at capacity and unable to take the patient on at this time. Advised to either send again in a week or resend to another home care agency.    Cathy Freed RN [facility]

## 2022-05-25 ENCOUNTER — TELEPHONE (OUTPATIENT)
Dept: CARE COORDINATION | Facility: CLINIC | Age: 77
End: 2022-05-25
Payer: COMMERCIAL

## 2022-05-25 NOTE — TELEPHONE ENCOUNTER
Can RN team or care coordination assist with options for another home care group? Standing orders could be faxed to them.

## 2022-05-25 NOTE — TELEPHONE ENCOUNTER
CCRC team member received notification from Ambulatory Care Coordination Clinic team requesting assistance from centralized CCRC team, in finding a home care agency to accept referral for skilled home care due to [facility] declining referral.     RN called [facility] (phone number: [phone]) and per their request, home care referral and Face to Face virtual video visit OV notes from 5/24/2022 was faxed through [employer] to them (fax number: [phone]), for their review.  RN will await return call from agency with update on status of referral.      RN additionally contacted the following home care agencies:      - [facility] - left message for intake, awaiting return call  - [facility] - they have skilled nursing services, but no HHA as no staffing coverage available where patient lives.  - [facility] - left message for intake, awaiting return call         Vianney Stark RN  Connected Care Resource Center  [facility] - Ambulatory Care Management

## 2022-05-25 NOTE — TELEPHONE ENCOUNTER
S-(situation): [facility] home care declined patient due to being at capacity     B-(background): PCP requesting home care for pt    A-(assessment): NA    R-(recommendations): [facility] ([facility]) has taken on this request to help find an alternative home care agency and will take action. No further action needed by clinic nurse.     Thank you, Kalie Dumas RN, BSN, PHN Care Coordinator  [place], [place], and [facility]   Phone: [phone]

## 2022-05-26 NOTE — TELEPHONE ENCOUNTER
RN received return voicemail message from [facility] on 5/25/22 at 4:36pm - unable to take referral as they are currently going through a software system change and do not have any availability to take any new clients until 6/6/22.    RN also received a return voicemail message from [facility] on 5/25/22 at 4:13pm - unable to take referral as they do not have HHA at this time, and would not be able to staff skilled nursing in [place].    RN additionally received an email from [facility] HIM department notifying a fax I sent to [facility] through [employer] did not go through as the fax number ([phone]) was invalid and no longer in service.  RN reviewed fax number for [facility] again and noted RN had transposed two numbers, and the correct fax number is [phone].  RN then called [facility] at 9:14am this morning to inquire if they received any faxed information on home care referral, and spoke with Roxann, whom RN had spoken with yesterday.  Roxann confirmed they received the F2F notes with med list and orders, but nothing else, however, they do not need any additional info faxed as they have access to the system and can look up what information they need with patient's MRN.     RN confirmed home care referral accepted by [facility], no additional action required by RN at this time.      Vianney Stark RN  Connected Care Resource [facility] - Ambulatory Care Management

## 2022-06-06 ENCOUNTER — MYC MEDICAL ADVICE (OUTPATIENT)
Dept: FAMILY MEDICINE | Facility: CLINIC | Age: 77
End: 2022-06-06
Payer: COMMERCIAL

## 2022-06-06 DIAGNOSIS — R41.3 MEMORY CHANGE: Primary | ICD-10-CM

## 2022-06-06 NOTE — TELEPHONE ENCOUNTER
Please review/advise if appropriate.     Selin Arreola RN, BSN  [facility]

## 2022-06-08 ENCOUNTER — LAB (OUTPATIENT)
Dept: LAB | Facility: CLINIC | Age: 77
End: 2022-06-08
Payer: COMMERCIAL

## 2022-06-08 DIAGNOSIS — N18.2 CKD (CHRONIC KIDNEY DISEASE) STAGE 2, GFR 60-89 ML/MIN: ICD-10-CM

## 2022-06-08 DIAGNOSIS — R41.3 MEMORY CHANGE: ICD-10-CM

## 2022-06-08 DIAGNOSIS — I63.9 CEREBROVASCULAR ACCIDENT (CVA), UNSPECIFIED MECHANISM (H): ICD-10-CM

## 2022-06-08 DIAGNOSIS — E55.9 VITAMIN D DEFICIENCY: ICD-10-CM

## 2022-06-08 DIAGNOSIS — R79.89 ABNORMAL TSH: ICD-10-CM

## 2022-06-08 DIAGNOSIS — E53.8 B12 DEFICIENCY: ICD-10-CM

## 2022-06-08 DIAGNOSIS — Z79.899 OTHER LONG TERM (CURRENT) DRUG THERAPY: ICD-10-CM

## 2022-06-08 DIAGNOSIS — F01.50 VASCULAR DEMENTIA WITHOUT BEHAVIORAL DISTURBANCE (H): ICD-10-CM

## 2022-06-08 LAB
ALBUMIN UR-MCNC: NEGATIVE MG/DL
ANION GAP SERPL CALCULATED.3IONS-SCNC: 7 MMOL/L (ref 3–14)
APPEARANCE UR: CLEAR
BILIRUB UR QL STRIP: NEGATIVE
BUN SERPL-MCNC: 11 MG/DL (ref 7–30)
CALCIUM SERPL-MCNC: 8.9 MG/DL (ref 8.5–10.1)
CHLORIDE BLD-SCNC: 109 MMOL/L (ref 94–109)
CO2 SERPL-SCNC: 28 MMOL/L (ref 20–32)
COLOR UR AUTO: COLORLESS
CREAT SERPL-MCNC: 0.87 MG/DL (ref 0.52–1.04)
CREAT UR-MCNC: 47 MG/DL
GFR SERPL CREATININE-BSD FRML MDRD: 69 ML/MIN/1.73M2
GLUCOSE BLD-MCNC: 104 MG/DL (ref 70–99)
GLUCOSE UR STRIP-MCNC: NEGATIVE MG/DL
HGB UR QL STRIP: NEGATIVE
KETONES UR STRIP-MCNC: NEGATIVE MG/DL
LEUKOCYTE ESTERASE UR QL STRIP: NEGATIVE
MICROALBUMIN UR-MCNC: 24 MG/L
MICROALBUMIN/CREAT UR: 51.06 MG/G CR (ref 0–25)
NITRATE UR QL: NEGATIVE
PH UR STRIP: 6.5 [PH] (ref 5–7)
POTASSIUM BLD-SCNC: 3.6 MMOL/L (ref 3.4–5.3)
SKIP: NORMAL
SODIUM SERPL-SCNC: 144 MMOL/L (ref 133–144)
SP GR UR STRIP: 1 (ref 1–1.03)
TSH SERPL DL<=0.005 MIU/L-ACNC: 1.38 MU/L (ref 0.4–4)
UROBILINOGEN UR STRIP-MCNC: NORMAL MG/DL
VIT B12 SERPL-MCNC: 368 PG/ML (ref 193–986)

## 2022-06-08 PROCEDURE — 81003 URINALYSIS AUTO W/O SCOPE: CPT

## 2022-06-08 PROCEDURE — 82607 VITAMIN B-12: CPT

## 2022-06-08 PROCEDURE — 84443 ASSAY THYROID STIM HORMONE: CPT

## 2022-06-08 PROCEDURE — 82306 VITAMIN D 25 HYDROXY: CPT

## 2022-06-08 PROCEDURE — 82043 UR ALBUMIN QUANTITATIVE: CPT

## 2022-06-08 PROCEDURE — 36415 COLL VENOUS BLD VENIPUNCTURE: CPT

## 2022-06-08 PROCEDURE — 80048 BASIC METABOLIC PNL TOTAL CA: CPT

## 2022-06-08 PROCEDURE — 87086 URINE CULTURE/COLONY COUNT: CPT

## 2022-06-09 LAB — DEPRECATED CALCIDIOL+CALCIFEROL SERPL-MC: 40 UG/L (ref 20–75)

## 2022-06-09 NOTE — RESULT ENCOUNTER NOTE
Dr. Moreno is currently out of the office and I am reviewing her results.   Your vitamin B  12 level is normal but lower than previous.  If you have changed your B12 supplement this may be the reason.   Be sure you are taking the supplement regularly as previously discussed with Dr. Moreno.  Your urine testing shows elevated protein level but it is lower than previous.  Control of blood pressure and cholesterol may help to protect kidney function.    Thyroid testing is normal indicating you are on the correct dosage of thyroid medication.    Your blood testing for kidney function is normal.  Blood sugar is listed as high but this would be normal if you were not fasting for this lab appointment.  Your urine testing is normal.  There is no sign of infection or other abnormality.   Urine culture and vitamin D level are both pending.  Please call or MyChart message me if you have any questions.      Aida Bruce MD

## 2022-06-09 NOTE — RESULT ENCOUNTER NOTE
Dr. Moreno is currently out of the office and I am reviewing her results.   Your vitamin D level is normal.  Continue your current vitamin D intake in diet and supplements.  Please call or MyChart message me if you have any questions.    JOHN

## 2022-06-10 LAB — BACTERIA UR CULT: NORMAL

## 2022-06-10 NOTE — RESULT ENCOUNTER NOTE
Dr. Moreno is currently out of the office and I am reviewing her results.   Your urine culture does not show any infection.  It does not appear that an infection of this type is contributing to your memory changes.  Please call or MyChart message me if you have any questions.      PSK

## 2022-08-14 DIAGNOSIS — R29.818 ACUTE FOCAL NEUROLOGICAL DEFICIT: ICD-10-CM

## 2022-08-14 DIAGNOSIS — R79.89 ABNORMAL TSH: ICD-10-CM

## 2022-08-14 DIAGNOSIS — I10 HYPERTENSION GOAL BP (BLOOD PRESSURE) < 130/80: ICD-10-CM

## 2022-08-14 DIAGNOSIS — F43.23 ADJUSTMENT REACTION WITH ANXIETY AND DEPRESSION: ICD-10-CM

## 2022-08-15 RX ORDER — LEVOTHYROXINE SODIUM 25 UG/1
TABLET ORAL
Qty: 90 TABLET | Refills: 1 | Status: SHIPPED | OUTPATIENT
Start: 2022-08-15

## 2022-08-15 RX ORDER — AMLODIPINE BESYLATE 5 MG/1
TABLET ORAL
Qty: 90 TABLET | Refills: 1 | Status: SHIPPED | OUTPATIENT
Start: 2022-08-15

## 2022-08-15 RX ORDER — LISINOPRIL 40 MG/1
TABLET ORAL
Qty: 90 TABLET | Refills: 1 | Status: SHIPPED | OUTPATIENT
Start: 2022-08-15

## 2022-08-15 RX ORDER — MIRTAZAPINE 30 MG/1
TABLET, FILM COATED ORAL
Qty: 90 TABLET | Refills: 1 | Status: SHIPPED | OUTPATIENT
Start: 2022-08-15

## 2022-08-15 RX ORDER — ATORVASTATIN CALCIUM 40 MG/1
TABLET, FILM COATED ORAL
Qty: 90 TABLET | Refills: 1 | Status: SHIPPED | OUTPATIENT
Start: 2022-08-15

## 2022-08-15 NOTE — TELEPHONE ENCOUNTER
Routing refill request to provider for review/approval because:  Drug not on the [facility] refill protocol   PHQ-9 score:    PHQ 5/29/2020   PHQ-9 Total Score 24   Q9: Thoughts of better off dead/self-harm past 2 weeks Not at all

## 2022-11-10 ENCOUNTER — TELEPHONE (OUTPATIENT)
Dept: NEUROLOGY | Facility: CLINIC | Age: 77
End: 2022-11-10

## 2022-11-10 NOTE — LETTER
November 16, 2022      Anabelle Herbert  [id number] [place] [address]              Dear Anabelle,        We have tried to contact you/your daughter Ratna three times by phone. If you'd like Dr. Loza to refill the escitalopram, please call [phone] to schedule a follow up with him. Otherwise, you can ask your primary care physician to refill it.    Sincerely,    [facility]

## 2022-11-10 NOTE — TELEPHONE ENCOUNTER
Called and left voicemail. Patient needs a refill of escitalopram from Dr. Loza, but she needs a follow up scheduled before it can be refilled to that date. Otherwise, she can ask her PCP to refill it.

## 2023-01-07 ENCOUNTER — HEALTH MAINTENANCE LETTER (OUTPATIENT)
Age: 78
End: 2023-01-07

## 2023-04-22 ENCOUNTER — HEALTH MAINTENANCE LETTER (OUTPATIENT)
Age: 78
End: 2023-04-22

## 2023-05-31 ENCOUNTER — TELEPHONE (OUTPATIENT)
Dept: FAMILY MEDICINE | Facility: CLINIC | Age: 78
End: 2023-05-31
Payer: COMMERCIAL

## 2023-05-31 NOTE — TELEPHONE ENCOUNTER
Patient Quality Outreach    Patient is due for the following:   Asthma  -  ACT needed and AAP  Hypertension -  BP check  Physical Annual Wellness Visit      Topic Date Due     Zoster (Shingles) Vaccine (1 of 2) Never done     Pneumococcal Vaccine (2 - PPSV23 if available, else PCV20) 05/11/2020     COVID-19 Vaccine (3 - Pfizer series) 05/25/2021     Flu Vaccine (1) 09/01/2022     Diptheria Tetanus Pertussis (DTAP/TDAP/TD) Vaccine (2 - Td or Tdap) 03/07/2023       Next Steps:   Patient has upcoming appointment, these items will be addressed at that time.    Type of outreach:    Chart review performed, no outreach needed.    Next Steps:  Reach out within 90 days via Letter.    Max number of attempts reached: No. Will try again in 90 days if patient still on fail list.            Nuvia Terrazas  Chart routed to N/A.

## 2023-10-11 NOTE — TELEPHONE ENCOUNTER
Advised patient of below. Really doesn't want to take simvastatin when she was on it previously she felt like she was losing her mind. She is open to trying something different than simvastatin.  Routing to provider to review and advise.  Melanie Dixon RN.       Notes Recorded by Sheba Henriquez NP on 10/3/2017 at 9:03 AM  Please call patient: STOP the Vit B12 supplement-your level is higher than what it should be. This can be rechecked in a few months. Your cholesterol is slightly elevated, with your history of stroke I would advise re-starting the simvastatin 20 mg daily, I sent this prescription into your pharmacy. The rest of your labs: kidney function and hemoglobin/WBC are all normal. Call if you feel your dizziness is worsening, if you feel your having a stroke, go to the ER.  CLAYTON Gallagher, NP-C    Patient was informed of test results and recommendations from provider. Patient verbalized understanding of all information given.   Melanie Dixon RN.     
Left message and advised of below. To call back with questions or concerns.  Melanie Dixon RN.     
Will trial different medication: lipitor 10 mg (1/2 tab) daily.   CLAYTON Gallagher, NP-C    
Yes

## 2024-01-01 ENCOUNTER — HEALTH MAINTENANCE LETTER (OUTPATIENT)
Age: 79
End: 2024-01-01